# Patient Record
Sex: FEMALE | Race: WHITE | NOT HISPANIC OR LATINO | Employment: OTHER | URBAN - METROPOLITAN AREA
[De-identification: names, ages, dates, MRNs, and addresses within clinical notes are randomized per-mention and may not be internally consistent; named-entity substitution may affect disease eponyms.]

---

## 2017-01-18 ENCOUNTER — RADIANT APPOINTMENT (OUTPATIENT)
Dept: MAMMOGRAPHY | Facility: CLINIC | Age: 77
End: 2017-01-18

## 2017-01-18 DIAGNOSIS — Z12.31 VISIT FOR SCREENING MAMMOGRAM: ICD-10-CM

## 2017-01-25 ENCOUNTER — OFFICE VISIT (OUTPATIENT)
Dept: INTERNAL MEDICINE | Facility: CLINIC | Age: 77
End: 2017-01-25

## 2017-01-25 VITALS
WEIGHT: 143 LBS | HEART RATE: 80 BPM | RESPIRATION RATE: 16 BRPM | DIASTOLIC BLOOD PRESSURE: 73 MMHG | SYSTOLIC BLOOD PRESSURE: 116 MMHG | BODY MASS INDEX: 23.4 KG/M2

## 2017-01-25 DIAGNOSIS — Z13.220 LIPID SCREENING: ICD-10-CM

## 2017-01-25 DIAGNOSIS — C91.10 CLL (CHRONIC LYMPHOCYTIC LEUKEMIA) (H): Primary | ICD-10-CM

## 2017-01-25 DIAGNOSIS — M54.40 ACUTE MIDLINE LOW BACK PAIN WITH SCIATICA, SCIATICA LATERALITY UNSPECIFIED: ICD-10-CM

## 2017-01-25 DIAGNOSIS — C91.10 CLL (CHRONIC LYMPHOCYTIC LEUKEMIA) (H): ICD-10-CM

## 2017-01-25 LAB
ALBUMIN SERPL-MCNC: 3.7 G/DL (ref 3.4–5)
ALP SERPL-CCNC: 65 U/L (ref 40–150)
ALT SERPL W P-5'-P-CCNC: 33 U/L (ref 0–50)
ANION GAP SERPL CALCULATED.3IONS-SCNC: 6 MMOL/L (ref 3–14)
AST SERPL W P-5'-P-CCNC: 22 U/L (ref 0–45)
BASOPHILS # BLD AUTO: 0 10E9/L (ref 0–0.2)
BASOPHILS NFR BLD AUTO: 0.2 %
BILIRUB SERPL-MCNC: 0.4 MG/DL (ref 0.2–1.3)
BUN SERPL-MCNC: 13 MG/DL (ref 7–30)
CALCIUM SERPL-MCNC: 9.2 MG/DL (ref 8.5–10.1)
CHLORIDE SERPL-SCNC: 105 MMOL/L (ref 94–109)
CHOLEST SERPL-MCNC: 169 MG/DL
CO2 SERPL-SCNC: 30 MMOL/L (ref 20–32)
CREAT SERPL-MCNC: 0.87 MG/DL (ref 0.52–1.04)
DIFFERENTIAL METHOD BLD: ABNORMAL
EOSINOPHIL # BLD AUTO: 0.2 10E9/L (ref 0–0.7)
EOSINOPHIL NFR BLD AUTO: 1 %
ERYTHROCYTE [DISTWIDTH] IN BLOOD BY AUTOMATED COUNT: 13.2 % (ref 10–15)
GFR SERPL CREATININE-BSD FRML MDRD: 63 ML/MIN/1.7M2
GLUCOSE SERPL-MCNC: 78 MG/DL (ref 70–99)
HCT VFR BLD AUTO: 44 % (ref 35–47)
HDLC SERPL-MCNC: 78 MG/DL
HGB BLD-MCNC: 14.3 G/DL (ref 11.7–15.7)
IMM GRANULOCYTES # BLD: 0 10E9/L (ref 0–0.4)
IMM GRANULOCYTES NFR BLD: 0.2 %
LDLC SERPL CALC-MCNC: 68 MG/DL
LYMPHOCYTES # BLD AUTO: 11.6 10E9/L (ref 0.8–5.3)
LYMPHOCYTES NFR BLD AUTO: 65.5 %
MCH RBC QN AUTO: 30.2 PG (ref 26.5–33)
MCHC RBC AUTO-ENTMCNC: 32.5 G/DL (ref 31.5–36.5)
MCV RBC AUTO: 93 FL (ref 78–100)
MONOCYTES # BLD AUTO: 0.8 10E9/L (ref 0–1.3)
MONOCYTES NFR BLD AUTO: 4.5 %
NEUTROPHILS # BLD AUTO: 5.1 10E9/L (ref 1.6–8.3)
NEUTROPHILS NFR BLD AUTO: 28.6 %
NONHDLC SERPL-MCNC: 91 MG/DL
NRBC # BLD AUTO: 0 10*3/UL
NRBC BLD AUTO-RTO: 0 /100
PLATELET # BLD AUTO: 201 10E9/L (ref 150–450)
POTASSIUM SERPL-SCNC: 4.5 MMOL/L (ref 3.4–5.3)
PROT SERPL-MCNC: 6.6 G/DL (ref 6.8–8.8)
RBC # BLD AUTO: 4.73 10E12/L (ref 3.8–5.2)
SODIUM SERPL-SCNC: 141 MMOL/L (ref 133–144)
TRIGL SERPL-MCNC: 114 MG/DL
WBC # BLD AUTO: 17.7 10E9/L (ref 4–11)

## 2017-01-25 ASSESSMENT — PAIN SCALES - GENERAL: PAINLEVEL: NO PAIN (0)

## 2017-01-25 NOTE — MR AVS SNAPSHOT
After Visit Summary   1/25/2017    Kerri Shook    MRN: [id number]           Patient Information     Date Of Birth          1940        Visit Information        Provider Department      1/25/2017 10:05 AM Juan Westbrook MD [facility]        Today's Diagnoses     CLL (chronic lymphocytic leukemia) (H)    -  1     Lipid screening         Acute midline low back pain with sciatica, sciatica laterality unspecified           Care Instructions    Primary Care Center Medication Refill Request Information:  * Please contact your pharmacy regarding ANY request for medication refills.  ** [facility] Prescription Fax = [phone]  * Please allow 3 business days for routine medication refills.  * Please allow 5 business days for controlled substance medication refills.     Primary Care Center Test Result notification information:  *You will be notified with in 7-10 days of your appointment day regarding the results of your test.  If you are on MyChart you will be notified as soon as the provider has reviewed the results and signed off on them.    [facility] [place] [phone]   DEXA Screening Tool    Dexa Scan  Is the patient taking any calcium supplements? , if yes patient must stop calcium supplements 48 hours prior to appointment.        Follow-ups after your visit        Your next 10 appointments already scheduled     Jan 31, 2017  4:30 PM   DX HIP/PELVIS/SPINE with [id number]   [facility] Dexa ([facility])    [address] [place] [address] [phone]           Please do not take any of the following 48 hours prior to your exam: vitamins, calcium tablets, antacids.            Jan 31, 2017  5:30 PM   (Arrive by 5:15 PM)   MR LUMBAR SPINE W/O & W CONTRAST with [id number]   [facility] MRI ([facility])    [address] [address] [phone]            Take your medicines as usual, unless your doctor tells you not to. Bring a list of your current medicines to your exam (including vitamins, minerals and over-the-counter drugs).  You will be given intravenous contrast for this exam. To prepare:   The day before your exam, drink extra fluids at least six 8-ounce glasses (unless your doctor tells you to restrict your fluids).   Have a blood test (creatinine test) within 30 days of your exam. Go to your clinic or Diagnostic Imaging Department for this test.  The MRI machine uses a strong magnet. Please wear clothes without metal (snaps, zippers). A sweatsuit works well, or we may give you a hospital gown.  Please remove any body piercings and hair extensions before you arrive. You will also remove watches, jewelry, hairpins, wallets, dentures, partial dental plates and hearing aids. You may wear contact lenses, and you may be able to wear your rings. We have a safe place to keep your personal items, but it is safer to leave them at home.   **IMPORTANT** THE INSTRUCTIONS BELOW ARE ONLY FOR THOSE PATIENTS WHO HAVE BEEN TOLD THEY WILL RECEIVE SEDATION OR GENERAL ANESTHESIA DURING THEIR MRI PROCEDURE:  IF YOU WILL RECEIVE SEDATION (take medicine to help you relax during your exam):   You must get the medicine from your doctor before you arrive. Bring the medicine to the exam. Do not take it at home.   Arrive one hour early. Bring someone who can take you home after the test. Your medicine will make you sleepy. After the exam, you may not drive, take a bus or take a taxi by yourself.   No eating 8 hours before your exam. You may have clear liquids up until 4 hours before your exam. (Clear liquids include water, clear tea, black coffee and fruit juice without pulp.)  IF YOU WILL RECEIVE ANESTHESIA (be asleep for your exam):   Arrive 1 1/2 hours early. Bring someone who can take you home after the test. You may not drive, take a bus or take a taxi by yourself.   No eating 8  hours before your exam. You may have clear liquids up until 4 hours before your exam. (Clear liquids include water, clear tea, black coffee and fruit juice without pulp.)  Please call the Imaging Department at your exam site with any questions.            Feb 22, 2017  2:30 PM   (Arrive by 2:15 PM)   Return Visit with Valerie Silverio PA-C   [facility] ([facility])    [address]  2nd Floor  [facility] [id number] [phone]            Mar 27, 2017 10:05 AM   (Arrive by 9:50 AM)   Return Visit with Juan Westbrook MD   [facility] ([facility])    [address]  4th Floor  [facility] [id number] [phone]              Future tests that were ordered for you today     Open Future Orders        Priority Expected Expires Ordered    MRI Lumbar spine w & w/o contrast Routine  1/25/2018 1/25/2017    Dexa hip/pelvis/spine* Routine  1/25/2018 1/25/2017    Lipid panel reflex to direct LDL Routine  1/25/2018 1/25/2017    CBC with platelets differential STAT 1/25/2017 1/25/2020 1/25/2017    Comprehensive metabolic panel Routine 1/25/2017 1/25/2020 1/25/2017            Who to contact     Please call your clinic at [phone] to:    Ask questions about your health    Make or cancel appointments    Discuss your medicines    Learn about your test results    Speak to your doctor   If you have compliments or concerns about an experience at your clinic, or if you wish to file a complaint, please contact [facility] Patient Relations at [phone] or email us at [email or web address]         Additional Information About Your Visit        MyChart Information     [employer] gives you secure access to your electronic health record. If you see a primary care provider, you can also send messages to your care team and make appointments. If you have questions, please call your primary  [facility] clinic.  If you do not have a primary care provider, please call [phone] and they will assist you.      AFTER-MOUSE is an electronic gateway that provides easy, online access to your medical records. With AFTER-MOUSE, you can request a clinic appointment, read your test results, renew a prescription or communicate with your care team.     To access your existing account, please contact your [facility] or call [phone] for assistance.        Care EveryWhere ID     This is your Care EveryWhere ID. This could be used by other organizations to access your [place] medical records  [id number]        Your Vitals Were     Pulse Respirations Breastfeeding?             80 16 No          Blood Pressure from Last 3 Encounters:   01/25/17 116/73   08/01/16 112/68   06/13/16 134/76    Weight from Last 3 Encounters:   01/25/17 64.864 kg (143 lb)   08/01/16 63.685 kg (140 lb 6.4 oz)   06/13/16 64.048 kg (141 lb 3.2 oz)               Primary Care Provider Office Phone # Fax #    Juan Westbrook [phone]       [address] [address]        Thank you!     Thank you for choosing [facility]  for your care. Our goal is always to provide you with excellent care. Hearing back from our patients is one way we can continue to improve our services. Please take a few minutes to complete the written survey that you may receive in the mail after your visit with us. Thank you!             Your Updated Medication List - Protect others around you: Learn how to safely use, store and throw away your medicines at www.disposemymeds.org.          This list is accurate as of: 1/25/17 10:47 AM.  Always use your most recent med list.                   Brand Name Dispense Instructions for use    aspirin 81 MG tablet      Take 81 mg by mouth daily       carvedilol 3.125 MG tablet    COREG    180 tablet    Take 1 tablet (3.125 mg) by mouth 2  times daily (with meals)       multivitamin per tablet      Take 1 tablet by mouth daily.       simvastatin 20 MG tablet    ZOCOR    90 tablet    Take 1 tablet (20 mg) by mouth At Bedtime

## 2017-01-25 NOTE — PROGRESS NOTES
HPI:    Pt. Comes in for follow up today.   She o/w is doing well and denies additional HEENT, cardiopulmonary, abdominal, , GYN, neurological complaints. She has h/o breast CA and was seen by Dr. Sheldon, 11/13.   She was also  followed by Dr. Puente ONC for CLL and was seen 10/13.  She some chronic Low back pain that is getting less with PT.  She had some skin back lesions.   She was seen by Ms. Silverio ONC 1/11/16. Colonoscopy 6/14; repeat in 3 years.  She had  B eye lid surgery. She is active with exercise and denies any rest/exertional CP/SOB/palptations.      Past Medical History   Diagnosis Date     CLL (chronic lymphoblastic leukemia) 1989     hyperlipidemia      Primary localized osteoarthrosis, pelvic region and thigh      Cardiomyopathy (H)      History of blood transfusion      Colon adenomas      6/26/14 colonoscopy, repeat in 3 years     Breast disorder 1990     Breast Cancer     Chronic osteoarthritis 2012     resulted in hip replacement         PE:    Vitals noted; HEENT, ears normal oropharynx clear no exudate, neck supple nl rom,  No adenopahty, LCTA, RRR, S1, S2, no MRG,   Normal neurological exam. She has some back skin lesions.  Minimal  tenderness to palpation of low back area. Abdomen; positive BS's no masses no tenderness. No B CVA tenderness to palpation. Grossly normal neurological exam.         A/P:    1. Ordered DEXA scan future; last scan 11/2012  2. CLL; will check labs and continue to follow.  3. Breast Cancer,  mammogram stable 1/18/17.  4. HTN stable; check labs today 1/25/17.  5. Elevated cholesterol; will check fasting lipids and labs.  6. She was seen in ONC clinic by Ms. Silverio 1/11/16.   7. She was seen in  Dermatology for skin check and seen 6/7/16; she denies any current skin issues.   8. MRI lumbar spine for back pain done 5/9/16 and seen by Dr. Delong, adrian 6/13/16. I also reviewed these findings with Dr. Puente ONC (see Telephone note from 6/3/16). Will repeat  MRI Lumbar spine with contrast  9. Refer to [facility] for annual exam and she was seen 5/6/16.   10. Immunizations up to date. Flu shot when available.     I will see her back in 4-6 weeks to review all labs, DEXA scan, and MRI lumbar spine.            Total time spent 25 minutes.  More than 50% of the time spent with Ms. Shook on counseling / coordinating her care

## 2017-01-25 NOTE — NURSING NOTE
Chief Complaint   Patient presents with     RECHECK     Here for routine recheck     Mass     Here for lump on right hand of palm     Phu Fox CMA at 9:56 AM on 1/25/2017

## 2017-01-26 DIAGNOSIS — I51.81 STRESS-INDUCED CARDIOMYOPATHY: Primary | ICD-10-CM

## 2017-01-26 RX ORDER — CARVEDILOL 3.12 MG/1
3.12 TABLET ORAL 2 TIMES DAILY WITH MEALS
Qty: 180 TABLET | Refills: 3 | Status: SHIPPED
Start: 2017-01-26 | End: 2018-02-21

## 2017-01-26 NOTE — TELEPHONE ENCOUNTER
CARVEDILOL 3.125MG       Last Written Prescription Date:  3/21/16  Last Fill Quantity: 180,   # refills: 2  Last Office Visit : 1/25/17  Future Office visit:  3/27/17  BP Readings from Last 3 Encounters:   01/25/17 116/73   08/01/16 112/68   06/13/16 134/76

## 2017-02-22 ENCOUNTER — ONCOLOGY VISIT (OUTPATIENT)
Dept: ONCOLOGY | Facility: CLINIC | Age: 77
End: 2017-02-22
Attending: PHYSICIAN ASSISTANT
Payer: COMMERCIAL

## 2017-02-22 VITALS
WEIGHT: 147.2 LBS | BODY MASS INDEX: 24.09 KG/M2 | TEMPERATURE: 97 F | HEART RATE: 72 BPM | RESPIRATION RATE: 16 BRPM | DIASTOLIC BLOOD PRESSURE: 67 MMHG | SYSTOLIC BLOOD PRESSURE: 122 MMHG | OXYGEN SATURATION: 97 %

## 2017-02-22 DIAGNOSIS — C50.919 MALIGNANT NEOPLASM OF FEMALE BREAST, UNSPECIFIED LATERALITY, UNSPECIFIED SITE OF BREAST: Primary | Chronic | ICD-10-CM

## 2017-02-22 DIAGNOSIS — Z12.31 ENCOUNTER FOR SCREENING MAMMOGRAM FOR MALIGNANT NEOPLASM OF BREAST: ICD-10-CM

## 2017-02-22 DIAGNOSIS — C91.10 CLL (CHRONIC LYMPHOCYTIC LEUKEMIA) (H): ICD-10-CM

## 2017-02-22 PROCEDURE — 99212 OFFICE O/P EST SF 10 MIN: CPT

## 2017-02-22 PROCEDURE — 99214 OFFICE O/P EST MOD 30 MIN: CPT | Mod: ZP | Performed by: PHYSICIAN ASSISTANT

## 2017-02-22 ASSESSMENT — PAIN SCALES - GENERAL: PAINLEVEL: NO PAIN (0)

## 2017-02-22 NOTE — NURSING NOTE
"Kerri Shook is a 76 year old female who presents for:  Chief Complaint   Patient presents with     Oncology Clinic Visit     Physical        Initial Vitals:  /67  Pulse 72  Temp 97  F (36.1  C) (Oral)  Resp 16  Wt 66.8 kg (147 lb 3.2 oz)  SpO2 97%  BMI 24.09 kg/m2 Estimated body mass index is 24.09 kg/(m^2) as calculated from the following:    Height as of 6/13/16: 1.665 m (5' 5.55\").    Weight as of this encounter: 66.8 kg (147 lb 3.2 oz).. Body surface area is 1.76 meters squared. BP completed using cuff size: regular  No Pain (0) No LMP recorded. Patient is postmenopausal. Allergies and medications reviewed.     Medications: Medication refills not needed today.  Pharmacy name entered into [employer]: [facility] [address] [place] [address]  AT [facility] [address]    Comments: Patient is not having any pain today.     6 minutes for nursing intake (face to face time)   Gela Jacinto CMA       "

## 2017-02-22 NOTE — MR AVS SNAPSHOT
After Visit Summary   2/22/2017    Kerri Shook    MRN: [id number]           Patient Information     Date Of Birth          1940        Visit Information        Provider Department      2/22/2017 2:30 PM Valerie Silverio PA-C [facility]        Today's Diagnoses     Malignant neoplasm of female breast, unspecified laterality, unspecified site of breast (H)    -  1    CLL (chronic lymphocytic leukemia) (H)        Encounter for screening mammogram for malignant neoplasm of breast            Follow-ups after your visit        Your next 10 appointments already scheduled     Mar 27, 2017 10:05 AM CDT   (Arrive by 9:50 AM)   Return Visit with Juan Westbrook MD   [facility] ([facility])    [address]  [address] [facility] [address] [phone]              Future tests that were ordered for you today     Open Future Orders        Priority Expected Expires Ordered    MA Screening Digital Bilateral Routine 1/22/2018 1/22/2019 2/23/2017            Who to contact     If you have questions or need follow up information about today's clinic visit or your schedule please contact [facility] directly at [phone].  Normal or non-critical lab and imaging results will be communicated to you by MyChart, letter or phone within 4 business days after the clinic has received the results. If you do not hear from us within 7 days, please contact the clinic through MyChart or phone. If you have a critical or abnormal lab result, we will notify you by phone as soon as possible.  Submit refill requests through Room or call your pharmacy and they will forward the refill request to us. Please allow 3 business days for your refill to be completed.          Additional Information About Your Visit        MyChart Information     Room gives you secure access to your electronic health record. If you see a primary care  provider, you can also send messages to your care team and make appointments. If you have questions, please call your primary care clinic.  If you do not have a primary care provider, please call [phone] and they will assist you.        Care EveryWhere ID     This is your Care EveryWhere ID. This could be used by other organizations to access your [place] medical records  [id number]        Your Vitals Were     Pulse Temperature Respirations Pulse Oximetry BMI (Body Mass Index)       72 97  F (36.1  C) (Oral) 16 97% 24.09 kg/m2        Blood Pressure from Last 3 Encounters:   02/22/17 122/67   01/25/17 116/73   08/01/16 112/68    Weight from Last 3 Encounters:   02/22/17 66.8 kg (147 lb 3.2 oz)   01/25/17 64.9 kg (143 lb)   08/01/16 63.7 kg (140 lb 6.4 oz)               Primary Care Provider Office Phone # Fax #    Juan Westbrook [phone]       [address] [address]        Thank you!     Thank you for choosing [facility]  for your care. Our goal is always to provide you with excellent care. Hearing back from our patients is one way we can continue to improve our services. Please take a few minutes to complete the written survey that you may receive in the mail after your visit with us. Thank you!             Your Updated Medication List - Protect others around you: Learn how to safely use, store and throw away your medicines at www.disposemymeds.org.          This list is accurate as of: 2/22/17 11:59 PM.  Always use your most recent med list.                   Brand Name Dispense Instructions for use    aspirin 81 MG tablet      Take 81 mg by mouth daily       carvedilol 3.125 MG tablet    COREG    180 tablet    Take 1 tablet (3.125 mg) by mouth 2 times daily (with meals)       multivitamin per tablet      Take 1 tablet by mouth daily.       simvastatin 20 MG tablet    ZOCOR    90 tablet    Take 1 tablet (20 mg) by mouth At  Bedtime

## 2017-02-22 NOTE — PROGRESS NOTES
DIAGNOSIS: CANCER SURVIVORSHIP PROGRAM  1. CLL in 1986. Ms. Shook went in for routine blood testing, was found to have an elevated white blood count. This was consistent with CLL. She was followed here at the [place] by Dr. Puente. She has received no treatment for her CLL.   2. 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 The patient had a screening mammogram in 1990, which showed an abnormality in the left breast. She underwent a lumpectomy on 03/02/1990, which was consistent with a 1.5 cm primary, mucinous adenocarcinoma with 0 of 8 lymph nodes positive. She was staged as a stage I (T1 N0 M0). The tumor was ER positive. She did have radiation to the left breast with a boost having completed 6600 cGy in 33 fractions from 03/28/1990 until 05/15/1990. She went on to take tamoxifen for 6 years from 03/1990 until 03/1996.  CANCER TREATMENT:  CLL   *No treatment   BREAST CANCER:  *Lumpectomy on 03/02/1990.   *Radiation to the left breast with a boost having completed 6600 cGy in 33 fractions from 03/28/1990 until 05/15/1990.   *Tamoxifen from 03/1990 until 03/1996.     INTERVAL HISTORY:  Ms. Shook comes into the clinic today for followup of her history of breast cancer.  Review of systems is negative (please see below).  She is exercising 30-minute sessions on the treadmill, usually doing this between 3-4 days a week.     ROS:  Answers for HPI/ROS submitted by the patient on 2/21/2017   General Symptoms: No  Skin Symptoms: No  HENT Symptoms: No  EYE SYMPTOMS: No  HEART SYMPTOMS: No  LUNG SYMPTOMS: No  INTESTINAL SYMPTOMS: No  URINARY SYMPTOMS: No  GYNECOLOGIC SYMPTOMS: No  BREAST SYMPTOMS: No  SKELETAL SYMPTOMS: No  BLOOD SYMPTOMS: No  NERVOUS SYSTEM SYMPTOMS: No  MENTAL HEALTH SYMPTOMS: No    MEDICATIONS:   Current Outpatient Prescriptions   Medication Sig Dispense Refill     carvedilol (COREG) 3.125 MG tablet Take 1 tablet (3.125 mg) by mouth 2 times daily (with meals) 180 tablet 3     simvastatin  (ZOCOR) 20 MG tablet Take 1 tablet (20 mg) by mouth At Bedtime 90 tablet 3     aspirin 81 MG tablet Take 81 mg by mouth daily       Multiple Vitamin (MULTIVITAMIN) per tablet Take 1 tablet by mouth daily.           ALLERGIES:    Allergies   Allergen Reactions     Nkda [No Known Drug Allergies]        PHYSICAL EXAMINATION:  Vitals: /67  Pulse 72  Temp 97  F (36.1  C) (Oral)  Resp 16  Wt 66.8 kg (147 lb 3.2 oz)  SpO2 97%  BMI 24.09 kg/m2  GENERAL:  A pleasant person in no acute distress.   HEENT:  Sclerae are nonicteric.     NECK:  Supple.   LYMPH NODES:  No peripheral lymphadenopathy noted in the axillary, supraclavicular, or cervical regions.   LUNGS:  Clear to auscultation bilaterally.   HEART:  Regular rate and rhythm, with no murmur appreciated.   ABDOMEN:  Bowel sounds are active.  Soft and nontender.  No hepatosplenomegaly or other masses appreciated.  LOWER EXTREMITIES:  Without pitting edema to the knees bilaterally.   NEUROLOGICAL:  Alert/orientated/able to answer all questions.  CN grossly intact.  BREAST: Right breast normal to inspection, no masses. Left breast, well healed surgical incision from the 11-1 o'clock position. No masses.  PSYCH: Normal affect.  SKIN: No suspicious lesions on the areas of exposed skin.     LAB:      1/25/2017 11:08   WBC 17.7 (H)   Hemoglobin 14.3   Hematocrit 44.0   Platelet Count 201   RBC Count 4.73   MCV 93   MCH 30.2   MCHC 32.5   RDW 13.2   Diff Method Automated Method   % Neutrophils 28.6   % Lymphocytes 65.5   % Monocytes 4.5   % Eosinophils 1.0   % Basophils 0.2   % Immature Granulocytes 0.2   Nucleated RBCs 0   Absolute Neutrophil 5.1   Absolute Lymphocytes 11.6 (H)   Absolute Monocytes 0.8   Absolute Eosinophils 0.2   Absolute Basophils 0.0   Abs Immature Granulocytes 0.0   Absolute Nucleated RBC 0.0       RADIOLOGY:  SCREENING MAMMOGRAM, BILATERAL, DIGITAL, with CAD 01/18/2017  IMPRESSION: BI-RADS CATEGORY: 2 - Benign Finding(s).  RECOMMENDED FOLLOW-UP:  Annual Mammography    IMPRESSION/PLAN:   1. Stage I (T1 N0 M0) mucinous adenocarcinoma of the left breast. ER positive. This was treated as above with lumpectomy, radiation and tamoxifen. Ms. Shook continues to do well. She is not having any signs or symptoms that would suggest recurrence of her breast carcinoma. Her yearly mammogram from 01/18/2017 looks fine. She does wish to follow here in the Cancer Survivorship Program, and I will see her in 1 year with her bilateral mammogram.   2. CLL. She was originally diagnosed in 1986, and has never received any treatment. She has been released from Dr. Giulia Puente since she has never been treated, and likely will not need treatment for CLL in the future. Dr. Puente did recommend a CBC yearly. A CBC was obtained in 01/2017, which shows a stable white blood count/lymphocyte count. Hemoglobin and platelet count are normal. Between Dr. Westbrook and myself, we will plan to order the CBC at least once a year.   3. Cardiac complications. Given her left-sided breast radiation, she is at risk for premature coronary artery disease, hypertension, scarring of the cardiac tissue, decrease in heart function, heart failure and myocardial infarction. She was experiencing some left arm pain and eventually chest pain. She had an echocardiogram in 03/2013, which showed no concerns. She has also had a cardiac catheterization. She has already established her care with a cardiologist here at the [place], and will continue to follow with them. She needs to follow up with her PCP or cardiology for screening/treatment of blood pressure, cholesterol and diabetes.   4. Radiation side effects. The bones in the area of the radiation are at risk for fractures. Should she develop any pain in this area, she should contact the clinic. The lungs were also in the treatment field. Since she is asymptomatic, no routine screening is recommended. Should she develop a new cough, dyspnea or hemoptysis, I  will consider evaluation at that time. She already receive the annual influenza vaccine. She has already received the pneumococcal vaccine. In regards to the skin in the area of the radiation, should she note any lesions, she should see her family care provider or dermatologist.   5. Surgery complications. She remains at risk for lymphedema, and contact the clinic if she notices any swelling in her left arm.   6. Bone health. She had a DEXA scan in 01/2017. Her most valid and negative T-score was consistent with normal bone density. She does do weightbearing exercises 2-3 days a week. She does consume calcium with vitamin D.   7. Cancer screening after cancer. She should continue undergoing general cancer screening for women in her age group. At the age of 76, she no longer needs cervical cancer screening. She should continue to get pelvic exams, as directed by her PCP or GYN. If she has any vaginal bleeding, she should notify her health care provider. She has already begun colorectal cancer screening, and last colonoscopy was June 2014. A polyp was removed, with recommendations for follow up colonoscopy in 3 years. She will have her PCP order the colonoscopy for this year.  She should limit her sun exposure, and when out in the sun use of sunscreens.   8. Healthy lifestyle. She should maintain a healthy weight with a BMI between 20 and 25. She should exercise 150 minutes of cardiovascular exercise per week.  She should eat lots of fruits and vegetables. She should continue the yearly influenza vaccines. She has already received the pneumococcal vaccine. She should see her general care provider for screening/treatment of cholesterol, blood pressure and glucose. She continues to see the eye doctor every 1-2 years. She should see her dentist at least once a year.      If there are no interval concerns, patient will follow up in 1 year with her mammogram.    Valerie Silverio PA-C

## 2017-02-22 NOTE — LETTER
2/22/2017      RE: Kerri Shook  [address] [facility] [address]       DIAGNOSIS: CANCER SURVIVORSHIP PROGRAM  1. CLL in 1986. Ms. Shook went in for routine blood testing, was found to have an elevated white blood count. This was consistent with CLL. She was followed here at the [facility] by Dr. Puente. She has received no treatment for her CLL.   2. 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 The patient had a screening mammogram in 1990, which showed an abnormality in the left breast. She underwent a lumpectomy on 03/02/1990, which was consistent with a 1.5 cm primary, mucinous adenocarcinoma with 0 of 8 lymph nodes positive. She was staged as a stage I (T1 N0 M0). The tumor was ER positive. She did have radiation to the left breast with a boost having completed 6600 cGy in 33 fractions from 03/28/1990 until 05/15/1990. She went on to take tamoxifen for 6 years from 03/1990 until 03/1996.  CANCER TREATMENT:  CLL   *No treatment   BREAST CANCER:  *Lumpectomy on 03/02/1990.   *Radiation to the left breast with a boost having completed 6600 cGy in 33 fractions from 03/28/1990 until 05/15/1990.   *Tamoxifen from 03/1990 until 03/1996.     INTERVAL HISTORY:  Ms. Shook comes into the clinic today for followup of her history of breast cancer.  Review of systems is negative (please see below).  She is exercising 30-minute sessions on the treadmill, usually doing this between 3-4 days a week.     ROS:  Answers for HPI/ROS submitted by the patient on 2/21/2017   General Symptoms: No  Skin Symptoms: No  HENT Symptoms: No  EYE SYMPTOMS: No  HEART SYMPTOMS: No  LUNG SYMPTOMS: No  INTESTINAL SYMPTOMS: No  URINARY SYMPTOMS: No  GYNECOLOGIC SYMPTOMS: No  BREAST SYMPTOMS: No  SKELETAL SYMPTOMS: No  BLOOD SYMPTOMS: No  NERVOUS SYSTEM SYMPTOMS: No  MENTAL HEALTH SYMPTOMS: No    MEDICATIONS:   Current Outpatient Prescriptions   Medication Sig Dispense Refill     carvedilol (COREG) 3.125 MG tablet Take 1  tablet (3.125 mg) by mouth 2 times daily (with meals) 180 tablet 3     simvastatin (ZOCOR) 20 MG tablet Take 1 tablet (20 mg) by mouth At Bedtime 90 tablet 3     aspirin 81 MG tablet Take 81 mg by mouth daily       Multiple Vitamin (MULTIVITAMIN) per tablet Take 1 tablet by mouth daily.           ALLERGIES:    Allergies   Allergen Reactions     Nkda [No Known Drug Allergies]        PHYSICAL EXAMINATION:  Vitals: /67  Pulse 72  Temp 97  F (36.1  C) (Oral)  Resp 16  Wt 66.8 kg (147 lb 3.2 oz)  SpO2 97%  BMI 24.09 kg/m2  GENERAL:  A pleasant person in no acute distress.   HEENT:  Sclerae are nonicteric.     NECK:  Supple.   LYMPH NODES:  No peripheral lymphadenopathy noted in the axillary, supraclavicular, or cervical regions.   LUNGS:  Clear to auscultation bilaterally.   HEART:  Regular rate and rhythm, with no murmur appreciated.   ABDOMEN:  Bowel sounds are active.  Soft and nontender.  No hepatosplenomegaly or other masses appreciated.  LOWER EXTREMITIES:  Without pitting edema to the knees bilaterally.   NEUROLOGICAL:  Alert/orientated/able to answer all questions.  CN grossly intact.  BREAST: Right breast normal to inspection, no masses. Left breast, well healed surgical incision from the 11-1 o'clock position. No masses.  PSYCH: Normal affect.  SKIN: No suspicious lesions on the areas of exposed skin.     LAB:      1/25/2017 11:08   WBC 17.7 (H)   Hemoglobin 14.3   Hematocrit 44.0   Platelet Count 201   RBC Count 4.73   MCV 93   MCH 30.2   MCHC 32.5   RDW 13.2   Diff Method Automated Method   % Neutrophils 28.6   % Lymphocytes 65.5   % Monocytes 4.5   % Eosinophils 1.0   % Basophils 0.2   % Immature Granulocytes 0.2   Nucleated RBCs 0   Absolute Neutrophil 5.1   Absolute Lymphocytes 11.6 (H)   Absolute Monocytes 0.8   Absolute Eosinophils 0.2   Absolute Basophils 0.0   Abs Immature Granulocytes 0.0   Absolute Nucleated RBC 0.0       RADIOLOGY:  SCREENING MAMMOGRAM, BILATERAL, DIGITAL, with CAD  01/18/2017  IMPRESSION: BI-RADS CATEGORY: 2 - Benign Finding(s).  RECOMMENDED FOLLOW-UP: Annual Mammography    IMPRESSION/PLAN:   1. Stage I (T1 N0 M0) mucinous adenocarcinoma of the left breast. ER positive. This was treated as above with lumpectomy, radiation and tamoxifen. Ms. Shook continues to do well. She is not having any signs or symptoms that would suggest recurrence of her breast carcinoma. Her yearly mammogram from 01/18/2017 looks fine. She does wish to follow here in the Cancer Survivorship Program, and I will see her in 1 year with her bilateral mammogram.   2. CLL. She was originally diagnosed in 1986, and has never received any treatment. She has been released from Dr. Giulia Puente since she has never been treated, and likely will not need treatment for CLL in the future. Dr. Puente did recommend a CBC yearly. A CBC was obtained in 01/2017, which shows a stable white blood count/lymphocyte count. Hemoglobin and platelet count are normal. Between Dr. Westbrook and myself, we will plan to order the CBC at least once a year.   3. Cardiac complications. Given her left-sided breast radiation, she is at risk for premature coronary artery disease, hypertension, scarring of the cardiac tissue, decrease in heart function, heart failure and myocardial infarction. She was experiencing some left arm pain and eventually chest pain. She had an echocardiogram in 03/2013, which showed no concerns. She has also had a cardiac catheterization. She has already established her care with a cardiologist here at the [facility], and will continue to follow with them. She needs to follow up with her PCP or cardiology for screening/treatment of blood pressure, cholesterol and diabetes.   4. Radiation side effects. The bones in the area of the radiation are at risk for fractures. Should she develop any pain in this area, she should contact the clinic. The lungs were also in the treatment field. Since she is asymptomatic, no  routine screening is recommended. Should she develop a new cough, dyspnea or hemoptysis, I will consider evaluation at that time. She already receive the annual influenza vaccine. She has already received the pneumococcal vaccine. In regards to the skin in the area of the radiation, should she note any lesions, she should see her family care provider or dermatologist.   5. Surgery complications. She remains at risk for lymphedema, and contact the clinic if she notices any swelling in her left arm.   6. Bone health. She had a DEXA scan in 01/2017. Her most valid and negative T-score was consistent with normal bone density. She does do weightbearing exercises 2-3 days a week. She does consume calcium with vitamin D.   7. Cancer screening after cancer. She should continue undergoing general cancer screening for women in her age group. At the age of 76, she no longer needs cervical cancer screening. She should continue to get pelvic exams, as directed by her PCP or GYN. If she has any vaginal bleeding, she should notify her health care provider. She has already begun colorectal cancer screening, and last colonoscopy was June 2014. A polyp was removed, with recommendations for follow up colonoscopy in 3 years. She will have her PCP order the colonoscopy for this year.  She should limit her sun exposure, and when out in the sun use of sunscreens.   8. Healthy lifestyle. She should maintain a healthy weight with a BMI between 20 and 25. She should exercise 150 minutes of cardiovascular exercise per week.  She should eat lots of fruits and vegetables. She should continue the yearly influenza vaccines. She has already received the pneumococcal vaccine. She should see her general care provider for screening/treatment of cholesterol, blood pressure and glucose. She continues to see the eye doctor every 1-2 years. She should see her dentist at least once a year.      If there are no interval concerns, patient will follow up in  1 year with her mammogram.    Valerie Silverio PA-C

## 2017-03-27 ENCOUNTER — OFFICE VISIT (OUTPATIENT)
Dept: INTERNAL MEDICINE | Facility: CLINIC | Age: 77
End: 2017-03-27

## 2017-03-27 VITALS
DIASTOLIC BLOOD PRESSURE: 76 MMHG | HEART RATE: 79 BPM | BODY MASS INDEX: 23.58 KG/M2 | SYSTOLIC BLOOD PRESSURE: 125 MMHG | WEIGHT: 144.1 LBS

## 2017-03-27 DIAGNOSIS — M79.605 PAIN OF LEFT LOWER EXTREMITY: ICD-10-CM

## 2017-03-27 DIAGNOSIS — M25.562 ACUTE PAIN OF LEFT KNEE: ICD-10-CM

## 2017-03-27 DIAGNOSIS — Z12.11 SPECIAL SCREENING FOR MALIGNANT NEOPLASMS, COLON: Primary | ICD-10-CM

## 2017-03-27 RX ORDER — GABAPENTIN 100 MG/1
100 CAPSULE ORAL
Qty: 30 CAPSULE | Refills: 0 | Status: SHIPPED | OUTPATIENT
Start: 2017-03-27 | End: 2018-03-29

## 2017-03-27 ASSESSMENT — PAIN SCALES - GENERAL: PAINLEVEL: MILD PAIN (2)

## 2017-03-27 NOTE — MR AVS SNAPSHOT
After Visit Summary   3/27/2017    Kerri Shook    MRN: [id number]           Patient Information     Date Of Birth          1940        Visit Information        Provider Department      3/27/2017 10:05 AM Juan Westbrook MD [facility]        Today's Diagnoses     Special screening for malignant neoplasms, colon    -  1    Acute pain of left knee          Care Instructions    Orthopaedics & Xray [phone]  Gastroenterology [phone]    Endoscopy Screening Tool    Your Provider is requesting that you have a Colonoscopy procedure.    If you answer yes to any of the following 9 questions, your procedure will be done at the [facility] - [phone].    No 1) PATIENT IS UNDER 16 YEARS OF AGE?  No 2) PATIENT HAS A BLEEDING DISORDER?  No 3) PATIENT IS TAKING MEDICATIONS THAT THIN THE BLOOD TO IMPAIR CLOTTING?  No 4) PATIENT HAS HAD A HEART OR LUNG TRANSPLANT?  No 5) PATIENT HAS A PACE MAKER WITH A DEFIBRILLATOR?  No 6) PATIENT HAS CHEST PAIN OR FREQUENT USE OF NITROGLYCERIN?  No 7) PATIENT HAS CARDIOVASCULAR PROBLEMS OR CONGESTIVE HEART FAILURE?  No 8) PATIENT HAS ESOPHAGEAL VARICES OR COPD?  No 9) PATIENT USES HOME OXYGEN CONTINUOUSLY?    If none of those 9 questions apply to you, your procedure will be done at the [facility] ([facility])  [address] [place] [address], [address]  [place] [id number] [phone].    Please answer the following questions to provide schedulers information to note for the providers performing the procedure:  No 10) PATIENT HAS KIDNEY DISEASE OR KIDNEY FAILURE?  No 11) IS THE PATIENT DIABETIC?  No 12) FOR FEMALE PATIENTS, IS THE PATIENT PREGNANT?    Additional Information that is needed:  Pharmacy [facility] [id number] [place] [address] - [address] [place] [address]  AT [facility] OF [address] (of Pharmacy): [place]  Phone Number (of Pharmacy):     What is best time of day; Anytime,  and best phone number: [phone] to reach  patient.            Follow-ups after your visit        Additional Services     GI Procedure Referral       Last Lab Result: Creatinine (mg/dL)       Date                     Value                 01/25/2017               0.87             ----------  Body mass index is 23.58 kg/(m^2).     Needed:  No  Language:  English    Patient will be contacted to schedule procedure.     Please be aware that coverage of these services is subject to the terms and limitations of your health insurance plan.  Call member services at your health plan with any benefit or coverage questions.  Any procedures must be performed at a [facility] facility OR coordinated by your clinic's referral office.    Please bring the following with you to your appointment:    (1) Any X-Rays, CTs or MRIs which have been performed.  Contact the facility where they were done to arrange for  prior to your scheduled appointment.    (2) List of current medications   (3) This referral request   (4) Any documents/labs given to you for this referral            ORTHOPEDICS ADULT REFERRAL       Knee pain                  Your next 10 appointments already scheduled     Mar 27, 2017 11:20 AM CDT   XR KNEE LEFT 3 VIEWS with [id number]   [facility] ([facility])    [address] [place] [address]  1st [facility] [id number] [phone]           Please bring a list of your current medicines to your exam. (Include vitamins, minerals and over-thecounter medicines.) Leave your valuables at home.  Tell your doctor if there is a chance you may be pregnant.  You do not need to do anything special for this exam.            Mar 29, 2017 10:30 AM CDT   (Arrive by 10:15 AM)   Return Visit with Jonathan Delong MD   [facility] ([facility])    [address] [place] [address]  5th Floor  [facility] [id number] [phone]            May 01, 2017  9:35 AM CDT   (Arrive by 9:20 AM)    Return Visit with Juan Westbrook MD   [facility] ([facility] [place])    [address]  4th Floor  [facility] [address] [phone]            Feb 21, 2018  2:30 PM CST   (Arrive by 2:15 PM)   MA SCREENING DIGITAL BILATERAL with [id number]   [facility] ([facility])    [address]  [address] [facility] [address] [phone]           Do not use any powder, lotion or deodorant under your arms or on your breast. If you do, we will ask you to remove it before your exam.  Wear comfortable, two-piece clothing.  If you have any allergies, tell your care team.  Bring any previous mammograms from other facilities or have them mailed to the breast center. This mammogram location, [facility], now offers 3D mammography. It doesn't replace a screening mammogram and can be done with a regular screening mammogram. It is optional and not all insurances will pay for it. 3D mammography is a special kind of mammogram that produces a three-dimensional image of the breast by using low dose-xrays. 3D allows the radiologist to see the breast tissue differently from 2D, which reduces the chance of repeat testing due to overlapping breast tissue. If you are interested in have a 3D mammogram, please check with your insurance before you arrive for your exam. 3D mammography is offered to all patients. On the day of your exam you will be asked to sign a form stating yes, you wish to have 3D imaging or, no, you decline.            Feb 21, 2018  3:15 PM CST   (Arrive by 3:00 PM)   LONG TERM RETURN with Valerie Silverio PA-C   [facility] ([facility] [place])    [address]  [address] [facility] [address] [phone]              Who to contact     Please call your clinic at [phone] to:    Ask questions about your health    Make or cancel  appointments    Discuss your medicines    Learn about your test results    Speak to your doctor   If you have compliments or concerns about an experience at your clinic, or if you wish to file a complaint, please contact [facility] Patient Relations at [phone] or email us at [email or web address]         Additional Information About Your Visit        MyChart Information     [employer] gives you secure access to your electronic health record. If you see a primary care provider, you can also send messages to your care team and make appointments. If you have questions, please call your primary care clinic.  If you do not have a primary care provider, please call [phone] and they will assist you.      [employer] is an electronic gateway that provides easy, online access to your medical records. With [employer] you can request a clinic appointment, read your test results, renew a prescription or communicate with your care team.     To access your existing account, please contact your [facility] or call [phone] for assistance.        Care EveryWhere ID     This is your Care EveryWhere ID. This could be used by other organizations to access your [facility] medical records  [id number]        Your Vitals Were     Pulse Breastfeeding? BMI (Body Mass Index)             79 No 23.58 kg/m2          Blood Pressure from Last 3 Encounters:   03/27/17 125/76   02/22/17 122/67   01/25/17 116/73    Weight from Last 3 Encounters:   03/27/17 65.4 kg (144 lb 1.6 oz)   02/22/17 66.8 kg (147 lb 3.2 oz)   01/25/17 64.9 kg (143 lb)              We Performed the Following     GI Procedure Referral     ORTHOPEDICS ADULT REFERRAL     XR Knee Left 3 Views        Primary Care Provider Office Phone # Fax #    Juan Westbrook [phone]       [address] [address]        Thank you!     Thank you for choosing   [facility]  for your care. Our goal is always to provide you with excellent care. Hearing back from our patients is one way we can continue to improve our services. Please take a few minutes to complete the written survey that you may receive in the mail after your visit with us. Thank you!             Your Updated Medication List - Protect others around you: Learn how to safely use, store and throw away your medicines at www.disposemymeds.org.          This list is accurate as of: 3/27/17 10:58 AM.  Always use your most recent med list.                   Brand Name Dispense Instructions for use    aspirin 81 MG tablet      Take 81 mg by mouth daily       carvedilol 3.125 MG tablet    COREG    180 tablet    Take 1 tablet (3.125 mg) by mouth 2 times daily (with meals)       multivitamin per tablet      Take 1 tablet by mouth daily.       simvastatin 20 MG tablet    ZOCOR    90 tablet    Take 1 tablet (20 mg) by mouth At Bedtime

## 2017-03-27 NOTE — PATIENT INSTRUCTIONS
Orthopaedics & Xray [phone]  Gastroenterology [phone]    Endoscopy Screening Tool    Your Provider is requesting that you have a Colonoscopy procedure.    If you answer yes to any of the following 9 questions, your procedure will be done at the [facility] - [phone].    No 1) PATIENT IS UNDER 16 YEARS OF AGE?  No 2) PATIENT HAS A BLEEDING DISORDER?  No 3) PATIENT IS TAKING MEDICATIONS THAT THIN THE BLOOD TO IMPAIR CLOTTING?  No 4) PATIENT HAS HAD A HEART OR LUNG TRANSPLANT?  No 5) PATIENT HAS A PACE MAKER WITH A DEFIBRILLATOR?  No 6) PATIENT HAS CHEST PAIN OR FREQUENT USE OF NITROGLYCERIN?  No 7) PATIENT HAS CARDIOVASCULAR PROBLEMS OR CONGESTIVE HEART FAILURE?  No 8) PATIENT HAS ESOPHAGEAL VARICES OR COPD?  No 9) PATIENT USES HOME OXYGEN CONTINUOUSLY?    If none of those 9 questions apply to you, your procedure will be done at the [facility] ([facility])  [address] [place] [address], [address]  [place] [address]  [phone].    Please answer the following questions to provide schedulers information to note for the providers performing the procedure:  No 10) PATIENT HAS KIDNEY DISEASE OR KIDNEY FAILURE?  No 11) IS THE PATIENT DIABETIC?  No 12) FOR FEMALE PATIENTS, IS THE PATIENT PREGNANT?    Additional Information that is needed:  Pharmacy [facility] [id number] [place] [address]  AT [address] (of Pharmacy): [place]  Phone Number (of Pharmacy):     What is best time of day; Anytime,  and best phone number: [phone] to reach patient.

## 2017-03-27 NOTE — NURSING NOTE
Chief Complaint   Patient presents with     Results     Patient here to go over test results.      Jennifer Cedillo LPN at 10:03 AM on 3/27/2017.

## 2017-03-27 NOTE — PROGRESS NOTES
HPI:    Pt. Comes in for follow up today.   She o/w is doing well and denies additional HEENT, cardiopulmonary, abdominal, , GYN, neurological complaints. She has h/o breast CA and was seen by Dr. Sheldon, 11/13.   She was also  followed by Dr. Puente ONC for CLL and was seen 10/13.  She some chronic Low back pain that is getting less with PT.  She had some skin back lesions.   She was seen by Ms. Silverio ONC 1/11/16. Colonoscopy 6/14; repeat in 3 years.  She had  B eye lid surgery. She is active with exercise and denies any rest/exertional CP/SOB/palptations today. Last week, she was in urgent care for L sided neck pain and some pain going down her L arm. This has resolved. She had negative labs (troponin), EKG, and CXR.     Past Medical History:   Diagnosis Date     Breast disorder 1990    Breast Cancer     Cardiomyopathy (H)      Chronic osteoarthritis 2012    resulted in hip replacement     CLL (chronic lymphoblastic leukemia) 1989     Colon adenomas     6/26/14 colonoscopy, repeat in 3 years     History of blood transfusion      hyperlipidemia      Primary localized osteoarthrosis, pelvic region and thigh          PE:    Vitals noted; HEENT, ears normal oropharynx clear no exudate, neck supple nl rom,  No adenopahty, LCTA, RRR, S1, S2, no MRG,   Normal neurological exam. She has some back skin lesions.  Minimal  tenderness to palpation of low back area. Abdomen; positive BS's no masses no tenderness. No B CVA tenderness to palpation. Grossly normal neurological exam. She has some some minor tenderness to L neck area.         A/P:    1. DEXA scan stable  2. CLL; will check labs and continue to follow.  3. Breast Cancer,  mammogram stable 1/18/17.  4. HTN stable; checked labs 1/25/17.  5. Elevated cholesterol; will check fasting lipids and labs.  6. She was seen in ONC clinic by Ms. Silverio 1/11/16.   7. She was seen in  Dermatology for skin check and seen 6/7/16; she denies any current skin issues.   8.  MRI lumbar spine for back pain done 5/9/16 and seen by Dr. Delong, ortho 6/13/16. I also reviewed these findings with Dr. Puente ONC (see Telephone note from 6/3/16). Will repeat MRI Lumbar spine with contrast done 1/31/17 and no acute findings.  9. Refer to [facility] for annual exam and she was seen 5/6/16.   10. Neck and L shoulder pain; if persists, X-ray shoulder and MRI cervical spine.   11. X-ray L knee and ortho referral  12. Low dose Gabapentin for restless legs.     I will see her back in about one month.             Total time spent 25 minutes.  More than 50% of the time spent with Ms. Shook on counseling / coordinating her care

## 2017-03-28 RX ORDER — GABAPENTIN 100 MG/1
CAPSULE ORAL
Qty: 90 CAPSULE | Refills: 0 | OUTPATIENT
Start: 2017-03-28

## 2017-03-29 ENCOUNTER — OFFICE VISIT (OUTPATIENT)
Dept: ORTHOPEDICS | Facility: CLINIC | Age: 77
End: 2017-03-29

## 2017-03-29 VITALS — HEIGHT: 66 IN | BODY MASS INDEX: 23.14 KG/M2 | WEIGHT: 144 LBS

## 2017-03-29 DIAGNOSIS — M25.561 ARTHRALGIA OF RIGHT LOWER LEG: Primary | ICD-10-CM

## 2017-03-29 NOTE — LETTER
3/29/2017      RE: Kerri Shook  [address] [facility] [address]        Subjective:   Kerri Shook is a 76 year old female who is here complaining of left medial knee pain. She denies any HAY. Has had more pain over the past year, but actually doing quite well. Has continued her treadmill exercises with walking 3-5 x a week without limitation. Walking actually improves symptoms.  Some pain with sitting longer periods of time, though not worse with standing.       Background:   Date of injury: NA   Duration of symptoms: 1 year  Mechanism of Injury: Chronic; Unknown   Aggravating factors: Sitting for long periods of time, being immobile.  Relieving Factors: Knee AROM  Prior Evaluation: Prior Physician Evalutation: 3/27/17    PAST MEDICAL, SOCIAL, SURGICAL AND FAMILY HISTORY: She  has a past medical history of Breast disorder (1990); Cardiomyopathy (H); Chronic osteoarthritis (2012); CLL (chronic lymphoblastic leukemia) (1989); Colon adenomas; History of blood transfusion; hyperlipidemia; and Primary localized osteoarthrosis, pelvic region and thigh. She also has no past medical history of Anemia; Aortic valve disorders; Arrhythmia; Asymptomatic human immunodeficiency virus (HIV) infection status (H); Back injury; Bleeding disorder (H); Bone tumor; Cerebral infarction (H); Chest pain; Chronic kidney disease; Congestive heart failure, unspecified; Coronary artery disease; Deep vein thrombosis (DVT) (H); Depressive disorder; Diabetes (H); Hearing problem; Hepatitis; Hyperlipidaemia; Hypertension; Hypothyroidism; Immune disorder (H); Learning disability; Liver disease; Lung disease; Mental disorder; Neck injuries; RA (rheumatoid arthritis) (H); Reduced vision; Scoliosis; Seizures (H); Shortness of breath; Stomach ulcer; Surgical complication; Thyroid disease; Tuberculosis; Ulcer, gastric, acute; or Uncomplicated asthma.  She  has a past surgical history that includes Lumpectomy breast (1990); R hip  "arthroscopy (7/19/11); d & c (1962); orthopedic surgery (age?); orthopedic surgery (~ 2013); Colonoscopy (6/26/2014); biopsy (within the last 5 years); ENT surgery (1950); left hip replacemen (Left); PELVIS/HIP JOINT SURGERY UNLISTED (April 2012); and SHOULDER SURG PROC UNLISTED (?).  Her family history includes Alcoholism in her son; CANCER in her maternal grandfather; Coronary Artery Disease (age of onset: 76) in her father; DIABETES (age of onset: 85) in her maternal grandmother. There is no history of C.A.D., Cancer - colorectal, Psychotic Disorder, Skin Cancer, Melanoma, Hypertension, Breast Cancer, Prostate Cancer, or CEREBROVASCULAR DISEASE.  She reports that she has never smoked. She has never used smokeless tobacco. She reports that she drinks alcohol. She reports that she does not use illicit drugs.    ALLERGIES: She is allergic to nkda [no known drug allergies].    CURRENT MEDICATIONS: She has a current medication list which includes the following prescription(s): gabapentin, carvedilol, simvastatin, aspirin, and multivitamin.     REVIEW OF SYSTEMS: 3 point review of systems is negative except as noted above.     Exam:   Ht 5' 5.5\" (1.664 m)  Wt 144 lb (65.3 kg)  BMI 23.6 kg/m2     CONSTITUTIONIAL: healthy, alert and no distress  HEAD: Normocephalic. No masses, lesions, tenderness or abnormalities  SKIN: no suspicious lesions or rashes  GAIT: normal  NEUROLOGIC: Non-focal  PSYCHIATRIC: affect normal/bright and mentation appears normal.    MUSCULOSKELETAL:     Right Left Exam  No joint effusion, No redness;mild medial joint line tenderness mild tendernessa the pes anserine bursa compared to the R ;ROM unremarkable; Strength 5/5 ext, 5/5 flexion;No pain or opening with varus or valgus stress test; Neg Lachmans; Neg McMurrays; Neg Squat test; Neg patellar apprehension test, Neg pseudocompression test, hip IR/ER did not cause pain    XR L knee shows mild medial joint space narrowing.      Assessment/Plan: "   MARTÍNEZ. medial knee pain  ---we discussed her pain appears consistent with some mild medial compartment OA, or perhaps some mild tendonosis of the pes anserine bursa could not be excluded.      Plan  --we discussed options including PT, acetaminophen, a few sessions of PT, recheck if swelling, mechanical symptoms or decreasing function. We could consider joint injection as a next step for pain.

## 2017-03-29 NOTE — PROGRESS NOTES
Subjective:   Kerri Shook is a 76 year old female who is here complaining of left medial knee pain. She denies any HAY. Has had more pain over the past year, but actually doing quite well. Has continued her treadmill exercises with walking 3-5 x a week without limitation. Walking actually improves symptoms.  Some pain with sitting longer periods of time, though not worse with standing.       Background:   Date of injury: NA   Duration of symptoms: 1 year  Mechanism of Injury: Chronic; Unknown   Aggravating factors: Sitting for long periods of time, being immobile.  Relieving Factors: Knee AROM  Prior Evaluation: Prior Physician Evalutation: 3/27/17    PAST MEDICAL, SOCIAL, SURGICAL AND FAMILY HISTORY: She  has a past medical history of Breast disorder (1990); Cardiomyopathy (H); Chronic osteoarthritis (2012); CLL (chronic lymphoblastic leukemia) (1989); Colon adenomas; History of blood transfusion; hyperlipidemia; and Primary localized osteoarthrosis, pelvic region and thigh. She also has no past medical history of Anemia; Aortic valve disorders; Arrhythmia; Asymptomatic human immunodeficiency virus (HIV) infection status (H); Back injury; Bleeding disorder (H); Bone tumor; Cerebral infarction (H); Chest pain; Chronic kidney disease; Congestive heart failure, unspecified; Coronary artery disease; Deep vein thrombosis (DVT) (H); Depressive disorder; Diabetes (H); Hearing problem; Hepatitis; Hyperlipidaemia; Hypertension; Hypothyroidism; Immune disorder (H); Learning disability; Liver disease; Lung disease; Mental disorder; Neck injuries; RA (rheumatoid arthritis) (H); Reduced vision; Scoliosis; Seizures (H); Shortness of breath; Stomach ulcer; Surgical complication; Thyroid disease; Tuberculosis; Ulcer, gastric, acute; or Uncomplicated asthma.  She  has a past surgical history that includes Lumpectomy breast (1990); R hip arthroscopy (7/19/11); d & c (1962); orthopedic surgery (age?); orthopedic surgery (~ 2013);  "Colonoscopy (6/26/2014); biopsy (within the last 5 years); ENT surgery (1950); left hip replacemen (Left); PELVIS/HIP JOINT SURGERY UNLISTED (April 2012); and SHOULDER SURG PROC UNLISTED (?).  Her family history includes Alcoholism in her son; CANCER in her maternal grandfather; Coronary Artery Disease (age of onset: 76) in her father; DIABETES (age of onset: 85) in her maternal grandmother. There is no history of C.A.D., Cancer - colorectal, Psychotic Disorder, Skin Cancer, Melanoma, Hypertension, Breast Cancer, Prostate Cancer, or CEREBROVASCULAR DISEASE.  She reports that she has never smoked. She has never used smokeless tobacco. She reports that she drinks alcohol. She reports that she does not use illicit drugs.    ALLERGIES: She is allergic to nkda [no known drug allergies].    CURRENT MEDICATIONS: She has a current medication list which includes the following prescription(s): gabapentin, carvedilol, simvastatin, aspirin, and multivitamin.     REVIEW OF SYSTEMS: 3 point review of systems is negative except as noted above.     Exam:   Ht 5' 5.5\" (1.664 m)  Wt 144 lb (65.3 kg)  BMI 23.6 kg/m2     CONSTITUTIONIAL: healthy, alert and no distress  HEAD: Normocephalic. No masses, lesions, tenderness or abnormalities  SKIN: no suspicious lesions or rashes  GAIT: normal  NEUROLOGIC: Non-focal  PSYCHIATRIC: affect normal/bright and mentation appears normal.    MUSCULOSKELETAL:     Right Left Exam  No joint effusion, No redness;mild medial joint line tenderness mild tendernessa the pes anserine bursa compared to the R ;ROM unremarkable; Strength 5/5 ext, 5/5 flexion;No pain or opening with varus or valgus stress test; Neg Lachmans; Neg McMurrays; Neg Squat test; Neg patellar apprehension test, Neg pseudocompression test, hip IR/ER did not cause pain    XR L knee shows mild medial joint space narrowing.      Assessment/Plan:   L. medial knee pain  ---we discussed her pain appears consistent with some mild medial " compartment OA, or perhaps some mild tendonosis of the pes anserine bursa could not be excluded.      Plan  --we discussed options including PT, acetaminophen, a few sessions of PT, recheck if swelling, mechanical symptoms or decreasing function. We could consider joint injection as a next step for pain.      Jonathan Delong MD CAQ

## 2017-03-29 NOTE — MR AVS SNAPSHOT
After Visit Summary   3/29/2017    Kerri Shook    MRN: [id number]           Patient Information     Date Of Birth          1940        Visit Information        Provider Department      3/29/2017 10:30 AM Jonathan Delong MD [facility]        Care Instructions    L knee pain  --mild osteoarthritis middle compartment  --some calcium deposition as well    Plan  -- treatment is some ibuprofen as needed  -- a few PT session if needed-- call [phone] for appointment if needed.  -- if more pain and swelling f/u as needed could consider undergraduate      Jonathan Delong MD CAQ          Follow-ups after your visit        Your next 10 appointments already scheduled     May 01, 2017  9:35 AM CDT   (Arrive by 9:20 AM)   Return Visit with Juan Westbrook MD   [facility] ([facility])    [address] [place] [address]  4th [facility] [id number] [phone]            Oct 09, 2017  9:00 AM CDT   Colonoscopy with Pola Arceo MD   [facility] ([facility])    [address]  [address]  [facility] [id number] [phone]            Feb 21, 2018  2:30 PM CST   (Arrive by 2:15 PM)   MA SCREENING DIGITAL BILATERAL with [id number]   [facility] ([facility])    [address] [place] [address]  2nd [facility] [id number] [phone]           Do not use any powder, lotion or deodorant under your arms or on your breast. If you do, we will ask you to remove it before your exam.  Wear comfortable, two-piece clothing.  If you have any allergies, tell your care team.  Bring any previous mammograms from other facilities or have them mailed to the breast center. This mammogram location, [facility], now offers 3D mammography. It doesn't replace a screening mammogram and can be done with a regular screening mammogram. It is optional and  not all insurances will pay for it. 3D mammography is a special kind of mammogram that produces a three-dimensional image of the breast by using low dose-xrays. 3D allows the radiologist to see the breast tissue differently from 2D, which reduces the chance of repeat testing due to overlapping breast tissue. If you are interested in have a 3D mammogram, please check with your insurance before you arrive for your exam. 3D mammography is offered to all patients. On the day of your exam you will be asked to sign a form stating yes, you wish to have 3D imaging or, no, you decline.            Feb 21, 2018  3:15 PM CST   (Arrive by 3:00 PM)   LONG TERM RETURN with Valerie Silverio PA-C   [facility] ([facility])    [address] [place] [address] [phone]              Who to contact     Please call your clinic at [phone] to:    Ask questions about your health    Make or cancel appointments    Discuss your medicines    Learn about your test results    Speak to your doctor   If you have compliments or concerns about an experience at your clinic, or if you wish to file a complaint, please contact [facility] Patient Relations at [phone] or email us at [email or web address]         Additional Information About Your Visit        Medxnotehart Information     Beamrt gives you secure access to your electronic health record. If you see a primary care provider, you can also send messages to your care team and make appointments. If you have questions, please call your primary care clinic.  If you do not have a primary care provider, please call [phone] and they will assist you.      uSamp is an electronic gateway that provides easy, online access to your medical records. With uSamp, you can request a clinic appointment, read your test results, renew a prescription or communicate with your care team.     To  "access your existing account, please contact your [facility] or call [phone] for assistance.        Care EveryWhere ID     This is your Care EveryWhere ID. This could be used by other organizations to access your [facility] medical records  [id number]        Your Vitals Were     Height BMI (Body Mass Index)                5' 5.5\" (1.664 m) 23.6 kg/m2           Blood Pressure from Last 3 Encounters:   03/27/17 125/76   02/22/17 122/67   01/25/17 116/73    Weight from Last 3 Encounters:   03/29/17 144 lb (65.3 kg)   03/27/17 144 lb 1.6 oz (65.4 kg)   02/22/17 147 lb 3.2 oz (66.8 kg)              Today, you had the following     No orders found for display       Primary Care Provider Office Phone # Fax #    Juan Westbrook [phone]       [address] [address]        Thank you!     Thank you for choosing [facility]  for your care. Our goal is always to provide you with excellent care. Hearing back from our patients is one way we can continue to improve our services. Please take a few minutes to complete the written survey that you may receive in the mail after your visit with us. Thank you!             Your Updated Medication List - Protect others around you: Learn how to safely use, store and throw away your medicines at www.disposemymeds.org.          This list is accurate as of: 3/29/17 10:50 AM.  Always use your most recent med list.                   Brand Name Dispense Instructions for use    aspirin 81 MG tablet      Take 81 mg by mouth daily       carvedilol 3.125 MG tablet    COREG    180 tablet    Take 1 tablet (3.125 mg) by mouth 2 times daily (with meals)       gabapentin 100 MG capsule    NEURONTIN    30 capsule    Take 1 capsule (100 mg) by mouth nightly as needed       multivitamin per tablet      Take 1 tablet by mouth daily.       simvastatin 20 MG tablet    ZOCOR    90 tablet    Take 1 " tablet (20 mg) by mouth At Bedtime

## 2017-03-29 NOTE — PATIENT INSTRUCTIONS
L knee pain  --mild osteoarthritis middle compartment  --some calcium deposition as well    Plan  -- treatment is some ibuprofen as needed  -- a few PT session if needed-- call [phone] for appointment if needed.  -- if more pain and swelling f/u as needed could consider undergraduate      Jonathan Delong MD CAQ

## 2017-05-01 ENCOUNTER — OFFICE VISIT (OUTPATIENT)
Dept: INTERNAL MEDICINE | Facility: CLINIC | Age: 77
End: 2017-05-01

## 2017-05-01 VITALS
BODY MASS INDEX: 23.14 KG/M2 | DIASTOLIC BLOOD PRESSURE: 72 MMHG | SYSTOLIC BLOOD PRESSURE: 134 MMHG | HEART RATE: 83 BPM | WEIGHT: 141.2 LBS

## 2017-05-01 DIAGNOSIS — Z01.419 ENCOUNTER FOR GYNECOLOGICAL EXAMINATION WITHOUT ABNORMAL FINDING: Primary | ICD-10-CM

## 2017-05-01 DIAGNOSIS — L98.9 SKIN LESION: ICD-10-CM

## 2017-05-01 ASSESSMENT — PAIN SCALES - GENERAL: PAINLEVEL: NO PAIN (0)

## 2017-05-01 NOTE — PATIENT INSTRUCTIONS
[facility] ([facility]) [phone] ([address], [address])   Dermatology [phone]   [facility] D[facility] [phone]

## 2017-05-01 NOTE — MR AVS SNAPSHOT
After Visit Summary   5/1/2017    Kerri Shook    MRN: [id number]           Patient Information     Date Of Birth          1940        Visit Information        Provider Department      5/1/2017 9:35 AM Juan Westbrook MD [facility]        Today's Diagnoses     Encounter for gynecological examination without abnormal finding    -  1    Skin lesion          Care Instructions    [facility] ([facility]) [phone] ([address])   Dermatology [phone]   [facility] [phone]           Follow-ups after your visit        Additional Services     DERMATOLOGY REFERRAL       Skin lesion            OB/GYN REFERRAL       Annual exam                  Your next 10 appointments already scheduled     May 11, 2017 10:30 AM CDT   Annual Visit with JAMSHID Ceja   [facility] ([facility])    [facility] [address]  [address]  [address]  [facility] [address]   [phone]            Jun 08, 2017  1:15 PM CDT   (Arrive by 1:00 PM)   Return Visit with Oskar Shipley MD   [facility] ([facility] [place])    [address] [place] [address]  3rd Floor  [facility] [address]   [phone]            Oct 09, 2017  9:00 AM CDT   Colonoscopy with Pola Arceo MD   [facility] ([facility])    [address] [address] [address]  [address]  [facility] [address]   [phone]            Oct 16, 2017  9:35 AM CDT   (Arrive by 9:20 AM)   Return Visit with Juan Westbrook MD   [facility] ([facility] [place])    [address] [place] [address]  4th Floor  [facility] [address]   [phone]            Feb 21, 2018  2:30 PM CST   (Arrive by 2:15 PM)   MA SCREENING DIGITAL BILATERAL with [email or web address]   [facility] ([facility])    [address] [place] [address]  2nd Floor  [facility]  [address] [phone]           Do not use any powder, lotion or deodorant under your arms or on your breast. If you do, we will ask you to remove it before your exam.  Wear comfortable, two-piece clothing.  If you have any allergies, tell your care team.  Bring any previous mammograms from other facilities or have them mailed to the breast center. This mammogram location, [facility], now offers 3D mammography. It doesn't replace a screening mammogram and can be done with a regular screening mammogram. It is optional and not all insurances will pay for it. 3D mammography is a special kind of mammogram that produces a three-dimensional image of the breast by using low dose-xrays. 3D allows the radiologist to see the breast tissue differently from 2D, which reduces the chance of repeat testing due to overlapping breast tissue. If you are interested in have a 3D mammogram, please check with your insurance before you arrive for your exam. 3D mammography is offered to all patients. On the day of your exam you will be asked to sign a form stating yes, you wish to have 3D imaging or, no, you decline.            Feb 21, 2018  3:15 PM CST   (Arrive by 3:00 PM)   LONG TERM RETURN with Valerie Silverio PA-C   [facility] ([facility])    [address] [place] [address] [phone]              Who to contact     Please call your clinic at [phone] to:    Ask questions about your health    Make or cancel appointments    Discuss your medicines    Learn about your test results    Speak to your doctor   If you have compliments or concerns about an experience at your clinic, or if you wish to file a complaint, please contact [facility] Patient Relations at [phone] or email us at [email or web address]         Additional Information About Your Visit        MyChart Information      Calpian gives you secure access to your electronic health record. If you see a primary care provider, you can also send messages to your care team and make appointments. If you have questions, please call your primary care clinic.  If you do not have a primary care provider, please call [phone] and they will assist you.      Calpian is an electronic gateway that provides easy, online access to your medical records. With Calpian, you can request a clinic appointment, read your test results, renew a prescription or communicate with your care team.     To access your existing account, please contact your [facility] or call [phone] for assistance.        Care EveryWhere ID     This is your Care EveryWhere ID. This could be used by other organizations to access your [place] medical records  [id number]        Your Vitals Were     Pulse Breastfeeding? BMI (Body Mass Index)             83 No 23.14 kg/m2          Blood Pressure from Last 3 Encounters:   05/01/17 134/72   03/27/17 125/76   02/22/17 122/67    Weight from Last 3 Encounters:   05/01/17 64 kg (141 lb 3.2 oz)   03/29/17 65.3 kg (144 lb)   03/27/17 65.4 kg (144 lb 1.6 oz)              We Performed the Following     DERMATOLOGY REFERRAL     OB/GYN REFERRAL        Primary Care Provider Office Phone # Fax #    Juan Westbrook [phone]       [address] [address]        Thank you!     Thank you for choosing [facility]  for your care. Our goal is always to provide you with excellent care. Hearing back from our patients is one way we can continue to improve our services. Please take a few minutes to complete the written survey that you may receive in the mail after your visit with us. Thank you!             Your Updated Medication List - Protect others around you: Learn how to safely use, store and throw away your medicines at  www.disposemymeds.org.          This list is accurate as of: 5/1/17 10:37 AM.  Always use your most recent med list.                   Brand Name Dispense Instructions for use    aspirin 81 MG tablet      Take 81 mg by mouth daily       carvedilol 3.125 MG tablet    COREG    180 tablet    Take 1 tablet (3.125 mg) by mouth 2 times daily (with meals)       gabapentin 100 MG capsule    NEURONTIN    30 capsule    Take 1 capsule (100 mg) by mouth nightly as needed       multivitamin per tablet      Take 1 tablet by mouth daily.       simvastatin 20 MG tablet    ZOCOR    90 tablet    Take 1 tablet (20 mg) by mouth At Bedtime

## 2017-05-01 NOTE — NURSING NOTE
Chief Complaint   Patient presents with     Results     Patient here to go over results.      Jennifer Cedillo LPN at 9:45 AM on 5/1/2017.

## 2017-05-01 NOTE — PROGRESS NOTES
HPI:    Pt. Comes in for follow up today.   She o/w is doing well and denies additional HEENT, cardiopulmonary, abdominal, , GYN, neurological complaints. She has h/o breast CA and was seen by Dr. Sheldon, 11/13.   She was also  followed by Dr. Puente ONC for CLL and was seen 10/13.  She some chronic Low back pain that is getting less with PT.  She had some skin back lesions.   She was seen by Ms. Silverio ONC 1/11/16. Colonoscopy 6/14; repeat in 3 years.  She had  B eye lid surgery. She is active with exercise and denies any rest/exertional CP/SOB/palptations today. Last week, she was in urgent care for L sided neck pain and some pain going down her L arm. This has resolved. She had negative labs (troponin), EKG, and CXR.     Past Medical History:   Diagnosis Date     Breast disorder 1990    Breast Cancer     Cardiomyopathy (H)      Chronic osteoarthritis 2012    resulted in hip replacement     CLL (chronic lymphoblastic leukemia) 1989     Colon adenomas     6/26/14 colonoscopy, repeat in 3 years     History of blood transfusion      hyperlipidemia      Primary localized osteoarthrosis, pelvic region and thigh          PE:    Vitals noted; HEENT, ears normal oropharynx clear no exudate, neck supple nl rom,  Small darker tip of nose skin lesion. No adenopahty, LCTA, RRR, S1, S2, no MRG,   Normal neurological exam. She has some back skin lesions.  Minimal  tenderness to palpation of low back area. Abdomen; positive BS's no masses no tenderness. No B CVA tenderness to palpation. Grossly normal neurological exam.       A/P:    1. DEXA scan stable  2. CLL; will check labs and continue to follow.  3. Breast Cancer,  mammogram stable 1/18/17.  4. HTN stable; checked labs 1/25/17.  5. Elevated cholesterol; will check fasting lipids and labs.  6. She was seen in ONC clinic by Ms. Silverio 1/11/16.   7. She was seen in  Dermatology for skin check and seen 6/7/16. She has nasal skin lesion and placed referral today   8.  MRI lumbar spine for back pain done 5/9/16 and seen by Dr. Delong, ortho 6/13/16. I also reviewed these findings with Dr. Cindi WALLER (see Telephone note from 6/3/16). Will repeat MRI Lumbar spine with contrast done 1/31/17 and no acute findings.  9. Refer to [facility] for annual exam and she was seen 5/6/16. Placed referral today 5/1/17.  10. Neck and L shoulder pain; if persists, X-ray shoulder and MRI cervical spine.  Currently not complaints.  11. X-ray L knee was done 3/27/17 and she was seen by Dr. Delong, ortho 3/29/17. Currently no complaints.   12. Low dose Gabapentin for restless legs; she not taken this because of less sxs.     I will see her back 10/17 after colonoscopy             Total time spent 25 minutes.  More than 50% of the time spent with Ms. Shook on counseling / coordinating her care

## 2017-06-08 ENCOUNTER — OFFICE VISIT (OUTPATIENT)
Dept: DERMATOLOGY | Facility: CLINIC | Age: 77
End: 2017-06-08

## 2017-06-08 DIAGNOSIS — L82.1 SEBORRHEIC KERATOSIS: ICD-10-CM

## 2017-06-08 DIAGNOSIS — D18.01 CHERRY ANGIOMA: ICD-10-CM

## 2017-06-08 DIAGNOSIS — I78.1 TELANGIECTASIA: Primary | ICD-10-CM

## 2017-06-08 ASSESSMENT — PAIN SCALES - GENERAL: PAINLEVEL: NO PAIN (0)

## 2017-06-08 NOTE — NURSING NOTE
Dermatology Rooming Note    Kerri Shook's goals for this visit include:   Chief Complaint   Patient presents with     Skin Check     Annual. Red spot on nose of concern.     Jovana Holden, CMA

## 2017-06-08 NOTE — PROGRESS NOTES
[facility] Dermatology Note      Dermatology Problem List:  1. Cherry angiomas  2. SKs     Encounter Date: Jun 8, 2017    CC:   Chief Complaint   Patient presents with     Skin Check     Annual. Red spot on nose of concern.         History of Present Illness:  Ms. Kerri Shook is a 76 year old female who presents for evaluation of red spot on nose which is asymptomatic but has become more obvious in the last year, not itchy or painful, not bleeding. She is not too concerned about it, but she wanted it to have it specifically evaluated. She also is here for f/u skin check and was last seen June 2016. No hx of skin cancer and no family hx of skin cancer. She does not sunbath or indoor tan, and she has no lesions that are growing, bleeding, crusty, or otherwise symptomatic. She feels well today and states that her health has been stable.      Past Medical History:   Patient Active Problem List   Diagnosis     Breast cancer -- Left     S/P coronary angiogram     Stress-induced cardiomyopathy     Foot injury     Advance care planning     Rosacea     KP (keratosis pilaris)     CLL (chronic lymphocytic leukemia) (H)     Hx of cardiomyopathy     Encounter for routine gynecological examination     Menopause--age 50     Colon adenomas     SK (seborrheic keratosis)     Past Medical History:   Diagnosis Date     Breast disorder 1990    Breast Cancer     Cardiomyopathy (H)      Chronic osteoarthritis 2012    resulted in hip replacement     CLL (chronic lymphoblastic leukemia) 1989     Colon adenomas     6/26/14 colonoscopy, repeat in 3 years     History of blood transfusion      hyperlipidemia      Primary localized osteoarthrosis, pelvic region and thigh      Past Surgical History:   Procedure Laterality Date     BIOPSY  within the last 5 years    polyps during colonoscopy     C PELVIS/HIP JOINT SURGERY UNLISTED  April 2012    left hip replacement     C SHOULDER SURG PROC UNLISTED  ?     COLONOSCOPY   6/26/2014    Procedure: COMBINED COLONOSCOPY, SINGLE BIOPSY/POLYPECTOMY BY BIOPSY;  Surgeon: Pola Arceo MD;  Location:  GI     D & C  1962    late PP hemorrhage --> retained placenta     ENT SURGERY  1950    tonselectomy     left hip replacemen Left     hip replacement in 2013     LUMPECTOMY BREAST  1990    Left     ORTHOPEDIC SURGERY  age?    right shoulder      ORTHOPEDIC SURGERY  ~ 2013    left hip replacement     R hip arthroscopy  7/19/11       Social History:  The patient is retired. The patient denies use of tanning beds.    Family History:  There is no family history of skin cancer.    Medications:  Current Outpatient Prescriptions   Medication Sig Dispense Refill     gabapentin (NEURONTIN) 100 MG capsule Take 1 capsule (100 mg) by mouth nightly as needed 30 capsule 0     carvedilol (COREG) 3.125 MG tablet Take 1 tablet (3.125 mg) by mouth 2 times daily (with meals) 180 tablet 3     simvastatin (ZOCOR) 20 MG tablet Take 1 tablet (20 mg) by mouth At Bedtime 90 tablet 3     aspirin 81 MG tablet Take 81 mg by mouth daily       Multiple Vitamin (MULTIVITAMIN) per tablet Take 1 tablet by mouth daily.          Allergies   Allergen Reactions     Nkda [No Known Drug Allergies]          Review of Systems:  -Skin Establ Pt: The patient denies any new rash, pruritus, or lesions that are symptomatic, changing or bleeding, except as per HPI.  -Constitutional: The patient denies fatigue, fevers, chills, unintended weight loss, and night sweats.  -HEENT: Patient denies nonhealing oral sores.  -Skin: As above in HPI. No additional skin concerns.    Physical exam:  Vitals: There were no vitals taken for this visit.  GEN: This is a well developed, well-nourished female in no acute distress, in a pleasant mood.    SKIN: Full skin, which includes the head/face, both arms, chest, back, abdomen,both legs, genitalia and/or groin buttocks, digits and/or nails, was examined.  - 2 mm telangiectatic macule on tip of nose  -  atropic macule on lower back at midline with surrounding hemorrhage   -There are waxy stuck on tan to brown papules on the trunk and extremities .  -There are numerous bright red some shaped papules scattered on the trunk and extremities.   -No other lesions of concern on areas examined.     Impression/Plan:  1. Telangiectasia on nose/spider angioam    Benign, reassured     2. Seborrheic keratosis, non irritated    Benign, reassured     3. Cherry angioma(s)    Benign, reassured       4. Degos-like lesion on low back, benign.  Suspect scar only. Reassured    Dr. Shipley staffed the patient.    Staff Involved:  Resident(Arleen Hernandez MD)/Staff(as above)    I have seen and examined this patient and agree with the assessment and plan as documented in the resident's note, and was present for all pertinent procedures.    Oskar Shipley MD  Dermatology Attending

## 2017-06-08 NOTE — MR AVS SNAPSHOT
After Visit Summary   6/8/2017    Kerri Shook    MRN: [id number]           Patient Information     Date Of Birth          1940        Visit Information        Provider Department      6/8/2017 1:15 PM Oskar Shipley MD [facility] Dermatology        Today's Diagnoses     Telangiectasia    -  1    Cherry angioma        Seborrheic keratosis           Follow-ups after your visit        Follow-up notes from your care team     Return if symptoms worsen or fail to improve.      Your next 10 appointments already scheduled     Oct 09, 2017  9:00 AM CDT   Colonoscopy with Pola Acreo MD   [facility] ([facility])    [address]  [place] [address]   [phone]            Oct 16, 2017  9:35 AM CDT   (Arrive by 9:20 AM)   Return Visit with Juan Westbrook MD   [facility] ([facility])    [address]  4th Floor  [place] [id number] [phone]            Feb 21, 2018  2:30 PM CST   (Arrive by 2:15 PM)   MA SCREENING DIGITAL BILATERAL with [email or web address]   [facility] ([facility])    [address]  2nd Floor  [place] [id number] [phone]           Do not use any powder, lotion or deodorant under your arms or on your breast. If you do, we will ask you to remove it before your exam.  Wear comfortable, two-piece clothing.  If you have any allergies, tell your care team.  Bring any previous mammograms from other facilities or have them mailed to the breast center. This mammogram location, [facility], now offers 3D mammography. It doesn't replace a screening mammogram and can be done with a regular screening mammogram. It is optional and not all insurances will pay for it. 3D mammography is a special kind of mammogram that produces a three-dimensional image of the breast by using low dose-xrays. 3D allows  the radiologist to see the breast tissue differently from 2D, which reduces the chance of repeat testing due to overlapping breast tissue. If you are interested in have a 3D mammogram, please check with your insurance before you arrive for your exam. 3D mammography is offered to all patients. On the day of your exam you will be asked to sign a form stating yes, you wish to have 3D imaging or, no, you decline.            Feb 21, 2018  3:15 PM CST   (Arrive by 3:00 PM)   LONG TERM RETURN with Valerie Silverio PA-C   [facility] ([facility] [place])    [address]  2nd [address]  [place] [address] [phone]              Who to contact     Please call your clinic at [phone] to:    Ask questions about your health    Make or cancel appointments    Discuss your medicines    Learn about your test results    Speak to your doctor   If you have compliments or concerns about an experience at your clinic, or if you wish to file a complaint, please contact [facility] Patient Relations at [phone] or email us at [email or web address]         Additional Information About Your Visit        [employer] Information     [employer] gives you secure access to your electronic health record. If you see a primary care provider, you can also send messages to your care team and make appointments. If you have questions, please call your primary care clinic.  If you do not have a primary care provider, please call [phone] and they will assist you.      [employer] is an electronic gateway that provides easy, online access to your medical records. With [employer], you can request a clinic appointment, read your test results, renew a prescription or communicate with your care team.     To access your existing account, please contact your [facility] or call [phone] for assistance.        Care EveryWhere ID     This is  your Care EveryWhere ID. This could be used by other organizations to access your [facility] medical records  [id number]         Blood Pressure from Last 3 Encounters:   05/01/17 134/72   03/27/17 125/76   02/22/17 122/67    Weight from Last 3 Encounters:   05/01/17 64 kg (141 lb 3.2 oz)   03/29/17 65.3 kg (144 lb)   03/27/17 65.4 kg (144 lb 1.6 oz)              Today, you had the following     No orders found for display       Primary Care Provider Office Phone # Fax #    Juan Westbrook [phone]       [address] [address]        Thank you!     Thank you for choosing [facility]ERMAT[facility]  for your care. Our goal is always to provide you with excellent care. Hearing back from our patients is one way we can continue to improve our services. Please take a few minutes to complete the written survey that you may receive in the mail after your visit with us. Thank you!             Your Updated Medication List - Protect others around you: Learn how to safely use, store and throw away your medicines at www.disposemymeds.org.          This list is accurate as of: 6/8/17 11:59 PM.  Always use your most recent med list.                   Brand Name Dispense Instructions for use    aspirin 81 MG tablet      Take 81 mg by mouth daily       carvedilol 3.125 MG tablet    COREG    180 tablet    Take 1 tablet (3.125 mg) by mouth 2 times daily (with meals)       gabapentin 100 MG capsule    NEURONTIN    30 capsule    Take 1 capsule (100 mg) by mouth nightly as needed       multivitamin per tablet      Take 1 tablet by mouth daily.       simvastatin 20 MG tablet    ZOCOR    90 tablet    Take 1 tablet (20 mg) by mouth At Bedtime

## 2017-06-08 NOTE — LETTER
6/8/2017       RE: Kerri Shook  [address] [address]     Dear Colleague,    Thank you for referring your patient, Kerri Shook, to the [facility]ER[facility] at [facility]. Please see a copy of my visit note below.    [facility] Dermatology Note      Dermatology Problem List:  1. Cherry angiomas  2. SKs     Encounter Date: Jun 8, 2017    CC:   Chief Complaint   Patient presents with     Skin Check     Annual. Red spot on nose of concern.         History of Present Illness:  Ms. Kerri Shook is a 76 year old female who presents for evaluation of red spot on nose which is asymptomatic but has become more obvious in the last year, not itchy or painful, not bleeding. She is not too concerned about it, but she wanted it to have it specifically evaluated. She also is here for f/u skin check and was last seen June 2016. No hx of skin cancer and no family hx of skin cancer. She does not sunbath or indoor tan, and she has no lesions that are growing, bleeding, crusty, or otherwise symptomatic. She feels well today and states that her health has been stable.      Past Medical History:   Patient Active Problem List   Diagnosis     Breast cancer -- Left     S/P coronary angiogram     Stress-induced cardiomyopathy     Foot injury     Advance care planning     Rosacea     KP (keratosis pilaris)     CLL (chronic lymphocytic leukemia) (H)     Hx of cardiomyopathy     Encounter for routine gynecological examination     Menopause--age 50     Colon adenomas     SK (seborrheic keratosis)     Past Medical History:   Diagnosis Date     Breast disorder 1990    Breast Cancer     Cardiomyopathy (H)      Chronic osteoarthritis 2012    resulted in hip replacement     CLL (chronic lymphoblastic leukemia) 1989     Colon adenomas     6/26/14 colonoscopy, repeat in 3 years     History of blood transfusion      hyperlipidemia      Primary localized osteoarthrosis,  pelvic region and thigh      Past Surgical History:   Procedure Laterality Date     BIOPSY  within the last 5 years    polyps during colonoscopy     C PELVIS/HIP JOINT SURGERY UNLISTED  April 2012    left hip replacement     C SHOULDER SURG PROC UNLISTED  ?     COLONOSCOPY  6/26/2014    Procedure: COMBINED COLONOSCOPY, SINGLE BIOPSY/POLYPECTOMY BY BIOPSY;  Surgeon: Pola Arceo MD;  Location:  GI     D & C  1962    late PP hemorrhage --> retained placenta     ENT SURGERY  1950    tonselectomy     left hip replacemen Left     hip replacement in 2013     LUMPECTOMY BREAST  1990    Left     ORTHOPEDIC SURGERY  age?    right shoulder      ORTHOPEDIC SURGERY  ~ 2013    left hip replacement     R hip arthroscopy  7/19/11       Social History:  The patient is retired. The patient denies use of tanning beds.    Family History:  There is no family history of skin cancer.    Medications:  Current Outpatient Prescriptions   Medication Sig Dispense Refill     gabapentin (NEURONTIN) 100 MG capsule Take 1 capsule (100 mg) by mouth nightly as needed 30 capsule 0     carvedilol (COREG) 3.125 MG tablet Take 1 tablet (3.125 mg) by mouth 2 times daily (with meals) 180 tablet 3     simvastatin (ZOCOR) 20 MG tablet Take 1 tablet (20 mg) by mouth At Bedtime 90 tablet 3     aspirin 81 MG tablet Take 81 mg by mouth daily       Multiple Vitamin (MULTIVITAMIN) per tablet Take 1 tablet by mouth daily.          Allergies   Allergen Reactions     Nkda [No Known Drug Allergies]          Review of Systems:  -Skin Establ Pt: The patient denies any new rash, pruritus, or lesions that are symptomatic, changing or bleeding, except as per HPI.  -Constitutional: The patient denies fatigue, fevers, chills, unintended weight loss, and night sweats.  -HEENT: Patient denies nonhealing oral sores.  -Skin: As above in HPI. No additional skin concerns.    Physical exam:  Vitals: There were no vitals taken for this visit.  GEN: This is a well developed,  well-nourished female in no acute distress, in a pleasant mood.    SKIN: Full skin, which includes the head/face, both arms, chest, back, abdomen,both legs, genitalia and/or groin buttocks, digits and/or nails, was examined.  - 2 mm telangiectatic macule on tip of nose  - atropic macule on lower back at midline with surrounding hemorrhage   -There are waxy stuck on tan to brown papules on the trunk and extremities .  -There are numerous bright red some shaped papules scattered on the trunk and extremities.   -No other lesions of concern on areas examined.     Impression/Plan:  1. Telangiectasia on nose/spider angioam    Benign, reassured     2. Seborrheic keratosis, non irritated    Benign, reassured     3. Cherry angioma(s)    Benign, reassured       4. Degos-like lesion on low back, benign.  Suspect scar only. Reassured    Dr. Shipley staffed the patient.    Staff Involved:  Resident(Arleen Hernandez MD)/Staff(as above)    I have seen and examined this patient and agree with the assessment and plan as documented in the resident's note, and was present for all pertinent procedures.    Oskar Shipley MD  Dermatology Attending

## 2017-06-16 DIAGNOSIS — E78.5 HYPERLIPIDEMIA LDL GOAL <70: ICD-10-CM

## 2017-06-17 PROBLEM — L82.1 SEBORRHEIC KERATOSIS: Status: ACTIVE | Noted: 2017-06-17

## 2017-06-19 RX ORDER — SIMVASTATIN 20 MG
20 TABLET ORAL AT BEDTIME
Qty: 90 TABLET | Refills: 3 | Status: SHIPPED | OUTPATIENT
Start: 2017-06-19 | End: 2018-07-01

## 2017-06-19 NOTE — TELEPHONE ENCOUNTER
simvastatin      Last Written Prescription Date:  6/7/16  Last Fill Quantity: 90,   # refills: 3  Last Office Visit : 5/1/17  Future Office visit:  10/16/17

## 2017-10-01 ENCOUNTER — HEALTH MAINTENANCE LETTER (OUTPATIENT)
Age: 77
End: 2017-10-01

## 2017-10-03 ENCOUNTER — TELEPHONE (OUTPATIENT)
Dept: GASTROENTEROLOGY | Facility: OUTPATIENT CENTER | Age: 77
End: 2017-10-03

## 2017-10-09 ENCOUNTER — DOCUMENTATION ONLY (OUTPATIENT)
Dept: GASTROENTEROLOGY | Facility: OUTPATIENT CENTER | Age: 77
End: 2017-10-09

## 2017-10-09 ENCOUNTER — TRANSFERRED RECORDS (OUTPATIENT)
Dept: HEALTH INFORMATION MANAGEMENT | Facility: CLINIC | Age: 77
End: 2017-10-09

## 2017-10-12 LAB — COPATH REPORT: NORMAL

## 2017-10-16 ENCOUNTER — OFFICE VISIT (OUTPATIENT)
Dept: INTERNAL MEDICINE | Facility: CLINIC | Age: 77
End: 2017-10-16

## 2017-10-16 VITALS
BODY MASS INDEX: 22.98 KG/M2 | DIASTOLIC BLOOD PRESSURE: 75 MMHG | HEART RATE: 77 BPM | SYSTOLIC BLOOD PRESSURE: 128 MMHG | WEIGHT: 140.2 LBS

## 2017-10-16 DIAGNOSIS — E78.00 HIGH CHOLESTEROL: ICD-10-CM

## 2017-10-16 DIAGNOSIS — H93.13 TINNITUS OF BOTH EARS: ICD-10-CM

## 2017-10-16 DIAGNOSIS — E78.00 HIGH CHOLESTEROL: Primary | ICD-10-CM

## 2017-10-16 LAB
ALBUMIN SERPL-MCNC: 3.6 G/DL (ref 3.4–5)
ALP SERPL-CCNC: 91 U/L (ref 40–150)
ALT SERPL W P-5'-P-CCNC: 40 U/L (ref 0–50)
ANION GAP SERPL CALCULATED.3IONS-SCNC: 5 MMOL/L (ref 3–14)
AST SERPL W P-5'-P-CCNC: 24 U/L (ref 0–45)
BASOPHILS # BLD AUTO: 0.1 10E9/L (ref 0–0.2)
BASOPHILS NFR BLD AUTO: 0.4 %
BILIRUB SERPL-MCNC: 0.5 MG/DL (ref 0.2–1.3)
BUN SERPL-MCNC: 15 MG/DL (ref 7–30)
CALCIUM SERPL-MCNC: 8.8 MG/DL (ref 8.5–10.1)
CHLORIDE SERPL-SCNC: 105 MMOL/L (ref 94–109)
CHOLEST SERPL-MCNC: 171 MG/DL
CO2 SERPL-SCNC: 29 MMOL/L (ref 20–32)
CREAT SERPL-MCNC: 0.81 MG/DL (ref 0.52–1.04)
DIFFERENTIAL METHOD BLD: ABNORMAL
EOSINOPHIL # BLD AUTO: 0.1 10E9/L (ref 0–0.7)
EOSINOPHIL NFR BLD AUTO: 0.6 %
ERYTHROCYTE [DISTWIDTH] IN BLOOD BY AUTOMATED COUNT: 13.3 % (ref 10–15)
GFR SERPL CREATININE-BSD FRML MDRD: 69 ML/MIN/1.7M2
GLUCOSE SERPL-MCNC: 71 MG/DL (ref 70–99)
HCT VFR BLD AUTO: 43.9 % (ref 35–47)
HDLC SERPL-MCNC: 79 MG/DL
HGB BLD-MCNC: 14.1 G/DL (ref 11.7–15.7)
IMM GRANULOCYTES # BLD: 0 10E9/L (ref 0–0.4)
IMM GRANULOCYTES NFR BLD: 0.2 %
LDLC SERPL CALC-MCNC: 75 MG/DL
LYMPHOCYTES # BLD AUTO: 13.8 10E9/L (ref 0.8–5.3)
LYMPHOCYTES NFR BLD AUTO: 69.9 %
MCH RBC QN AUTO: 30.3 PG (ref 26.5–33)
MCHC RBC AUTO-ENTMCNC: 32.1 G/DL (ref 31.5–36.5)
MCV RBC AUTO: 94 FL (ref 78–100)
MONOCYTES # BLD AUTO: 0.7 10E9/L (ref 0–1.3)
MONOCYTES NFR BLD AUTO: 3.6 %
NEUTROPHILS # BLD AUTO: 5 10E9/L (ref 1.6–8.3)
NEUTROPHILS NFR BLD AUTO: 25.3 %
NONHDLC SERPL-MCNC: 92 MG/DL
NRBC # BLD AUTO: 0 10*3/UL
NRBC BLD AUTO-RTO: 0 /100
PLATELET # BLD AUTO: 195 10E9/L (ref 150–450)
POTASSIUM SERPL-SCNC: 4.6 MMOL/L (ref 3.4–5.3)
PROT SERPL-MCNC: 6.7 G/DL (ref 6.8–8.8)
RBC # BLD AUTO: 4.65 10E12/L (ref 3.8–5.2)
SODIUM SERPL-SCNC: 139 MMOL/L (ref 133–144)
TRIGL SERPL-MCNC: 87 MG/DL
WBC # BLD AUTO: 19.8 10E9/L (ref 4–11)

## 2017-10-16 ASSESSMENT — PAIN SCALES - GENERAL: PAINLEVEL: MILD PAIN (2)

## 2017-10-16 NOTE — NURSING NOTE
Chief Complaint   Patient presents with     Results     Patient here to go over results.     Jennifer Cedillo LPN at 9:26 AM on 10/16/2017.

## 2017-10-16 NOTE — PATIENT INSTRUCTIONS
[facility]: [phone]     [facility] Medication Refill Request Information:  * Please contact your pharmacy regarding ANY request for medication refills.  ** [facility] Prescription Fax = [phone]  * Please allow 3 business days for routine medication refills.  * Please allow 5 business days for controlled substance medication refills.     [facility] Test Result notification information:  *You will be notified with in 7-10 days of your appointment day regarding the results of your test.  If you are on MyChart you will be notified as soon as the provider has reviewed the results and signed off on them.  [phone]

## 2017-10-16 NOTE — PROGRESS NOTES
HPI:    Pt. Comes in for follow up today.   She o/w is doing well and denies additional HEENT, cardiopulmonary, abdominal, , GYN, neurological complaints. She has h/o breast CA and was seen by Dr. Sheldon, 11/13.   She was also  followed by Dr. Puente ONC for CLL and was seen 10/13.  She some chronic Low back pain that is getting less with PT.  She had some skin back lesions.   She was seen by Ms. Silverio ONC 1/11/16. Colonoscopy 6/14; repeat in 3 years.  She had  B eye lid surgery. She is active with exercise and denies any rest/exertional CP/SOB/palptations today. B hear ringing for several years.       Past Medical History:   Diagnosis Date     Breast disorder 1990    Breast Cancer     Cardiomyopathy (H)      Chronic osteoarthritis 2012    resulted in hip replacement     CLL (chronic lymphoblastic leukemia) 1989     Colon adenomas     6/26/14 colonoscopy, repeat in 3 years     History of blood transfusion      hyperlipidemia      Primary localized osteoarthrosis, pelvic region and thigh          PE:    Vitals noted; HEENT, ears normal oropharynx clear no exudate, neck supple nl rom,  No adenopahty, LCTA, RRR, S1, S2, no MRG,   Normal neurological exam. She has some back skin lesions.  Minimal  tenderness to palpation of low back area. Abdomen; positive BS's no masses no tenderness. No B CVA tenderness to palpation. Grossly normal neurological exam.       A/P:    1. DEXA scan stable  2. CLL; will check labs and continue to follow.  3. Breast Cancer,  mammogram stable 1/18/17. Next scheduled 2/2018  4. HTN stable; checked labs 1/25/17.  5. Elevated cholesterol; will check fasting lipids and labs.  6. She was seen in ONC clinic by Ms. Silverio in the past.  She has follow up 2/2018  7. She was seen in  Dermatology for skin check and seen 6/17  8. MRI lumbar spine for back pain done 5/9/16 and seen by Dr. Delong, ortho 6/13/16. I also reviewed these findings with Dr. Puente ONC (see Telephone note from  6/3/16). Repeat MRI Lumbar spine with contrast done 1/31/17 and no acute findings.  9. Refer to [facility] for annual exam and she was seen 5/6/16. Placed referral  5/1/17.  10. Neck and L shoulder pain; if persists, X-ray shoulder and MRI cervical spine.  Currently not complaints.  11. X-ray L knee was done 3/27/17 and she was seen by Dr. Delong, ortho 3/29/17. Currently no complaints.   12. Low dose Gabapentin for restless legs; she not taken this because of less sxs.   13. Colonoscopy 10/9/17 repeat in 3 years with 10 mm and 15 mm polyp.   14. ENT referral for ear ringing  15. She had flu shot this year 2017    I will see her back 2/2018.             Total time spent 25 minutes.  More than 50% of the time spent with Ms. Shook on counseling / coordinating her care

## 2017-10-16 NOTE — MR AVS SNAPSHOT
After Visit Summary   10/16/2017    Kerri Shook    MRN: [id number]           Patient Information     Date Of Birth          1940        Visit Information        Provider Department      10/16/2017 9:35 AM Juan Westbrook MD [facility]        Today's Diagnoses     High cholesterol    -  1    Tinnitus of both ears          Care Instructions    Primary Care Center: [phone]     Primary Care Center Medication Refill Request Information:  * Please contact your pharmacy regarding ANY request for medication refills.  ** [facility] Prescription Fax = [phone]  * Please allow 3 business days for routine medication refills.  * Please allow 5 business days for controlled substance medication refills.     Primary Care Center Test Result notification information:  *You will be notified with in 7-10 days of your appointment day regarding the results of your test.  If you are on MyChart you will be notified as soon as the provider has reviewed the results and signed off on them.  [phone]            Follow-ups after your visit        Additional Services     OTOLARYNGOLOGY REFERRAL       Ear ringing                  Your next 10 appointments already scheduled     Oct 16, 2017 10:15 AM CDT   LAB with  LAB   [facility] ([facility] [place])    [address] [place] [address] [phone]           Patient must bring picture ID. Patient should be prepared to give a urine specimen  Please do not eat 10-12 hours before your appointment if you are coming in fasting for labs on lipids, cholesterol, or glucose (sugar). Pregnant women should follow their Care Team instructions. Water with medications is okay. Do not drink coffee or other fluids. If you have concerns about taking  your medications, please ask at office or if scheduling via Stem, send a message by clicking on Secure Messaging, Message Your Care Team.            Nov 17, 2017   "1:00 PM CST   Walk In From ENT with Jani Johansen   [facility] ([facility])    [address]  4th Floor  [facility] [address]   [phone]            Nov 17, 2017  2:00 PM CST   (Arrive by 1:45 PM)   New Patient Visit with Bolivar Sanhcez MD   [facility] Nose [facility] Throat ([facility])    [address]  4th [facility] [address]   [phone]            Feb 21, 2018  2:30 PM CST   (Arrive by 2:15 PM)   MA SCREENING DIGITAL BILATERAL with UCBCMA1   [facility] ([facility])    [address]  2nd Floor  [facility] [address] [phone]           Do not use any powder, lotion or deodorant under your arms or on your breast. If you do, we will ask you to remove it before your exam.  Wear comfortable, two-piece clothing.  If you have any allergies, tell your care team.  Bring any previous mammograms from other facilities or have them mailed to the breast center. Three-dimensional (3D) mammograms are available at [place] locations in [facility], [facility], [facility], [place], [place], and [place]. [facility] locations include [place] and Clinic & Surgery Center in [place]. Benefits of 3D mammograms include: - Improved rate of cancer detection - Decreases your chance of having to go back for more tests, which means fewer: - \"False-positive\" results (This means that there is an abnormal area but it isn't cancer.) - Invasive testing procedures, such as a biopsy or surgery - Can provide clearer images of the breast if you have dense breast tissue. 3D mammography is an optional exam that anyone can have with a 2D mammogram. It doesn't replace or take the place of a 2D mammogram. 2D mammograms remain an effective screening test for all women.  Not all insurance companies cover the cost of a 3D mammogram. Check with your insurance.         "    Feb 21, 2018  3:15 PM CST   (Arrive by 3:00 PM)   Survivorship Visit with Valerie Silverio PA-C   [facility] ([facility])    [address]  2nd Floor  [facility] [id number] [phone]            Feb 26, 2018  9:35 AM CST   (Arrive by 9:20 AM)   Return Visit with Juan Westbrook MD   [facility] ([facility])    [address]  4th Floor  [facility] [address] [phone]              Future tests that were ordered for you today     Open Future Orders        Priority Expected Expires Ordered    CBC with platelets differential Routine 10/16/2017 10/30/2017 10/16/2017    Comprehensive metabolic panel Routine 10/16/2017 10/16/2018 10/16/2017    Lipid panel reflex to direct LDL Routine  10/16/2018 10/16/2017            Who to contact     Please call your clinic at [phone] to:    Ask questions about your health    Make or cancel appointments    Discuss your medicines    Learn about your test results    Speak to your doctor   If you have compliments or concerns about an experience at your clinic, or if you wish to file a complaint, please contact [facility] Patient Relations at [phone] or email us at [email or web address]         Additional Information About Your Visit        MyChart Information     [employer] gives you secure access to your electronic health record. If you see a primary care provider, you can also send messages to your care team and make appointments. If you have questions, please call your primary care clinic.  If you do not have a primary care provider, please call [phone] and they will assist you.      Carritus is an electronic gateway that provides easy, online access to your medical records. With Carritus, you can request a clinic appointment, read your test results, renew a prescription or communicate with your care team.     To access  your existing account, please contact your [facility] or call [phone] for assistance.        Care EveryWhere ID     This is your Care EveryWhere ID. This could be used by other organizations to access your [place] medical records  [id number]        Your Vitals Were     Pulse Breastfeeding? BMI (Body Mass Index)             77 No 22.98 kg/m2          Blood Pressure from Last 3 Encounters:   10/16/17 128/75   05/01/17 134/72   03/27/17 125/76    Weight from Last 3 Encounters:   10/16/17 63.6 kg (140 lb 3.2 oz)   05/01/17 64 kg (141 lb 3.2 oz)   03/29/17 65.3 kg (144 lb)              We Performed the Following     OTOLARYNGOLOGY REFERRAL        Primary Care Provider Office Phone # Fax #    Juan Westbrook [phone] [address] [address]        Equal Access to Services     KENDALL VILLARREAL : Hadii aad ku hadasho Soomaali, waaxda luqadaha, qaybta kaalmada adeegyada, waxay idiin haybeen pb bashir . So [facility] [phone].    ATENCIÓN: Si habla español, tiene a lopez disposición servicios gratuitos de asistencia lingüística. Llame al [phone].    We comply with applicable federal civil rights laws and [place] laws. We do not discriminate on the basis of race, color, national origin, age, disability, sex, sexual orientation, or gender identity.            Thank you!     Thank you for choosing [facility]  for your care. Our goal is always to provide you with excellent care. Hearing back from our patients is one way we can continue to improve our services. Please take a few minutes to complete the written survey that you may receive in the mail after your visit with us. Thank you!             Your Updated Medication List - Protect others around you: Learn how to safely use, store and throw away your medicines at www.disposemymeds.org.          This list is accurate as of: 10/16/17 10:08 AM.  Always use your most  recent med list.                   Brand Name Dispense Instructions for use Diagnosis    aspirin 81 MG tablet      Take 81 mg by mouth daily        carvedilol 3.125 MG tablet    COREG    180 tablet    Take 1 tablet (3.125 mg) by mouth 2 times daily (with meals)    Stress-induced cardiomyopathy       gabapentin 100 MG capsule    NEURONTIN    30 capsule    Take 1 capsule (100 mg) by mouth nightly as needed    Pain of left lower extremity       multivitamin per tablet      Take 1 tablet by mouth daily.        simvastatin 20 MG tablet    ZOCOR    90 tablet    Take 1 tablet (20 mg) by mouth At Bedtime    Hyperlipidemia LDL goal <70

## 2017-11-07 NOTE — TELEPHONE ENCOUNTER
APPT INFO    Date /Time: 11/17/17 at 2 pm    Reason for Appt: Tinnitus   Ref Provider/Clinic: Dr. Westbrook   Are there internal records? If yes, list: Yes;  [facility] ENT (Dr. Ford in 2015)  [facility] Primary Care   Patient Contact (Y/N) & Call Details: no   Action: Chart reviewed     OUTSIDE RECORDS CHECKLIST     CLINIC NAME COMMENTS REC (x) IMG (x)   None

## 2017-11-16 DIAGNOSIS — H93.19 TINNITUS: Primary | ICD-10-CM

## 2017-11-17 ENCOUNTER — OFFICE VISIT (OUTPATIENT)
Dept: OTOLARYNGOLOGY | Facility: CLINIC | Age: 77
End: 2017-11-17

## 2017-11-17 ENCOUNTER — PRE VISIT (OUTPATIENT)
Dept: OTOLARYNGOLOGY | Facility: CLINIC | Age: 77
End: 2017-11-17

## 2017-11-17 ENCOUNTER — OFFICE VISIT (OUTPATIENT)
Dept: AUDIOLOGY | Facility: CLINIC | Age: 77
End: 2017-11-17

## 2017-11-17 VITALS — WEIGHT: 141 LBS | HEIGHT: 66 IN | BODY MASS INDEX: 22.66 KG/M2

## 2017-11-17 DIAGNOSIS — H93.13 TINNITUS OF BOTH EARS: Primary | ICD-10-CM

## 2017-11-17 DIAGNOSIS — H90.42 SENSORINEURAL HEARING LOSS (SNHL) OF LEFT EAR WITH UNRESTRICTED HEARING OF RIGHT EAR: ICD-10-CM

## 2017-11-17 DIAGNOSIS — H93.13 TINNITUS, BILATERAL: Primary | ICD-10-CM

## 2017-11-17 ASSESSMENT — PAIN SCALES - GENERAL: PAINLEVEL: NO PAIN (0)

## 2017-11-17 NOTE — PROGRESS NOTES
AUDIOLOGY REPORT    SUMMARY: Audiology visit completed. See audiogram for results.      RECOMMENDATIONS: Follow-up with ENT.      Jani Doyle  Audiologist  [place] License  #[id number]

## 2017-11-17 NOTE — MR AVS SNAPSHOT
"              After Visit Summary   11/17/2017    Kerri Shook    MRN: [id number]           Patient Information     Date Of Birth          1940        Visit Information        Provider Department      11/17/2017 1:00 PM Avis Rhodes AuD [facility]        Today's Diagnoses     Tinnitus of both ears    -  1    Sensorineural hearing loss (SNHL) of left ear with unrestricted hearing of right ear           Follow-ups after your visit        Your next 10 appointments already scheduled     Feb 21, 2018  2:30 PM CST   (Arrive by 2:15 PM)   MA SCREENING DIGITAL BILATERAL with UCBCMA1   [facility] ([facility] [place])    [address]  2nd Floor  [facility] [address] [phone]           Do not use any powder, lotion or deodorant under your arms or on your breast. If you do, we will ask you to remove it before your exam.  Wear comfortable, two-piece clothing.  If you have any allergies, tell your care team.  Bring any previous mammograms from other facilities or have them mailed to the breast center. Three-dimensional (3D) mammograms are available at [place] locations in [facility], [facility], [facility], [place], [place], and [place]. [facility] locations include [place] and Clinic & Surgery Center in [place]. Benefits of 3D mammograms include: - Improved rate of cancer detection - Decreases your chance of having to go back for more tests, which means fewer: - \"False-positive\" results (This means that there is an abnormal area but it isn't cancer.) - Invasive testing procedures, such as a biopsy or surgery - Can provide clearer images of the breast if you have dense breast tissue. 3D mammography is an optional exam that anyone can have with a 2D mammogram. It doesn't replace or take the place of a 2D mammogram. 2D mammograms remain an effective screening test for all women.  Not all insurance companies cover the cost of " a 3D mammogram. Check with your insurance.            Feb 21, 2018  3:15 PM CST   (Arrive by 3:00 PM)   Survivorship Visit with Valerie Silverio PA-C   [facility] ([facility])    [address]  2nd Floor  [facility] [place] [address] [phone]            Feb 26, 2018  9:35 AM CST   (Arrive by 9:20 AM)   Return Visit with Juan Westbrook MD   [facility] ([facility])    [address]  4th Floor  [facility] [place] [address] [phone]              Who to contact     Please call your clinic at [phone] to:    Ask questions about your health    Make or cancel appointments    Discuss your medicines    Learn about your test results    Speak to your doctor   If you have compliments or concerns about an experience at your clinic, or if you wish to file a complaint, please contact [facility] Patient Relations at [phone] or email us at [email or web address]         Additional Information About Your Visit        Digital Sportshart Information     Intimate Bridge 2 [employer] gives you secure access to your electronic health record. If you see a primary care provider, you can also send messages to your care team and make appointments. If you have questions, please call your primary care clinic.  If you do not have a primary care provider, please call [phone] and they will assist you.      GO-SIM is an electronic gateway that provides easy, online access to your medical records. With GO-SIM, you can request a clinic appointment, read your test results, renew a prescription or communicate with your care team.     To access your existing account, please contact your [facility] or call [phone] for assistance.        Care EveryWhere ID     This is your Care EveryWhere ID. This could be used by other organizations to access your [place] medical records  [id number]          Blood Pressure from Last 3 Encounters:   10/16/17 128/75   05/01/17 134/72   03/27/17 125/76    Weight from Last 3 Encounters:   11/17/17 64 kg (141 lb)   10/16/17 63.6 kg (140 lb 3.2 oz)   05/01/17 64 kg (141 lb 3.2 oz)              We Performed the Following     AUDIOGRAM/TYMPANOGRAM - INTERFACE     [facility] Recog ([address])     Tymps / Reflex   ([id number])        Primary Care Provider Office Phone # Fax #    Juan Westbrook -[phone] [address] [address]        Equal Access to Services     KENDALL VILLARREAL : Hadii abdullahi ku hadasho Soomaali, waaxda luqadaha, qaybta kaalmada adeegyada, waxay idiin haybeen pb tuttle. So [facility] [phone].    ATENCIÓN: Si habla español, tiene a lopez disposición servicios gratuitos de asistencia lingüística. [facility] [phone].    We comply with applicable federal civil rights laws and [place] laws. We do not discriminate on the basis of race, color, national origin, age, disability, sex, sexual orientation, or gender identity.            Thank you!     Thank you for choosing [facility] AUDIOLOGY  for your care. Our goal is always to provide you with excellent care. Hearing back from our patients is one way we can continue to improve our services. Please take a few minutes to complete the written survey that you may receive in the mail after your visit with us. Thank you!             Your Updated Medication List - Protect others around you: Learn how to safely use, store and throw away your medicines at www.disposemymeds.org.          This list is accurate as of: 11/17/17  3:19 PM.  Always use your most recent med list.                   Brand Name Dispense Instructions for use Diagnosis    aspirin 81 MG tablet      Take 81 mg by mouth daily        carvedilol 3.125 MG tablet    COREG    180 tablet    Take 1 tablet (3.125 mg) by mouth 2 times daily (with meals)    Stress-induced cardiomyopathy        gabapentin 100 MG capsule    NEURONTIN    30 capsule    Take 1 capsule (100 mg) by mouth nightly as needed    Pain of left lower extremity       multivitamin per tablet      Take 1 tablet by mouth daily.        simvastatin 20 MG tablet    ZOCOR    90 tablet    Take 1 tablet (20 mg) by mouth At Bedtime    Hyperlipidemia LDL goal <70

## 2017-11-17 NOTE — LETTER
11/17/2017       RE: Kerri Shook  [address] [facility] [address]     Dear Colleague,    Thank you for referring your patient, Kerri Shook, to the [facility] at [facility]. Please see a copy of my visit note below.    The patient presents with a history of tinnitus in both ears.  The patient reports minimal hearing loss in both ears.  The patient denies ear infections, otalgia, otorrhea, or dizziness. The patient denies sinusitis, rhinitis, facial pain, nasal obstruction or purulent nasal discharge. The patient denies chronic or recurrent tonsillitis, chronic or recurrent pharyngitis. The patient's Audiogram and Tympanogram are reviewed with her and they demonstrate bilateral very mild sensorineural hearing loss worst at the very high frequency area with 100% word recognition scores and normal tympanograms.     This patient is seen in consultation at the request of Dr. Juan Westbrook.    All other systems were reviewed and they are either negative or they are not directly pertinent to this Otolaryngology examination.      Past Medical History:    Past Medical History:   Diagnosis Date     Breast disorder 1990    Breast Cancer     Cardiomyopathy (H)      Chronic osteoarthritis 2012    resulted in hip replacement     CLL (chronic lymphoblastic leukemia) 1989     Colon adenomas     6/26/14 colonoscopy, repeat in 3 years     History of blood transfusion      hyperlipidemia      Primary localized osteoarthrosis, pelvic region and thigh      Tinnitus        Past Surgical History:    Past Surgical History:   Procedure Laterality Date     BIOPSY  within the last 5 years    polyps during colonoscopy     C PELVIS/HIP JOINT SURGERY UNLISTED  April 2012    left hip replacement     C SHOULDER SURG PROC UNLISTED  ?     COLONOSCOPY  6/26/2014    Procedure: COMBINED COLONOSCOPY, SINGLE BIOPSY/POLYPECTOMY BY BIOPSY;  Surgeon: Pola Arceo MD;  Location:  GI      D & C  1962    late PP hemorrhage --> retained placenta     ENT SURGERY  1950    tonselectomy     left hip replacemen Left     hip replacement in 2013     LUMPECTOMY BREAST  1990    Left     ORTHOPEDIC SURGERY  age?    right shoulder      ORTHOPEDIC SURGERY  ~ 2013    left hip replacement     R hip arthroscopy  7/19/11     TONSILLECTOMY  1950       Medications:      Current Outpatient Prescriptions:      simvastatin (ZOCOR) 20 MG tablet, Take 1 tablet (20 mg) by mouth At Bedtime, Disp: 90 tablet, Rfl: 3     gabapentin (NEURONTIN) 100 MG capsule, Take 1 capsule (100 mg) by mouth nightly as needed, Disp: 30 capsule, Rfl: 0     carvedilol (COREG) 3.125 MG tablet, Take 1 tablet (3.125 mg) by mouth 2 times daily (with meals), Disp: 180 tablet, Rfl: 3     aspirin 81 MG tablet, Take 81 mg by mouth daily, Disp: , Rfl:      Multiple Vitamin (MULTIVITAMIN) per tablet, Take 1 tablet by mouth daily., Disp: , Rfl:     Allergies:    Nkda [no known drug allergies]    Physical Examination:    The patient is a well developed, well nourished female in no apparent distress.  She is normocephalic, atraumatic with pupils equally round and reactive to light.    Oral Cavity Examination:  Normal mucosa with no masses or lesions  Nasal Examination: Normal mucosa with no masses or lesions  Ear Examination: Ear canals clear, tympanic membranes and middle ear spaces normal  Neurological Examination: Facial nerve function intact and symmetric  Integumentary Examination: No lesions on the skin of the head and neck  Neck Examination: No masses or lesions, no lymphadenopathy  Endocrine Examination: Normal thyroid examination    Assessment and Plan:    The patient presents with a history of tinnitus.  The patient's physical examination shows no abnormalities of the ears.  We have discussed efforts to minimize tinnitus including minimizing caffeine consumption. She will be seen again in two years to review a repea Audiogram and Tympanogram.      CC: Dr. Juan Westbrook    Again, thank you for allowing me to participate in the care of your patient.      Sincerely,    Bolivar Sanchez MD

## 2017-11-17 NOTE — PATIENT INSTRUCTIONS
The patient presents with a history of tinnitus.  The patient's physical examination shows no abnormalities of the ears.  We have discussed efforts to minimize tinnitus including minimizing caffeine consumption. She will be seen again in two years to review a repea Audiogram and Tympanogram.

## 2017-11-17 NOTE — MR AVS SNAPSHOT
After Visit Summary   11/17/2017    Kerri Shook    MRN: [id number]           Patient Information     Date Of Birth          1940        Visit Information        Provider Department      11/17/2017 2:00 PM Bolivar Sanchez MD [facility]        Today's Diagnoses     Tinnitus, bilateral    -  1      Care Instructions      The patient presents with a history of tinnitus.  The patient's physical examination shows no abnormalities of the ears.  We have discussed efforts to minimize tinnitus including minimizing caffeine consumption. She will be seen again in two years to review a repea Audiogram and Tympanogram.           Follow-ups after your visit        Your next 10 appointments already scheduled     Nov 17, 2017  2:00 PM CST   (Arrive by 1:45 PM)   New Patient Visit with Bolivar Sanchez MD   [facility] ([facility])    [address] [place] [address]  4th [facility] [id number] [phone]            Feb 21, 2018  2:30 PM CST   (Arrive by 2:15 PM)   MA SCREENING DIGITAL BILATERAL with [id number]   [facility] ([facility])    [address] [place] [id number] [phone]           Do not use any powder, lotion or deodorant under your arms or on your breast. If you do, we will ask you to remove it before your exam.  Wear comfortable, two-piece clothing.  If you have any allergies, tell your care team.  Bring any previous mammograms from other facilities or have them mailed to the breast center. Three-dimensional (3D) mammograms are available at [place] locations in [facility], [facility], [facility], and [place]. [facility] locations include [place] and [facility] Center in [place]. Benefits of 3D mammograms include: - Improved rate of cancer detection - Decreases your chance of having to go back for  "more tests, which means fewer: - \"False-positive\" results (This means that there is an abnormal area but it isn't cancer.) - Invasive testing procedures, such as a biopsy or surgery - Can provide clearer images of the breast if you have dense breast tissue. 3D mammography is an optional exam that anyone can have with a 2D mammogram. It doesn't replace or take the place of a 2D mammogram. 2D mammograms remain an effective screening test for all women.  Not all insurance companies cover the cost of a 3D mammogram. Check with your insurance.            Feb 21, 2018  3:15 PM CST   (Arrive by 3:00 PM)   Survivorship Visit with Valerie Silverio PA-C   [facility] ([facility])    [address] [place] [id number] [phone]            Feb 26, 2018  9:35 AM CST   (Arrive by 9:20 AM)   Return Visit with Juan Westbrook MD   [facility] ([facility])    [address] [place] [address] [phone]              Who to contact     Please call your clinic at [phone] to:    Ask questions about your health    Make or cancel appointments    Discuss your medicines    Learn about your test results    Speak to your doctor   If you have compliments or concerns about an experience at your clinic, or if you wish to file a complaint, please contact [facility] Patient Relations at [phone] or email us at [email or web address]         Additional Information About Your Visit        MyChart Information     [employer] gives you secure access to your electronic health record. If you see a primary care provider, you can also send messages to your care team and make appointments. If you have questions, please call your primary care clinic.  If you do not have a primary care provider, please call [phone] and they will assist you.      MyChart is an " "electronic gateway that provides easy, online access to your medical records. With [employer], you can request a clinic appointment, read your test results, renew a prescription or communicate with your care team.     To access your existing account, please contact your [facility] or call [phone] for assistance.        Care EveryWhere ID     This is your Care EveryWhere ID. This could be used by other organizations to access your [place] medical records  [id number]        Your Vitals Were     Height BMI (Body Mass Index)                1.67 m (5' 5.75\") 22.93 kg/m2           Blood Pressure from Last 3 Encounters:   10/16/17 128/75   05/01/17 134/72   03/27/17 125/76    Weight from Last 3 Encounters:   11/17/17 64 kg (141 lb)   10/16/17 63.6 kg (140 lb 3.2 oz)   05/01/17 64 kg (141 lb 3.2 oz)              Today, you had the following     No orders found for display       Primary Care Provider Office Phone # Fax #    Juan Westbrook [phone] [address] [address]        Equal Access to Services     [facility]: Hadii aad ku hadasho Soomaali, waaxda luqadaha, qaybta kaalmada adeegyada, lulu webster haybeen pb tuttle. So [facility] [phone].    ATENCIÓN: Si habla español, tiene a lopez disposición servicios gratuitos de asistencia lingüística. Llame al [phone].    We comply with applicable federal civil rights laws and [place] laws. We do not discriminate on the basis of race, color, national origin, age, disability, sex, sexual orientation, or gender identity.            Thank you!     Thank you for choosing [facility]  for your care. Our goal is always to provide you with excellent care. Hearing back from our patients is one way we can continue to improve our services. Please take a few minutes to complete the written survey that you may receive in the mail after your visit with us. Thank you!      "        Your Updated Medication List - Protect others around you: Learn how to safely use, store and throw away your medicines at www.disposemymeds.org.          This list is accurate as of: 11/17/17  1:56 PM.  Always use your most recent med list.                   Brand Name Dispense Instructions for use Diagnosis    aspirin 81 MG tablet      Take 81 mg by mouth daily        carvedilol 3.125 MG tablet    COREG    180 tablet    Take 1 tablet (3.125 mg) by mouth 2 times daily (with meals)    Stress-induced cardiomyopathy       gabapentin 100 MG capsule    NEURONTIN    30 capsule    Take 1 capsule (100 mg) by mouth nightly as needed    Pain of left lower extremity       multivitamin per tablet      Take 1 tablet by mouth daily.        simvastatin 20 MG tablet    ZOCOR    90 tablet    Take 1 tablet (20 mg) by mouth At Bedtime    Hyperlipidemia LDL goal <70

## 2017-11-17 NOTE — PROGRESS NOTES
The patient presents with a history of tinnitus in both ears.  The patient reports minimal hearing loss in both ears.  The patient denies ear infections, otalgia, otorrhea, or dizziness. The patient denies sinusitis, rhinitis, facial pain, nasal obstruction or purulent nasal discharge. The patient denies chronic or recurrent tonsillitis, chronic or recurrent pharyngitis. The patient's Audiogram and Tympanogram are reviewed with her and they demonstrate bilateral very mild sensorineural hearing loss worst at the very high frequency area with 100% word recognition scores and normal tympanograms.     This patient is seen in consultation at the request of Dr. Juan Westbrook.    All other systems were reviewed and they are either negative or they are not directly pertinent to this Otolaryngology examination.      Past Medical History:    Past Medical History:   Diagnosis Date     Breast disorder 1990    Breast Cancer     Cardiomyopathy (H)      Chronic osteoarthritis 2012    resulted in hip replacement     CLL (chronic lymphoblastic leukemia) 1989     Colon adenomas     6/26/14 colonoscopy, repeat in 3 years     History of blood transfusion      hyperlipidemia      Primary localized osteoarthrosis, pelvic region and thigh      Tinnitus        Past Surgical History:    Past Surgical History:   Procedure Laterality Date     BIOPSY  within the last 5 years    polyps during colonoscopy     C PELVIS/HIP JOINT SURGERY UNLISTED  April 2012    left hip replacement     C SHOULDER SURG PROC UNLISTED  ?     COLONOSCOPY  6/26/2014    Procedure: COMBINED COLONOSCOPY, SINGLE BIOPSY/POLYPECTOMY BY BIOPSY;  Surgeon: Pola Arceo MD;  Location:  GI     D & C  1962    late PP hemorrhage --> retained placenta     ENT SURGERY  1950    tonselectomy     left hip replacemen Left     hip replacement in 2013     LUMPECTOMY BREAST  1990    Left     ORTHOPEDIC SURGERY  age?    right shoulder      ORTHOPEDIC SURGERY  ~ 2013    left hip  replacement     R hip arthroscopy  7/19/11     TONSILLECTOMY  1950       Medications:      Current Outpatient Prescriptions:      simvastatin (ZOCOR) 20 MG tablet, Take 1 tablet (20 mg) by mouth At Bedtime, Disp: 90 tablet, Rfl: 3     gabapentin (NEURONTIN) 100 MG capsule, Take 1 capsule (100 mg) by mouth nightly as needed, Disp: 30 capsule, Rfl: 0     carvedilol (COREG) 3.125 MG tablet, Take 1 tablet (3.125 mg) by mouth 2 times daily (with meals), Disp: 180 tablet, Rfl: 3     aspirin 81 MG tablet, Take 81 mg by mouth daily, Disp: , Rfl:      Multiple Vitamin (MULTIVITAMIN) per tablet, Take 1 tablet by mouth daily., Disp: , Rfl:     Allergies:    Nkda [no known drug allergies]    Physical Examination:    The patient is a well developed, well nourished female in no apparent distress.  She is normocephalic, atraumatic with pupils equally round and reactive to light.    Oral Cavity Examination:  Normal mucosa with no masses or lesions  Nasal Examination: Normal mucosa with no masses or lesions  Ear Examination: Ear canals clear, tympanic membranes and middle ear spaces normal  Neurological Examination: Facial nerve function intact and symmetric  Integumentary Examination: No lesions on the skin of the head and neck  Neck Examination: No masses or lesions, no lymphadenopathy  Endocrine Examination: Normal thyroid examination    Assessment and Plan:    The patient presents with a history of tinnitus.  The patient's physical examination shows no abnormalities of the ears.  We have discussed efforts to minimize tinnitus including minimizing caffeine consumption. She will be seen again in two years to review a repea Audiogram and Tympanogram.     CC: Dr. Juan Westbrook

## 2018-01-03 ENCOUNTER — OFFICE VISIT (OUTPATIENT)
Dept: INTERNAL MEDICINE | Facility: CLINIC | Age: 78
End: 2018-01-03
Payer: COMMERCIAL

## 2018-01-03 VITALS
DIASTOLIC BLOOD PRESSURE: 72 MMHG | WEIGHT: 142.8 LBS | HEART RATE: 62 BPM | BODY MASS INDEX: 23.23 KG/M2 | SYSTOLIC BLOOD PRESSURE: 115 MMHG

## 2018-01-03 DIAGNOSIS — S33.5XXA LUMBAR SPRAIN, INITIAL ENCOUNTER: Primary | ICD-10-CM

## 2018-01-03 RX ORDER — METHYLPREDNISOLONE 4 MG
TABLET, DOSE PACK ORAL
Qty: 21 TABLET | Refills: 0 | Status: SHIPPED | OUTPATIENT
Start: 2018-01-03 | End: 2018-02-23

## 2018-01-03 RX ORDER — CYCLOBENZAPRINE HCL 5 MG
5 TABLET ORAL 3 TIMES DAILY PRN
Qty: 42 TABLET | Refills: 3 | Status: SHIPPED | OUTPATIENT
Start: 2018-01-03 | End: 2018-02-19

## 2018-01-03 ASSESSMENT — PAIN SCALES - GENERAL: PAINLEVEL: EXTREME PAIN (8)

## 2018-01-03 NOTE — NURSING NOTE
Chief Complaint   Patient presents with     Back Pain     Patient here for back pain x2 weeks.      Aamir Shepherd CMA at 1:30 PM on 1/3/2018.

## 2018-01-03 NOTE — PATIENT INSTRUCTIONS
[facility]: [phone]     [facility] Medication Refill Request Information:  * Please contact your pharmacy regarding ANY request for medication refills.  ** [facility] Prescription Fax = [phone]  * Please allow 3 business days for routine medication refills.  * Please allow 5 business days for controlled substance medication refills.     [facility] Test Result notification information:  *You will be notified with in 7-10 days of your appointment day regarding the results of your test.  If you are on MyChart you will be notified as soon as the provider has reviewed the results and signed off on them.

## 2018-01-03 NOTE — MR AVS SNAPSHOT
After Visit Summary   1/3/2018    Kerri Shook    MRN: [id number]           Patient Information     Date Of Birth          1940        Visit Information        Provider Department      1/3/2018 1:30 PM Carina Dueñas MD [facility]        Today's Diagnoses     Lumbar sprain, initial encounter    -  1      Care Instructions    Primary Care Center: [phone]     [facility] Medication Refill Request Information:  * Please contact your pharmacy regarding ANY request for medication refills.  ** [facility] Prescription Fax = [phone]  * Please allow 3 business days for routine medication refills.  * Please allow 5 business days for controlled substance medication refills.     Primary Care Center Test Result notification information:  *You will be notified with in 7-10 days of your appointment day regarding the results of your test.  If you are on MyChart you will be notified as soon as the provider has reviewed the results and signed off on them.            Follow-ups after your visit        Your next 10 appointments already scheduled     Feb 21, 2018  2:30 PM CST   (Arrive by 2:15 PM)   MA SCREENING DIGITAL BILATERAL with [id number]   [facility] ([facility])    [address] [place] [address] [phone]           Do not use any powder, lotion or deodorant under your arms or on your breast. If you do, we will ask you to remove it before your exam.  Wear comfortable, two-piece clothing.  If you have any allergies, tell your care team.  Bring any previous mammograms from other facilities or have them mailed to the breast center. Three-dimensional (3D) mammograms are available at [place] locations in [facility], [facility], [facility], [facility], and [place]. [facility] locations include [place] and [facility] Center in [place]. Benefits of 3D mammograms  "include: - Improved rate of cancer detection - Decreases your chance of having to go back for more tests, which means fewer: - \"False-positive\" results (This means that there is an abnormal area but it isn't cancer.) - Invasive testing procedures, such as a biopsy or surgery - Can provide clearer images of the breast if you have dense breast tissue. 3D mammography is an optional exam that anyone can have with a 2D mammogram. It doesn't replace or take the place of a 2D mammogram. 2D mammograms remain an effective screening test for all women.  Not all insurance companies cover the cost of a 3D mammogram. Check with your insurance.            Feb 21, 2018  3:15 PM CST   (Arrive by 3:00 PM)   Survivorship Visit with Valerie Silverio PA-C   [facility] ([facility])    [address] [place] [address]  [address]  [facility] [id number] [phone]            Feb 26, 2018  9:35 AM CST   (Arrive by 9:20 AM)   Return Visit with Juan Westbrook MD   [facility] ([facility])    [address] [place] [address]  4th Floor  [facility] [id number] [phone]              Who to contact     Please call your clinic at [phone] to:    Ask questions about your health    Make or cancel appointments    Discuss your medicines    Learn about your test results    Speak to your doctor   If you have compliments or concerns about an experience at your clinic, or if you wish to file a complaint, please contact [facility] Patient Relations at [phone] or email us at [email or web address]         Additional Information About Your Visit        MyChart Information     [employer] gives you secure access to your electronic health record. If you see a primary care provider, you can also send messages to your care team and make appointments. If you have questions, please call your primary care clinic.  If you do not have a primary " care provider, please call [phone] and they will assist you.      [employer] is an electronic gateway that provides easy, online access to your medical records. With [employer], you can request a clinic appointment, read your test results, renew a prescription or communicate with your care team.     To access your existing account, please contact your [facility] or call [phone] for assistance.        Care EveryWhere ID     This is your Care EveryWhere ID. This could be used by other organizations to access your [place] medical records  [id number]        Your Vitals Were     Pulse Breastfeeding? BMI (Body Mass Index)             62 No 23.23 kg/m2          Blood Pressure from Last 3 Encounters:   01/03/18 115/72   10/16/17 128/75   05/01/17 134/72    Weight from Last 3 Encounters:   01/03/18 64.8 kg (142 lb 12.8 oz)   11/17/17 64 kg (141 lb)   10/16/17 63.6 kg (140 lb 3.2 oz)              Today, you had the following     No orders found for display         Today's Medication Changes          These changes are accurate as of: 1/3/18  2:06 PM.  If you have any questions, ask your nurse or doctor.               Start taking these medicines.        Dose/Directions    cyclobenzaprine 5 MG tablet   Commonly known as:  FLEXERIL   Used for:  Lumbar sprain, initial encounter   Started by:  Carina Dueñas MD        Dose:  5 mg   Take 1 tablet (5 mg) by mouth 3 times daily as needed for muscle spasms   Quantity:  42 tablet   Refills:  3       methylPREDNISolone 4 MG tablet   Commonly known as:  MEDROL DOSEPAK   Used for:  Lumbar sprain, initial encounter   Started by:  Carina Dueñas MD        Follow package instructions   Quantity:  21 tablet   Refills:  0            Where to get your medicines      These medications were sent to [id number] - [place], [place] - [address] [place] [address]  AT [facility] [address]   [address] [facility]  [address]     Phone:  [phone]     cyclobenzaprine 5 MG tablet    methylPREDNISolone 4 MG tablet                Primary Care Provider Office Phone # Fax #    Juan Westbrook [phone] [address] [address]        Equal Access to Services     TOMYKENZIE CHERELLE : Hadii aad ku hadasho Soomaali, waaxda luqadaha, qaybta kaalmada adeegyada, waxmilad rohitin hayaan cathieclaudia marino mckay tuttle. So [facility] [phone].    ATENCIÓN: Si habla español, tiene a lopez disposición servicios gratuitos de asistencia lingüística. [facility] [phone].    We comply with applicable federal civil rights laws and [place] laws. We do not discriminate on the basis of race, color, national origin, age, disability, sex, sexual orientation, or gender identity.            Thank you!     Thank you for choosing [facility]  for your care. Our goal is always to provide you with excellent care. Hearing back from our patients is one way we can continue to improve our services. Please take a few minutes to complete the written survey that you may receive in the mail after your visit with us. Thank you!             Your Updated Medication List - Protect others around you: Learn how to safely use, store and throw away your medicines at www.disposemymeds.org.          This list is accurate as of: 1/3/18  2:06 PM.  Always use your most recent med list.                   Brand Name Dispense Instructions for use Diagnosis    aspirin 81 MG tablet      Take 81 mg by mouth daily        carvedilol 3.125 MG tablet    COREG    180 tablet    Take 1 tablet (3.125 mg) by mouth 2 times daily (with meals)    Stress-induced cardiomyopathy       cyclobenzaprine 5 MG tablet    FLEXERIL    42 tablet    Take 1 tablet (5 mg) by mouth 3 times daily as needed for muscle spasms    Lumbar sprain, initial encounter       gabapentin 100 MG capsule    NEURONTIN    30 capsule    Take 1 capsule (100 mg) by mouth nightly as  needed    Pain of left lower extremity       methylPREDNISolone 4 MG tablet    MEDROL DOSEPAK    21 tablet    Follow package instructions    Lumbar sprain, initial encounter       multivitamin per tablet      Take 1 tablet by mouth daily.        simvastatin 20 MG tablet    ZOCOR    90 tablet    Take 1 tablet (20 mg) by mouth At Bedtime    Hyperlipidemia LDL goal <70

## 2018-01-10 NOTE — PROGRESS NOTES
Kerri was seen today for low back pain.  She is a 77 year old female with a past medical history of Breast disorder (1990); Cardiomyopathy (H); Chronic osteoarthritis (2012); CLL (chronic lymphoblastic leukemia) (1989); Colon adenomas; History of blood transfusion; hyperlipidemia; Primary localized osteoarthrosis, pelvic region and thigh; and Tinnitus.   This low back pain has been present about three weeks.  Has tried stretching exercises and NSAIDs at home without any improvement.  No known trauma or injury.  No neurologic symptoms.    On exam  B/P: 115/72, P: 62  MS:  Tenderness to palpation over the lower lumbar paraspinous muscles bilaterally, right greater than left; no SI joint or sciatic foramen tendernes.    Neuro:  +5/5 motor strength BLE    A/P    Lumbar sprain, initial encounter; seems to have a component of muscle spasm which may be helped by a short course of steroids plus muscle relaxant  -     methylPREDNISolone (MEDROL DOSEPAK) 4 MG tablet; Follow package instructions  -     cyclobenzaprine (FLEXERIL) 5 MG tablet; Take 1 tablet (5 mg) by mouth 3 times daily as needed for muscle spasms  --RTC if no improvement in two weeks and consider imaging at that time    -- Carina Charles MD

## 2018-02-19 ENCOUNTER — OFFICE VISIT (OUTPATIENT)
Dept: FAMILY MEDICINE | Facility: CLINIC | Age: 78
End: 2018-02-19
Payer: COMMERCIAL

## 2018-02-19 VITALS
DIASTOLIC BLOOD PRESSURE: 76 MMHG | WEIGHT: 142 LBS | SYSTOLIC BLOOD PRESSURE: 121 MMHG | HEART RATE: 93 BPM | HEIGHT: 65 IN | BODY MASS INDEX: 23.66 KG/M2 | TEMPERATURE: 98 F | OXYGEN SATURATION: 99 %

## 2018-02-19 DIAGNOSIS — J01.90 ACUTE SINUSITIS WITH SYMPTOMS > 10 DAYS: Primary | ICD-10-CM

## 2018-02-19 PROCEDURE — 99213 OFFICE O/P EST LOW 20 MIN: CPT | Performed by: PHYSICIAN ASSISTANT

## 2018-02-19 RX ORDER — GUAIFENESIN AND DEXTROMETHORPHAN HYDROBROMIDE 1200; 60 MG/1; MG/1
1 TABLET, EXTENDED RELEASE ORAL 2 TIMES DAILY
Qty: 28 TABLET | Refills: 0 | Status: SHIPPED | OUTPATIENT
Start: 2018-02-19 | End: 2018-04-13

## 2018-02-19 ASSESSMENT — PAIN SCALES - GENERAL: PAINLEVEL: NO PAIN (0)

## 2018-02-19 NOTE — PROGRESS NOTES
SUBJECTIVE:   Kerri Shook is a 77 year old female who presents to clinic today for the following health issues:      RESPIRATORY SYMPTOMS      Duration: x 2 weeks    Description  nasal congestion, rhinorrhea, facial pain/pressure, headache, fatigue/malaise, hoarse voice and nausea    Severity: moderate    Accompanying signs and symptoms: None    History (predisposing factors):  none    Precipitating or alleviating factors: None    Therapies tried and outcome:  oral decongestant          Problem list and histories reviewed & adjusted, as indicated.  Additional history: as documented    Patient Active Problem List   Diagnosis     Breast cancer -- Left     S/P coronary angiogram     Stress-induced cardiomyopathy     Foot injury     Advance care planning     Rosacea     KP (keratosis pilaris)     CLL (chronic lymphocytic leukemia) (H)     Hx of cardiomyopathy     Encounter for routine gynecological examination     Menopause--age 50     Colon adenomas     SK (seborrheic keratosis)     Seborrheic keratosis     Past Surgical History:   Procedure Laterality Date     BIOPSY  within the last 5 years    polyps during colonoscopy     C PELVIS/HIP JOINT SURGERY UNLISTED  April 2012    left hip replacement     C SHOULDER SURG PROC UNLISTED  ?     COLONOSCOPY  6/26/2014    Procedure: COMBINED COLONOSCOPY, SINGLE BIOPSY/POLYPECTOMY BY BIOPSY;  Surgeon: Pola Arceo MD;  Location:  GI     D & C  1962    late PP hemorrhage --> retained placenta     ENT SURGERY  1950    tonselectomy     left hip replacemen Left     hip replacement in 2013     LUMPECTOMY BREAST  1990    Left     ORTHOPEDIC SURGERY  age?    right shoulder      ORTHOPEDIC SURGERY  ~ 2013    left hip replacement     R hip arthroscopy  7/19/11     TONSILLECTOMY  1950       Social History   Substance Use Topics     Smoking status: Never Smoker     Smokeless tobacco: Never Used     Alcohol use 0.0 oz/week      Comment: 1s 2x/wk     Family History   Problem Relation  "Age of Onset     DIABETES Maternal Grandmother 85     Coronary Artery Disease Father 76      of MI     HEART DISEASE Father      CANCER Maternal Grandfather      stomach     Alcoholism Son      Active alcoholic     Dementia Mother      C.A.D. No family hx of      Cancer - colorectal No family hx of      Psychotic Disorder No family hx of      Skin Cancer No family hx of      Melanoma No family hx of      Hypertension No family hx of      Breast Cancer No family hx of      Prostate Cancer No family hx of      CEREBROVASCULAR DISEASE No family hx of      ,, ,         Current Outpatient Prescriptions   Medication Sig Dispense Refill     amoxicillin-clavulanate (AUGMENTIN) 875-125 MG per tablet Take 1 tablet by mouth 2 times daily for 10 days 20 tablet 0     Dextromethorphan-Guaifenesin  MG TB12 Take 1 tablet by mouth 2 times daily 28 tablet 0     methylPREDNISolone (MEDROL DOSEPAK) 4 MG tablet Follow package instructions 21 tablet 0     simvastatin (ZOCOR) 20 MG tablet Take 1 tablet (20 mg) by mouth At Bedtime 90 tablet 3     gabapentin (NEURONTIN) 100 MG capsule Take 1 capsule (100 mg) by mouth nightly as needed 30 capsule 0     carvedilol (COREG) 3.125 MG tablet Take 1 tablet (3.125 mg) by mouth 2 times daily (with meals) 180 tablet 3     aspirin 81 MG tablet Take 81 mg by mouth daily       Multiple Vitamin (MULTIVITAMIN) per tablet Take 1 tablet by mouth daily.       Allergies   Allergen Reactions     Nkda [No Known Drug Allergies]             ROS:  Constitutional, HEENT, cardiovascular, pulmonary, GI, , musculoskeletal, neuro, skin, endocrine and psych systems are negative, except as otherwise noted.    OBJECTIVE:     /76  Pulse 93  Temp 98  F (36.7  C) (Oral)  Ht 5' 5\" (1.651 m)  Wt 142 lb (64.4 kg)  SpO2 99%  Breastfeeding? No  BMI 23.63 kg/m2  Body mass index is 23.63 kg/(m^2).  GENERAL: healthy, alert and no distress  EYES: Eyes grossly normal to inspection, PERRL and conjunctivae and " sclerae normal  HENT: normal cephalic/atraumatic, ear canals and TM's normal, nose and mouth without ulcers or lesions, nasal mucosa edematous , rhinorrhea yellow, oral mucous membranes moist and sinuses: maxillary tenderness on bilaterally  NECK: no adenopathy, no asymmetry, masses, or scars and thyroid normal to palpation  RESP: lungs clear to auscultation - no rales, rhonchi or wheezes  CV: regular rate and rhythm, normal S1 S2, no S3 or S4, no murmur, click or rub, no peripheral edema and peripheral pulses strong  ABDOMEN: soft, nontender, no hepatosplenomegaly, no masses and bowel sounds normal  MS: no gross musculoskeletal defects noted, no edema    Diagnostic Test Results:  none     ASSESSMENT/PLAN:       ICD-10-CM    1. Acute sinusitis with symptoms > 10 days J01.90 amoxicillin-clavulanate (AUGMENTIN) 875-125 MG per tablet     Dextromethorphan-Guaifenesin  MG TB12     Augmentin 875 mg, 1 tablet twice a day for 10 days  Increase fluids  Nasal wash  Mucinex DM 1 tablet twice a day for 10-14 days   Follow up if not better after the antibiotic course.       Amy Pool PA-C  [facility]

## 2018-02-19 NOTE — PATIENT INSTRUCTIONS
Augmentin 875 mg, 1 tablet twice a day for 10 days  Increase fluids  Nasal wash  Mucinex DM 1 tablet twice a day for 10-14 days   Follow up if not better after the antibiotic course.     Sinusitis (Antibiotic Treatment)    The sinuses are air-filled spaces within the bones of the face. They connect to the inside of the nose. Sinusitis is an inflammation of the tissue lining the sinus cavity. Sinus inflammation can occur during a cold. It can also be due to allergies to pollens and other particles in the air. Sinusitis can cause symptoms of sinus congestion and fullness. A sinus infection causes fever, headache and facial pain. There is often green or yellow drainage from the nose or into the back of the throat (post-nasal drip). You have been given antibiotics to treat this condition.  Home care:    Take the full course of antibiotics as instructed. Do not stop taking them, even if you feel better.    Drink plenty of water, hot tea, and other liquids. This may help thin mucus. It also may promote sinus drainage.    Heat may help soothe painful areas of the face. Use a towel soaked in hot water. Or,  the shower and direct the hot spray onto your face. Using a vaporizer along with a menthol rub at night may also help.     An expectorant containing guaifenesin may help thin the mucus and promote drainage from the sinuses.    Over-the-counter decongestants may be used unless a similar medicine was prescribed. Nasal sprays work the fastest. Use one that contains phenylephrine or oxymetazoline. First blow the nose gently. Then use the spray. Do not use these medicines more often than directed on the label or symptoms may get worse. You may also use tablets containing pseudoephedrine. Avoid products that combine ingredients, because side effects may be increased. Read labels. You can also ask the pharmacist for help. (NOTE: Persons with high blood pressure should not use decongestants. They can raise blood  pressure.)    Over-the-counter antihistamines may help if allergies contributed to your sinusitis.      Do not use nasal rinses or irrigation during an acute sinus infection, unless told to by your health care provider. Rinsing may spread the infection to other sinuses.    Use acetaminophen or ibuprofen to control pain, unless another pain medicine was prescribed. (If you have chronic liver or kidney disease or ever had a stomach ulcer, talk with your doctor before using these medicines. Aspirin should never be used in anyone under 18 years of age who is ill with a fever. It may cause severe liver damage.)    Don't smoke. This can worsen symptoms.  Follow-up care  Follow up with your healthcare provider or our staff if you are not improving within the next week.  When to seek medical advice  Call your healthcare provider if any of these occur:    Facial pain or headache becoming more severe    Stiff neck    Unusual drowsiness or confusion    Swelling of the forehead or eyelids    Vision problems, including blurred or double vision    Fever of 100.4 F (38 C) or higher, or as directed by your healthcare provider    Seizure    Breathing problems    Symptoms not resolving within 10 days  Date Last Reviewed: 4/13/2015 2000-2017 [employer]. [address] [place] [address], [place] [address]. All rights reserved. This information is not intended as a substitute for professional medical care. Always follow your healthcare professional's instructions.

## 2018-02-19 NOTE — MR AVS SNAPSHOT
After Visit Summary   2/19/2018    Kerri Shook    MRN: [id number]           Patient Information     Date Of Birth          1940        Visit Information        Provider Department      2/19/2018 11:00 AM Amy Pool PA-C [facility]        Today's Diagnoses     Acute sinusitis with symptoms > 10 days    -  1      Care Instructions    Augmentin 875 mg, 1 tablet twice a day for 10 days  Increase fluids  Nasal wash  Mucinex DM 1 tablet twice a day for 10-14 days   Follow up if not better after the antibiotic course.     Sinusitis (Antibiotic Treatment)    The sinuses are air-filled spaces within the bones of the face. They connect to the inside of the nose. Sinusitis is an inflammation of the tissue lining the sinus cavity. Sinus inflammation can occur during a cold. It can also be due to allergies to pollens and other particles in the air. Sinusitis can cause symptoms of sinus congestion and fullness. A sinus infection causes fever, headache and facial pain. There is often green or yellow drainage from the nose or into the back of the throat (post-nasal drip). You have been given antibiotics to treat this condition.  Home care:    Take the full course of antibiotics as instructed. Do not stop taking them, even if you feel better.    Drink plenty of water, hot tea, and other liquids. This may help thin mucus. It also may promote sinus drainage.    Heat may help soothe painful areas of the face. Use a towel soaked in hot water. Or,  the shower and direct the hot spray onto your face. Using a vaporizer along with a menthol rub at night may also help.     An expectorant containing guaifenesin may help thin the mucus and promote drainage from the sinuses.    Over-the-counter decongestants may be used unless a similar medicine was prescribed. Nasal sprays work the fastest. Use one that contains phenylephrine or oxymetazoline. First blow the nose gently. Then  use the spray. Do not use these medicines more often than directed on the label or symptoms may get worse. You may also use tablets containing pseudoephedrine. Avoid products that combine ingredients, because side effects may be increased. Read labels. You can also ask the pharmacist for help. (NOTE: Persons with high blood pressure should not use decongestants. They can raise blood pressure.)    Over-the-counter antihistamines may help if allergies contributed to your sinusitis.      Do not use nasal rinses or irrigation during an acute sinus infection, unless told to by your health care provider. Rinsing may spread the infection to other sinuses.    Use acetaminophen or ibuprofen to control pain, unless another pain medicine was prescribed. (If you have chronic liver or kidney disease or ever had a stomach ulcer, talk with your doctor before using these medicines. Aspirin should never be used in anyone under 18 years of age who is ill with a fever. It may cause severe liver damage.)    Don't smoke. This can worsen symptoms.  Follow-up care  Follow up with your healthcare provider or our staff if you are not improving within the next week.  When to seek medical advice  Call your healthcare provider if any of these occur:    Facial pain or headache becoming more severe    Stiff neck    Unusual drowsiness or confusion    Swelling of the forehead or eyelids    Vision problems, including blurred or double vision    Fever of 100.4 F (38 C) or higher, or as directed by your healthcare provider    Seizure    Breathing problems    Symptoms not resolving within 10 days  Date Last Reviewed: 4/13/2015 2000-2017 [employer]. [address] [place] [address], [place] [address]. All rights reserved. This information is not intended as a substitute for professional medical care. Always follow your healthcare professional's instructions.                Follow-ups after your visit        Your next 10 appointments already  "scheduled     Feb 21, 2018  2:30 PM CST   (Arrive by 2:15 PM)   MA SCREENING DIGITAL BILATERAL with [id number]   [facility] ([facility])    [address]  [address]  [facility] [id number] [phone]           Do not use any powder, lotion or deodorant under your arms or on your breast. If you do, we will ask you to remove it before your exam.  Wear comfortable, two-piece clothing.  If you have any allergies, tell your care team.  Bring any previous mammograms from other facilities or have them mailed to the breast center. Three-dimensional (3D) mammograms are available at [place] locations in [facility], [facility], and [place]. [facility] locations include [place] [facility] in [place]. Benefits of 3D mammograms include: - Improved rate of cancer detection - Decreases your chance of having to go back for more tests, which means fewer: - \"False-positive\" results (This means that there is an abnormal area but it isn't cancer.) - Invasive testing procedures, such as a biopsy or surgery - Can provide clearer images of the breast if you have dense breast tissue. 3D mammography is an optional exam that anyone can have with a 2D mammogram. It doesn't replace or take the place of a 2D mammogram. 2D mammograms remain an effective screening test for all women.  Not all insurance companies cover the cost of a 3D mammogram. Check with your insurance.            Feb 21, 2018  3:15 PM CST   (Arrive by 3:00 PM)   Survivorship Visit with Valerie Silverio PA-C   [facility] ([facility])    [address] [place] [address]  [address]  [facility] [address]   [phone]            Feb 26, 2018  9:35 AM CST   (Arrive by 9:20 AM)   Return Visit with Juan Westbrook MD   [facility] ([facility])    [address] [place] [address]  4th " "[facility] [address] [phone]              Who to contact     If you have questions or need follow up information about today's clinic visit or your schedule please contact [facility] directly at [phone].  Normal or non-critical lab and imaging results will be communicated to you by MyChart, letter or phone within 4 business days after the clinic has received the results. If you do not hear from us within 7 days, please contact the clinic through [employer] or phone. If you have a critical or abnormal lab result, we will notify you by phone as soon as possible.  Submit refill requests through [employer] or call your pharmacy and they will forward the refill request to us. Please allow 3 business days for your refill to be completed.          Additional Information About Your Visit        [employer] Information     [employer] gives you secure access to your electronic health record. If you see a primary care provider, you can also send messages to your care team and make appointments. If you have questions, please call your primary care clinic.  If you do not have a primary care provider, please call [phone] and they will assist you.        Care EveryWhere ID     This is your Care EveryWhere ID. This could be used by other organizations to access your [place] medical records  [id number]        Your Vitals Were     Pulse Temperature Height Pulse Oximetry Breastfeeding? BMI (Body Mass Index)    93 98  F (36.7  C) (Oral) 5' 5\" (1.651 m) 99% No 23.63 kg/m2       Blood Pressure from Last 3 Encounters:   02/19/18 121/76   01/03/18 115/72   10/16/17 128/75    Weight from Last 3 Encounters:   02/19/18 142 lb (64.4 kg)   01/03/18 142 lb 12.8 oz (64.8 kg)   11/17/17 141 lb (64 kg)              Today, you had the following     No orders found for display         Today's Medication Changes          These changes are accurate as of 2/19/18 11:36 AM.  If you have any questions, ask your nurse " or doctor.               Start taking these medicines.        Dose/Directions    amoxicillin-clavulanate 875-125 MG per tablet   Commonly known as:  AUGMENTIN   Used for:  Acute sinusitis with symptoms > 10 days   Started by:  Amy Pool PA-C        Dose:  1 tablet   Take 1 tablet by mouth 2 times daily for 10 days   Quantity:  20 tablet   Refills:  0       Dextromethorphan-Guaifenesin  MG Tb12   Used for:  Acute sinusitis with symptoms > 10 days   Started by:  Amy Pool PA-C        Dose:  1 tablet   Take 1 tablet by mouth 2 times daily   Quantity:  28 tablet   Refills:  0         Stop taking these medicines if you haven't already. Please contact your care team if you have questions.     cyclobenzaprine 5 MG tablet   Commonly known as:  FLEXERIL   Stopped by:  Amy Pool PA-C                Where to get your medicines      These medications were sent to [facility] [address] [place] [address]  AT [address] , [facility] [address]     Phone:  [phone]     amoxicillin-clavulanate 875-125 MG per tablet    Dextromethorphan-Guaifenesin  MG Tb12                Primary Care Provider Office Phone # Fax #    Juan Westbrook [phone] [address] [address]        Equal Access to Services     [facility] AH: Hadii abdullahi ku hadasho Sokojoali, waaxda luqadaha, qaybta kaalmada adeegyada, lulu tuttle. So [facility] [phone].    ATENCIÓN: Si habla español, tiene a lopez disposición servicios gratuitos de asistencia lingüística. Rm al [phone].    We comply with applicable federal civil rights laws and [place] laws. We do not discriminate on the basis of race, color, national origin, age, disability, sex, sexual orientation, or gender identity.            Thank you!     Thank you for choosing [facility]  [facility]  for your care. Our goal is always to provide you with excellent care. Hearing back from our patients is one way we can continue to improve our services. Please take a few minutes to complete the written survey that you may receive in the mail after your visit with us. Thank you!             Your Updated Medication List - Protect others around you: Learn how to safely use, store and throw away your medicines at www.disposemymeds.org.          This list is accurate as of 2/19/18 11:36 AM.  Always use your most recent med list.                   Brand Name Dispense Instructions for use Diagnosis    amoxicillin-clavulanate 875-125 MG per tablet    AUGMENTIN    20 tablet    Take 1 tablet by mouth 2 times daily for 10 days    Acute sinusitis with symptoms > 10 days       aspirin 81 MG tablet      Take 81 mg by mouth daily        carvedilol 3.125 MG tablet    COREG    180 tablet    Take 1 tablet (3.125 mg) by mouth 2 times daily (with meals)    Stress-induced cardiomyopathy       Dextromethorphan-Guaifenesin  MG Tb12     28 tablet    Take 1 tablet by mouth 2 times daily    Acute sinusitis with symptoms > 10 days       gabapentin 100 MG capsule    NEURONTIN    30 capsule    Take 1 capsule (100 mg) by mouth nightly as needed    Pain of left lower extremity       methylPREDNISolone 4 MG tablet    MEDROL DOSEPAK    21 tablet    Follow package instructions    Lumbar sprain, initial encounter       multivitamin per tablet      Take 1 tablet by mouth daily.        simvastatin 20 MG tablet    ZOCOR    90 tablet    Take 1 tablet (20 mg) by mouth At Bedtime    Hyperlipidemia LDL goal <70

## 2018-02-21 DIAGNOSIS — I51.81 STRESS-INDUCED CARDIOMYOPATHY: ICD-10-CM

## 2018-02-21 RX ORDER — CARVEDILOL 3.12 MG/1
3.12 TABLET ORAL 2 TIMES DAILY WITH MEALS
Qty: 180 TABLET | Refills: 2 | Status: SHIPPED | OUTPATIENT
Start: 2018-02-21 | End: 2018-12-10

## 2018-02-23 ENCOUNTER — RADIANT APPOINTMENT (OUTPATIENT)
Dept: MAMMOGRAPHY | Facility: CLINIC | Age: 78
End: 2018-02-23
Payer: COMMERCIAL

## 2018-02-23 ENCOUNTER — ONCOLOGY SURVIVORSHIP VISIT (OUTPATIENT)
Dept: ONCOLOGY | Facility: CLINIC | Age: 78
End: 2018-02-23
Attending: PHYSICIAN ASSISTANT
Payer: MEDICARE

## 2018-02-23 VITALS
OXYGEN SATURATION: 94 % | DIASTOLIC BLOOD PRESSURE: 75 MMHG | TEMPERATURE: 96.8 F | HEIGHT: 65 IN | HEART RATE: 75 BPM | BODY MASS INDEX: 23.35 KG/M2 | SYSTOLIC BLOOD PRESSURE: 134 MMHG | RESPIRATION RATE: 16 BRPM | WEIGHT: 140.13 LBS

## 2018-02-23 DIAGNOSIS — C91.10 CLL (CHRONIC LYMPHOCYTIC LEUKEMIA) (H): ICD-10-CM

## 2018-02-23 DIAGNOSIS — C50.919 MALIGNANT NEOPLASM OF FEMALE BREAST (H): Chronic | ICD-10-CM

## 2018-02-23 DIAGNOSIS — Z12.31 ENCOUNTER FOR SCREENING MAMMOGRAM FOR MALIGNANT NEOPLASM OF BREAST: ICD-10-CM

## 2018-02-23 DIAGNOSIS — Z17.0 MALIGNANT NEOPLASM OF LEFT BREAST IN FEMALE, ESTROGEN RECEPTOR POSITIVE, UNSPECIFIED SITE OF BREAST (H): Primary | Chronic | ICD-10-CM

## 2018-02-23 DIAGNOSIS — C50.912 MALIGNANT NEOPLASM OF LEFT BREAST IN FEMALE, ESTROGEN RECEPTOR POSITIVE, UNSPECIFIED SITE OF BREAST (H): Primary | Chronic | ICD-10-CM

## 2018-02-23 PROCEDURE — G0463 HOSPITAL OUTPT CLINIC VISIT: HCPCS | Mod: ZF

## 2018-02-23 PROCEDURE — 99213 OFFICE O/P EST LOW 20 MIN: CPT | Mod: ZP | Performed by: PHYSICIAN ASSISTANT

## 2018-02-23 ASSESSMENT — PAIN SCALES - GENERAL: PAINLEVEL: NO PAIN (0)

## 2018-02-23 NOTE — MR AVS SNAPSHOT
After Visit Summary   2/23/2018    Kerri Shook    MRN: [id number]           Patient Information     Date Of Birth          1940        Visit Information        Provider Department      2/23/2018 11:00 AM Valerie Silverio PA-C [facility]        Today's Diagnoses     Malignant neoplasm of left breast in female, estrogen receptor positive, unspecified site of breast (H)    -  1    CLL (chronic lymphocytic leukemia) (H)           Follow-ups after your visit        Future tests that were ordered for you today     Open Future Orders        Priority Expected Expires Ordered    CBC with platelets differential Routine 2/26/2018 3/12/2018 2/26/2018    Comprehensive metabolic panel Routine 2/26/2018 3/12/2018 2/26/2018            Who to contact     If you have questions or need follow up information about today's clinic visit or your schedule please contact [facility] directly at [phone].  Normal or non-critical lab and imaging results will be communicated to you by [employer]hart, letter or phone within 4 business days after the clinic has received the results. If you do not hear from us within 7 days, please contact the clinic through [employer]hart or phone. If you have a critical or abnormal lab result, we will notify you by phone as soon as possible.  Submit refill requests through [email or web address] or call your pharmacy and they will forward the refill request to us. Please allow 3 business days for your refill to be completed.          Additional Information About Your Visit        MyChart Information     [email or web address] gives you secure access to your electronic health record. If you see a primary care provider, you can also send messages to your care team and make appointments. If you have questions, please call your primary care clinic.  If you do not have a primary care provider, please call [phone] and they will assist you.        Care EveryWhere ID     This is your Care  "EveryWhere ID. This could be used by other organizations to access your [place] medical records  [id number]        Your Vitals Were     Pulse Temperature Respirations Height Pulse Oximetry BMI (Body Mass Index)    75 96.8  F (36  C) (Oral) 16 1.651 m (5' 5\") 94% 23.32 kg/m2       Blood Pressure from Last 3 Encounters:   02/26/18 123/73   02/23/18 134/75   02/19/18 121/76    Weight from Last 3 Encounters:   02/26/18 63.1 kg (139 lb 3.2 oz)   02/23/18 63.6 kg (140 lb 2 oz)   02/19/18 64.4 kg (142 lb)              Today, you had the following     No orders found for display         Today's Medication Changes          These changes are accurate as of 2/23/18 11:59 PM.  If you have any questions, ask your nurse or doctor.               Stop taking these medicines if you haven't already. Please contact your care team if you have questions.     methylPREDNISolone 4 MG tablet   Commonly known as:  MEDROL DOSEPAK   Stopped by:  Valerie Silverio PA-C                    Primary Care Provider Office Phone # Fax #    Juan Westbrook [phone] [address] [address]        Equal Access to Services     KENZIE VILLARREAL : Hadpiedad ramoso Sorachelle, waaxda luqadaha, qaybta kaalmada adeegyada, lulu bashir . So [facility] [phone].    ATENCIÓN: Si habla español, tiene a lopez disposición servicios gratuitos de asistencia lingüística. [facility] [phone].    We comply with applicable federal civil rights laws and [place] laws. We do not discriminate on the basis of race, color, national origin, age, disability, sex, sexual orientation, or gender identity.            Thank you!     Thank you for choosing [facility]  for your care. Our goal is always to provide you with excellent care. Hearing back from our patients is one way we can continue to improve our services. Please take a few minutes to complete the written survey that you may " receive in the mail after your visit with us. Thank you!             Your Updated Medication List - Protect others around you: Learn how to safely use, store and throw away your medicines at www.disposemymeds.org.          This list is accurate as of 2/23/18 11:59 PM.  Always use your most recent med list.                   Brand Name Dispense Instructions for use Diagnosis    amoxicillin-clavulanate 875-125 MG per tablet    AUGMENTIN    20 tablet    Take 1 tablet by mouth 2 times daily for 10 days    Acute sinusitis with symptoms > 10 days       aspirin 81 MG tablet      Take 81 mg by mouth daily        carvedilol 3.125 MG tablet    COREG    180 tablet    Take 1 tablet (3.125 mg) by mouth 2 times daily (with meals)    Stress-induced cardiomyopathy       Dextromethorphan-Guaifenesin  MG Tb12     28 tablet    Take 1 tablet by mouth 2 times daily    Acute sinusitis with symptoms > 10 days       gabapentin 100 MG capsule    NEURONTIN    30 capsule    Take 1 capsule (100 mg) by mouth nightly as needed    Pain of left lower extremity       multivitamin per tablet      Take 1 tablet by mouth daily.        simvastatin 20 MG tablet    ZOCOR    90 tablet    Take 1 tablet (20 mg) by mouth At Bedtime    Hyperlipidemia LDL goal <70

## 2018-02-23 NOTE — NURSING NOTE
"Oncology Rooming Note    February 23, 2018 11:21 AM   Kerri Shook is a 77 year old female who presents for:    Chief Complaint   Patient presents with     Oncology Clinic Visit     Return: Survivorship     Initial Vitals: /75  Pulse 75  Temp 96.8  F (36  C) (Oral)  Resp 16  Ht 1.651 m (5' 5\")  Wt 63.6 kg (140 lb 2 oz)  SpO2 94%  BMI 23.32 kg/m2 Estimated body mass index is 23.32 kg/(m^2) as calculated from the following:    Height as of this encounter: 1.651 m (5' 5\").    Weight as of this encounter: 63.6 kg (140 lb 2 oz). Body surface area is 1.71 meters squared.  No Pain (0) Comment: Data Unavailable   No LMP recorded. Patient is postmenopausal.  Allergies reviewed: Yes  Medications reviewed: Yes    Medications: Medication refills not needed today.  Pharmacy name entered into [employer]: [facility] [address] [place] [address]  AT [facility] [address]    Clinical concerns: no new concerns Valerie Silverio was NOT notified.    10 minutes for nursing intake (face to face time)     Donald Luna CMA              "

## 2018-02-23 NOTE — PROGRESS NOTES
DIAGNOSIS: CANCER SURVIVORSHIP PROGRAM  1. CLL in 1986. Ms. Shook went in for routine blood testing, was found to have an elevated white blood count. This was consistent with CLL. She was followed here at the [facility] by Dr. Puente. She has received no treatment for her CLL.   2. 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 The patient had a screening mammogram in 1990, which showed an abnormality in the left breast. She underwent a lumpectomy on 03/02/1990, which was consistent with a 1.5 cm primary, mucinous adenocarcinoma with 0 of 8 lymph nodes positive. She was staged as a stage I (T1 N0 M0). The tumor was ER positive. She did have radiation to the left breast with a boost having completed 6600 cGy in 33 fractions from 03/28/1990 until 05/15/1990. She went on to take tamoxifen for 6 years from 03/1990 until 03/1996.  CANCER TREATMENT:  CLL   *No treatment   BREAST CANCER:  *Left lumpectomy on 03/02/1990.   *Radiation to the left breast with a boost having completed 6600 cGy in 33 fractions from 03/28/1990 until 05/15/1990.   *Tamoxifen from 03/1990 until 03/1996.   INTERVAL HISTORY:  Ms. Shook returns to clinic today for follow-up for her breast carcinoma and CLL.  She has done well over the last year.  She had a mammogram done today.  She is not having breast concerns.  She denies any adenopathy.  She denies any chest pain, shortness of breath, cough, lower extremity edema.  She is having some mild nausea secondary to the antibiotic she is on for sinusitis.  No vomiting or abdominal pain.  She denies any bone pains or headaches.    MEDICATIONS:   Current Outpatient Prescriptions   Medication Sig Dispense Refill     carvedilol (COREG) 3.125 MG tablet Take 1 tablet (3.125 mg) by mouth 2 times daily (with meals) 180 tablet 2     amoxicillin-clavulanate (AUGMENTIN) 875-125 MG per tablet Take 1 tablet by mouth 2 times daily for 10 days 20 tablet 0     simvastatin (ZOCOR) 20 MG tablet Take 1 tablet  "(20 mg) by mouth At Bedtime 90 tablet 3     gabapentin (NEURONTIN) 100 MG capsule Take 1 capsule (100 mg) by mouth nightly as needed 30 capsule 0     aspirin 81 MG tablet Take 81 mg by mouth daily       Multiple Vitamin (MULTIVITAMIN) per tablet Take 1 tablet by mouth daily.       Dextromethorphan-Guaifenesin  MG TB12 Take 1 tablet by mouth 2 times daily (Patient not taking: Reported on 2/23/2018) 28 tablet 0         ALLERGIES:    Allergies   Allergen Reactions     Nkda [No Known Drug Allergies]        PHYSICAL EXAMINATION:  Vitals: /75  Pulse 75  Temp 96.8  F (36  C) (Oral)  Resp 16  Ht 1.651 m (5' 5\")  Wt 63.6 kg (140 lb 2 oz)  SpO2 94%  BMI 23.32 kg/m2  GENERAL:  A pleasant person in no acute distress.   HEENT:  Sclerae are nonicteric.    NECK:  Supple.   LYMPH NODES:  No peripheral lymphadenopathy noted in the axillary, supraclavicular, or cervical regions.   LUNGS:  Clear to auscultation bilaterally.   HEART:  Regular rate and rhythm, with no murmur appreciated.   ABDOMEN:  Bowel sounds are active.  Soft and nontender.  No hepatosplenomegaly or other masses appreciated.  LOWER EXTREMITIES:  Without pitting edema to the knees bilaterally.   NEUROLOGICAL:  Alert/orientated/able to answer all questions.  CN grossly intact.  PSYCH:  Normal affect.  SKIN:  No suspicious lesions on areas of exposed skin.  BREAST: Right breast normal to inspection, no masses. Left breast, well healed surgical incision from the 11-1 o'clock position. No masses.     LAB:      10/16/2017 10:39   WBC 19.8 (H)   Hemoglobin 14.1   Hematocrit 43.9   Platelet Count 195   RBC Count 4.65   MCV 94   MCH 30.3   MCHC 32.1   RDW 13.3   Diff Method Automated Method   % Neutrophils 25.3   % Lymphocytes 69.9   % Monocytes 3.6   % Eosinophils 0.6   % Basophils 0.4   % Immature Granulocytes 0.2   Nucleated RBCs 0   Absolute Neutrophil 5.0   Absolute Lymphocytes 13.8 (H)   Absolute Monocytes 0.7   Absolute Eosinophils 0.1   Absolute " Basophils 0.1   Abs Immature Granulocytes 0.0   Absolute Nucleated RBC 0.0     RADIOLOGY:  Screening mammogram today:  Report still pending.    IMPRESSION/PLAN:   1. Stage I (T1 N0 M0) mucinous adenocarcinoma of the left breast. ER positive. This was treated as above with lumpectomy, radiation and tamoxifen. Ms. Shook continues to do well. She is not having any signs or symptoms that would suggest recurrence of her breast carcinoma. She had her yearly mammogram today, results still pending.  She does wish to follow here in the Cancer Survivorship Program, and I will see her in 1 year with her bilateral mammogram.   2. CLL. She was originally diagnosed in 1986, and has never received any treatment. She has been released from Dr. Giulia Puente since she has never been treated, and likely will not need treatment for CLL in the future. Dr. Puente did recommend a CBC yearly. A CBC was obtained in 10/2017, which shows a stable white blood count/lymphocyte count. Hemoglobin and platelet count are normal. Between Dr. Westbrook and myself, we will plan to order the CBC at least once a year.   3. Cardiac complications. Given her left-sided breast radiation, she is at risk for cardiac complications in the future.  She had an echocardiogram in 03/2013, which showed no concerns. She has also had a cardiac catheterization.  She continues on the Coreg and will discuss this medication with Dr. Westbrook.   She needs to follow up with her PCP for screening/treatment of blood pressure, cholesterol and diabetes.  I do not recommend further echocardiograms unless she develops cardiac symptoms.  4. Radiation side effects. The bones in the area of the radiation are at risk for fractures.  The lungs were also in the treatment field. Since she is asymptomatic, no routine screening is recommended. Should she develop a new cough, dyspnea or hemoptysis, I will consider chest imaging at that time.  In regards to the skin in the area of the  radiation, should she note any lesions, she should see her family care provider or dermatologist.   5. Surgery complications. She remains at risk for lymphedema, and contact the clinic if she notices any swelling in her left arm.  She denies lymphedema.  6. Bone health. She had a DEXA scan in 01/2017. Her most valid and negative T-score was consistent with normal bone density. She does do weightbearing exercises 2-3 days a week. She does consume calcium with vitamin D.  She will discuss the need further DEXA scans with her PCP.  7. Cancer screening. She should continue undergoing general cancer screening for women in her age group. At the age of 76, she no longer needs cervical cancer screening. She should continue to get pelvic exams, as directed by her PCP or GYN. If she has any vaginal bleeding, she should notify her health care provider. She has already begun colorectal cancer screening, and last colonoscopy was October 2017 with recommendations for follow up colonoscopy in 3 years.  She should limit her sun exposure, and when out in the sun use of sunscreens.   8. Healthy lifestyle. Her BMI is around 23.   She should exercise 150 minutes of cardiovascular exercise per week.  She should eat lots of fruits and vegetables. She should continue the yearly influenza vaccines. She has already received the pneumococcal vaccine. She should see her general care provider for screening/treatment of cholesterol, blood pressure and glucose. She should see her eye doctor and dentist yearly.      If there are no interval concerns, patient will follow up in 1 year with her mammogram.

## 2018-02-23 NOTE — LETTER
2/23/2018      RE: Kerri Shook  [address] [facility] [facility] [id number]       DIAGNOSIS: CANCER SURVIVORSHIP PROGRAM  1. CLL in 1986. Ms. Shook went in for routine blood testing, was found to have an elevated white blood count. This was consistent with CLL. She was followed here at the [facility] by Dr. Puente. She has received no treatment for her CLL.   2. 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 The patient had a screening mammogram in 1990, which showed an abnormality in the left breast. She underwent a lumpectomy on 03/02/1990, which was consistent with a 1.5 cm primary, mucinous adenocarcinoma with 0 of 8 lymph nodes positive. She was staged as a stage I (T1 N0 M0). The tumor was ER positive. She did have radiation to the left breast with a boost having completed 6600 cGy in 33 fractions from 03/28/1990 until 05/15/1990. She went on to take tamoxifen for 6 years from 03/1990 until 03/1996.  CANCER TREATMENT:  CLL   *No treatment   BREAST CANCER:  *Left lumpectomy on 03/02/1990.   *Radiation to the left breast with a boost having completed 6600 cGy in 33 fractions from 03/28/1990 until 05/15/1990.   *Tamoxifen from 03/1990 until 03/1996.   INTERVAL HISTORY:  Ms. Shook returns to clinic today for follow-up for her breast carcinoma and CLL.  She has done well over the last year.  She had a mammogram done today.  She is not having breast concerns.  She denies any adenopathy.  She denies any chest pain, shortness of breath, cough, lower extremity edema.  She is having some mild nausea secondary to the antibiotic she is on for sinusitis.  No vomiting or abdominal pain.  She denies any bone pains or headaches.    MEDICATIONS:   Current Outpatient Prescriptions   Medication Sig Dispense Refill     carvedilol (COREG) 3.125 MG tablet Take 1 tablet (3.125 mg) by mouth 2 times daily (with meals) 180 tablet 2     amoxicillin-clavulanate (AUGMENTIN) 875-125 MG per tablet Take 1 tablet by mouth 2  "times daily for 10 days 20 tablet 0     simvastatin (ZOCOR) 20 MG tablet Take 1 tablet (20 mg) by mouth At Bedtime 90 tablet 3     gabapentin (NEURONTIN) 100 MG capsule Take 1 capsule (100 mg) by mouth nightly as needed 30 capsule 0     aspirin 81 MG tablet Take 81 mg by mouth daily       Multiple Vitamin (MULTIVITAMIN) per tablet Take 1 tablet by mouth daily.       Dextromethorphan-Guaifenesin  MG TB12 Take 1 tablet by mouth 2 times daily (Patient not taking: Reported on 2/23/2018) 28 tablet 0         ALLERGIES:    Allergies   Allergen Reactions     Nkda [No Known Drug Allergies]        PHYSICAL EXAMINATION:  Vitals: /75  Pulse 75  Temp 96.8  F (36  C) (Oral)  Resp 16  Ht 1.651 m (5' 5\")  Wt 63.6 kg (140 lb 2 oz)  SpO2 94%  BMI 23.32 kg/m2  GENERAL:  A pleasant person in no acute distress.   HEENT:  Sclerae are nonicteric.    NECK:  Supple.   LYMPH NODES:  No peripheral lymphadenopathy noted in the axillary, supraclavicular, or cervical regions.   LUNGS:  Clear to auscultation bilaterally.   HEART:  Regular rate and rhythm, with no murmur appreciated.   ABDOMEN:  Bowel sounds are active.  Soft and nontender.  No hepatosplenomegaly or other masses appreciated.  LOWER EXTREMITIES:  Without pitting edema to the knees bilaterally.   NEUROLOGICAL:  Alert/orientated/able to answer all questions.  CN grossly intact.  PSYCH:  Normal affect.  SKIN:  No suspicious lesions on areas of exposed skin.  BREAST: Right breast normal to inspection, no masses. Left breast, well healed surgical incision from the 11-1 o'clock position. No masses.     LAB:      10/16/2017 10:39   WBC 19.8 (H)   Hemoglobin 14.1   Hematocrit 43.9   Platelet Count 195   RBC Count 4.65   MCV 94   MCH 30.3   MCHC 32.1   RDW 13.3   Diff Method Automated Method   % Neutrophils 25.3   % Lymphocytes 69.9   % Monocytes 3.6   % Eosinophils 0.6   % Basophils 0.4   % Immature Granulocytes 0.2   Nucleated RBCs 0   Absolute Neutrophil 5.0 "   Absolute Lymphocytes 13.8 (H)   Absolute Monocytes 0.7   Absolute Eosinophils 0.1   Absolute Basophils 0.1   Abs Immature Granulocytes 0.0   Absolute Nucleated RBC 0.0     RADIOLOGY:  Screening mammogram today:  Report still pending.    IMPRESSION/PLAN:   1. Stage I (T1 N0 M0) mucinous adenocarcinoma of the left breast. ER positive. This was treated as above with lumpectomy, radiation and tamoxifen. Ms. Shook continues to do well. She is not having any signs or symptoms that would suggest recurrence of her breast carcinoma. She had her yearly mammogram today, results still pending.  She does wish to follow here in the Cancer Survivorship Program, and I will see her in 1 year with her bilateral mammogram.   2. CLL. She was originally diagnosed in 1986, and has never received any treatment. She has been released from Dr. Giulia Puente since she has never been treated, and likely will not need treatment for CLL in the future. Dr. Puente did recommend a CBC yearly. A CBC was obtained in 10/2017, which shows a stable white blood count/lymphocyte count. Hemoglobin and platelet count are normal. Between Dr. Westbrook and myself, we will plan to order the CBC at least once a year.   3. Cardiac complications. Given her left-sided breast radiation, she is at risk for cardiac complications in the future.  She had an echocardiogram in 03/2013, which showed no concerns. She has also had a cardiac catheterization.  She continues on the Coreg and will discuss this medication with Dr. Westbrook.   She needs to follow up with her PCP for screening/treatment of blood pressure, cholesterol and diabetes.  I do not recommend further echocardiograms unless she develops cardiac symptoms.  4. Radiation side effects. The bones in the area of the radiation are at risk for fractures.  The lungs were also in the treatment field. Since she is asymptomatic, no routine screening is recommended. Should she develop a new cough, dyspnea or  hemoptysis, I will consider chest imaging at that time.  In regards to the skin in the area of the radiation, should she note any lesions, she should see her family care provider or dermatologist.   5. Surgery complications. She remains at risk for lymphedema, and contact the clinic if she notices any swelling in her left arm.  She denies lymphedema.  6. Bone health. She had a DEXA scan in 01/2017. Her most valid and negative T-score was consistent with normal bone density. She does do weightbearing exercises 2-3 days a week. She does consume calcium with vitamin D.  She will discuss the need further DEXA scans with her PCP.  7. Cancer screening. She should continue undergoing general cancer screening for women in her age group. At the age of 76, she no longer needs cervical cancer screening. She should continue to get pelvic exams, as directed by her PCP or GYN. If she has any vaginal bleeding, she should notify her health care provider. She has already begun colorectal cancer screening, and last colonoscopy was October 2017 with recommendations for follow up colonoscopy in 3 years.  She should limit her sun exposure, and when out in the sun use of sunscreens.   8. Healthy lifestyle. Her BMI is around 23.   She should exercise 150 minutes of cardiovascular exercise per week.  She should eat lots of fruits and vegetables. She should continue the yearly influenza vaccines. She has already received the pneumococcal vaccine. She should see her general care provider for screening/treatment of cholesterol, blood pressure and glucose. She should see her eye doctor and dentist yearly.      If there are no interval concerns, patient will follow up in 1 year with her mammogram.      Valerie Silverio PA-C

## 2018-02-26 ENCOUNTER — OFFICE VISIT (OUTPATIENT)
Dept: INTERNAL MEDICINE | Facility: CLINIC | Age: 78
End: 2018-02-26
Payer: COMMERCIAL

## 2018-02-26 VITALS
HEART RATE: 81 BPM | DIASTOLIC BLOOD PRESSURE: 73 MMHG | WEIGHT: 139.2 LBS | BODY MASS INDEX: 23.16 KG/M2 | SYSTOLIC BLOOD PRESSURE: 123 MMHG

## 2018-02-26 DIAGNOSIS — I10 BENIGN ESSENTIAL HYPERTENSION: ICD-10-CM

## 2018-02-26 DIAGNOSIS — I10 BENIGN ESSENTIAL HYPERTENSION: Primary | ICD-10-CM

## 2018-02-26 DIAGNOSIS — Z85.3 HX: BREAST CANCER: ICD-10-CM

## 2018-02-26 LAB
ALBUMIN SERPL-MCNC: 3.5 G/DL (ref 3.4–5)
ALP SERPL-CCNC: 79 U/L (ref 40–150)
ALT SERPL W P-5'-P-CCNC: 36 U/L (ref 0–50)
ANION GAP SERPL CALCULATED.3IONS-SCNC: 4 MMOL/L (ref 3–14)
AST SERPL W P-5'-P-CCNC: 21 U/L (ref 0–45)
BASOPHILS # BLD AUTO: 0.1 10E9/L (ref 0–0.2)
BASOPHILS NFR BLD AUTO: 0.3 %
BILIRUB SERPL-MCNC: 0.3 MG/DL (ref 0.2–1.3)
BUN SERPL-MCNC: 14 MG/DL (ref 7–30)
CALCIUM SERPL-MCNC: 8.7 MG/DL (ref 8.5–10.1)
CHLORIDE SERPL-SCNC: 105 MMOL/L (ref 94–109)
CO2 SERPL-SCNC: 29 MMOL/L (ref 20–32)
CREAT SERPL-MCNC: 0.85 MG/DL (ref 0.52–1.04)
CRP SERPL-MCNC: 4.4 MG/L (ref 0–8)
DIFFERENTIAL METHOD BLD: ABNORMAL
EOSINOPHIL # BLD AUTO: 0.2 10E9/L (ref 0–0.7)
EOSINOPHIL NFR BLD AUTO: 1.3 %
ERYTHROCYTE [DISTWIDTH] IN BLOOD BY AUTOMATED COUNT: 13.5 % (ref 10–15)
ERYTHROCYTE [SEDIMENTATION RATE] IN BLOOD BY WESTERGREN METHOD: 12 MM/H (ref 0–30)
GFR SERPL CREATININE-BSD FRML MDRD: 65 ML/MIN/1.7M2
GLUCOSE SERPL-MCNC: 76 MG/DL (ref 70–99)
HCT VFR BLD AUTO: 42.9 % (ref 35–47)
HGB BLD-MCNC: 14.2 G/DL (ref 11.7–15.7)
IMM GRANULOCYTES # BLD: 0 10E9/L (ref 0–0.4)
IMM GRANULOCYTES NFR BLD: 0.2 %
LYMPHOCYTES # BLD AUTO: 11.9 10E9/L (ref 0.8–5.3)
LYMPHOCYTES NFR BLD AUTO: 66.2 %
MCH RBC QN AUTO: 31.1 PG (ref 26.5–33)
MCHC RBC AUTO-ENTMCNC: 33.1 G/DL (ref 31.5–36.5)
MCV RBC AUTO: 94 FL (ref 78–100)
MONOCYTES # BLD AUTO: 1.3 10E9/L (ref 0–1.3)
MONOCYTES NFR BLD AUTO: 7.2 %
NEUTROPHILS # BLD AUTO: 4.5 10E9/L (ref 1.6–8.3)
NEUTROPHILS NFR BLD AUTO: 24.8 %
NRBC # BLD AUTO: 0 10*3/UL
NRBC BLD AUTO-RTO: 0 /100
PLATELET # BLD AUTO: 200 10E9/L (ref 150–450)
PLATELET # BLD EST: ABNORMAL 10*3/UL
POTASSIUM SERPL-SCNC: 4.4 MMOL/L (ref 3.4–5.3)
PROT SERPL-MCNC: 6.6 G/DL (ref 6.8–8.8)
RBC # BLD AUTO: 4.56 10E12/L (ref 3.8–5.2)
RBC MORPH BLD: NORMAL
SODIUM SERPL-SCNC: 137 MMOL/L (ref 133–144)
WBC # BLD AUTO: 18 10E9/L (ref 4–11)

## 2018-02-26 ASSESSMENT — PAIN SCALES - GENERAL: PAINLEVEL: NO PAIN (0)

## 2018-02-26 NOTE — NURSING NOTE
Chief Complaint   Patient presents with     Results     Patient here to go over results.     Jennifer Cedillo LPN at 9:34 AM on 2/26/2018.

## 2018-02-26 NOTE — MR AVS SNAPSHOT
After Visit Summary   2/26/2018    Kerri Shook    MRN: [id number]           Patient Information     Date Of Birth          1940        Visit Information        Provider Department      2/26/2018 9:35 AM Juan Westbrook MD [facility]        Today's Diagnoses     Benign essential hypertension    -  1    HX: breast cancer          Care Instructions    [facility]: [phone]     Primary Care Center Medication Refill Request Information:  * Please contact your pharmacy regarding ANY request for medication refills.  ** [facility] Prescription Fax = [phone]  * Please allow 3 business days for routine medication refills.  * Please allow 5 business days for controlled substance medication refills.     Primary Care Center Test Result notification information:  *You will be notified with in 7-10 days of your appointment day regarding the results of your test.  If you are on MyChart you will be notified as soon as the provider has reviewed the results and signed off on them.    [facility] ([facility]) [phone] ([address], [address])               Follow-ups after your visit        Additional Services     OB/GYN REFERRAL       Annual exam h/o breast cancer                  Follow-up notes from your care team     Return in about 4 months (around 6/26/2018).      Your next 10 appointments already scheduled     Mar 13, 2018  2:00 PM CDT   Annual Visit with Ladonna Leal MD   [facility] ([facility])    [facility] [address]  [address],[place] [address]  [address]  [facility] [place] [facility] [address] [phone]            Jun 26, 2018 10:05 AM CDT   (Arrive by 9:50 AM)   Return Visit with Juan Westbrook MD   [facility] ([facility])    [address]  4th Floor  [facility] [place] [facility] [address] [phone]              Who to contact     Please call your clinic at  [phone] to:    Ask questions about your health    Make or cancel appointments    Discuss your medicines    Learn about your test results    Speak to your doctor            Additional Information About Your Visit        incrediblueharNambii Information     [employer] gives you secure access to your electronic health record. If you see a primary care provider, you can also send messages to your care team and make appointments. If you have questions, please call your primary care clinic.  If you do not have a primary care provider, please call [phone] and they will assist you.      [employer] is an electronic gateway that provides easy, online access to your medical records. With [employer], you can request a clinic appointment, read your test results, renew a prescription or communicate with your care team.     To access your existing account, please contact your [facility] or call [phone] for assistance.        Care EveryWhere ID     This is your Care EveryWhere ID. This could be used by other organizations to access your [facility] medical records  [id number]        Your Vitals Were     Pulse Breastfeeding? BMI (Body Mass Index)             81 No 23.16 kg/m2          Blood Pressure from Last 3 Encounters:   02/26/18 123/73   02/23/18 134/75   02/19/18 121/76    Weight from Last 3 Encounters:   02/26/18 63.1 kg (139 lb 3.2 oz)   02/23/18 63.6 kg (140 lb 2 oz)   02/19/18 64.4 kg (142 lb)              We Performed the Following     OB/GYN REFERRAL        Primary Care Provider Office Phone # Fax #    Juan Westbrook [phone]       [address] [address]        Equal Access to Services     [facility]: Hadii aad ku hadasho Soomaali, waaxda luqadaha, qaybta kaalmada adeegyada, lulu tuttle. So [facility] [phone].    ATENCIÓN: Si habla español, tiene a lopez disposición servicios gratuitos de asistencia lingüística. Llame al  [phone].    We comply with applicable federal civil rights laws and [place] laws. We do not discriminate on the basis of race, color, national origin, age, disability, sex, sexual orientation, or gender identity.            Thank you!     Thank you for choosing [facility]  for your care. Our goal is always to provide you with excellent care. Hearing back from our patients is one way we can continue to improve our services. Please take a few minutes to complete the written survey that you may receive in the mail after your visit with us. Thank you!             Your Updated Medication List - Protect others around you: Learn how to safely use, store and throw away your medicines at www.disposemymeds.org.          This list is accurate as of 2/26/18  3:52 PM.  Always use your most recent med list.                   Brand Name Dispense Instructions for use Diagnosis    amoxicillin-clavulanate 875-125 MG per tablet    AUGMENTIN    20 tablet    Take 1 tablet by mouth 2 times daily for 10 days    Acute sinusitis with symptoms > 10 days       aspirin 81 MG tablet      Take 81 mg by mouth daily        carvedilol 3.125 MG tablet    COREG    180 tablet    Take 1 tablet (3.125 mg) by mouth 2 times daily (with meals)    Stress-induced cardiomyopathy       Dextromethorphan-Guaifenesin  MG Tb12     28 tablet    Take 1 tablet by mouth 2 times daily    Acute sinusitis with symptoms > 10 days       gabapentin 100 MG capsule    NEURONTIN    30 capsule    Take 1 capsule (100 mg) by mouth nightly as needed    Pain of left lower extremity       multivitamin per tablet      Take 1 tablet by mouth daily.        simvastatin 20 MG tablet    ZOCOR    90 tablet    Take 1 tablet (20 mg) by mouth At Bedtime    Hyperlipidemia LDL goal <70

## 2018-02-26 NOTE — PATIENT INSTRUCTIONS
[facility]: [phone]     [facility] Medication Refill Request Information:  * Please contact your pharmacy regarding ANY request for medication refills.  ** [facility] Prescription Fax = [phone]  * Please allow 3 business days for routine medication refills.  * Please allow 5 business days for controlled substance medication refills.     Primary Care Center Test Result notification information:  *You will be notified with in 7-10 days of your appointment day regarding the results of your test.  If you are on MyChart you will be notified as soon as the provider has reviewed the results and signed off on them.    [facility] ([place] [place] [facility]) [phone] ([address], [address])

## 2018-02-26 NOTE — PROGRESS NOTES
HPI:    Pt. Comes in for follow up today.   She o/w is doing well and denies additional HEENT, cardiopulmonary, abdominal, , GYN, neurological complaints. She has h/o breast CA and was seen by Dr. Sheldon, 11/13.   She was also  followed by Dr. Cindi WALLER for CLL and was seen 10/13.  She some chronic Low back pain that is getting less with PT.  She had some skin back lesions.   She was seen by Ms. Jean Claude WALLER 2/23/2018  She is active with exercise and denies any rest/exertional CP/SOB/palptations today.       Past Medical History:   Diagnosis Date     Breast disorder 1990    Breast Cancer     Cardiomyopathy (H)      Chronic osteoarthritis 2012    resulted in hip replacement     CLL (chronic lymphoblastic leukemia) 1989     Colon adenomas     6/26/14 colonoscopy, repeat in 3 years     History of blood transfusion      hyperlipidemia      Primary localized osteoarthrosis, pelvic region and thigh      Tinnitus          PE:    Vitals noted; HEENT, ears normal oropharynx clear no exudate, neck supple nl rom,  No adenopahty, LCTA, RRR, S1, S2, no MRG. No B temporal tenderness to palpation. No B carotid bruits, no B temporal bruits,   Normal neurological exam. She has some back skin lesions.  No tenderness to palpation of low back area. Abdomen; positive BS's no masses no tenderness. No B CVA tenderness to palpation. Grossly normal neurological exam.       A/P:    1. DEXA scan stable  2. CLL; will check labs and continue to follow.  3. Breast Cancer,  mammogram done 2/23/18  4. HTN stable; checked labs 10/17.  5. Elevated cholesterol; will check fasting lipids and labs.  6. She was seen in ONC clinic by Ms. Silverio 2/23/18  7. She was seen in  Dermatology for skin check and seen 6/17  8. MRI lumbar spine for back pain done 5/9/16 and seen by Dr. Delong, ortho 6/13/16. I also reviewed these findings with Dr. Cindi WALLER (see Telephone note from 6/3/16). Repeat MRI Lumbar spine with contrast done 1/31/17 and no acute  findings. She was seen by Dr. Charles, [facility] 1/2018 and give Medrol dose pack and less sxs. Currently.   9. Refer to [facility] for annual exam and she was seen 5/6/16. Placed referral  Today 2/26/18.  10. Neck and L shoulder pain; if persists, X-ray shoulder and MRI cervical spine.  Currently not complaints.  11. X-ray L knee was done 3/27/17 and she was seen by Dr. Delong, ortho 3/29/17. Currently no complaints.   12. Low dose Gabapentin for restless legs; she not taken this because of less sxs.   13. Colonoscopy 10/9/17 repeat in 3 years with 10 mm and 15 mm polyp.   14. ENT referral for ear ringing and seen Dr. Sanchez 11/17/17.  15. She had flu shot 10/2017. Consider new Zoster Vaccination and check with insurance.   16. She was seen in Urgent Care for R sided HA and possible sinus. Treated with antibiotics and feels overall better. Will check ESR, CRP today and if worse get MRI brain. She will contact me. She denies any eye complaints.     I will see her back in about 4  Months.             Total time spent 25 minutes.  More than 50% of the time spent with Ms. Shook on counseling / coordinating her care

## 2018-03-04 ENCOUNTER — OFFICE VISIT (OUTPATIENT)
Dept: URGENT CARE | Facility: URGENT CARE | Age: 78
End: 2018-03-04
Payer: COMMERCIAL

## 2018-03-04 VITALS
OXYGEN SATURATION: 94 % | WEIGHT: 138.56 LBS | TEMPERATURE: 98.1 F | HEART RATE: 75 BPM | SYSTOLIC BLOOD PRESSURE: 116 MMHG | BODY MASS INDEX: 23.06 KG/M2 | DIASTOLIC BLOOD PRESSURE: 68 MMHG

## 2018-03-04 DIAGNOSIS — J01.01 ACUTE RECURRENT MAXILLARY SINUSITIS: Primary | ICD-10-CM

## 2018-03-04 PROCEDURE — 99214 OFFICE O/P EST MOD 30 MIN: CPT | Performed by: PHYSICIAN ASSISTANT

## 2018-03-04 RX ORDER — CEFUROXIME AXETIL 500 MG/1
500 TABLET ORAL 2 TIMES DAILY
Qty: 20 TABLET | Refills: 0 | Status: SHIPPED | OUTPATIENT
Start: 2018-03-04 | End: 2018-04-13

## 2018-03-04 RX ORDER — FLUTICASONE PROPIONATE 50 MCG
1-2 SPRAY, SUSPENSION (ML) NASAL DAILY
Qty: 1 BOTTLE | Refills: 11 | Status: SHIPPED | OUTPATIENT
Start: 2018-03-04 | End: 2018-04-13

## 2018-03-04 NOTE — MR AVS SNAPSHOT
After Visit Summary   3/4/2018    Kerri Shook    MRN: [id number]           Patient Information     Date Of Birth          1940        Visit Information        Provider Department      3/4/2018 10:45 AM Bri Cevallos PA-C [facility]        Today's Diagnoses     Acute sinusitis with symptoms > 10 days    -  1       Follow-ups after your visit        Your next 10 appointments already scheduled     Mar 13, 2018  2:00 PM CDT   Annual Visit with Ladonna Leal MD   [facility] ([facility])    [facility] [address]  [address]  [address]  [facility] [address]   [phone]            Jun 26, 2018 10:05 AM CDT   (Arrive by 9:50 AM)   Return Visit with Juan Westbrook MD   [facility] ([facility])    [address]  4th Floor  [facility] [address] [phone]              Who to contact     If you have questions or need follow up information about today's clinic visit or your schedule please contact [facility] directly at [phone].  Normal or non-critical lab and imaging results will be communicated to you by MyChart, letter or phone within 4 business days after the clinic has received the results. If you do not hear from us within 7 days, please contact the clinic through MyChart or phone. If you have a critical or abnormal lab result, we will notify you by phone as soon as possible.  Submit refill requests through [facility] or call your pharmacy and they will forward the refill request to us. Please allow 3 business days for your refill to be completed.          Additional Information About Your Visit        MyChart Information     [facility] gives you secure access to your electronic health record. If you see a primary care provider, you can also send messages to your care team and make appointments. If you have questions, please call your  primary care clinic.  If you do not have a primary care provider, please call [phone] and they will assist you.        Care EveryWhere ID     This is your Care EveryWhere ID. This could be used by other organizations to access your [facility] medical records  [id number]        Your Vitals Were     Pulse Temperature Pulse Oximetry Breastfeeding? BMI (Body Mass Index)       75 98.1  F (36.7  C) (Oral) 94% No 23.06 kg/m2        Blood Pressure from Last 3 Encounters:   03/04/18 116/68   02/26/18 123/73   02/23/18 134/75    Weight from Last 3 Encounters:   03/04/18 138 lb 9 oz (62.9 kg)   02/26/18 139 lb 3.2 oz (63.1 kg)   02/23/18 140 lb 2 oz (63.6 kg)              Today, you had the following     No orders found for display         Today's Medication Changes          These changes are accurate as of 3/4/18 12:26 PM.  If you have any questions, ask your nurse or doctor.               Start taking these medicines.        Dose/Directions    cefuroxime 500 MG tablet   Commonly known as:  CEFTIN   Used for:  Acute sinusitis with symptoms > 10 days   Started by:  Bri Cevallos PA-C        Dose:  500 mg   Take 1 tablet (500 mg) by mouth 2 times daily   Quantity:  20 tablet   Refills:  0       fluticasone 50 MCG/ACT spray   Commonly known as:  FLONASE   Used for:  Acute sinusitis with symptoms > 10 days   Started by:  Bri Cevallos PA-C        Dose:  1-2 spray   Spray 1-2 sprays into both nostrils daily   Quantity:  1 Bottle   Refills:  11            Where to get your medicines      These medications were sent to [facility] [address]  [facility]   [address] [place] [address]     Phone:  [phone]     cefuroxime 500 MG tablet    fluticasone 50 MCG/ACT spray                Primary Care Provider Office Phone # Fax #    Juan Westbrook [phone]       [address] [address]        Equal  Access to Services     [facility]: Hadii aad ku hadbaronashleigh Allarachelle, wadianeda luqadaha, qaybta kaalmalulu oquendo. So [facility] [phone].    ATENCIÓN: Si jenniferla carol, tiene a lopez disposición servicios gratuitos de asistencia lingüística. Llame al [phone].    We comply with applicable federal civil rights laws and [place] laws. We do not discriminate on the basis of race, color, national origin, age, disability, sex, sexual orientation, or gender identity.            Thank you!     Thank you for choosing [facility]  for your care. Our goal is always to provide you with excellent care. Hearing back from our patients is one way we can continue to improve our services. Please take a few minutes to complete the written survey that you may receive in the mail after your visit with us. Thank you!             Your Updated Medication List - Protect others around you: Learn how to safely use, store and throw away your medicines at www.disposemymeds.org.          This list is accurate as of 3/4/18 12:26 PM.  Always use your most recent med list.                   Brand Name Dispense Instructions for use Diagnosis    aspirin 81 MG tablet      Take 81 mg by mouth daily        carvedilol 3.125 MG tablet    COREG    180 tablet    Take 1 tablet (3.125 mg) by mouth 2 times daily (with meals)    Stress-induced cardiomyopathy       cefuroxime 500 MG tablet    CEFTIN    20 tablet    Take 1 tablet (500 mg) by mouth 2 times daily    Acute sinusitis with symptoms > 10 days       Dextromethorphan-Guaifenesin  MG Tb12     28 tablet    Take 1 tablet by mouth 2 times daily    Acute sinusitis with symptoms > 10 days       fluticasone 50 MCG/ACT spray    FLONASE    1 Bottle    Spray 1-2 sprays into both nostrils daily    Acute sinusitis with symptoms > 10 days       gabapentin 100 MG capsule    NEURONTIN    30 capsule    Take 1 capsule (100 mg) by mouth nightly as needed     Pain of left lower extremity       multivitamin per tablet      Take 1 tablet by mouth daily.        simvastatin 20 MG tablet    ZOCOR    90 tablet    Take 1 tablet (20 mg) by mouth At Bedtime    Hyperlipidemia LDL goal <70

## 2018-03-04 NOTE — PROGRESS NOTES
SUBJECTIVE:   Kerri Shook is a 77 year old female who presents to clinic today for the following health issues:      MD/JOSE Followup:    Facility:  [facility]  Date of visit: February 19, 2018  Reason for visit: Sinus infection  Current Status: Has symptoms that are progressively getting worse again after she completed the 10 day course of antibiotics         2 days after finishing Augmentin sinus pressure and pain returned.     Cough and PND present. No ST      Allergies   Allergen Reactions     Nkda [No Known Drug Allergies]        Past Medical History:   Diagnosis Date     Breast disorder 1990    Breast Cancer     Cardiomyopathy (H)      Chronic osteoarthritis 2012    resulted in hip replacement     CLL (chronic lymphoblastic leukemia) 1989     Colon adenomas     6/26/14 colonoscopy, repeat in 3 years     History of blood transfusion      hyperlipidemia      Primary localized osteoarthrosis, pelvic region and thigh      Tinnitus          Current Outpatient Prescriptions on File Prior to Visit:  carvedilol (COREG) 3.125 MG tablet Take 1 tablet (3.125 mg) by mouth 2 times daily (with meals)   simvastatin (ZOCOR) 20 MG tablet Take 1 tablet (20 mg) by mouth At Bedtime   aspirin 81 MG tablet Take 81 mg by mouth daily   Multiple Vitamin (MULTIVITAMIN) per tablet Take 1 tablet by mouth daily.   Dextromethorphan-Guaifenesin  MG TB12 Take 1 tablet by mouth 2 times daily (Patient not taking: Reported on 3/4/2018)   gabapentin (NEURONTIN) 100 MG capsule Take 1 capsule (100 mg) by mouth nightly as needed (Patient not taking: Reported on 3/4/2018)     No current facility-administered medications on file prior to visit.     Social History   Substance Use Topics     Smoking status: Never Smoker     Smokeless tobacco: Never Used     Alcohol use 0.0 oz/week      Comment: 1s 2x/wk       ROS:  CONSTITUTIONAL: Negative for fatigue or fever.  EYES: Negative for eye problems.  ENT: As above.  RESP: As above.  CV: Negative  for chest pains.  GI: Negative for vomiting.  MUSCULOSKELETAL:  Negative for significant muscle or joint pains.  NEUROLOGIC: Positive for headaches.  SKIN: Negative for rash.    OBJECTIVE:  /68 (BP Location: Right arm, Patient Position: Chair, Cuff Size: Adult Regular)  Pulse 75  Temp 98.1  F (36.7  C) (Oral)  Wt 138 lb 9 oz (62.9 kg)  SpO2 94%  Breastfeeding? No  BMI 23.06 kg/m2  GENERAL APPEARANCE: Healthy, alert and no distress.  EYES:Conjunctiva/sclera clear.  EARS: No cerumen.   Ear canals w/o erythema.  TM's intact w/o erythema.    NOSE/MOUTH: Nasal turbinates red and inflamed.  SINUSES: Moderate r maxillary sinus tenderness.  THROAT: No erythema w/o tonsillar enlargement . No exudates.  NECK: Supple, nontender, no lymphadenopathy.  RESP: Lungs clear to auscultation - no rales, rhonchi or wheezes  CV: Regular rate and rhythm, normal S1 S2, no murmur noted.  NEURO: Awake, alert    SKIN: No rashes        ASSESSMENT:     ICD-10-CM    1. Acute recurrent maxillary sinusitis J01.01            PLAN:Will try second course of antibiotic. Wants to avoid Augmentin as it caused diarrhea  Lots of rest and fluids.  RTC if any worsening symptoms or if not improving.    Bri Cevallos PA-C

## 2018-03-04 NOTE — NURSING NOTE
"Chief Complaint   Patient presents with     Sinus Problem     patient dx w/ a sinus infection about 2 weeks ago, completed antibiotics, now infection is back and is worse than before       Initial /68 (BP Location: Right arm, Patient Position: Chair, Cuff Size: Adult Regular)  Pulse 75  Temp 98.1  F (36.7  C) (Oral)  Wt 138 lb 9 oz (62.9 kg)  SpO2 94%  Breastfeeding? No  BMI 23.06 kg/m2 Estimated body mass index is 23.06 kg/(m^2) as calculated from the following:    Height as of 2/23/18: 5' 5\" (1.651 m).    Weight as of this encounter: 138 lb 9 oz (62.9 kg).  Medication Reconciliation: complete   Corin Rod MA    "

## 2018-03-12 ASSESSMENT — ENCOUNTER SYMPTOMS: SINUS PAIN: 1

## 2018-03-12 ASSESSMENT — ANXIETY QUESTIONNAIRES
GAD7 TOTAL SCORE: 0
2. NOT BEING ABLE TO STOP OR CONTROL WORRYING: NOT AT ALL
4. TROUBLE RELAXING: NOT AT ALL
5. BEING SO RESTLESS THAT IT IS HARD TO SIT STILL: NOT AT ALL
6. BECOMING EASILY ANNOYED OR IRRITABLE: NOT AT ALL
GAD7 TOTAL SCORE: 0
1. FEELING NERVOUS, ANXIOUS, OR ON EDGE: NOT AT ALL
3. WORRYING TOO MUCH ABOUT DIFFERENT THINGS: NOT AT ALL
7. FEELING AFRAID AS IF SOMETHING AWFUL MIGHT HAPPEN: NOT AT ALL
7. FEELING AFRAID AS IF SOMETHING AWFUL MIGHT HAPPEN: NOT AT ALL

## 2018-03-13 ENCOUNTER — OFFICE VISIT (OUTPATIENT)
Dept: OBGYN | Facility: CLINIC | Age: 78
End: 2018-03-13
Attending: OBSTETRICS & GYNECOLOGY
Payer: MEDICARE

## 2018-03-13 VITALS
HEIGHT: 65 IN | HEART RATE: 69 BPM | WEIGHT: 142.3 LBS | SYSTOLIC BLOOD PRESSURE: 120 MMHG | DIASTOLIC BLOOD PRESSURE: 62 MMHG | BODY MASS INDEX: 23.71 KG/M2

## 2018-03-13 DIAGNOSIS — Z00.00 VISIT FOR PREVENTIVE HEALTH EXAMINATION: ICD-10-CM

## 2018-03-13 DIAGNOSIS — Z12.4 SCREENING FOR MALIGNANT NEOPLASM OF CERVIX: ICD-10-CM

## 2018-03-13 DIAGNOSIS — Z01.419 ENCOUNTER FOR GYNECOLOGICAL EXAMINATION WITHOUT ABNORMAL FINDING: Primary | ICD-10-CM

## 2018-03-13 PROCEDURE — G0476 HPV COMBO ASSAY CA SCREEN: HCPCS | Performed by: OBSTETRICS & GYNECOLOGY

## 2018-03-13 PROCEDURE — G0145 SCR C/V CYTO,THINLAYER,RESCR: HCPCS | Performed by: OBSTETRICS & GYNECOLOGY

## 2018-03-13 PROCEDURE — G0463 HOSPITAL OUTPT CLINIC VISIT: HCPCS | Mod: ZF

## 2018-03-13 ASSESSMENT — ANXIETY QUESTIONNAIRES: GAD7 TOTAL SCORE: 0

## 2018-03-13 NOTE — PROGRESS NOTES
Progress Note    SUBJECTIVE:  Kerri Shook is an 77 year old, , who requests an Annual Preventive Exam.     Concerns today include: none    SEXUAL HISTORY  Sexual Activity  not at present  Male (not sexually active since )  Sexually transmitted disease history:  none      MEDICAL HISTORY:    Current Outpatient Prescriptions on File Prior to Visit:  cefuroxime (CEFTIN) 500 MG tablet Take 1 tablet (500 mg) by mouth 2 times daily   fluticasone (FLONASE) 50 MCG/ACT spray Spray 1-2 sprays into both nostrils daily   carvedilol (COREG) 3.125 MG tablet Take 1 tablet (3.125 mg) by mouth 2 times daily (with meals)   Dextromethorphan-Guaifenesin  MG TB12 Take 1 tablet by mouth 2 times daily (Patient not taking: Reported on 3/4/2018)   simvastatin (ZOCOR) 20 MG tablet Take 1 tablet (20 mg) by mouth At Bedtime   gabapentin (NEURONTIN) 100 MG capsule Take 1 capsule (100 mg) by mouth nightly as needed (Patient not taking: Reported on 3/4/2018)   aspirin 81 MG tablet Take 81 mg by mouth daily   Multiple Vitamin (MULTIVITAMIN) per tablet Take 1 tablet by mouth daily.     No current facility-administered medications on file prior to visit.   Allergies   Allergen Reactions     Nkda [No Known Drug Allergies]        Obstetric History       T3      L3     SAB0   TAB0   Ectopic0   Multiple0   Live Births3      Past Medical History:   Diagnosis Date     Breast disorder     Breast Cancer     Cardiomyopathy (H)      Chronic osteoarthritis     resulted in hip replacement     CLL (chronic lymphoblastic leukemia)      Colon adenomas     14 colonoscopy, repeat in 3 years     History of blood transfusion      hyperlipidemia      Primary localized osteoarthrosis, pelvic region and thigh      Tinnitus      Past Surgical History:   Procedure Laterality Date     BIOPSY  within the last 5 years    polyps during colonoscopy     C PELVIS/HIP JOINT SURGERY UNLISTED  2012    left hip replacement      C SHOULDER SURG PROC UNLISTED  ?     COLONOSCOPY  2014    Procedure: COMBINED COLONOSCOPY, SINGLE BIOPSY/POLYPECTOMY BY BIOPSY;  Surgeon: Pola Arceo MD;  Location:  GI     D & C      late PP hemorrhage --> retained placenta     ENT SURGERY      tonselectomy     left hip replacemen Left     hip replacement in      LUMPECTOMY BREAST  1990    Left     ORTHOPEDIC SURGERY  age?    right shoulder      ORTHOPEDIC SURGERY  ~     left hip replacement     R hip arthroscopy  11     TONSILLECTOMY  1950     Family History   Problem Relation Age of Onset     DIABETES Maternal Grandmother 85     Coronary Artery Disease Father 76      of MI     HEART DISEASE Father      CANCER Maternal Grandfather      stomach     Alcoholism Son      Active alcoholic     Dementia Mother      C.A.D. No family hx of      Cancer - colorectal No family hx of      Psychotic Disorder No family hx of      Skin Cancer No family hx of      Melanoma No family hx of      Hypertension No family hx of      Breast Cancer No family hx of      Prostate Cancer No family hx of      CEREBROVASCULAR DISEASE No family hx of      ,, ,     Social History     Social History     Marital status:      Spouse name: N/A     Number of children: N/A     Years of education: N/A     Social History Main Topics     Smoking status: Never Smoker     Smokeless tobacco: Never Used     Alcohol use 0.0 oz/week      Comment: 1s 2x/wk     Drug use: No     Sexual activity: Not Currently     Birth control/ protection: None      ROS: 10 point ROS neg other than the symptoms noted above in the HPI.      SCREENING HISTORY:  Pap:  History of abnormal Pap smear: NO   Last    Lab Results   Component Value Date    PAP NIL 10/16/2012     Mammogram:  Mammogram current: yes and not applicable  Last Mammogram:   Ma Screening Digital Bilateral    Result Date: 2018  Narrative: Examination: Bilateral digital screening mammography with computer aided  detection. Comparison: 1/18/2017, 1/8/2016, 10/21/2014, 10/16/2012. History / Family History: No symptoms, routine screening. Personal history of left breast cancer diagnosed in 1990, status post lumpectomy and radiation. History of 3 breast biopsies. BREAST DENSITY: Scattered fibroglandular densities. COMMENTS: No significant change.      Colonoscopy:  Last Colonoscopy:  Results for orders placed or performed during the hospital encounter of 06/26/14   COLONOSCOPY   Result Value Ref Range    COLONOSCOPY       [address] [place] [address] [phone]     Endoscopy Department  _______________________________________________________________________________  Patient Name: Kerri Shook           Procedure Date: 6/26/2014 13:06:SS A6/P6    MRN: [id number]                       Account Number: [id number]                 YOB: 1940              Admit Type: Outpatient                      Age: 73                               Room:  #4                                 Gender: Female                        Note Status: Finalized                      Attending MD: Pola Reid MD       Pause for the Cause: Pause for the cause   completed  _______________________________________________________________________________     Procedure:                Colonoscopy  Indications:              Follow-up for history of adenomatous polyps in the                             colon  Providers:                Pola Arceo MD, Randolph Yun RN  Referring  MD:             Andrés Greene MD, Juan Westbrook MD  Medicines:                Midazolam 3 mg IV, Fentanyl 100 micrograms IV  Complications:            No immediate complications  _______________________________________________________________________________  Procedure:                Pre-Anesthesia Assessment:                            - Prior to the procedure, a History and Physical                             was  performed, and patient medications and                             allergies were reviewed. The patient is competent.                             The risks and benefits of the procedure and the                             sedation options and risks were discussed with the                             patient. All questions were answered and informed                             consent was obtained. Patient identification and                             proposed procedure were verified by the physician                             and the nurse in the procedure room . Mental Status                             Examination: alert and oriented. Airway                             Examination: normal oropharyngeal airway and neck                             mobility. Respiratory Examination: clear to                             auscultation. CV Examination: normal. Prophylactic                             Antibiotics: The patient does not require                             prophylactic antibiotics. Prior Anticoagulants: The                             patient has taken aspirin, last dose was 5 days                             prior to procedure. ASA Grade Assessment: II - A                             patient with mild systemic disease. After reviewing                             the risks and benefits, the patient was deemed in                             satisfactory condition to undergo the procedure.                             The anesthesia plan was to use moderate sedation /                             analgesia (conscious sedation). Immediatel y prior                             to administration of medications, the patient was                             re-assessed for adequacy to receive sedatives. The                             heart rate, respiratory rate, oxygen saturations,                             blood pressure, adequacy of pulmonary ventilation,                             and response to care were  monitored throughout the                             procedure. The physical status of the patient was                             re-assessed after the procedure.                            After obtaining informed consent, the colonoscope                             was passed under direct vision. Throughout the                             procedure, the patient's blood pressure, pulse, and                             oxygen saturations were monitored continuously. The                             Colonoscope was introduced through the anus and                             advanced to the cecum, identified by appendi ceal                             orifice & ileocecal valve. The colonoscopy was                             somewhat difficult due to a tortuous colon. The                             patient tolerated the procedure well. The quality                             of the bowel preparation was excellent and adequate                             to identify polyps 5 mm and larger in size.                                                                                   Findings:       The perianal and digital rectal examinations were normal. Pertinent        negatives include normal sphincter tone and no palpable rectal lesions.        A sessile polyp was found in the transverse colon. The polyp was 3 mm in        size. The polyp was removed with a cold biopsy forceps. Resection and        retrieval were complete. The retroflexed view of the anal verge was        normal and showed no anal or rectal abnormalities. The exam was        otherwise without abnormality.                                                                                    Impression:               - One 3 mm polyp in the transverse colon. Resected                             and retrieved.                            - The examination was otherwise normal.  Recommendation:           - Repeat colonoscopy in 3 years for surveillance.                                                                                      Electronically signed by Dr. Pola Arceo  ___________________  Pola Arceo MD  Signed Date: 6/26/2014 14:06:SKYLAR A6/P6  Number of Addenda: 0  I was physically present for the entire viewing portion of the exam.  __________________________  Signature of teaching physician  B4c/D4c  Note Initiated On: 6/26/2014 13:06:SKYLAR A6/P6  Scope Withdrawal Time: 0 hours 0 minutes 0 seconds   Scope Withdrawal Time: 0 hours 0 minutes 0 seconds   Total Procedure Duration: 0 hours 0 minutes 0 seconds   Total Procedure Duration: 0 hours 0 minutes 0 seconds          Bone Density:  Last DEXA:  Last order of DX HIP/PELVIS/SPINE was found on 1/31/2017 from [facility] Appointment on 1/31/2017   Normal (lowest T score -0.8)    PHYSICAL EXAM:  not currently breastfeeding. There is no height or weight on file to calculate BMI.  General - Well-noursished, pleasant female in no acute distress  Skin - no suspicious lesions or rashes  Neck - supple without lymphadenopathy  Lungs - clear to auscultation bilaterally  Heart - regular rate and rhythm without murmur  Abdomen - soft, nontender, nondistended, no masses or organomegaly noted  Neurological - grossly normal strength, normal mental status.    Breast Exam:  Breasts: Without visible skin changes. No dimpling or lesions seen.   Breasts supple, non-tender with palpation, no dominant mass, nodularity, or nipple discharge noted bilaterally. Axillary and supraclavicular nodes negative bilaterally.      Pelvic Exam:  EG/BUS: normal genital architecture without lesions, erythema or abnormal secretions. Bartholin's, Urethra, [place]'s normal   Bladder: no masses or tenderness  Vagina: atrophic, thin, dry  Cervix: difficult to visualize, flush with vaginal wall, atrophic. dry, stenotic  Uterus: anteverted, small, smooth, firm, mobile w/o pain  Adnexa: ovaries not palpable. No masses, nodularity, or tenderness  bilaterally  Rectum: anus appears normal       ASSESSMENT:  Encounter Diagnoses   Name Primary?     Encounter for gynecological examination without abnormal finding Yes     Visit for preventive health examination      Screening for malignant neoplasm of cervix         PLAN:   Orders Placed This Encounter   Procedures     Pelvic and Breast Exam Procedure []     Pap Smear Exam [] Do Not Remove     Pap imaged thin layer screen with HPV - recommended age 30 - 65 years (select HPV order below)     Additional teaching done at this visit regarding self breast awareness and recommendations for no pap if 65 and older if no history of abnormal paps and no new partners. Patient desires pap today since she is here, but will likely not opt for pap smears in the future. Also recommended continue breast/pelvic exams annually.    Ladonna Leal MD  3/13/2018  2:41 PM

## 2018-03-13 NOTE — MR AVS SNAPSHOT
After Visit Summary   3/13/2018    Kerri Shook    MRN: [id number]           Patient Information     Date Of Birth          1940        Visit Information        Provider Department      3/13/2018 2:00 PM Ladonna Leal MD [facility]        Today's Diagnoses     Encounter for gynecological examination without abnormal finding    -  1    Visit for preventive health examination        Screening for malignant neoplasm of cervix           Follow-ups after your visit        Your next 10 appointments already scheduled     Jun 26, 2018 10:05 AM CDT   (Arrive by 9:50 AM)   Return Visit with Juan Westbrook MD   [facility] ([facility])    [address]  [address] [facility] [address] [phone]              Who to contact     Please call your clinic at [phone] to:    Ask questions about your health    Make or cancel appointments    Discuss your medicines    Learn about your test results    Speak to your doctor            Additional Information About Your Visit        MyChart Information     [employer] gives you secure access to your electronic health record. If you see a primary care provider, you can also send messages to your care team and make appointments. If you have questions, please call your primary care clinic.  If you do not have a primary care provider, please call [phone] and they will assist you.      [employer] is an electronic gateway that provides easy, online access to your medical records. With [employer], you can request a clinic appointment, read your test results, renew a prescription or communicate with your care team.     To access your existing account, please contact your [facility] or call [phone] for assistance.        Care EveryWhere ID     This is your Care EveryWhere ID. This could be used by other organizations to access your [facility]  "records  [id number]        Your Vitals Were     Pulse Height Breastfeeding? BMI (Body Mass Index)          69 1.651 m (5' 5\") No 23.68 kg/m2         Blood Pressure from Last 3 Encounters:   03/13/18 120/62   03/04/18 116/68   02/26/18 123/73    Weight from Last 3 Encounters:   03/13/18 64.5 kg (142 lb 4.8 oz)   03/04/18 62.9 kg (138 lb 9 oz)   02/26/18 63.1 kg (139 lb 3.2 oz)              We Performed the Following     Pap imaged thin layer screen with HPV - recommended age 30 - 65 years (select HPV order below)     Pap Smear Exam [] Do Not Remove     Pelvic and Breast Exam Procedure []        Primary Care Provider Office Phone # Fax #    Juan Westbrook [phone] [address] [address]        Equal Access to Services     KENDALL VILLARREAL : Hadii aad ku hadasho Soomaali, waaxda luqadaha, qaybta kaalmada adeegyada, waxay darin bashir . So [facility] [phone].    ATENCIÓN: Si azeb medina, tiene a lopez disposición servicios gratuitos de asistencia lingüística. Llame al [phone].    We comply with applicable federal civil rights laws and [place] laws. We do not discriminate on the basis of race, color, national origin, age, disability, sex, sexual orientation, or gender identity.            Thank you!     Thank you for choosing [facility]  for your care. Our goal is always to provide you with excellent care. Hearing back from our patients is one way we can continue to improve our services. Please take a few minutes to complete the written survey that you may receive in the mail after your visit with us. Thank you!             Your Updated Medication List - Protect others around you: Learn how to safely use, store and throw away your medicines at www.disposemymeds.org.          This list is accurate as of 3/13/18 11:59 PM.  Always use your most recent med list.                   Brand Name Dispense Instructions for " use Diagnosis    aspirin 81 MG tablet      Take 81 mg by mouth daily        carvedilol 3.125 MG tablet    COREG    180 tablet    Take 1 tablet (3.125 mg) by mouth 2 times daily (with meals)    Stress-induced cardiomyopathy       cefuroxime 500 MG tablet    CEFTIN    20 tablet    Take 1 tablet (500 mg) by mouth 2 times daily        Dextromethorphan-Guaifenesin  MG Tb12     28 tablet    Take 1 tablet by mouth 2 times daily    Acute sinusitis with symptoms > 10 days       fluticasone 50 MCG/ACT spray    FLONASE    1 Bottle    Spray 1-2 sprays into both nostrils daily        gabapentin 100 MG capsule    NEURONTIN    30 capsule    Take 1 capsule (100 mg) by mouth nightly as needed    Pain of left lower extremity       multivitamin per tablet      Take 1 tablet by mouth daily.        simvastatin 20 MG tablet    ZOCOR    90 tablet    Take 1 tablet (20 mg) by mouth At Bedtime    Hyperlipidemia LDL goal <70

## 2018-03-13 NOTE — LETTER
3/13/2018       RE: Kerri Shook  [address] [facility] [address]     Dear Colleague,    Thank you for referring your patient, Kerri Shook, to the [facility] at [facility]. Please see a copy of my visit note below.    Progress Note    SUBJECTIVE:  Kerri Shook is an 77 year old, , who requests an Annual Preventive Exam.     Concerns today include: none    SEXUAL HISTORY  Sexual Activity  not at present  Male (not sexually active since )  Sexually transmitted disease history:  none    MEDICAL HISTORY:    Current Outpatient Prescriptions on File Prior to Visit:  cefuroxime (CEFTIN) 500 MG tablet Take 1 tablet (500 mg) by mouth 2 times daily   fluticasone (FLONASE) 50 MCG/ACT spray Spray 1-2 sprays into both nostrils daily   carvedilol (COREG) 3.125 MG tablet Take 1 tablet (3.125 mg) by mouth 2 times daily (with meals)   Dextromethorphan-Guaifenesin  MG TB12 Take 1 tablet by mouth 2 times daily (Patient not taking: Reported on 3/4/2018)   simvastatin (ZOCOR) 20 MG tablet Take 1 tablet (20 mg) by mouth At Bedtime   gabapentin (NEURONTIN) 100 MG capsule Take 1 capsule (100 mg) by mouth nightly as needed (Patient not taking: Reported on 3/4/2018)   aspirin 81 MG tablet Take 81 mg by mouth daily   Multiple Vitamin (MULTIVITAMIN) per tablet Take 1 tablet by mouth daily.     No current facility-administered medications on file prior to visit.   Allergies   Allergen Reactions     Nkda [No Known Drug Allergies]        Obstetric History       T3      L3     SAB0   TAB0   Ectopic0   Multiple0   Live Births3      Past Medical History:   Diagnosis Date     Breast disorder     Breast Cancer     Cardiomyopathy (H)      Chronic osteoarthritis     resulted in hip replacement     CLL (chronic lymphoblastic leukemia)      Colon adenomas     14 colonoscopy, repeat in 3 years     History of blood transfusion       hyperlipidemia      Primary localized osteoarthrosis, pelvic region and thigh      Tinnitus      Past Surgical History:   Procedure Laterality Date     BIOPSY  within the last 5 years    polyps during colonoscopy     C PELVIS/HIP JOINT SURGERY UNLISTED  2012    left hip replacement     C SHOULDER SURG PROC UNLISTED  ?     COLONOSCOPY  2014    Procedure: COMBINED COLONOSCOPY, SINGLE BIOPSY/POLYPECTOMY BY BIOPSY;  Surgeon: Pola Arceo MD;  Location:  GI     D & C      late PP hemorrhage --> retained placenta     ENT SURGERY  1950    tonselectomy     left hip replacemen Left     hip replacement in      LUMPECTOMY BREAST      Left     ORTHOPEDIC SURGERY  age?    right shoulder      ORTHOPEDIC SURGERY  ~     left hip replacement     R hip arthroscopy  11     TONSILLECTOMY  1950     Family History   Problem Relation Age of Onset     DIABETES Maternal Grandmother 85     Coronary Artery Disease Father 76      of MI     HEART DISEASE Father      CANCER Maternal Grandfather      stomach     Alcoholism Son      Active alcoholic     Dementia Mother      C.A.D. No family hx of      Cancer - colorectal No family hx of      Psychotic Disorder No family hx of      Skin Cancer No family hx of      Melanoma No family hx of      Hypertension No family hx of      Breast Cancer No family hx of      Prostate Cancer No family hx of      CEREBROVASCULAR DISEASE No family hx of      ,, ,     Social History     Social History     Marital status:      Spouse name: N/A     Number of children: N/A     Years of education: N/A     Social History Main Topics     Smoking status: Never Smoker     Smokeless tobacco: Never Used     Alcohol use 0.0 oz/week      Comment: 1s 2x/wk     Drug use: No     Sexual activity: Not Currently     Birth control/ protection: None      ROS: 10 point ROS neg other than the symptoms noted above in the HPI.    SCREENING HISTORY:  Pap:  History of abnormal Pap smear: NO    Last    Lab Results   Component Value Date    PAP NIL 10/16/2012     Mammogram:  Mammogram current: yes and not applicable  Last Mammogram:   Ma Screening Digital Bilateral    Result Date: 2/26/2018  Narrative: Examination: Bilateral digital screening mammography with computer aided detection. Comparison: 1/18/2017, 1/8/2016, 10/21/2014, 10/16/2012. History / Family History: No symptoms, routine screening. Personal history of left breast cancer diagnosed in 1990, status post lumpectomy and radiation. History of 3 breast biopsies. BREAST DENSITY: Scattered fibroglandular densities. COMMENTS: No significant change.      Colonoscopy:  Last Colonoscopy:  Results for orders placed or performed during the hospital encounter of 06/26/14   COLONOSCOPY   Result Value Ref Range    COLONOSCOPY       [facility], [address] [place] [address] [phone]     Endoscopy Department  _______________________________________________________________________________  Patient Name: Kerri Shook           Procedure Date: 6/26/2014 13:06:SS A6/P6    MRN: [id number]                       Account Number: [id number]                 YOB: 1940              Admit Type: Outpatient                      Age: 73                               Room: [facility]                                 Gender: Female                        Note Status: Finalized                      Attending MD: Pola Reid MD       Pause for the Cause: Pause for the cause   completed  _______________________________________________________________________________     Procedure:                Colonoscopy  Indications:              Follow-up for history of adenomatous polyps in the                             colon  Providers:                Pola Arceo MD, Randolph Yun RN  Referring  MD:             Andrés Greene MD, Juan Westbrook MD  Medicines:                Midazolam 3 mg IV, Fentanyl 100 micrograms  IV  Complications:            No immediate complications  _______________________________________________________________________________  Procedure:                Pre-Anesthesia Assessment:                            - Prior to the procedure, a History and Physical                             was performed, and patient medications and                             allergies were reviewed. The patient is competent.                             The risks and benefits of the procedure and the                             sedation options and risks were discussed with the                             patient. All questions were answered and informed                             consent was obtained. Patient identification and                             proposed procedure were verified by the physician                             and the nurse in the procedure room . Mental Status                             Examination: alert and oriented. Airway                             Examination: normal oropharyngeal airway and neck                             mobility. Respiratory Examination: clear to                             auscultation. CV Examination: normal. Prophylactic                             Antibiotics: The patient does not require                             prophylactic antibiotics. Prior Anticoagulants: The                             patient has taken aspirin, last dose was 5 days                             prior to procedure. ASA Grade Assessment: II - A                             patient with mild systemic disease. After reviewing                             the risks and benefits, the patient was deemed in                             satisfactory condition to undergo the procedure.                             The anesthesia plan was to use moderate sedation /                             analgesia (conscious sedation). Immediatel y prior                             to administration of medications, the  patient was                             re-assessed for adequacy to receive sedatives. The                             heart rate, respiratory rate, oxygen saturations,                             blood pressure, adequacy of pulmonary ventilation,                             and response to care were monitored throughout the                             procedure. The physical status of the patient was                             re-assessed after the procedure.                            After obtaining informed consent, the colonoscope                             was passed under direct vision. Throughout the                             procedure, the patient's blood pressure, pulse, and                             oxygen saturations were monitored continuously. The                             Colonoscope was introduced through the anus and                             advanced to the cecum, identified by appendi ceal                             orifice & ileocecal valve. The colonoscopy was                             somewhat difficult due to a tortuous colon. The                             patient tolerated the procedure well. The quality                             of the bowel preparation was excellent and adequate                             to identify polyps 5 mm and larger in size.                                                                                   Findings:       The perianal and digital rectal examinations were normal. Pertinent        negatives include normal sphincter tone and no palpable rectal lesions.        A sessile polyp was found in the transverse colon. The polyp was 3 mm in        size. The polyp was removed with a cold biopsy forceps. Resection and        retrieval were complete. The retroflexed view of the anal verge was        normal and showed no anal or rectal abnormalities. The exam was        otherwise without abnormality.                                                                                     Impression:               - One 3 mm polyp in the transverse colon. Resected                             and retrieved.                            - The examination was otherwise normal.  Recommendation:           - Repeat colonoscopy in 3 years for surveillance.                                                                                     Electronically signed by Dr. Pola Arceo  ___________________  Pola Arceo MD  Signed Date: 6/26/2014 14:06:SS A6/P6  Number of Addenda: 0  I was physically present for the entire viewing portion of the exam.  __________________________  Signature of teaching physician  B4c/D4c  Note Initiated On: 6/26/2014 13:06:SS A6/P6  Scope Withdrawal Time: 0 hours 0 minutes 0 seconds   Scope Withdrawal Time: 0 hours 0 minutes 0 seconds   Total Procedure Duration: 0 hours 0 minutes 0 seconds   Total Procedure Duration: 0 hours 0 minutes 0 seconds        Bone Density:  Last DEXA:  Last order of DX HIP/PELVIS/SPINE was found on 1/31/2017 from [facility] Appointment on 1/31/2017   Normal (lowest T score -0.8)    PHYSICAL EXAM:  not currently breastfeeding. There is no height or weight on file to calculate BMI.  General - Well-noursished, pleasant female in no acute distress  Skin - no suspicious lesions or rashes  Neck - supple without lymphadenopathy  Lungs - clear to auscultation bilaterally  Heart - regular rate and rhythm without murmur  Abdomen - soft, nontender, nondistended, no masses or organomegaly noted  Neurological - grossly normal strength, normal mental status.    Breast Exam:  Breasts: Without visible skin changes. No dimpling or lesions seen.   Breasts supple, non-tender with palpation, no dominant mass, nodularity, or nipple discharge noted bilaterally. Axillary and supraclavicular nodes negative bilaterally.      Pelvic Exam:  EG/BUS: normal genital architecture without lesions, erythema or abnormal secretions. Bartholin's, Urethra,  Concord's normal   Bladder: no masses or tenderness  Vagina: atrophic, thin, dry  Cervix: difficult to visualize, flush with vaginal wall, atrophic. dry, stenotic  Uterus: anteverted, small, smooth, firm, mobile w/o pain  Adnexa: ovaries not palpable. No masses, nodularity, or tenderness bilaterally  Rectum: anus appears normal     ASSESSMENT:  Encounter Diagnoses   Name Primary?     Encounter for gynecological examination without abnormal finding Yes     Visit for preventive health examination      Screening for malignant neoplasm of cervix       PLAN:   Orders Placed This Encounter   Procedures     Pelvic and Breast Exam Procedure []     Pap Smear Exam [] Do Not Remove     Pap imaged thin layer screen with HPV - recommended age 30 - 65 years (select HPV order below)     Additional teaching done at this visit regarding self breast awareness and recommendations for no pap if 65 and older if no history of abnormal paps and no new partners. Patient desires pap today since she is here, but will likely not opt for pap smears in the future. Also recommended continue breast/pelvic exams annually.    Ladonna Leal MD  3/13/2018  2:41 PM

## 2018-03-16 ENCOUNTER — RADIANT APPOINTMENT (OUTPATIENT)
Dept: MRI IMAGING | Facility: CLINIC | Age: 78
End: 2018-03-16
Attending: INTERNAL MEDICINE
Payer: COMMERCIAL

## 2018-03-16 DIAGNOSIS — G44.89 OTHER HEADACHE SYNDROME: ICD-10-CM

## 2018-03-16 LAB
COPATH REPORT: NORMAL
PAP: NORMAL

## 2018-03-16 RX ORDER — GADOBUTROL 604.72 MG/ML
7.5 INJECTION INTRAVENOUS ONCE
Status: COMPLETED | OUTPATIENT
Start: 2018-03-16 | End: 2018-03-16

## 2018-03-16 RX ADMIN — GADOBUTROL 7.5 ML: 604.72 INJECTION INTRAVENOUS at 10:34

## 2018-03-16 NOTE — DISCHARGE INSTRUCTIONS
MRI Contrast Discharge Instructions    The IV contrast you received today will pass out of your body in your  urine. This will happen in the next 24 hours. You will not feel this process.  Your urine will not change color.    Drink at least 4 extra glasses of water or juice today (unless your doctor  has restricted your fluids). This reduces the stress on your kidneys.  You may take your regular medicines.    If you are on dialysis: It is best to have dialysis today.    If you have a reaction: Most reactions happen right away. If you have  any new symptoms after leaving the hospital (such as hives or swelling),  call your hospital at the correct number below. Or call your family doctor.  If you have breathing distress or wheezing, call [phone].    Special instructions: ***    I have read and understand the above information.    Signature:______________________________________ Date:___________    Staff:__________________________________________ Date:___________     Time:__________    [facility] Departments:    ___[place]: [phone]  ___[facility]: [phone]  ___[place]: [phone] ___[facility]: [phone]  ___[facility]: [phone]  ___[facility]: [phone]  ___[phone]  ___[facility]: [phone]  ___[phone]

## 2018-03-18 ENCOUNTER — TELEPHONE (OUTPATIENT)
Dept: INTERNAL MEDICINE | Facility: CLINIC | Age: 78
End: 2018-03-18

## 2018-03-18 DIAGNOSIS — J32.8 OTHER CHRONIC SINUSITIS: Primary | ICD-10-CM

## 2018-03-18 NOTE — TELEPHONE ENCOUNTER
Dear Susan;    Please see results note below I sent to Kerri and please help coordinate ENT appt. (referral placed this encounter)    Thanks     ANABELLA Westbrook    Pt called and will have clinic coordinators help schedule appt. Pt did not have any questions at this time.  Susan Ocampo RN 10:53 AM on 3/19/2018.

## 2018-03-19 NOTE — PROGRESS NOTES
Her pap was unsatisfactory, not enough cells to evaluate. This is likely due to menopause. Could have her use some vaginal estrogen for a few weeks and repeat, or, we had discussed in clinic no further paps since she is >66yo without any history of abnormal pap, so I would be okay not even repeating unless she prefers.  Please contact the patient and see which she would prefer.     Ladonna Leal MD  3/19/2018

## 2018-03-20 LAB
FINAL DIAGNOSIS: NORMAL
HPV HR 12 DNA CVX QL NAA+PROBE: NEGATIVE
HPV16 DNA SPEC QL NAA+PROBE: NEGATIVE
HPV18 DNA SPEC QL NAA+PROBE: NEGATIVE
SPECIMEN DESCRIPTION: NORMAL
SPECIMEN SOURCE CVX/VAG CYTO: NORMAL

## 2018-03-29 DIAGNOSIS — M79.605 PAIN OF LEFT LOWER EXTREMITY: ICD-10-CM

## 2018-04-02 RX ORDER — GABAPENTIN 100 MG/1
100 CAPSULE ORAL
Qty: 30 CAPSULE | Refills: 0 | Status: SHIPPED | OUTPATIENT
Start: 2018-04-02 | End: 2019-07-13

## 2018-04-02 NOTE — TELEPHONE ENCOUNTER
Gabapentin  100 MG    Last Written Prescription Date:  3/27/17  Last Fill Quantity: 30,   # refills: 0  Last Office Visit : 2/26/18  Future Office visit:  6/29/18    Routing refill request to provider for review/approval because:  Drug not on the refill protocol or controlled substance

## 2018-04-13 ENCOUNTER — OFFICE VISIT (OUTPATIENT)
Dept: OTOLARYNGOLOGY | Facility: CLINIC | Age: 78
End: 2018-04-13
Payer: COMMERCIAL

## 2018-04-13 ENCOUNTER — RADIANT APPOINTMENT (OUTPATIENT)
Dept: CT IMAGING | Facility: CLINIC | Age: 78
End: 2018-04-13
Attending: OTOLARYNGOLOGY
Payer: COMMERCIAL

## 2018-04-13 VITALS — BODY MASS INDEX: 23.16 KG/M2 | WEIGHT: 139 LBS | HEIGHT: 65 IN

## 2018-04-13 DIAGNOSIS — J32.3 CHRONIC SPHENOIDAL SINUSITIS: Primary | ICD-10-CM

## 2018-04-13 DIAGNOSIS — J32.3 CHRONIC SPHENOIDAL SINUSITIS: ICD-10-CM

## 2018-04-13 LAB
CREAT BLD-MCNC: 0.9 MG/DL (ref 0.52–1.04)
GFR SERPL CREATININE-BSD FRML MDRD: 61 ML/MIN/1.7M2

## 2018-04-13 RX ORDER — IOPAMIDOL 755 MG/ML
75 INJECTION, SOLUTION INTRAVASCULAR ONCE
Status: COMPLETED | OUTPATIENT
Start: 2018-04-13 | End: 2018-04-13

## 2018-04-13 RX ADMIN — IOPAMIDOL 75 ML: 755 INJECTION, SOLUTION INTRAVASCULAR at 09:20

## 2018-04-13 ASSESSMENT — PAIN SCALES - GENERAL: PAINLEVEL: NO PAIN (0)

## 2018-04-13 NOTE — NURSING NOTE
"Chief Complaint   Patient presents with     RECHECK     Follow up sinusitis patient reports improvement     Height 1.651 m (5' 5\"), weight 63 kg (139 lb), not currently breastfeeding.    Sanford Velazquez    "

## 2018-04-13 NOTE — MR AVS SNAPSHOT
After Visit Summary   4/13/2018    Kerri Shook    MRN: [id number]           Patient Information     Date Of Birth          1940        Visit Information        Provider Department      4/13/2018 7:00 AM Bolivar Sanchez MD [facility]        Today's Diagnoses     Chronic sphenoidal sinusitis    -  1      Care Instructions    The patient presents with a history of chronic sinusitis. Based upon her recent MRI scan, the patient will be referred for a CT scan to further assess possible bone erosion and fungal disease and the patient will be referred to Dr. Kishore Webster for possible surgical intervention.           Follow-ups after your visit        Your next 10 appointments already scheduled     Apr 13, 2018 11:00 AM CDT   (Arrive by 10:45 AM)   CT MAXILLOFACIAL W CONTRAST with UCCT2   [facility] ([facility])    [address] [address] [phone]           Please bring any scans or X-rays taken at other hospitals, if similar tests were done. Also bring a list of your medicines, including vitamins, minerals and over-the-counter drugs. It is safest to leave personal items at home.  Be sure to tell your doctor:   If you have any allergies.   If there s any chance you are pregnant.   If you are breastfeeding.    If you have diabetes as your medication may need to be adjusted for this exam.  You will have contrast for this exam. To prepare:   Do not eat or drink for 2 hours before your exam. If you need to take medicine, you may take it with small sips of water. (We may ask you to take liquid medicine as well.)   The day before your exam, drink extra fluids at least six 8-ounce glasses (unless your doctor tells you to restrict your fluids).  Patients over 70 or patients with diabetes or kidney problems:   If you haven t had a blood test (creatinine test) within the last 30 days, the  Cardiologist/Radiologist may require you to get this test prior to your exam.  Please wear loose clothing, such as a sweat suit or jogging clothes. Avoid snaps, zippers and other metal. We may ask you to undress and put on a hospital gown.  If you have any questions, please call the Imaging Department where you will have your exam.            May 14, 2018  8:00 AM CDT   (Arrive by 7:45 AM)   New Patient Visit with Kishore Webster MD   [facility] ([facility])    [address] [place] [address] [phone]            Jun 29, 2018 10:35 AM CDT   (Arrive by 10:20 AM)   Return Visit with Juan Westbrook MD   [facility] ([facility])    [address] [place] [address] [phone]              Future tests that were ordered for you today     Open Future Orders        Priority Expected Expires Ordered    Creatinine Routine  4/13/2019 4/13/2018    Urea nitrogen Routine  4/13/2019 4/13/2018    CT Maxillofacial w contrast Routine  4/13/2019 4/13/2018            Who to contact     Please call your clinic at [phone] to:    Ask questions about your health    Make or cancel appointments    Discuss your medicines    Learn about your test results    Speak to your doctor            Additional Information About Your Visit        [employer] Information     [employer] gives you secure access to your electronic health record. If you see a primary care provider, you can also send messages to your care team and make appointments. If you have questions, please call your primary care clinic.  If you do not have a primary care provider, please call [phone] and they will assist you.      [employer] is an electronic gateway that provides easy, online access to your medical records. With [employer], you can request a clinic appointment, read your test results, renew a prescription or communicate with your  "care team.     To access your existing account, please contact your [facility] or call [phone] for assistance.        Care EveryWhere ID     This is your Care EveryWhere ID. This could be used by other organizations to access your [facility] medical records  [id number]        Your Vitals Were     Height BMI (Body Mass Index)                1.651 m (5' 5\") 23.13 kg/m2           Blood Pressure from Last 3 Encounters:   03/13/18 120/62   03/04/18 116/68   02/26/18 123/73    Weight from Last 3 Encounters:   04/13/18 63 kg (139 lb)   03/13/18 64.5 kg (142 lb 4.8 oz)   03/04/18 62.9 kg (138 lb 9 oz)               Primary Care Provider Office Phone # Fax #    Juan Westbrook [phone]       [address] [address]        Equal Access to Services     KENDALL VILLARREAL : Hadii aad ku hadasho Soomaali, waaxda luqadaha, qaybta kaalmada adeegyada, waxay idiin haybeen pb bashir . So [facility] [phone].    ATENCIÓN: Si habla español, tiene a lopez disposición servicios gratuitos de asistencia lingüística. Llame al [phone].    We comply with applicable federal civil rights laws and [place] laws. We do not discriminate on the basis of race, color, national origin, age, disability, sex, sexual orientation, or gender identity.            Thank you!     Thank you for choosing [facility]  for your care. Our goal is always to provide you with excellent care. Hearing back from our patients is one way we can continue to improve our services. Please take a few minutes to complete the written survey that you may receive in the mail after your visit with us. Thank you!             Your Updated Medication List - Protect others around you: Learn how to safely use, store and throw away your medicines at www.disposemymeds.org.          This list is accurate as of 4/13/18  7:17 AM.  Always use your most recent med list.                   " Brand Name Dispense Instructions for use Diagnosis    aspirin 81 MG tablet      Take 81 mg by mouth daily        carvedilol 3.125 MG tablet    COREG    180 tablet    Take 1 tablet (3.125 mg) by mouth 2 times daily (with meals)    Stress-induced cardiomyopathy       gabapentin 100 MG capsule    NEURONTIN    30 capsule    Take 1 capsule (100 mg) by mouth nightly as needed    Pain of left lower extremity       multivitamin per tablet      Take 1 tablet by mouth daily.        simvastatin 20 MG tablet    ZOCOR    90 tablet    Take 1 tablet (20 mg) by mouth At Bedtime    Hyperlipidemia LDL goal <70

## 2018-04-13 NOTE — DISCHARGE INSTRUCTIONS

## 2018-04-13 NOTE — PROGRESS NOTES
The patient presents with a history of chronic sinusitis.  The patient's recent MRI scan demonstrates material in the sphenoid sinus with possible fungal disease and possible erosion.  She reports that after her third course of antibiotics, the condition has improved. Her primary symptom has been headaches. She denies nasal congestion or rhinitis at this time. She has never experienced such difficulties.     This patient is seen in consultation at the request of Dr. Juan Westbrook.    All other systems were reviewed and they are either negative or they are not directly pertinent to this Otolaryngology examination.      Past Medical History:    Past Medical History:   Diagnosis Date     Breast disorder 1990    Breast Cancer     Cardiomyopathy (H)      Chronic osteoarthritis 2012    resulted in hip replacement     CLL (chronic lymphoblastic leukemia) 1989     Colon adenomas     6/26/14 colonoscopy, repeat in 3 years     History of blood transfusion      hyperlipidemia      Primary localized osteoarthrosis, pelvic region and thigh      Tinnitus        Past Surgical History:    Past Surgical History:   Procedure Laterality Date     BIOPSY  within the last 5 years    polyps during colonoscopy     C PELVIS/HIP JOINT SURGERY UNLISTED  April 2012    left hip replacement     C SHOULDER SURG PROC UNLISTED  ?     COLONOSCOPY  6/26/2014    Procedure: COMBINED COLONOSCOPY, SINGLE BIOPSY/POLYPECTOMY BY BIOPSY;  Surgeon: Pola Arceo MD;  Location:  GI     D & C  1962    late PP hemorrhage --> retained placenta     ENT SURGERY  1950    tonselectomy     EYE SURGERY  2015    cataract on left eye     left hip replacemen Left     hip replacement in 2013     LUMPECTOMY BREAST  1990    Left     ORTHOPEDIC SURGERY  age?    right shoulder      ORTHOPEDIC SURGERY  ~ 2013    left hip replacement     R hip arthroscopy  7/19/11     TONSILLECTOMY  1950       Medications:      Current Outpatient Prescriptions:      gabapentin (NEURONTIN)  100 MG capsule, Take 1 capsule (100 mg) by mouth nightly as needed, Disp: 30 capsule, Rfl: 0     cefuroxime (CEFTIN) 500 MG tablet, Take 1 tablet (500 mg) by mouth 2 times daily, Disp: 20 tablet, Rfl: 0     fluticasone (FLONASE) 50 MCG/ACT spray, Spray 1-2 sprays into both nostrils daily, Disp: 1 Bottle, Rfl: 11     carvedilol (COREG) 3.125 MG tablet, Take 1 tablet (3.125 mg) by mouth 2 times daily (with meals), Disp: 180 tablet, Rfl: 2     Dextromethorphan-Guaifenesin  MG TB12, Take 1 tablet by mouth 2 times daily (Patient not taking: Reported on 3/4/2018), Disp: 28 tablet, Rfl: 0     simvastatin (ZOCOR) 20 MG tablet, Take 1 tablet (20 mg) by mouth At Bedtime, Disp: 90 tablet, Rfl: 3     aspirin 81 MG tablet, Take 81 mg by mouth daily, Disp: , Rfl:      Multiple Vitamin (MULTIVITAMIN) per tablet, Take 1 tablet by mouth daily., Disp: , Rfl:     Allergies:    Nkda [no known drug allergies]    Physical Examination:    The patient is a well developed, well nourished female in no apparent distress.  She is normocephalic, atraumatic with pupils equally round and reactive to light.    Oral Cavity Examination:  Normal mucosa with no masses or lesions  Nasal Examination:  Engorged nasal turbinates and a straight septum.  There are no masses or lesions in either nostril and no discharge or infection in either nostril at this time.  Ear Examination: Ear canals clear, tympanic membranes and middle ear spaces normal  Neurological Examination: Facial nerve function intact and symmetric  Integumentary Examination: No lesions on the skin of the head and neck  Neck Examination: No masses or lesions, no lymphadenopathy  Endocrine Examination: Normal thyroid examination    Assessment and Plan:    The patient presents with a history of chronic sinusitis. Based upon her recent MRI scan, the patient will be referred for a CT scan to further assess possible bone erosion and fungal disease and the patient will be referred to   Kishore Webster for possible surgical intervention.     CC: Dr. Kishore Webster   CC: Dr. Juan Westbrook

## 2018-04-13 NOTE — PATIENT INSTRUCTIONS
The patient presents with a history of chronic sinusitis. Based upon her recent MRI scan, the patient will be referred for a CT scan to further assess possible bone erosion and fungal disease and the patient will be referred to Dr. Kishore Webster for possible surgical intervention.

## 2018-04-13 NOTE — LETTER
4/13/2018       RE: Kerri Shook  [address] [facility] [address]     Dear Colleague,    Thank you for referring your patient, Kerri Shook, to the [facility] EAR NOSE AND THROAT at [facility]. Please see a copy of my visit note below.    The patient presents with a history of chronic sinusitis.  The patient's recent MRI scan demonstrates material in the sphenoid sinus with possible fungal disease and possible erosion.  She reports that after her third course of antibiotics, the condition has improved. Her primary symptom has been headaches. She denies nasal congestion or rhinitis at this time. She has never experienced such difficulties.     This patient is seen in consultation at the request of Dr. Juan Westbrook.    All other systems were reviewed and they are either negative or they are not directly pertinent to this Otolaryngology examination.      Past Medical History:    Past Medical History:   Diagnosis Date     Breast disorder 1990    Breast Cancer     Cardiomyopathy (H)      Chronic osteoarthritis 2012    resulted in hip replacement     CLL (chronic lymphoblastic leukemia) 1989     Colon adenomas     6/26/14 colonoscopy, repeat in 3 years     History of blood transfusion      hyperlipidemia      Primary localized osteoarthrosis, pelvic region and thigh      Tinnitus        Past Surgical History:    Past Surgical History:   Procedure Laterality Date     BIOPSY  within the last 5 years    polyps during colonoscopy     C PELVIS/HIP JOINT SURGERY UNLISTED  April 2012    left hip replacement     C SHOULDER SURG PROC UNLISTED  ?     COLONOSCOPY  6/26/2014    Procedure: COMBINED COLONOSCOPY, SINGLE BIOPSY/POLYPECTOMY BY BIOPSY;  Surgeon: Pola Arceo MD;  Location:  GI     D & C  1962    late PP hemorrhage --> retained placenta     ENT SURGERY  1950    tonselectomy     EYE SURGERY  2015    cataract on left eye     left hip replacemen Left     hip replacement  in 2013     LUMPECTOMY BREAST  1990    Left     ORTHOPEDIC SURGERY  age?    right shoulder      ORTHOPEDIC SURGERY  ~ 2013    left hip replacement     R hip arthroscopy  7/19/11     TONSILLECTOMY  1950       Medications:      Current Outpatient Prescriptions:      gabapentin (NEURONTIN) 100 MG capsule, Take 1 capsule (100 mg) by mouth nightly as needed, Disp: 30 capsule, Rfl: 0     cefuroxime (CEFTIN) 500 MG tablet, Take 1 tablet (500 mg) by mouth 2 times daily, Disp: 20 tablet, Rfl: 0     fluticasone (FLONASE) 50 MCG/ACT spray, Spray 1-2 sprays into both nostrils daily, Disp: 1 Bottle, Rfl: 11     carvedilol (COREG) 3.125 MG tablet, Take 1 tablet (3.125 mg) by mouth 2 times daily (with meals), Disp: 180 tablet, Rfl: 2     Dextromethorphan-Guaifenesin  MG TB12, Take 1 tablet by mouth 2 times daily (Patient not taking: Reported on 3/4/2018), Disp: 28 tablet, Rfl: 0     simvastatin (ZOCOR) 20 MG tablet, Take 1 tablet (20 mg) by mouth At Bedtime, Disp: 90 tablet, Rfl: 3     aspirin 81 MG tablet, Take 81 mg by mouth daily, Disp: , Rfl:      Multiple Vitamin (MULTIVITAMIN) per tablet, Take 1 tablet by mouth daily., Disp: , Rfl:     Allergies:    Nkda [no known drug allergies]    Physical Examination:    The patient is a well developed, well nourished female in no apparent distress.  She is normocephalic, atraumatic with pupils equally round and reactive to light.    Oral Cavity Examination:  Normal mucosa with no masses or lesions  Nasal Examination:  Engorged nasal turbinates and a straight septum.  There are no masses or lesions in either nostril and no discharge or infection in either nostril at this time.  Ear Examination: Ear canals clear, tympanic membranes and middle ear spaces normal  Neurological Examination: Facial nerve function intact and symmetric  Integumentary Examination: No lesions on the skin of the head and neck  Neck Examination: No masses or lesions, no lymphadenopathy  Endocrine Examination:  Normal thyroid examination    Assessment and Plan:    The patient presents with a history of chronic sinusitis. Based upon her recent MRI scan, the patient will be referred for a CT scan to further assess possible bone erosion and fungal disease and the patient will be referred to Dr. Kishore Webster for possible surgical intervention.     CC: Dr. Kishore Webster   CC: Dr. Juan Westbrook    Again, thank you for allowing me to participate in the care of your patient.      Sincerely,    Bolivar Sanchez MD

## 2018-04-16 ENCOUNTER — MYC MEDICAL ADVICE (OUTPATIENT)
Dept: INTERNAL MEDICINE | Facility: CLINIC | Age: 78
End: 2018-04-16

## 2018-04-16 DIAGNOSIS — T75.3XXA MOTION SICKNESS, INITIAL ENCOUNTER: Primary | ICD-10-CM

## 2018-04-17 RX ORDER — SCOLOPAMINE TRANSDERMAL SYSTEM 1 MG/1
1 PATCH, EXTENDED RELEASE TRANSDERMAL
Qty: 5 PATCH | Refills: 0 | COMMUNITY
Start: 2018-04-17 | End: 2018-04-20

## 2018-04-17 NOTE — TELEPHONE ENCOUNTER
Dear Susan;    Placed historical order for scopolamine patch    Please advise side effects:    Blurred vision  Confusion  Dry mouth  Urinary retention    She CANNOT have glaucoma    Thanks, ANABELLA Westbrook    Pt called and reviewed side effects of Scopolamine patch.  Pt does not have glaucoma.  Clinic numbers given for question or concerns.  Susan Ocampo RN 10:02 AM on 4/18/2018.  .

## 2018-04-17 NOTE — TELEPHONE ENCOUNTER
I am going on a small ship cruise later this month and I have a tendency to motion sickness.  I have used the patches before and would like a prescription for them - can't remember the name.  Thank you!

## 2018-04-20 RX ORDER — SCOLOPAMINE TRANSDERMAL SYSTEM 1 MG/1
1 PATCH, EXTENDED RELEASE TRANSDERMAL
Qty: 5 PATCH | Refills: 0 | Status: SHIPPED | OUTPATIENT
Start: 2018-04-20 | End: 2018-05-14

## 2018-05-08 NOTE — TELEPHONE ENCOUNTER
FUTURE VISIT INFORMATION      FUTURE VISIT INFORMATION:    Date: 5/14/18    Time: 8AM    Location: [facility]  REFERRAL INFORMATION:    Referring provider:  Dr Bolivar Sanchez    Referring providers clinic:  [facility]    Reason for visit/diagnosis assess possible bone erosion and fungal disease & possible surgical intervention    RECORDS REQUESTED FROM:       Clinic name Comments Records Status Imaging Status   [facility] 4/13/18 visit notes with Dr Sanchez  4/13/18 [facility] PACS                                   RECORDS STATUS

## 2018-05-14 ENCOUNTER — OFFICE VISIT (OUTPATIENT)
Dept: OTOLARYNGOLOGY | Facility: CLINIC | Age: 78
End: 2018-05-14
Payer: COMMERCIAL

## 2018-05-14 ENCOUNTER — PRE VISIT (OUTPATIENT)
Dept: OTOLARYNGOLOGY | Facility: CLINIC | Age: 78
End: 2018-05-14

## 2018-05-14 VITALS — WEIGHT: 141 LBS | BODY MASS INDEX: 22.66 KG/M2 | HEIGHT: 66 IN

## 2018-05-14 DIAGNOSIS — J32.3 CHRONIC SPHENOIDAL SINUSITIS: Primary | ICD-10-CM

## 2018-05-14 LAB
GRAM STN SPEC: NORMAL
GRAM STN SPEC: NORMAL
SPECIMEN SOURCE: NORMAL

## 2018-05-14 ASSESSMENT — PAIN SCALES - GENERAL: PAINLEVEL: NO PAIN (0)

## 2018-05-14 NOTE — PROGRESS NOTES
HISTORY OF PRESENT ILLNESS:  Ms. Shook is seen here today.  She notes that she is feeling much better that she had previously.  She has been seen previously by Dr. Sanchez.  She had developed a right temporal headache that was severe a couple months ago and had an MRI scan which showed right sphenoid sinusitis.  She was treated a couple times with antibiotics and now has good improvement.  She still has some posterior discolored nasal drainage but no pain.  She states that she feels well.  She does not remember having discolored drainage until maybe 6-12 months ago.  She had never had headaches like she did two or three months ago.      PHYSICAL EXAMINATION:  She is in no distress, alert and appropriate.  Breathing without difficulty or stridor.  Eyes are anicteric.  Skin of the head and neck appears normal.  Examination of the nose shows normal anterior nasal exam.      PROCEDURE:  The right side of the nose was sprayed with lidocaine and Afrin after verbal consent, and nasal endoscopy is performed.  The patient has a reddish swelling/polyp in the sphenoethmoidal recess on the right side with surrounding purulent mucus which is cultured.      IMAGING:  CT scan shows sclerotic bone surrounding what appears to be chronic opacification of the sphenoid sinus on the right side.      ASSESSMENT AND PLAN:  Today, we discussed options with the patient.  We will go ahead and check for culture to see if we should treat her with a different antibiotic.  I did go over the pros and cons of doing a sphenoidotomy with removal of tissue.  She understands this.  The plan will be to contact her with the culture results and possibly put her on another antibiotic based on what those are.  She will think about having the procedure.  We will schedule that for her at her convenience should she decide to.  If not, we will see her back in three months.

## 2018-05-14 NOTE — PATIENT INSTRUCTIONS
The plan will be to see what the left sinus culture grows and treat that. If you are wanting sinus surgery before the 3 month follow up please lobo the RN care coordinator.  Venancio HollingsworthRN  [phone]

## 2018-05-14 NOTE — LETTER
5/14/2018       RE: Kerri Shook  [address] [facility] [address]     Dear Colleague,    Thank you for referring your patient, Kerri Shook, to the [facility] EAR NOSE AND THROAT at [facility]. Please see a copy of my visit note below.    HISTORY OF PRESENT ILLNESS:  Ms. Shook is seen here today.  She notes that she is feeling much better that she had previously.  She has been seen previously by Dr. Sanchez.  She had developed a right temporal headache that was severe a couple months ago and had an MRI scan which showed right sphenoid sinusitis.  She was treated a couple times with antibiotics and now has good improvement.  She still has some posterior discolored nasal drainage but no pain.  She states that she feels well.  She does not remember having discolored drainage until maybe 6-12 months ago.  She had never had headaches like she did two or three months ago.      PHYSICAL EXAMINATION:  She is in no distress, alert and appropriate.  Breathing without difficulty or stridor.  Eyes are anicteric.  Skin of the head and neck appears normal.  Examination of the nose shows normal anterior nasal exam.      PROCEDURE:  The right side of the nose was sprayed with lidocaine and Afrin after verbal consent, and nasal endoscopy is performed.  The patient has a reddish swelling/polyp in the sphenoethmoidal recess on the right side with surrounding purulent mucus which is cultured.      IMAGING:  CT scan shows sclerotic bone surrounding what appears to be chronic opacification of the sphenoid sinus on the right side.      ASSESSMENT AND PLAN:  Today, we discussed options with the patient.  We will go ahead and check for culture to see if we should treat her with a different antibiotic.  I did go over the pros and cons of doing a sphenoidotomy with removal of tissue.  She understands this.  The plan will be to contact her with the culture results and possibly put her on  another antibiotic based on what those are.  She will think about having the procedure.  We will schedule that for her at her convenience should she decide to.  If not, we will see her back in three months.         Again, thank you for allowing me to participate in the care of your patient.      Sincerely,    Kishore Webster MD

## 2018-05-14 NOTE — NURSING NOTE
"Chief Complaint   Patient presents with     Consult     review ct-fungal infection/bone erosion     Height 1.68 m (5' 6.14\"), weight 64 kg (141 lb), not currently breastfeeding.    Esteban Poole LPN    "

## 2018-05-14 NOTE — MR AVS SNAPSHOT
After Visit Summary   5/14/2018    Kerri Shook    MRN: [id number]           Patient Information     Date Of Birth          1940        Visit Information        Provider Department      5/14/2018 8:00 AM Kishore Webster MD [facility] Ear Nose and Throat        Today's Diagnoses     Chronic sphenoidal sinusitis    -  1      Care Instructions    The plan will be to see what the left sinus culture grows and treat that. If you are wanting sinus surgery before the 3 month follow up please lobo the RN care coordinator.  Venancio Hollingsworth ,[place]  [phone]              Follow-ups after your visit        Follow-up notes from your care team     Return in about 3 months (around 8/14/2018).      Your next 10 appointments already scheduled     Jun 29, 2018 10:35 AM CDT   (Arrive by 10:20 AM)   Return Visit with Juan Westbrook MD   [facility] ([facility])    [address] [place] [id number] [phone]              Who to contact     Please call your clinic at [phone] to:    Ask questions about your health    Make or cancel appointments    Discuss your medicines    Learn about your test results    Speak to your doctor            Additional Information About Your Visit        [email or web address] Information     [employer] gives you secure access to your electronic health record. If you see a primary care provider, you can also send messages to your care team and make appointments. If you have questions, please call your primary care clinic.  If you do not have a primary care provider, please call [phone] and they will assist you.      [employer] is an electronic gateway that provides easy, online access to your medical records. With [employer], you can request a clinic appointment, read your test results, renew a prescription or communicate with your care team.     To access your existing account, please contact your [facility] or call [phone] for assistance.        Care EveryWhere ID     This is your Care EveryWhere ID. This could be used by other organizations to access your [place] medical records  [id number]        Your Vitals Were     Height BMI (Body Mass Index)                1.68 m (5' 6.14\") 22.66 kg/m2           Blood Pressure from Last 3 Encounters:   05/17/18 132/66   03/13/18 120/62   03/04/18 116/68    Weight from Last 3 Encounters:   05/17/18 63.9 kg (140 lb 14.4 oz)   05/14/18 64 kg (141 lb)   04/13/18 63 kg (139 lb)              We Performed the Following     Gram stain     NASAL ENDOSCOPY, DIAGNOSTIC     Sinus Culture Aerobic Bacterial        Primary Care Provider Office Phone # Fax #    Juan Westbrook [phone] [address] [address]        Equal Access to Services     KENZIE VILLARREAL : Hadii aad ku hadasho Soomaali, waaxda luqadaha, qaybta kaalmada adeegyada, waxay rohitin haybeen pb bashir . So [facility] [phone].    ATENCIÓN: Si azeb medina, tiene a lopez disposición servicios gratuitos de asistencia lingüística. Llame al [phone].    We comply with applicable federal civil rights laws and [place] laws. We do not discriminate on the basis of race, color, national origin, age, disability, sex, sexual orientation, or gender identity.            Thank you!     Thank you for choosing [facility] EAR NOSE AND THROAT  for your care. Our goal is always to provide you with excellent care. Hearing back from our patients is one way we can continue to improve our services. Please take a few minutes to complete the written survey that you may receive in the mail after your visit with us. Thank you!             Your Updated Medication List - Protect others around you: Learn how to safely use, store and throw away your medicines at www.disposemymeds.org.          This list is accurate as of 5/14/18 11:59 PM.  Always use your most recent med list.    "                Brand Name Dispense Instructions for use Diagnosis    aspirin 81 MG tablet      Take 81 mg by mouth daily        carvedilol 3.125 MG tablet    COREG    180 tablet    Take 1 tablet (3.125 mg) by mouth 2 times daily (with meals)    Stress-induced cardiomyopathy       gabapentin 100 MG capsule    NEURONTIN    30 capsule    Take 1 capsule (100 mg) by mouth nightly as needed    Pain of left lower extremity       multivitamin per tablet      Take 1 tablet by mouth daily.        simvastatin 20 MG tablet    ZOCOR    90 tablet    Take 1 tablet (20 mg) by mouth At Bedtime    Hyperlipidemia LDL goal <70

## 2018-05-16 LAB
BACTERIA SPEC CULT: NO GROWTH
SPECIMEN SOURCE: NORMAL

## 2018-05-17 ENCOUNTER — OFFICE VISIT (OUTPATIENT)
Dept: INTERNAL MEDICINE | Facility: CLINIC | Age: 78
End: 2018-05-17
Payer: COMMERCIAL

## 2018-05-17 ENCOUNTER — TELEPHONE (OUTPATIENT)
Dept: OTOLARYNGOLOGY | Facility: CLINIC | Age: 78
End: 2018-05-17

## 2018-05-17 VITALS
SYSTOLIC BLOOD PRESSURE: 132 MMHG | OXYGEN SATURATION: 99 % | WEIGHT: 140.9 LBS | BODY MASS INDEX: 22.64 KG/M2 | HEART RATE: 77 BPM | RESPIRATION RATE: 12 BRPM | DIASTOLIC BLOOD PRESSURE: 66 MMHG

## 2018-05-17 DIAGNOSIS — R11.0 NAUSEA: Primary | ICD-10-CM

## 2018-05-17 DIAGNOSIS — R11.0 NAUSEA: ICD-10-CM

## 2018-05-17 LAB
ALBUMIN SERPL-MCNC: 3.8 G/DL (ref 3.4–5)
ALP SERPL-CCNC: 77 U/L (ref 40–150)
ALT SERPL W P-5'-P-CCNC: 27 U/L (ref 0–50)
AMYLASE SERPL-CCNC: 106 U/L (ref 30–110)
ANION GAP SERPL CALCULATED.3IONS-SCNC: 6 MMOL/L (ref 3–14)
AST SERPL W P-5'-P-CCNC: 20 U/L (ref 0–45)
BASOPHILS # BLD AUTO: 0.1 10E9/L (ref 0–0.2)
BASOPHILS NFR BLD AUTO: 0.3 %
BILIRUB SERPL-MCNC: 0.5 MG/DL (ref 0.2–1.3)
BUN SERPL-MCNC: 18 MG/DL (ref 7–30)
CALCIUM SERPL-MCNC: 9.1 MG/DL (ref 8.5–10.1)
CHLORIDE SERPL-SCNC: 106 MMOL/L (ref 94–109)
CO2 SERPL-SCNC: 29 MMOL/L (ref 20–32)
CREAT SERPL-MCNC: 0.79 MG/DL (ref 0.52–1.04)
DIFFERENTIAL METHOD BLD: ABNORMAL
EOSINOPHIL # BLD AUTO: 0.1 10E9/L (ref 0–0.7)
EOSINOPHIL NFR BLD AUTO: 0.5 %
ERYTHROCYTE [DISTWIDTH] IN BLOOD BY AUTOMATED COUNT: 13.2 % (ref 10–15)
GFR SERPL CREATININE-BSD FRML MDRD: 70 ML/MIN/1.7M2
GLUCOSE SERPL-MCNC: 89 MG/DL (ref 70–99)
HCT VFR BLD AUTO: 45.3 % (ref 35–47)
HGB BLD-MCNC: 14.9 G/DL (ref 11.7–15.7)
IMM GRANULOCYTES # BLD: 0.1 10E9/L (ref 0–0.4)
IMM GRANULOCYTES NFR BLD: 0.3 %
LIPASE SERPL-CCNC: 179 U/L (ref 73–393)
LYMPHOCYTES # BLD AUTO: 15.2 10E9/L (ref 0.8–5.3)
LYMPHOCYTES NFR BLD AUTO: 68.7 %
MCH RBC QN AUTO: 31.4 PG (ref 26.5–33)
MCHC RBC AUTO-ENTMCNC: 32.9 G/DL (ref 31.5–36.5)
MCV RBC AUTO: 95 FL (ref 78–100)
MONOCYTES # BLD AUTO: 0.9 10E9/L (ref 0–1.3)
MONOCYTES NFR BLD AUTO: 4.1 %
NEUTROPHILS # BLD AUTO: 5.7 10E9/L (ref 1.6–8.3)
NEUTROPHILS NFR BLD AUTO: 26.1 %
NRBC # BLD AUTO: 0 10*3/UL
NRBC BLD AUTO-RTO: 0 /100
PLATELET # BLD AUTO: 188 10E9/L (ref 150–450)
POTASSIUM SERPL-SCNC: 4.8 MMOL/L (ref 3.4–5.3)
PROT SERPL-MCNC: 6.9 G/DL (ref 6.8–8.8)
RBC # BLD AUTO: 4.75 10E12/L (ref 3.8–5.2)
SODIUM SERPL-SCNC: 141 MMOL/L (ref 133–144)
WBC # BLD AUTO: 22.1 10E9/L (ref 4–11)

## 2018-05-17 ASSESSMENT — PAIN SCALES - GENERAL: PAINLEVEL: NO PAIN (0)

## 2018-05-17 NOTE — PROGRESS NOTES
HPI:    Pt. Comes in for follow up today.   She just returned from a  trip and was on a ship. She did use the scopolamine  patch. She states for about one week after she got back some dizziness and GI upset. She is now back to her baseline and feels fine. She o/w is doing well and denies additional HEENT, cardiopulmonary, abdominal, , GYN, neurological complaints. She has h/o breast CA and was seen by Dr. Sheldon, 11/13.   She was also  followed by Dr. Cindi WALLER for CLL and was seen 10/13.  She some chronic Low back pain that is getting less with PT.  She had some skin back lesions.   She was seen by Ms. Silverio ONC 2/23/2018  She is active with exercise and denies any rest/exertional CP/SOB/palptations today.       Past Medical History:   Diagnosis Date     Breast disorder 1990    Breast Cancer     Cardiomyopathy (H)      Chronic osteoarthritis 2012    resulted in hip replacement     CLL (chronic lymphoblastic leukemia) 1989     Colon adenomas     6/26/14 colonoscopy, repeat in 3 years     History of blood transfusion      hyperlipidemia      Primary localized osteoarthrosis, pelvic region and thigh      Tinnitus          PE:    Vitals noted; HEENT, ears normal oropharynx clear no exudate, neck supple nl rom,  No adenopahty, LCTA, RRR, S1, S2, no MRG.  No B carotid bruits, no B temporal bruits,   Normal neurological exam. She has some back skin lesions.  No tenderness to palpation of low back area. Abdomen; positive BS's no masses no tenderness. No B CVA tenderness to palpation. Grossly normal neurological exam.       A/P:    1. Resolved dizziness and nausea. She is eating OK and has no additional sxs. Will, never the less check her labs today. Possibly related to scopolamine patch?  2. I can do her preop 6/29/2018 for ENT surgery. She was seen on 5/14/2018 by Dr. Webster  3. CLL will check her counts today                  Total time spent 25 minutes.  More than 50% of the time spent with Ms. Shook on  counseling / coordinating her care

## 2018-05-17 NOTE — MR AVS SNAPSHOT
After Visit Summary   5/17/2018    Kerri Shook    MRN: [id number]           Patient Information     Date Of Birth          1940        Visit Information        Provider Department      5/17/2018 9:00 AM Juan Westbrook MD [facility]        Today's Diagnoses     Nausea    -  1      Care Instructions    [facility]: [phone]     [facility] Medication Refill Request Information:  * Please contact your pharmacy regarding ANY request for medication refills.  ** [facility] Prescription Fax = [phone]  * Please allow 3 business days for routine medication refills.  * Please allow 5 business days for controlled substance medication refills.     Primary Care Center Test Result notification information:  *You will be notified with in 7-10 days of your appointment day regarding the results of your test.  If you are on MyChart you will be notified as soon as the provider has reviewed the results and signed off on them.            Follow-ups after your visit        Your next 10 appointments already scheduled     May 17, 2018 10:45 AM CDT   LAB with  LAB   [facility])    [address] [place] [id number] [phone]           Please do not eat 10-12 hours before your appointment if you are coming in fasting for labs on lipids, cholesterol, or glucose (sugar). This does not apply to pregnant women. Water, hot tea and black coffee (with nothing added) are okay. Do not drink other fluids, diet soda or chew gum.            Jun 29, 2018 10:35 AM CDT   (Arrive by 10:20 AM)   Return Visit with Juan Westbrook MD   [facility] ([place] campus)    [address] [place] [id number] [phone]              Future tests that were ordered for you today     Open Future Orders        Priority Expected Expires Ordered    Amylase Routine 5/17/2018  5/17/2019 5/17/2018    Lipase Routine 5/17/2018 5/31/2018 5/17/2018    CBC with platelets differential Routine 5/17/2018 5/31/2018 5/17/2018    Comprehensive metabolic panel Routine 5/17/2018 5/31/2018 5/17/2018            Who to contact     Please call your clinic at [phone] to:    Ask questions about your health    Make or cancel appointments    Discuss your medicines    Learn about your test results    Speak to your doctor            Additional Information About Your Visit        [employer] Information     [employer] gives you secure access to your electronic health record. If you see a primary care provider, you can also send messages to your care team and make appointments. If you have questions, please call your primary care clinic.  If you do not have a primary care provider, please call [phone] and they will assist you.      [employer] is an electronic gateway that provides easy, online access to your medical records. With [employer] you can request a clinic appointment, read your test results, renew a prescription or communicate with your care team.     To access your existing account, please contact your [facility] or call [phone] for assistance.        Care EveryWhere ID     This is your Care EveryWhere ID. This could be used by other organizations to access your [place] medical records  [id number]        Your Vitals Were     Pulse Respirations Pulse Oximetry BMI (Body Mass Index)          77 12 99% 22.64 kg/m2         Blood Pressure from Last 3 Encounters:   05/17/18 132/66   03/13/18 120/62   03/04/18 116/68    Weight from Last 3 Encounters:   05/17/18 63.9 kg (140 lb 14.4 oz)   05/14/18 64 kg (141 lb)   04/13/18 63 kg (139 lb)               Primary Care Provider Office Phone # Fax #    Juan Westbrook [phone]       [address] [address]        Equal Access to Services     KENDALL VILLARREAL : Malathi Gentile,  geoffrey ray, abdiel bebetona hernandez, lulu ambriz joleencori laMarbellaevelin ah. So [facility] [phone].    ATENCIÓN: Si azeb medina, tiene a lopez disposición servicios gratuitos de asistencia lingüística. Rm al [phone].    We comply with applicable federal civil rights laws and [place] laws. We do not discriminate on the basis of race, color, national origin, age, disability, sex, sexual orientation, or gender identity.            Thank you!     Thank you for choosing [facility]  for your care. Our goal is always to provide you with excellent care. Hearing back from our patients is one way we can continue to improve our services. Please take a few minutes to complete the written survey that you may receive in the mail after your visit with us. Thank you!             Your Updated Medication List - Protect others around you: Learn how to safely use, store and throw away your medicines at www.disposemymeds.org.          This list is accurate as of 5/17/18 10:17 AM.  Always use your most recent med list.                   Brand Name Dispense Instructions for use Diagnosis    aspirin 81 MG tablet      Take 81 mg by mouth daily        carvedilol 3.125 MG tablet    COREG    180 tablet    Take 1 tablet (3.125 mg) by mouth 2 times daily (with meals)    Stress-induced cardiomyopathy       gabapentin 100 MG capsule    NEURONTIN    30 capsule    Take 1 capsule (100 mg) by mouth nightly as needed    Pain of left lower extremity       multivitamin per tablet      Take 1 tablet by mouth daily.        simvastatin 20 MG tablet    ZOCOR    90 tablet    Take 1 tablet (20 mg) by mouth At Bedtime    Hyperlipidemia LDL goal <70

## 2018-05-17 NOTE — PATIENT INSTRUCTIONS
[facility]: [phone]     [facility] Medication Refill Request Information:  * Please contact your pharmacy regarding ANY request for medication refills.  ** [facility] Prescription Fax = [phone]  * Please allow 3 business days for routine medication refills.  * Please allow 5 business days for controlled substance medication refills.     [facility] Test Result notification information:  *You will be notified with in 7-10 days of your appointment day regarding the results of your test.  If you are on MyChart you will be notified as soon as the provider has reviewed the results and signed off on them.

## 2018-05-17 NOTE — TELEPHONE ENCOUNTER
The patient was called and message left to report that the results of her sinus culture did not show any growth of bacteria. She is thinking about a procedure with Dr Webster or if not follow up would be in 3 months.  Venancio Hollingsworth RN  [phone]

## 2018-05-22 DIAGNOSIS — J32.0 CHRONIC MAXILLARY SINUSITIS: Primary | ICD-10-CM

## 2018-05-23 ENCOUNTER — DOCUMENTATION ONLY (OUTPATIENT)
Dept: OTOLARYNGOLOGY | Facility: CLINIC | Age: 78
End: 2018-05-23

## 2018-05-23 NOTE — PROGRESS NOTES
5/23/18  Left message for patient to call to schedule surgery with Dr Webster      5/25/18  Left 2nd msg on home #    Melissa Keegan   ENT Estee-Op Coordinator  [phone]

## 2018-06-01 ENCOUNTER — DOCUMENTATION ONLY (OUTPATIENT)
Dept: OTOLARYNGOLOGY | Facility: CLINIC | Age: 78
End: 2018-06-01

## 2018-06-01 NOTE — PROGRESS NOTES
Pt returned call (msg left by patient - yesterday after clinic hours) and left message    11:39am -- Melissa surgery scheduled called home # [phone] left return call message.    Melissa Allison   ENT Estee-Op Coordinator  [phone]

## 2018-06-18 ASSESSMENT — ENCOUNTER SYMPTOMS
BACK PAIN: 1
SORE THROAT: 0
STIFFNESS: 0
NECK MASS: 0
SINUS PAIN: 0
SINUS CONGESTION: 0
MUSCLE WEAKNESS: 0
MUSCLE CRAMPS: 0
MYALGIAS: 0
HOARSE VOICE: 0
TASTE DISTURBANCE: 0
JOINT SWELLING: 0
ARTHRALGIAS: 0
SMELL DISTURBANCE: 0
TROUBLE SWALLOWING: 0
NECK PAIN: 0

## 2018-06-18 ASSESSMENT — ACTIVITIES OF DAILY LIVING (ADL)
IN_THE_PAST_7_DAYS,_DID_YOU_NEED_HELP_FROM_OTHERS_TO_PERFORM_EVERYDAY_ACTIVITIES_SUCH_AS_EATING,_GETTING_DRESSED,_GROOMING,_BATHING,_WALKING,_OR_USING_THE_TOILET: N
IN_THE_PAST_7_DAYS,_DID_YOU_NEED_HELP_FROM_OTHERS_TO_TAKE_CARE_OF_THINGS_SUCH_AS_LAUNDRY_AND_HOUSEKEEPING,_BANKING,_SHOPPING,_USING_THE_TELEPHONE,_FOOD_PREPARATION,_TRANSPORTATION,_OR_TAKING_YOUR_OWN_MEDICATIONS?: N

## 2018-06-27 ENCOUNTER — TELEPHONE (OUTPATIENT)
Dept: OTOLARYNGOLOGY | Facility: CLINIC | Age: 78
End: 2018-06-27

## 2018-06-27 NOTE — TELEPHONE ENCOUNTER
Relevant Diagnosis: right maxillary sinusitis   Teaching Topic: Endoscopic sphenoidotomy with removal of tissue  Person(s) involved in teaching: Patient     Teaching Concerns Addressed:  Pre op teaching included the need for an H&P, NPO status pre op, hospital routines, expected recovery, activity  restrictions, antimicrobial scrub, s/s of infection, pain control methods and the importance of follow up appointments.  The patient voiced an understanding of all instructions and will call with questions.     Motivation Level:  Asks Questions:   Yes  Eager to Learn:   Yes  Cooperative:   Yes  Receptive (willing/able to accept information):   Yes     Patient  demonstrates understanding of the following:  Reason for the appointment, diagnosis and treatment plan:   Yes  Knowledge of proper use of medications and conditions for which they are ordered (with special attention to potential side effects or drug interactions):   Yes  Which situations necessitate calling provider and whom to contact:   Yes        Proper use and care of  (medical equip, care aids, etc.):   NA  Nutritional needs and diet plan:   Yes  Pain management techniques:   Yes  Patient instructed on hand hygiene:  Yes  How and/when to access community resources:   NA     Infection Prevention:  Patient   demonstrates understanding of the following:  Surgical procedure site care taught   Signs and symptoms of infection taught Yes  Wound care taught Yes     Instructional Materials Used/Given: Pre op booklet.

## 2018-06-29 ENCOUNTER — OFFICE VISIT (OUTPATIENT)
Dept: INTERNAL MEDICINE | Facility: CLINIC | Age: 78
End: 2018-06-29
Payer: COMMERCIAL

## 2018-06-29 VITALS
SYSTOLIC BLOOD PRESSURE: 123 MMHG | BODY MASS INDEX: 22.69 KG/M2 | DIASTOLIC BLOOD PRESSURE: 73 MMHG | HEART RATE: 74 BPM | WEIGHT: 141.2 LBS

## 2018-06-29 DIAGNOSIS — I10 BENIGN ESSENTIAL HYPERTENSION: ICD-10-CM

## 2018-06-29 DIAGNOSIS — I10 BENIGN ESSENTIAL HYPERTENSION: Primary | ICD-10-CM

## 2018-06-29 DIAGNOSIS — Z12.83 SKIN CANCER SCREENING: ICD-10-CM

## 2018-06-29 LAB
ALBUMIN SERPL-MCNC: 3.7 G/DL (ref 3.4–5)
ALP SERPL-CCNC: 71 U/L (ref 40–150)
ALT SERPL W P-5'-P-CCNC: 28 U/L (ref 0–50)
ANION GAP SERPL CALCULATED.3IONS-SCNC: 6 MMOL/L (ref 3–14)
AST SERPL W P-5'-P-CCNC: 19 U/L (ref 0–45)
BASOPHILS # BLD AUTO: 0.1 10E9/L (ref 0–0.2)
BASOPHILS NFR BLD AUTO: 0.3 %
BILIRUB SERPL-MCNC: 0.4 MG/DL (ref 0.2–1.3)
BUN SERPL-MCNC: 14 MG/DL (ref 7–30)
CALCIUM SERPL-MCNC: 8.7 MG/DL (ref 8.5–10.1)
CHLORIDE SERPL-SCNC: 108 MMOL/L (ref 94–109)
CO2 SERPL-SCNC: 28 MMOL/L (ref 20–32)
CREAT SERPL-MCNC: 0.88 MG/DL (ref 0.52–1.04)
DIFFERENTIAL METHOD BLD: ABNORMAL
EOSINOPHIL # BLD AUTO: 0.2 10E9/L (ref 0–0.7)
EOSINOPHIL NFR BLD AUTO: 0.8 %
ERYTHROCYTE [DISTWIDTH] IN BLOOD BY AUTOMATED COUNT: 13.1 % (ref 10–15)
GFR SERPL CREATININE-BSD FRML MDRD: 62 ML/MIN/1.7M2
GLUCOSE SERPL-MCNC: 88 MG/DL (ref 70–99)
HCT VFR BLD AUTO: 44.6 % (ref 35–47)
HGB BLD-MCNC: 14.2 G/DL (ref 11.7–15.7)
IMM GRANULOCYTES # BLD: 0 10E9/L (ref 0–0.4)
IMM GRANULOCYTES NFR BLD: 0.1 %
INTERPRETATION ECG - MUSE: NORMAL
LYMPHOCYTES # BLD AUTO: 14.9 10E9/L (ref 0.8–5.3)
LYMPHOCYTES NFR BLD AUTO: 69 %
MCH RBC QN AUTO: 30.1 PG (ref 26.5–33)
MCHC RBC AUTO-ENTMCNC: 31.8 G/DL (ref 31.5–36.5)
MCV RBC AUTO: 95 FL (ref 78–100)
MONOCYTES # BLD AUTO: 1.7 10E9/L (ref 0–1.3)
MONOCYTES NFR BLD AUTO: 7.7 %
NEUTROPHILS # BLD AUTO: 4.8 10E9/L (ref 1.6–8.3)
NEUTROPHILS NFR BLD AUTO: 22.1 %
NRBC # BLD AUTO: 0 10*3/UL
NRBC BLD AUTO-RTO: 0 /100
PLATELET # BLD AUTO: 187 10E9/L (ref 150–450)
POTASSIUM SERPL-SCNC: 4.5 MMOL/L (ref 3.4–5.3)
PROT SERPL-MCNC: 6.8 G/DL (ref 6.8–8.8)
RBC # BLD AUTO: 4.72 10E12/L (ref 3.8–5.2)
SODIUM SERPL-SCNC: 142 MMOL/L (ref 133–144)
WBC # BLD AUTO: 21.6 10E9/L (ref 4–11)

## 2018-06-29 ASSESSMENT — PAIN SCALES - GENERAL: PAINLEVEL: NO PAIN (0)

## 2018-06-29 NOTE — NURSING NOTE
Chief Complaint   Patient presents with     Pre-Op Exam     Patient here for pre op exam.       Aamir Shepherd CMA at 10:23 AM on 6/29/2018.

## 2018-06-29 NOTE — PROGRESS NOTES
Rooming Note      Pre-Operative Information  [facility] PREOP PROCEDURE RS 6/18/2018   Date? 7/16/18   Time? AM   [facility]   Surgeon? Dr. Webster   Surgical Procedure? Sphenoidotomy     Aamir Nicolejoyces, DADA at 10:20 AM on 6/29/2018    HPI:    Pt. Comes in for preop today for sinus surgery with Dr. Webster. See note from Dr. Webster 5/14/2018 regarding sinus complaints. Today she is stable and denies any new HEENT, cardiopulmonary, abdominal, , GYN, neurological complaints. Chronic L nasal labial skin nodule where her glasses rest. She does not smoke nor abuse EtOH. No bleeding or anesthesia complaints.     Past Medical History:   Diagnosis Date     Breast disorder 1990    Breast Cancer     Cardiomyopathy (H)      Chronic osteoarthritis 2012    resulted in hip replacement     CLL (chronic lymphoblastic leukemia) 1989     Colon adenomas     6/26/14 colonoscopy, repeat in 3 years     History of blood transfusion      hyperlipidemia      Primary localized osteoarthrosis, pelvic region and thigh      Tinnitus      Past Surgical History:   Procedure Laterality Date     BIOPSY  within the last 5 years    polyps during colonoscopy     C PELVIS/HIP JOINT SURGERY UNLISTED  April 2012    left hip replacement     C SHOULDER SURG PROC UNLISTED  ?     COLONOSCOPY  6/26/2014    Procedure: COMBINED COLONOSCOPY, SINGLE BIOPSY/POLYPECTOMY BY BIOPSY;  Surgeon: Pola Arceo MD;  Location:  GI     D & C  1962    late PP hemorrhage --> retained placenta     ENT SURGERY  1950    tonselectomy     EYE SURGERY  2015    cataract on left eye     left hip replacemen Left     hip replacement in 2013     LUMPECTOMY BREAST  1990    Left     ORTHOPEDIC SURGERY  age?    right shoulder      ORTHOPEDIC SURGERY  ~ 2013    left hip replacement     R hip arthroscopy  7/19/11     TONSILLECTOMY  1950     PE:    Vitals noted gen nad cooperative alert, she has non-tender very small firm R sided nasal labial skin nodule, oropharynx clear,  neck supple nl rom, LCAT, B, good air movement, RRR, S1, S2, no MRG, abdomen, nt, nd, no masses, grossly normal neurological exam    EKG; SR at 66; no acute findings and no change from 5/24/2016    A/P:    (1) Low risk for planned procedure. She has good cardiopulmonary functional status. Avoid ASA/NSAIDS before surgery. She can remain on her previous mediations. Labs today are stable  (2) Dermatology referral for skin lesions  (3) She should check with insurance regarding new Shingles vaccine  (4) Chronic elevated and stable lymphocyte count from CLL      Total time spent 25 minutes.  More than 50% of the time spent with Ms. Shook on counseling / coordinating her care

## 2018-06-29 NOTE — PATIENT INSTRUCTIONS
[facility]: [phone]     [facility] Medication Refill Request Information:  * Please contact your pharmacy regarding ANY request for medication refills.  ** [facility] Prescription Fax = [phone]  * Please allow 3 business days for routine medication refills.  * Please allow 5 business days for controlled substance medication refills.     [facility] Test Result notification information:  *You will be notified with in 7-10 days of your appointment day regarding the results of your test.  If you are on MyChart you will be notified as soon as the provider has reviewed the results and signed off on them.

## 2018-06-29 NOTE — MR AVS SNAPSHOT
After Visit Summary   6/29/2018    Kerri Shook    MRN: [id number]           Patient Information     Date Of Birth          1940        Visit Information        Provider Department      6/29/2018 10:35 AM Juan Westbrook MD [facility]        Today's Diagnoses     Benign essential hypertension    -  1    Skin cancer screening          Care Instructions    [facility]: [phone]     Primary Care Center Medication Refill Request Information:  * Please contact your pharmacy regarding ANY request for medication refills.  ** [facility] Prescription Fax = [phone]  * Please allow 3 business days for routine medication refills.  * Please allow 5 business days for controlled substance medication refills.     [facility] Center Test Result notification information:  *You will be notified with in 7-10 days of your appointment day regarding the results of your test.  If you are on MyChart you will be notified as soon as the provider has reviewed the results and signed off on them.            Follow-ups after your visit        Additional Services     DERMATOLOGY REFERRAL       Skin check                  Your next 10 appointments already scheduled     Jul 16, 2018   Procedure with Kishore Webster MD   [facility] ([facility])    [address] [place] [address]   [phone]           Located in the Clinics and Surgery Center at [address] [place] [address].   parking is very convenient and highly recommended.  is a $6 flat rate fee.  Both  and self parkers should enter the main arrival plaza from [facility]; parking attendants will direct you based on your parking preference.            Jul 24, 2018  1:45 PM CDT   (Arrive by 1:30 PM)   Return Visit with Rica Moran PA-C   [facility] ([facility])    [address] [place] [address]  [facility] [id number] [phone]            Jul 30, 2018 10:00 AM CDT   (Arrive by 9:45 AM)   Return Visit with Kishore Webster MD   [facility] ([facility])    [address] [facility]  [address] [facility] [id number] [phone]              Future tests that were ordered for you today     Open Future Orders        Priority Expected Expires Ordered    CBC with platelets differential Routine 6/29/2018 7/13/2018 6/29/2018    Comprehensive metabolic panel Routine 6/29/2018 7/13/2018 6/29/2018            Who to contact     Please call your clinic at [phone] to:    Ask questions about your health    Make or cancel appointments    Discuss your medicines    Learn about your test results    Speak to your doctor            Additional Information About Your Visit        [employer] Information     [employer] gives you secure access to your electronic health record. If you see a primary care provider, you can also send messages to your care team and make appointments. If you have questions, please call your primary care clinic.  If you do not have a primary care provider, please call [phone] and they will assist you.      [employer] is an electronic gateway that provides easy, online access to your medical records. With [employer], you can request a clinic appointment, read your test results, renew a prescription or communicate with your care team.     To access your existing account, please contact your [facility] or call [phone] for assistance.        Care EveryWhere ID     This is your Care EveryWhere ID. This could be used by other organizations to access your [place] medical records  [id number]        Your Vitals Were     Pulse BMI (Body Mass Index)                74 22.69 kg/m2           Blood Pressure from Last 3 Encounters:   06/29/18 123/73   05/17/18 132/66   03/13/18 120/62    Weight from Last 3 Encounters:   06/29/18 64  kg (141 lb 3.2 oz)   05/17/18 63.9 kg (140 lb 14.4 oz)   05/14/18 64 kg (141 lb)              We Performed the Following     DERMATOLOGY REFERRAL     EKG Performed in Clinic w/ Provider Reading Fee        Primary Care Provider Office Phone # Fax #    Juan Westbrook [phone] [address] [address]        Equal Access to Services     KENDALL VILLARREAL : Hadii aad ku hadasho Soomaali, waaxda luqadaha, qaybta kaalmada adeegyada, waxay idiin hayaan adeeg kharash lajude . So [facility] [phone].    ATENCIÓN: Si azeb medina, tiene a lopez disposición servicios gratuitos de asistencia lingüística. Llame al [phone].    We comply with applicable federal civil rights laws and [place] laws. We do not discriminate on the basis of race, color, national origin, age, disability, sex, sexual orientation, or gender identity.            Thank you!     Thank you for choosing [facility]  for your care. Our goal is always to provide you with excellent care. Hearing back from our patients is one way we can continue to improve our services. Please take a few minutes to complete the written survey that you may receive in the mail after your visit with us. Thank you!             Your Updated Medication List - Protect others around you: Learn how to safely use, store and throw away your medicines at www.disposemymeds.org.          This list is accurate as of 6/29/18 11:42 AM.  Always use your most recent med list.                   Brand Name Dispense Instructions for use Diagnosis    aspirin 81 MG tablet      Take 81 mg by mouth daily        carvedilol 3.125 MG tablet    COREG    180 tablet    Take 1 tablet (3.125 mg) by mouth 2 times daily (with meals)    Stress-induced cardiomyopathy       gabapentin 100 MG capsule    NEURONTIN    30 capsule    Take 1 capsule (100 mg) by mouth nightly as needed    Pain of left lower extremity       multivitamin per tablet      Take 1  tablet by mouth daily.        simvastatin 20 MG tablet    ZOCOR    90 tablet    Take 1 tablet (20 mg) by mouth At Bedtime    Hyperlipidemia LDL goal <70

## 2018-07-01 DIAGNOSIS — E78.5 HYPERLIPIDEMIA LDL GOAL <70: ICD-10-CM

## 2018-07-03 RX ORDER — SIMVASTATIN 20 MG
20 TABLET ORAL AT BEDTIME
Qty: 90 TABLET | Refills: 3 | Status: SHIPPED | OUTPATIENT
Start: 2018-07-03 | End: 2019-07-03

## 2018-07-13 ENCOUNTER — ANESTHESIA EVENT (OUTPATIENT)
Dept: SURGERY | Facility: AMBULATORY SURGERY CENTER | Age: 78
End: 2018-07-13

## 2018-07-16 ENCOUNTER — SURGERY (OUTPATIENT)
Age: 78
End: 2018-07-16
Payer: COMMERCIAL

## 2018-07-16 ENCOUNTER — HOSPITAL ENCOUNTER (OUTPATIENT)
Facility: AMBULATORY SURGERY CENTER | Age: 78
End: 2018-07-16
Attending: OTOLARYNGOLOGY
Payer: COMMERCIAL

## 2018-07-16 ENCOUNTER — ANESTHESIA (OUTPATIENT)
Dept: SURGERY | Facility: AMBULATORY SURGERY CENTER | Age: 78
End: 2018-07-16

## 2018-07-16 VITALS
DIASTOLIC BLOOD PRESSURE: 73 MMHG | HEIGHT: 66 IN | TEMPERATURE: 97.6 F | RESPIRATION RATE: 17 BRPM | SYSTOLIC BLOOD PRESSURE: 131 MMHG | HEART RATE: 56 BPM | WEIGHT: 141 LBS | BODY MASS INDEX: 22.66 KG/M2 | OXYGEN SATURATION: 99 %

## 2018-07-16 DIAGNOSIS — G89.18 POSTOPERATIVE PAIN: Primary | ICD-10-CM

## 2018-07-16 DIAGNOSIS — Z98.890 S/P FESS (FUNCTIONAL ENDOSCOPIC SINUS SURGERY): ICD-10-CM

## 2018-07-16 RX ORDER — OXYMETAZOLINE HYDROCHLORIDE 0.05 G/100ML
SPRAY NASAL PRN
Status: DISCONTINUED | OUTPATIENT
Start: 2018-07-16 | End: 2018-07-16 | Stop reason: HOSPADM

## 2018-07-16 RX ORDER — ONDANSETRON 2 MG/ML
4 INJECTION INTRAMUSCULAR; INTRAVENOUS EVERY 30 MIN PRN
Status: DISCONTINUED | OUTPATIENT
Start: 2018-07-16 | End: 2018-07-17 | Stop reason: HOSPADM

## 2018-07-16 RX ORDER — HYDROCODONE BITARTRATE AND ACETAMINOPHEN 5; 325 MG/1; MG/1
1 TABLET ORAL EVERY 6 HOURS PRN
Qty: 10 TABLET | Refills: 0 | Status: SHIPPED | OUTPATIENT
Start: 2018-07-16 | End: 2018-12-28

## 2018-07-16 RX ORDER — MEPERIDINE HYDROCHLORIDE 25 MG/ML
12.5 INJECTION INTRAMUSCULAR; INTRAVENOUS; SUBCUTANEOUS
Status: DISCONTINUED | OUTPATIENT
Start: 2018-07-16 | End: 2018-07-17 | Stop reason: HOSPADM

## 2018-07-16 RX ORDER — LIDOCAINE HYDROCHLORIDE 20 MG/ML
INJECTION, SOLUTION INFILTRATION; PERINEURAL PRN
Status: DISCONTINUED | OUTPATIENT
Start: 2018-07-16 | End: 2018-07-16

## 2018-07-16 RX ORDER — LIDOCAINE 40 MG/G
CREAM TOPICAL
Status: DISCONTINUED | OUTPATIENT
Start: 2018-07-16 | End: 2018-07-16 | Stop reason: HOSPADM

## 2018-07-16 RX ORDER — PROPOFOL 10 MG/ML
INJECTION, EMULSION INTRAVENOUS CONTINUOUS PRN
Status: DISCONTINUED | OUTPATIENT
Start: 2018-07-16 | End: 2018-07-16

## 2018-07-16 RX ORDER — ONDANSETRON 4 MG/1
4 TABLET, ORALLY DISINTEGRATING ORAL EVERY 30 MIN PRN
Status: DISCONTINUED | OUTPATIENT
Start: 2018-07-16 | End: 2018-07-17 | Stop reason: HOSPADM

## 2018-07-16 RX ORDER — FENTANYL CITRATE 50 UG/ML
25-50 INJECTION, SOLUTION INTRAMUSCULAR; INTRAVENOUS
Status: DISCONTINUED | OUTPATIENT
Start: 2018-07-16 | End: 2018-07-16 | Stop reason: HOSPADM

## 2018-07-16 RX ORDER — NALOXONE HYDROCHLORIDE 0.4 MG/ML
.1-.4 INJECTION, SOLUTION INTRAMUSCULAR; INTRAVENOUS; SUBCUTANEOUS
Status: DISCONTINUED | OUTPATIENT
Start: 2018-07-16 | End: 2018-07-17 | Stop reason: HOSPADM

## 2018-07-16 RX ORDER — PROPOFOL 10 MG/ML
INJECTION, EMULSION INTRAVENOUS PRN
Status: DISCONTINUED | OUTPATIENT
Start: 2018-07-16 | End: 2018-07-16

## 2018-07-16 RX ORDER — EPHEDRINE SULFATE 50 MG/ML
INJECTION, SOLUTION INTRAMUSCULAR; INTRAVENOUS; SUBCUTANEOUS PRN
Status: DISCONTINUED | OUTPATIENT
Start: 2018-07-16 | End: 2018-07-16

## 2018-07-16 RX ORDER — ECHINACEA PURPUREA EXTRACT 125 MG
2 TABLET ORAL 3 TIMES DAILY
Qty: 104 ML | Refills: 1 | Status: SHIPPED | OUTPATIENT
Start: 2018-07-16 | End: 2018-12-28

## 2018-07-16 RX ORDER — DEXAMETHASONE SODIUM PHOSPHATE 10 MG/ML
INJECTION, SOLUTION INTRAMUSCULAR; INTRAVENOUS PRN
Status: DISCONTINUED | OUTPATIENT
Start: 2018-07-16 | End: 2018-07-16

## 2018-07-16 RX ORDER — FENTANYL CITRATE 50 UG/ML
INJECTION, SOLUTION INTRAMUSCULAR; INTRAVENOUS PRN
Status: DISCONTINUED | OUTPATIENT
Start: 2018-07-16 | End: 2018-07-16

## 2018-07-16 RX ORDER — LIDOCAINE HYDROCHLORIDE AND EPINEPHRINE 10; 10 MG/ML; UG/ML
INJECTION, SOLUTION INFILTRATION; PERINEURAL PRN
Status: DISCONTINUED | OUTPATIENT
Start: 2018-07-16 | End: 2018-07-16 | Stop reason: HOSPADM

## 2018-07-16 RX ORDER — ONDANSETRON 2 MG/ML
INJECTION INTRAMUSCULAR; INTRAVENOUS PRN
Status: DISCONTINUED | OUTPATIENT
Start: 2018-07-16 | End: 2018-07-16

## 2018-07-16 RX ORDER — SODIUM CHLORIDE, SODIUM LACTATE, POTASSIUM CHLORIDE, CALCIUM CHLORIDE 600; 310; 30; 20 MG/100ML; MG/100ML; MG/100ML; MG/100ML
INJECTION, SOLUTION INTRAVENOUS CONTINUOUS
Status: DISCONTINUED | OUTPATIENT
Start: 2018-07-16 | End: 2018-07-17 | Stop reason: HOSPADM

## 2018-07-16 RX ORDER — SODIUM CHLORIDE, SODIUM LACTATE, POTASSIUM CHLORIDE, CALCIUM CHLORIDE 600; 310; 30; 20 MG/100ML; MG/100ML; MG/100ML; MG/100ML
INJECTION, SOLUTION INTRAVENOUS CONTINUOUS
Status: DISCONTINUED | OUTPATIENT
Start: 2018-07-16 | End: 2018-07-16 | Stop reason: HOSPADM

## 2018-07-16 RX ORDER — ACETAMINOPHEN 325 MG/1
975 TABLET ORAL ONCE
Status: COMPLETED | OUTPATIENT
Start: 2018-07-16 | End: 2018-07-16

## 2018-07-16 RX ADMIN — ONDANSETRON 4 MG: 2 INJECTION INTRAMUSCULAR; INTRAVENOUS at 14:57

## 2018-07-16 RX ADMIN — ACETAMINOPHEN 975 MG: 325 TABLET ORAL at 13:43

## 2018-07-16 RX ADMIN — PROPOFOL 140 MG: 10 INJECTION, EMULSION INTRAVENOUS at 14:28

## 2018-07-16 RX ADMIN — LIDOCAINE HYDROCHLORIDE 80 MG: 20 INJECTION, SOLUTION INFILTRATION; PERINEURAL at 14:28

## 2018-07-16 RX ADMIN — DEXAMETHASONE SODIUM PHOSPHATE 4 MG: 10 INJECTION, SOLUTION INTRAMUSCULAR; INTRAVENOUS at 14:28

## 2018-07-16 RX ADMIN — EPHEDRINE SULFATE 10 MG: 50 INJECTION, SOLUTION INTRAMUSCULAR; INTRAVENOUS; SUBCUTANEOUS at 14:37

## 2018-07-16 RX ADMIN — LIDOCAINE HYDROCHLORIDE AND EPINEPHRINE 1 ML: 10; 10 INJECTION, SOLUTION INFILTRATION; PERINEURAL at 15:01

## 2018-07-16 RX ADMIN — SODIUM CHLORIDE, SODIUM LACTATE, POTASSIUM CHLORIDE, CALCIUM CHLORIDE: 600; 310; 30; 20 INJECTION, SOLUTION INTRAVENOUS at 13:44

## 2018-07-16 RX ADMIN — PROPOFOL 150 MCG/KG/MIN: 10 INJECTION, EMULSION INTRAVENOUS at 14:28

## 2018-07-16 RX ADMIN — FENTANYL CITRATE 50 MCG: 50 INJECTION, SOLUTION INTRAMUSCULAR; INTRAVENOUS at 14:28

## 2018-07-16 RX ADMIN — OXYMETAZOLINE HYDROCHLORIDE 5 ML: 0.05 SPRAY NASAL at 14:55

## 2018-07-16 RX ADMIN — Medication 30 MG: at 14:28

## 2018-07-16 RX ADMIN — ONDANSETRON 4 MG: 4 TABLET, ORALLY DISINTEGRATING ORAL at 15:45

## 2018-07-16 NOTE — ANESTHESIA CARE TRANSFER NOTE
"Patient: Kerri Shook    Procedure(s):  Endoscopic Sphenoidotomy with Removal of Tissue - Wound Class: I-Clean    Diagnosis: Chronic Opacificiation Right Sinus  Diagnosis Additional Information: No value filed.    Anesthesia Type:   General, ETT     Note:  Airway :Room Air  Patient transferred to:PACU  Comments: VSS and WNL, comfortable except for \"sore throat\", no PONV, report to Jojo RNHandoff Report: Identifed the Patient, Identified the Reponsible Provider, Reviewed the pertinent medical history, Discussed the surgical course, Reviewed Intra-OP anesthesia mangement and issues during anesthesia, Set expectations for post-procedure period and Allowed opportunity for questions and acknowledgement of understanding      Vitals: (Last set prior to Anesthesia Care Transfer)    CRNA VITALS  7/16/2018 1440 - 7/16/2018 1516      7/16/2018             Pulse: 70    SpO2: 100 %    Resp Rate (observed): (!)  7                Electronically Signed By: JAMSHID Varela CRNA  July 16, 2018  3:16 PM  "

## 2018-07-16 NOTE — BRIEF OP NOTE
[facility]    Brief Operative Note    Pre-operative diagnosis: Chronic Opacificiation Right Sinus  Post-operative diagnosis Same  Procedure: Procedure(s):  Endoscopic Sphenoidotomy with Removal of Tissue - Wound Class: I-Clean  Surgeon: Surgeon(s) and Role:     * Kishore Webster MD - Primary  Anesthesia: General   Estimated blood loss: Minimal  Drains: None  Specimens:   ID Type Source Tests Collected by Time Destination   1 : Right sphenoid contents  Tissue Nose FUNGUS CULTURE, TISSUE CULTURE AEROBIC BACTERIAL Kishore Webster MD 7/16/2018  2:51 PM    A : Right spheniod sinus  Tissue Nose SURGICAL PATHOLOGY EXAM Kishore Webster MD 7/16/2018  2:57 PM      Findings:   Findings consistent with fungal ball in right sphenoid sinus.  Complications: None.  Implants: None.

## 2018-07-16 NOTE — ANESTHESIA PREPROCEDURE EVALUATION
Anesthesia Evaluation     .             ROS/MED HX    ENT/Pulmonary:  - neg pulmonary ROS     Neurologic:  - neg neurologic ROS     Cardiovascular: Comment: Hx of stress induced cardiomyopathy.  Since fully resolved. - neg cardiovascular ROS       METS/Exercise Tolerance:     Hematologic:  - neg hematologic  ROS       Musculoskeletal:  - neg musculoskeletal ROS       GI/Hepatic:  - neg GI/hepatic ROS       Renal/Genitourinary:  - ROS Renal section negative       Endo: Comment: hyperlipidemia - neg endo ROS       Psychiatric:  - neg psychiatric ROS       Infectious Disease:  - neg infectious disease ROS       Malignancy:   (+) Malignancy History of Lymphoma/Leukemia and Breast  Breast CA Remission status post.      - no malignancy   Other:    - neg other ROS                 Physical Exam  Normal systems: cardiovascular, pulmonary and dental    Airway   Mallampati: II  TM distance: >3 FB  Neck ROM: full    Dental     Cardiovascular       Pulmonary                     Anesthesia Plan      History & Physical Review  History and physical reviewed and following examination; no interval change.    ASA Status:  2 .    NPO Status:  > 8 hours    Plan for General and ETT with Intravenous induction. Maintenance will be Balanced.    PONV prophylaxis:  Ondansetron (or other 5HT-3) and Dexamethasone or Solumedrol       Postoperative Care  Postoperative pain management:  IV analgesics, Oral pain medications and Multi-modal analgesia.      Consents  Anesthetic plan, risks, benefits and alternatives discussed with:  Patient..                          .

## 2018-07-16 NOTE — OP NOTE
Procedure Date: 07/16/2018      SURGEON:  Kishore Webster MD      RESIDENT SURGEON:  Ramos Us MD      PROCEDURE:  Image-guided right sphenoidotomy with tissue removal.      PREOPERATIVE DIAGNOSIS:  Right chronic sphenoid sinusitis.      POSTOPERATIVE DIAGNOSIS:  Right sphenoid fungal ball.      INDICATIONS FOR PROCEDURE:  Kerri Shook is a 77-year-old female with a history of headaches.  An MRI demonstrated opacification of her right sphenoid sinus.  Despite a course of antibiotics, the opacification has persisted as have her headaches.  Given the ongoing symptoms, it was recommended that she undergo the above-stated procedure, and she provided consent after explanation of risks, benefits and alternatives.      FINDINGS:    1. There was debris at the right sphenoethmoid recess and within the right sphenoid sinus consistent with mycetoma.  2. 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        ANESTHESIA:  General endotracheal.      ESTIMATED BLOOD LOSS:  Less than 5 mL.      COMPLICATIONS:  None apparent.      CONDITION:  Stable to the PACU.        DESCRIPTION OF PROCEDURE:  The patient was met in the preoperative area, where informed consent was obtained.  She was brought back to the operating room and transferred to the operating table and laid in the supine position.  General anesthesia was induced, and she was intubated with an endotracheal tube.  The bed was rotated 90 degrees, and the patient was prepped and draped in the usual fashion.  The Stealth navigation system was applied, registered and found to be accurate.  An institutional time-out was performed to verify the correct patient, procedure and site, and all present were in agreement.  One-percent lidocaine with epinephrine was injected into the right nasal cavity for hemostasis.  The middle turbinate was gently lateralized, and immediately there was noted to be debris in the sphenoethmoid  recess with friable mucosa.  This was suctioned, and a portion was sent for culture.  The sphenoid ostium was identified with the navigation system and entered bluntly.  Debris was suctioned from the sphenoid sinus.  Utilizing a shaver and downbiting rongeur, the sphenoid os was widened inferior and laterally.  At this point, the nasopharynx was suctioned, and care of the patient was returned back to Anesthesia, who awoke and extubated her uneventfully.  She was transferred to the PACU in stable condition.      Dr. Randolph Jackman was present for all portions of the procedure.      Dictated by Nilay Us MD    Resident         RANDOLPH JACKMAN MD       As dictated by NILAY US MD            D: 2018   T: 2018   MT: carlos      Name:     MOLLY RICHEY   MRN:      [id number]        Account:        [id number]   :      1940           Procedure Date: 2018      Document: [id number]

## 2018-07-16 NOTE — ANESTHESIA POSTPROCEDURE EVALUATION
Patient: Kerri Shook    Procedure(s):  Endoscopic Sphenoidotomy with Removal of Tissue - Wound Class: I-Clean    Diagnosis:Chronic Opacificiation Right Sinus  Diagnosis Additional Information: No value filed.    Anesthesia Type:  General, ETT    Note:  Anesthesia Post Evaluation    Patient location during evaluation: PACU  Patient participation: Able to fully participate in evaluation  Level of consciousness: awake  Pain management: adequate  Airway patency: patent  Cardiovascular status: acceptable  Respiratory status: acceptable  Hydration status: balanced  PONV: none     Anesthetic complications: None          Last vitals:  Vitals:    07/16/18 1515 07/16/18 1531 07/16/18 1541   BP: 111/50 119/67 132/66   Pulse:   56   Resp: 10 17 16   Temp:  36.8  C (98.3  F) 36.5  C (97.7  F)   SpO2: 98% 99% 99%         Electronically Signed By: Otto Villalpando MD  July 16, 2018  4:00 PM

## 2018-07-16 NOTE — DISCHARGE INSTRUCTIONS
[facility]  Home Care Following Anesthesia  For 24 hours after surgery:  1. Get plenty of rest.  A responsible adult must stay with you for at least 24 hours after you leave the surgery center.  2. Do not drive or use heavy equipment.  If you have weakness or tingling, don't drive or use heavy equipment until this feeling goes away.   3. Do not drink alcohol.   4. Avoid strenuous or risky activities.  Ask for help when climbing stairs.  5. You may feel lightheaded.  IF so, sit for a few minutes before standing.  Have someone help you get up.   6. If you have nausea (feel sick to your stomach): Drink only clear liquids such as apple juice, ginger ale, broth or 7-Up.  Rest may also help.  Be sure to drink enough fluids.  Move to a regular diet as you feel able.   7. You may have a slight fever.  Call the doctor if your fever is over 100 F (37.7 C) (taken under the tongue) or lasts longer than 24 hours.  8. You may have a dry mouth, a sore throat, muscle aches or trouble sleeping. These should go away after 24 hours.  9. Do not make important or legal decisions.               Tips for taking pain medications  To get the best pain relief possible, remember these points:    Take pain medications as directed, before pain becomes severe.    Pain medication can upset your stomach: taking it with food may help.    Constipation is a common side effect of pain medication. Drink plenty of  fluids.    Eat foods high in fiber. Take a stool softener if recommended by your doctor or pharmacist.    Do not drink alcohol, drive or operate machinery while taking pain medications.    Ask about other ways to control pain, such as with heat, ice or relaxation.    Tylenol/Acetaminophen Consumption  To help encourage the safe use of acetaminophen, the makers of TYLENOL  have lowered the maximum daily dose for single-ingredient Extra Strength TYLENOL  (acetaminophen) products sold in the [place] from 8  pills per day (4,000 mg) to 6 pills per day (3,000 mg). The dosing interval has also changed from 2 pills every 4-6 hours to 2 pills every 6 hours.    If you feel your pain relief is insufficient, you may take Tylenol/Acetaminophen in addition to your narcotic pain medication.     Be careful not to exceed 3,000 mg of Tylenol/Acetaminophen in a 24 hour period from all sources.    If you are taking extra strength Tylenol/acetaminophen (500 mg), the maximum dose is 6 tablets in 24 hours.    If you are taking regular strength acetaminophen (325 mg), the maximum dose is 9 tablets in 24 hours.    Call a doctor for any of the followin. Signs of infection (fever, growing tenderness at the surgery site, a large amount of drainage or bleeding, severe pain, foul-smelling drainage, redness, swelling).  2. It has been over 8 to 10 hours since surgery and you are still not able to urinate (pass water).  3. Headache for over 24 hours.  4. Numbness, tingling or weakness the day after surgery (if you had spinal anesthesia).  Your doctor is:   Dr. Kishore Webster  [phone]                    Or dial [phone] and ask for the resident on call for:  ENT Otolaryngology  For emergency care, call the:  [facility]:  [phone] (TTY for hearing impaired: [phone])

## 2018-07-16 NOTE — IP AVS SNAPSHOT
"                  MRN:[id number]                      After Visit Summary   7/16/2018    Kerri Shook    MRN: [id number]           Thank you!     Thank you for choosing [facility] for your care. Our goal is always to provide you with excellent care. Hearing back from our patients is one way we can continue to improve our services. Please take a few minutes to complete the written survey that you may receive in the mail after you visit with us. Thank you!        Patient Information     Date Of Birth          1940        About your hospital stay     You were admitted on:  July 16, 2018 [facility]    You were discharged on:  July 16, 2018       Who to Call     For medical emergencies, please call 911.  For non-urgent questions about your medical care, please call your primary care provider or clinic, [phone]  For questions related to your surgery, please call your surgery clinic        Attending Provider     Provider Kishore Morley MD Otolaryngology       Primary Care Provider Office Phone # Fax #    Juan JOHNSON MD Pietro [phone]      After Care Instructions     Diet Instructions       Resume pre procedure diet            Discharge Instructions       - Follow up with Dr. Webster as previously scheduled  - Take pain medications as prescribed  - Follow sinus precautions x 2 weeks. Open your mouth when you sneeze. Try not to left forward and allow blood to build up in your head. Do not blow your nose. No lifting greater than 20 lbs.  - Use nasal saline spray starting tomorrow  - If you develop fevers, chills, or purulent malorodorous discharge from the nose which may indicate infection. If you feel it is acute, please return to the emergency department. If you have questions or concerns during the day please call ENT clinic and [phone]. If at night you can call [facility] at [phone] and ask for the \"ENT " "resident on call\".            No blowing nose                 Your next 10 appointments already scheduled     Jul 24, 2018  1:45 PM CDT   (Arrive by 1:30 PM)   Return Visit with Rica Moran PA-C   [facility] ([facility])    [address] [place] [address]  3[address] [facility] [id number] [phone]            Jul 30, 2018 10:00 AM CDT   (Arrive by 9:45 AM)   Return Visit with Kishore Webster MD   [facility] ([facility])    [address] [place] [address]  [address] [facility] [id number] [phone]              Further instructions from your care team       [facility]  Home Care Following Anesthesia  For 24 hours after surgery:  1. Get plenty of rest.  A responsible adult must stay with you for at least 24 hours after you leave the surgery center.  2. Do not drive or use heavy equipment.  If you have weakness or tingling, don't drive or use heavy equipment until this feeling goes away.   3. Do not drink alcohol.   4. Avoid strenuous or risky activities.  Ask for help when climbing stairs.  5. You may feel lightheaded.  IF so, sit for a few minutes before standing.  Have someone help you get up.   6. If you have nausea (feel sick to your stomach): Drink only clear liquids such as apple juice, ginger ale, broth or 7-Up.  Rest may also help.  Be sure to drink enough fluids.  Move to a regular diet as you feel able.   7. You may have a slight fever.  Call the doctor if your fever is over 100 F (37.7 C) (taken under the tongue) or lasts longer than 24 hours.  8. You may have a dry mouth, a sore throat, muscle aches or trouble sleeping. These should go away after 24 hours.  9. Do not make important or legal decisions.               Tips for taking pain medications  To get the best pain relief possible, remember these points:    Take pain medications as directed, before pain becomes severe.    Pain medication " can upset your stomach: taking it with food may help.    Constipation is a common side effect of pain medication. Drink plenty of  fluids.    Eat foods high in fiber. Take a stool softener if recommended by your doctor or pharmacist.    Do not drink alcohol, drive or operate machinery while taking pain medications.    Ask about other ways to control pain, such as with heat, ice or relaxation.    Tylenol/Acetaminophen Consumption  To help encourage the safe use of acetaminophen, the makers of TYLENOL  have lowered the maximum daily dose for single-ingredient Extra Strength TYLENOL  (acetaminophen) products sold in the [place] from 8 pills per day (4,000 mg) to 6 pills per day (3,000 mg). The dosing interval has also changed from 2 pills every 4-6 hours to 2 pills every 6 hours.    If you feel your pain relief is insufficient, you may take Tylenol/Acetaminophen in addition to your narcotic pain medication.     Be careful not to exceed 3,000 mg of Tylenol/Acetaminophen in a 24 hour period from all sources.    If you are taking extra strength Tylenol/acetaminophen (500 mg), the maximum dose is 6 tablets in 24 hours.    If you are taking regular strength acetaminophen (325 mg), the maximum dose is 9 tablets in 24 hours.    Call a doctor for any of the followin. Signs of infection (fever, growing tenderness at the surgery site, a large amount of drainage or bleeding, severe pain, foul-smelling drainage, redness, swelling).  2. It has been over 8 to 10 hours since surgery and you are still not able to urinate (pass water).  3. Headache for over 24 hours.  4. Numbness, tingling or weakness the day after surgery (if you had spinal anesthesia).  Your doctor is:   Dr. Kishore Webster  [phone]                    Or dial [phone] and ask for the resident on call for:  ENT Otolaryngology  For emergency care, call the:  [facility]:  [phone] (TTY for hearing impaired:  "[phone])                Pending Results     No orders found from 7/14/2018 to 7/17/2018.            Admission Information     Date & Time Provider Department Dept. Phone    7/16/2018 Kishore Webster MD [facility] [phone]      Your Vitals Were     Blood Pressure Temperature Respirations Height Weight Pulse Oximetry    111/50 97.8  F (36.6  C) (Temporal) 10 1.676 m (5' 6\") 64 kg (141 lb) 98%    BMI (Body Mass Index)                   22.76 kg/m2           MyChart Information     [employer] gives you secure access to your electronic health record. If you see a primary care provider, you can also send messages to your care team and make appointments. If you have questions, please call your primary care clinic.  If you do not have a primary care provider, please call [phone] and they will assist you.      [employer] is an electronic gateway that provides easy, online access to your medical records. With [employer], you can request a clinic appointment, read your test results, renew a prescription or communicate with your care team.     To access your existing account, please contact your [facility] or call [phone] for assistance.        Care EveryWhere ID     This is your Care EveryWhere ID. This could be used by other organizations to access your [facility] medical records  [id number]        Equal Access to Services     KENDALL VILLARREAL : Hadii abdullahi luke hadasho Sokojoali, waaxda luqadaha, qaybta kaalmada david, lulu tuttle. So [facility] [phone].    ATENCIÓN: Si habla español, tiene a lopez disposición servicios gratuitos de asistencia lingüística. Llame al [phone].    We comply with applicable federal civil rights laws and [place] laws. We do not discriminate on the basis of race, color, national origin, age, disability, sex, sexual orientation, or gender identity.               Review of your medicines      START taking  "       Dose / Directions    HYDROcodone-acetaminophen 5-325 MG per tablet   Commonly known as:  NORCO   Used for:  Postoperative pain        Dose:  1 tablet   Take 1 tablet by mouth every 6 hours as needed for pain   Quantity:  10 tablet   Refills:  0       sodium chloride 0.65 % nasal spray   Commonly known as:  OCEAN NASAL SPRAY   Used for:  S/P FESS (functional endoscopic sinus surgery)        Dose:  2 spray   Spray 2 sprays into both nostrils 3 times daily   Quantity:  104 mL   Refills:  1         CONTINUE these medicines which have NOT CHANGED        Dose / Directions    aspirin 81 MG tablet        Dose:  81 mg   Take 81 mg by mouth daily   Refills:  0       carvedilol 3.125 MG tablet   Commonly known as:  COREG   Used for:  Stress-induced cardiomyopathy        Dose:  3.125 mg   Take 1 tablet (3.125 mg) by mouth 2 times daily (with meals)   Quantity:  180 tablet   Refills:  2       gabapentin 100 MG capsule   Commonly known as:  NEURONTIN   Used for:  Pain of left lower extremity        Dose:  100 mg   Take 1 capsule (100 mg) by mouth nightly as needed   Quantity:  30 capsule   Refills:  0       multivitamin per tablet        Dose:  1 tablet   Take 1 tablet by mouth daily.   Refills:  0       simvastatin 20 MG tablet   Commonly known as:  ZOCOR   Used for:  Hyperlipidemia LDL goal <70        Dose:  20 mg   Take 1 tablet (20 mg) by mouth At Bedtime   Quantity:  90 tablet   Refills:  3            Where to get your medicines      These medications were sent to [place], [place] - [address] [place] [address]    Hours:  TRANSPLANT PHONE NUMBER [phone] Phone:  [phone]     sodium chloride 0.65 % nasal spray         Some of these will need a paper prescription and others can be bought over the counter. Ask your nurse if you have questions.     Bring a paper prescription for each of these medications      HYDROcodone-acetaminophen 5-325 MG per tablet                Protect others around you: Learn how to safely use, store and throw away your medicines at www.disposemymeds.org.        Information about OPIOIDS     PRESCRIPTION OPIOIDS: WHAT YOU NEED TO KNOW   We gave you an opioid (narcotic) pain medicine. It is important to manage your pain, but opioids are not always the best choice. You should first try all the other options your care team gave you. Take this medicine for as short a time (and as few doses) as possible.     These medicines have risks:    DO NOT drive when on new or higher doses of pain medicine. These medicines can affect your alertness and reaction times, and you could be arrested for driving under the influence (DUI). If you need to use opioids long-term, talk to your care team about driving.    DO NOT operate heave machinery    DO NOT do any other dangerous activities while taking these medicines.     DO NOT drink any alcohol while taking these medicines.      If the opioid prescribed includes acetaminophen, DO NOT take with any other medicines that contain acetaminophen. Read all labels carefully. Look for the word  acetaminophen  or  Tylenol.  Ask your pharmacist if you have questions or are unsure.    You can get addicted to pain medicines, especially if you have a history of addiction (chemical, alcohol or substance dependence). Talk to your care team about ways to reduce this risk.    Store your pills in a secure place, locked if possible. We will not replace any lost or stolen medicine. If you don t finish your medicine, please throw away (dispose) as directed by your pharmacist. The [place] Pollution Control Agency has more information about safe disposal: https://www.pca.[facility].[place].us/living-green/managing-unwanted-medications.     All opioids tend to cause constipation. Drink plenty of water and eat foods that have a lot of fiber, such as fruits, vegetables, prune juice, apple juice and  high-fiber cereal. Take a laxative (Miralax, milk of magnesia, Colace, Senna) if you don t move your bowels at least every other day.              Medication List: This is a list of all your medications and when to take them. Check marks below indicate your daily home schedule. Keep this list as a reference.      Medications           Morning Afternoon Evening Bedtime As Needed    aspirin 81 MG tablet   Take 81 mg by mouth daily                                carvedilol 3.125 MG tablet   Commonly known as:  COREG   Take 1 tablet (3.125 mg) by mouth 2 times daily (with meals)                                gabapentin 100 MG capsule   Commonly known as:  NEURONTIN   Take 1 capsule (100 mg) by mouth nightly as needed                                HYDROcodone-acetaminophen 5-325 MG per tablet   Commonly known as:  NORCO   Take 1 tablet by mouth every 6 hours as needed for pain                                multivitamin per tablet   Take 1 tablet by mouth daily.                                simvastatin 20 MG tablet   Commonly known as:  ZOCOR   Take 1 tablet (20 mg) by mouth At Bedtime                                sodium chloride 0.65 % nasal spray   Commonly known as:  OCEAN NASAL SPRAY   Spray 2 sprays into both nostrils 3 times daily

## 2018-07-16 NOTE — IP AVS SNAPSHOT
[facility]    [address] [place] [id number]    Phone:  [phone]    Fax:  [phone]                                       After Visit Summary   7/16/2018    Kerri Shook    MRN: [id number]           After Visit Summary Signature Page     I have received my discharge instructions, and my questions have been answered. I have discussed any challenges I see with this plan with the nurse or doctor.    ..........................................................................................................................................  Patient/Patient Representative Signature      ..........................................................................................................................................  Patient Representative Print Name and Relationship to Patient    ..................................................               ................................................  Date                                            Time    ..........................................................................................................................................  Reviewed by Signature/Title    ...................................................              ..............................................  Date                                                            Time

## 2018-07-18 LAB
BACTERIA SPEC CULT: ABNORMAL
SPECIMEN SOURCE: ABNORMAL

## 2018-07-19 LAB
COPATH REPORT: NORMAL
FUNGUS SPEC CULT: ABNORMAL
SPECIMEN SOURCE: ABNORMAL

## 2018-07-24 ENCOUNTER — OFFICE VISIT (OUTPATIENT)
Dept: DERMATOLOGY | Facility: CLINIC | Age: 78
End: 2018-07-24
Payer: COMMERCIAL

## 2018-07-24 DIAGNOSIS — D18.01 CHERRY ANGIOMA: ICD-10-CM

## 2018-07-24 DIAGNOSIS — Z12.83 SKIN CANCER SCREENING: Primary | ICD-10-CM

## 2018-07-24 DIAGNOSIS — L82.0 SEBORRHEIC KERATOSES, INFLAMED: ICD-10-CM

## 2018-07-24 DIAGNOSIS — M77.9 BONE SPUR: ICD-10-CM

## 2018-07-24 ASSESSMENT — PAIN SCALES - GENERAL: PAINLEVEL: NO PAIN (0)

## 2018-07-24 NOTE — PROGRESS NOTES
"[facility] Dermatology Note      Dermatology Problem List:  1. Cherry angiomas  2. SKs     CC:   Chief Complaint   Patient presents with     Skin Check     Kerri is here today for a skin check- some areas of concern.          Encounter Date: Jul 24, 2018    History of Present Illness:  Ms. Kerri Shook is a 77 year old female who returns as a referral from Dr. Westbrook regarding a bump on the right side of the nose. She tells me the bump has been there \"forever\", and it is not painful except when she wears her glasses. She thinks it is bone. She has no hx of skin cancer. She was last seen 6/8/17 by Dr. Shipley. She has no hx of skin cancer. The patient denies painful, itching, tingling or bleeding lesions unless otherwise noted. She is otherwise feeing well today.    Past Medical History:   Patient Active Problem List   Diagnosis     Breast cancer -- Left     S/P coronary angiogram     Stress-induced cardiomyopathy     Foot injury     Advance care planning     Rosacea     KP (keratosis pilaris)     CLL (chronic lymphocytic leukemia) (H)     Hx of cardiomyopathy     Encounter for routine gynecological examination     Menopause--age 50     Colon adenomas     SK (seborrheic keratosis)     Seborrheic keratosis     Plantar fasciitis     Past Medical History:   Diagnosis Date     Breast disorder 1990    Breast Cancer     Cardiomyopathy (H)      Chronic osteoarthritis 2012    resulted in hip replacement     CLL (chronic lymphoblastic leukemia) 1989     Colon adenomas     6/26/14 colonoscopy, repeat in 3 years     History of blood transfusion      hyperlipidemia      Primary localized osteoarthrosis, pelvic region and thigh      Tinnitus      Past Surgical History:   Procedure Laterality Date     BIOPSY  within the last 5 years    polyps during colonoscopy     C PELVIS/HIP JOINT SURGERY UNLISTED  April 2012    left hip replacement     C SHOULDER SURG PROC UNLISTED  ?     COLONOSCOPY  6/26/2014    " Procedure: COMBINED COLONOSCOPY, SINGLE BIOPSY/POLYPECTOMY BY BIOPSY;  Surgeon: Pola Arceo MD;  Location:  GI     D & C  1962    late PP hemorrhage --> retained placenta     ENDOSCOPIC SINUS SURGERY Right 7/16/2018    Procedure: ENDOSCOPIC SINUS SURGERY;  Endoscopic Sphenoidotomy with Removal of Tissue;  Surgeon: Kishore Webster MD;  Location:  OR     ENT SURGERY  1950    tonselectomy     EYE SURGERY  2015    cataract on left eye     left hip replacemen Left     hip replacement in 2013     LUMPECTOMY BREAST  1990    Left     ORTHOPEDIC SURGERY  age?    right shoulder      ORTHOPEDIC SURGERY  ~ 2013    left hip replacement     R hip arthroscopy  7/19/11     TONSILLECTOMY  1950       Social History:  The patient is retired. The patient denies use of tanning beds.     Family History:  There is no family history of skin cancer.    Medications:  Current Outpatient Prescriptions   Medication Sig Dispense Refill     aspirin 81 MG tablet Take 81 mg by mouth daily       carvedilol (COREG) 3.125 MG tablet Take 1 tablet (3.125 mg) by mouth 2 times daily (with meals) 180 tablet 2     gabapentin (NEURONTIN) 100 MG capsule Take 1 capsule (100 mg) by mouth nightly as needed 30 capsule 0     HYDROcodone-acetaminophen (NORCO) 5-325 MG per tablet Take 1 tablet by mouth every 6 hours as needed for pain 10 tablet 0     Multiple Vitamin (MULTIVITAMIN) per tablet Take 1 tablet by mouth daily.       simvastatin (ZOCOR) 20 MG tablet Take 1 tablet (20 mg) by mouth At Bedtime 90 tablet 3     sodium chloride (OCEAN NASAL SPRAY) 0.65 % nasal spray Spray 2 sprays into both nostrils 3 times daily 104 mL 1     Allergies   Allergen Reactions     Nkda [No Known Drug Allergies]          Review of Systems:  -Constitutional: The patient denies fatigue, fevers, chills, unintended weight loss, and night sweats.  -Skin: As above in HPI. No additional skin concerns.    Physical exam:  Vitals: There were no vitals taken for this visit.  GEN: This  is a well developed, well-nourished female in no acute distress, in a pleasant mood.    SKIN: Total skin excluding the undergarment areas was performed. The exam included the head/face, neck, both arms, chest, back, abdomen, both legs, digits and/or nails.   -There are dome shaped bright red papules on the chest and trunk.   -There are waxy stuck on tan to brown papules on the trunk and extremities.  -Huynh's skin type I-II, fewer than 100 nevi  -there is a 2mm round non-mobile very hard papule which feels bony on the right lateral nose  -No other lesions of concern on areas examined.     Impression/Plan:  1. Skin Cancer Screening    ABCD's of melanoma were reviewed with patient    Sunscreen: Apply 20 minutes prior to going outdoors and reapply every two hours, when wet or sweating. We recommend using an SPF 30 or higher, and to use one that is water resistant.       2. Seborrheic keratosis, irritated - mid lower back  Cryotherapy procedure note: After verbal consent and discussion of risks and benefits including but no limited to dyspigmentation/scar, blister, and pain, 1 was(were) treated with 1-2mm freeze border for 2 cycles with liquid nitrogen. Post cryotherapy instructions were provided.     3. [place] Angiomas    Reassured benign    4. Bone Spur - right lateral nasal bridge - suspect this is due to repetitive trauma from glasses possibly    Suggested follow-up with ENT or plastics regarding removal    CC Dr. Bustamante on close of this encounter.  Follow-up in 1 year, earlier for new or changing lesions.       Staff Involved:  Staff Only  All risks, benefits and alternatives were discussed with patient.  Patient is in agreement and understands the assessment and plan.  All questions were answered.    Rica Moran PA-C  [facility]: Phone: [phone], Fax: [phone]

## 2018-07-24 NOTE — NURSING NOTE
Dermatology Rooming Note    Kerri Shook's goals for this visit include:   Chief Complaint   Patient presents with     Skin Check     Kerri is here today for a skin check- some areas of concern.      Onelia Ornelas MA

## 2018-07-24 NOTE — LETTER
"7/24/2018       RE: Kerri Shook  [address] [address]     Dear Colleague,    Thank you for referring your patient, Kerri Shook, to the [facility] DERMATOLOGY at [facility]. Please see a copy of my visit note below.    [facility] Dermatology Note      Dermatology Problem List:  1. Cherry angiomas  2. SKs     CC:   Chief Complaint   Patient presents with     Skin Check     Kerir is here today for a skin check- some areas of concern.          Encounter Date: Jul 24, 2018    History of Present Illness:  Ms. Kerri Shook is a 77 year old female who returns as a referral from Dr. Westbrook regarding a bump on the right side of the nose. She tells me the bump has been there \"forever\", and it is not painful except when she wears her glasses. She thinks it is bone. She has no hx of skin cancer. She was last seen 6/8/17 by Dr. Shipley. She has no hx of skin cancer. The patient denies painful, itching, tingling or bleeding lesions unless otherwise noted. She is otherwise feeing well today.    Past Medical History:   Patient Active Problem List   Diagnosis     Breast cancer -- Left     S/P coronary angiogram     Stress-induced cardiomyopathy     Foot injury     Advance care planning     Rosacea     KP (keratosis pilaris)     CLL (chronic lymphocytic leukemia) (H)     Hx of cardiomyopathy     Encounter for routine gynecological examination     Menopause--age 50     Colon adenomas     SK (seborrheic keratosis)     Seborrheic keratosis     Plantar fasciitis     Past Medical History:   Diagnosis Date     Breast disorder 1990    Breast Cancer     Cardiomyopathy (H)      Chronic osteoarthritis 2012    resulted in hip replacement     CLL (chronic lymphoblastic leukemia) 1989     Colon adenomas     6/26/14 colonoscopy, repeat in 3 years     History of blood transfusion      hyperlipidemia      Primary localized osteoarthrosis, pelvic region and thigh  "     Tinnitus      Past Surgical History:   Procedure Laterality Date     BIOPSY  within the last 5 years    polyps during colonoscopy     C PELVIS/HIP JOINT SURGERY UNLISTED  April 2012    left hip replacement     C SHOULDER SURG PROC UNLISTED  ?     COLONOSCOPY  6/26/2014    Procedure: COMBINED COLONOSCOPY, SINGLE BIOPSY/POLYPECTOMY BY BIOPSY;  Surgeon: Pola Arceo MD;  Location:  GI     D & C  1962    late PP hemorrhage --> retained placenta     ENDOSCOPIC SINUS SURGERY Right 7/16/2018    Procedure: ENDOSCOPIC SINUS SURGERY;  Endoscopic Sphenoidotomy with Removal of Tissue;  Surgeon: Kishore Webster MD;  Location:  OR     ENT SURGERY  1950    tonselectomy     EYE SURGERY  2015    cataract on left eye     left hip replacemen Left     hip replacement in 2013     LUMPECTOMY BREAST  1990    Left     ORTHOPEDIC SURGERY  age?    right shoulder      ORTHOPEDIC SURGERY  ~ 2013    left hip replacement     R hip arthroscopy  7/19/11     TONSILLECTOMY  1950       Social History:  The patient is retired. The patient denies use of tanning beds.     Family History:  There is no family history of skin cancer.    Medications:  Current Outpatient Prescriptions   Medication Sig Dispense Refill     aspirin 81 MG tablet Take 81 mg by mouth daily       carvedilol (COREG) 3.125 MG tablet Take 1 tablet (3.125 mg) by mouth 2 times daily (with meals) 180 tablet 2     gabapentin (NEURONTIN) 100 MG capsule Take 1 capsule (100 mg) by mouth nightly as needed 30 capsule 0     HYDROcodone-acetaminophen (NORCO) 5-325 MG per tablet Take 1 tablet by mouth every 6 hours as needed for pain 10 tablet 0     Multiple Vitamin (MULTIVITAMIN) per tablet Take 1 tablet by mouth daily.       simvastatin (ZOCOR) 20 MG tablet Take 1 tablet (20 mg) by mouth At Bedtime 90 tablet 3     sodium chloride (OCEAN NASAL SPRAY) 0.65 % nasal spray Spray 2 sprays into both nostrils 3 times daily 104 mL 1     Allergies   Allergen Reactions     Nkda [No Known Drug  Allergies]          Review of Systems:  -Constitutional: The patient denies fatigue, fevers, chills, unintended weight loss, and night sweats.  -Skin: As above in HPI. No additional skin concerns.    Physical exam:  Vitals: There were no vitals taken for this visit.  GEN: This is a well developed, well-nourished female in no acute distress, in a pleasant mood.    SKIN: Total skin excluding the undergarment areas was performed. The exam included the head/face, neck, both arms, chest, back, abdomen, both legs, digits and/or nails.   -There are dome shaped bright red papules on the chest and trunk.   -There are waxy stuck on tan to brown papules on the trunk and extremities.  -Huynh's skin type I-II, fewer than 100 nevi  -there is a 2mm round non-mobile very hard papule which feels bony on the right lateral nose  -No other lesions of concern on areas examined.     Impression/Plan:  1. Skin Cancer Screening    ABCD's of melanoma were reviewed with patient    Sunscreen: Apply 20 minutes prior to going outdoors and reapply every two hours, when wet or sweating. We recommend using an SPF 30 or higher, and to use one that is water resistant.       2. Seborrheic keratosis, irritated - mid lower back  Cryotherapy procedure note: After verbal consent and discussion of risks and benefits including but no limited to dyspigmentation/scar, blister, and pain, 1 was(were) treated with 1-2mm freeze border for 2 cycles with liquid nitrogen. Post cryotherapy instructions were provided.     3. [place]a Angiomas    Reassured benign    4. Bone Spur - right lateral nasal bridge - suspect this is due to repetitive trauma from glasses possibly    Suggested follow-up with ENT or plastics regarding removal    CC Dr. Bustamante on close of this encounter.  Follow-up in 1 year, earlier for new or changing lesions.       Staff Involved:  Staff Only  All risks, benefits and alternatives were discussed with patient.  Patient is in agreement and  understands the assessment and plan.  All questions were answered.    Rica Moran PA-C  [facility]: Phone: [phone], Fax: [phone]

## 2018-07-24 NOTE — MR AVS SNAPSHOT
After Visit Summary   7/24/2018    Kerri Shook    MRN: [id number]           Patient Information     Date Of Birth          1940        Visit Information        Provider Department      7/24/2018 1:45 PM Rica Moran PA-C [facility] Dermatology        Care Instructions    Cryotherapy    What is it?    Use of a very cold liquid, such as liquid nitrogen, to freeze and destroy abnormal skin cells that need to be removed    What should I expect?    Tenderness and redness    A small blister that might grow and fill with dark purple blood. There may be crusting.    More than one treatment may be needed if the lesions do not go away.    How do I care for the treated area?    Gently wash the area with your hands when bathing.    Use a thin layer of Vaseline to help with healing. You may use a Band-Aid.     The area should heal within 7-10 days and may leave behind a pink or lighter color.     Do not use an antibiotic or Neosporin ointment.     You may take acetaminophen (Tylenol) for pain.     Call your Doctor if you have:    Severe pain    Signs of infection (warmth, redness, cloudy yellow drainage, and or a bad smell)    Questions or concerns    Who should I call with questions?       [facility]: [phone]       [facility]: [phone]       For urgent needs outside of business hours call the [facility] at [phone]        and ask for the dermatology resident on call            Follow-ups after your visit        Your next 10 appointments already scheduled     Jul 30, 2018 10:00 AM CDT   (Arrive by 9:45 AM)   Return Visit with Kishore Webster MD   [facility] ([facility])    [address] [place] [address] [phone]              Who to contact     Please call your clinic at [phone] to:    Ask questions about your health    Make or cancel  appointments    Discuss your medicines    Learn about your test results    Speak to your doctor            Additional Information About Your Visit        [facility] Information     [employer] gives you secure access to your electronic health record. If you see a primary care provider, you can also send messages to your care team and make appointments. If you have questions, please call your primary care clinic.  If you do not have a primary care provider, please call [phone] and they will assist you.      [employer] is an electronic gateway that provides easy, online access to your medical records. With [employer], you can request a clinic appointment, read your test results, renew a prescription or communicate with your care team.     To access your existing account, please contact your [facility] or call [phone] for assistance.        Care EveryWhere ID     This is your Care EveryWhere ID. This could be used by other organizations to access your [facility] medical records  [id number]         Blood Pressure from Last 3 Encounters:   07/16/18 131/73   06/29/18 123/73   05/17/18 132/66    Weight from Last 3 Encounters:   07/16/18 64 kg (141 lb)   06/29/18 64 kg (141 lb 3.2 oz)   05/17/18 63.9 kg (140 lb 14.4 oz)              Today, you had the following     No orders found for display       Primary Care Provider Office Phone # Fax #    Juan Westbrook [phone] [address] [address]        Equal Access to Services     KENDALL VILLARREAL : Hadii aad ku hadasho Soomaali, waaxda luqadaha, qaybta kaalmada adeegyada, lulu tuttle. So [facility] [phone].    ATENCIÓN: Si habla español, tiene a lopez disposición servicios gratuitos de asistencia lingüística. Llame al [phone].    We comply with applicable federal civil rights laws and [place] laws. We do not discriminate on the basis of race, color, national origin, age,  disability, sex, sexual orientation, or gender identity.            Thank you!     Thank you for choosing [facility] DERMATOLOGY  for your care. Our goal is always to provide you with excellent care. Hearing back from our patients is one way we can continue to improve our services. Please take a few minutes to complete the written survey that you may receive in the mail after your visit with us. Thank you!             Your Updated Medication List - Protect others around you: Learn how to safely use, store and throw away your medicines at www.disposemymeds.org.          This list is accurate as of 7/24/18  2:28 PM.  Always use your most recent med list.                   Brand Name Dispense Instructions for use Diagnosis    aspirin 81 MG tablet      Take 81 mg by mouth daily        carvedilol 3.125 MG tablet    COREG    180 tablet    Take 1 tablet (3.125 mg) by mouth 2 times daily (with meals)    Stress-induced cardiomyopathy       gabapentin 100 MG capsule    NEURONTIN    30 capsule    Take 1 capsule (100 mg) by mouth nightly as needed    Pain of left lower extremity       HYDROcodone-acetaminophen 5-325 MG per tablet    NORCO    10 tablet    Take 1 tablet by mouth every 6 hours as needed for pain    Postoperative pain       multivitamin per tablet      Take 1 tablet by mouth daily.        simvastatin 20 MG tablet    ZOCOR    90 tablet    Take 1 tablet (20 mg) by mouth At Bedtime    Hyperlipidemia LDL goal <70       sodium chloride 0.65 % nasal spray    OCEAN NASAL SPRAY    104 mL    Spray 2 sprays into both nostrils 3 times daily    S/P FESS (functional endoscopic sinus surgery)

## 2018-07-24 NOTE — PATIENT INSTRUCTIONS
Cryotherapy    What is it?    Use of a very cold liquid, such as liquid nitrogen, to freeze and destroy abnormal skin cells that need to be removed    What should I expect?    Tenderness and redness    A small blister that might grow and fill with dark purple blood. There may be crusting.    More than one treatment may be needed if the lesions do not go away.    How do I care for the treated area?    Gently wash the area with your hands when bathing.    Use a thin layer of Vaseline to help with healing. You may use a Band-Aid.     The area should heal within 7-10 days and may leave behind a pink or lighter color.     Do not use an antibiotic or Neosporin ointment.     You may take acetaminophen (Tylenol) for pain.     Call your Doctor if you have:    Severe pain    Signs of infection (warmth, redness, cloudy yellow drainage, and or a bad smell)    Questions or concerns    Who should I call with questions?       [facility]: [phone]       [facility]: [phone]       For urgent needs outside of business hours call the [facility] at [phone]        and ask for the dermatology resident on call

## 2018-07-30 ENCOUNTER — OFFICE VISIT (OUTPATIENT)
Dept: OTOLARYNGOLOGY | Facility: CLINIC | Age: 78
End: 2018-07-30
Payer: COMMERCIAL

## 2018-07-30 VITALS — HEIGHT: 66 IN | BODY MASS INDEX: 22.34 KG/M2 | WEIGHT: 139 LBS

## 2018-07-30 DIAGNOSIS — Z98.890 S/P FUNCTIONAL ENDOSCOPIC SINUS SURGERY: Primary | ICD-10-CM

## 2018-07-30 ASSESSMENT — PAIN SCALES - GENERAL: PAINLEVEL: NO PAIN (0)

## 2018-07-30 NOTE — NURSING NOTE
"Chief Complaint   Patient presents with     RECHECK     7/16/18 post op     Height 1.676 m (5' 6\"), weight 63 kg (139 lb), not currently breastfeeding.    Viraj Hoskins    "

## 2018-07-30 NOTE — PROGRESS NOTES
HISTORY OF PRESENT ILLNESS:  Ms. Shook is back to see us again today.  She is doing well after her surgery, no complaints or concerns, no pain or drainage.      PHYSICAL EXAMINATION:  She is in no distress, alert and appropriate.  Breathing without difficulty or stridor.  Examination of the nose is performed.        PROCEDURE NOTE:  Nose is sprayed with lidocaine and Afrin on the right side and after verbal consent, rigid endoscopy is performed.  The sphenoethmoidal recess appears clean without any evidence of polyps or purulence.      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  Plan for follow-up in one month.

## 2018-07-30 NOTE — MR AVS SNAPSHOT
After Visit Summary   2018    Kerri Shook    MRN: [id number]           Patient Information     Date Of Birth          1940        Visit Information        Provider Department      2018 10:00 AM Kishore Webster MD [facility] N[facility] Th[facility]        Today's Diagnoses     S/P functional endoscopic sinus surgery    -  1      Care Instructions    Plan of care:  Follow up with Dr Webster in one month  Clinic contact information:  1. To schedule an appointment or to speak to someone regarding your medical care, please call [phone], option #1  2. LizetteRN: [phone]  3. Surgery scheduling:      Melissa Keegan: [phone]      Daiana Lane: [phone]  4. Fax: [phone]  5. [phone]            Follow-ups after your visit        Your next 10 appointments already scheduled     Aug 27, 2018  1:00 PM CDT   (Arrive by 12:45 PM)   Return Visit with MD ALLEN Bowens [facility] ([facility] [place])    [address] [place] [id number] [phone]              Who to contact     Please call your clinic at [phone] to:    Ask questions about your health    Make or cancel appointments    Discuss your medicines    Learn about your test results    Speak to your doctor            Additional Information About Your Visit        MyChart Information     Zibby gives you secure access to your electronic health record. If you see a primary care provider, you can also send messages to your care team and make appointments. If you have questions, please call your primary care clinic.  If you do not have a primary care provider, please call [phone] and they will assist you.      Zibby is an electronic gateway that provides easy, online access to your medical records. With Zibby, you can request a clinic appointment, read your test results, renew a prescription or communicate with your care team.     To access your  "existing account, please contact your [facility] or call [phone] for assistance.        Care EveryWhere ID     This is your Care EveryWhere ID. This could be used by other organizations to access your [place] medical records  [id number]        Your Vitals Were     Height BMI (Body Mass Index)                1.676 m (5' 6\") 22.44 kg/m2           Blood Pressure from Last 3 Encounters:   07/16/18 131/73   06/29/18 123/73   05/17/18 132/66    Weight from Last 3 Encounters:   07/30/18 63 kg (139 lb)   07/16/18 64 kg (141 lb)   06/29/18 64 kg (141 lb 3.2 oz)              We Performed the Following     NASAL ENDOSCOPY, DIAGNOSTIC        Primary Care Provider Office Phone # Fax #    Juan Westbrook [phone] [address] [address]        Equal Access to Services     KENDALL VILLARREAL : Hadii abdullahi ku hadasho Soomaali, waaxda luqadaha, qaybta kaalmada adeegyada, waxay rohitin haybeen pb bashir . So [facility] [phone].    ATENCIÓN: Si habla español, tiene a lopez disposición servicios gratuitos de asistencia lingüística. Llame al [phone].    We comply with applicable federal civil rights laws and [place] laws. We do not discriminate on the basis of race, color, national origin, age, disability, sex, sexual orientation, or gender identity.            Thank you!     Thank you for choosing [facility] EAR NOSE AND THROAT  for your care. Our goal is always to provide you with excellent care. Hearing back from our patients is one way we can continue to improve our services. Please take a few minutes to complete the written survey that you may receive in the mail after your visit with us. Thank you!             Your Updated Medication List - Protect others around you: Learn how to safely use, store and throw away your medicines at www.disposemymeds.org.          This list is accurate as of 7/30/18 10:19 AM.  Always use your most recent med " list.                   Brand Name Dispense Instructions for use Diagnosis    aspirin 81 MG tablet      Take 81 mg by mouth daily        carvedilol 3.125 MG tablet    COREG    180 tablet    Take 1 tablet (3.125 mg) by mouth 2 times daily (with meals)    Stress-induced cardiomyopathy       gabapentin 100 MG capsule    NEURONTIN    30 capsule    Take 1 capsule (100 mg) by mouth nightly as needed    Pain of left lower extremity       HYDROcodone-acetaminophen 5-325 MG per tablet    NORCO    10 tablet    Take 1 tablet by mouth every 6 hours as needed for pain    Postoperative pain       multivitamin per tablet      Take 1 tablet by mouth daily.        simvastatin 20 MG tablet    ZOCOR    90 tablet    Take 1 tablet (20 mg) by mouth At Bedtime    Hyperlipidemia LDL goal <70       sodium chloride 0.65 % nasal spray    OCEAN NASAL SPRAY    104 mL    Spray 2 sprays into both nostrils 3 times daily    S/P FESS (functional endoscopic sinus surgery)

## 2018-07-30 NOTE — LETTER
7/30/2018       RE: Kerri Shook  [address] [address]     Dear Colleague,    Thank you for referring your patient, Kerri Shook, to the [facility] EAR NOSE AND THROAT at [facility]. Please see a copy of my visit note below.    HISTORY OF PRESENT ILLNESS:  Ms. Shook is back to see us again today.  She is doing well after her surgery, no complaints or concerns, no pain or drainage.      PHYSICAL EXAMINATION:  She is in no distress, alert and appropriate.  Breathing without difficulty or stridor.  Examination of the nose is performed.        PROCEDURE NOTE:  Nose is sprayed with lidocaine and Afrin on the right side and after verbal consent, rigid endoscopy is performed.  The sphenoethmoidal recess appears clean without any evidence of polyps or purulence.      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  Plan for follow-up in one month.     Again, thank you for allowing me to participate in the care of your patient.      Sincerely,    Kishore Webster MD

## 2018-07-30 NOTE — PATIENT INSTRUCTIONS
Plan of care:  Follow up with Dr Webster in one month  Clinic contact information:  1. To schedule an appointment or to speak to someone regarding your medical care, please call [phone], option #1  2. CHADWICK Bauer: [phone]  3. Surgery scheduling:      Melissa Allison: [phone]      Daiana Lane: [phone]  4. Fax: [phone]  5. [phone]

## 2018-12-10 DIAGNOSIS — I51.81 STRESS-INDUCED CARDIOMYOPATHY: ICD-10-CM

## 2018-12-12 RX ORDER — CARVEDILOL 3.12 MG/1
3.12 TABLET ORAL 2 TIMES DAILY
Qty: 180 TABLET | Refills: 1 | Status: SHIPPED | OUTPATIENT
Start: 2018-12-12 | End: 2019-07-03

## 2018-12-28 ENCOUNTER — OFFICE VISIT (OUTPATIENT)
Dept: ORTHOPEDICS | Facility: CLINIC | Age: 78
End: 2018-12-28
Payer: COMMERCIAL

## 2018-12-28 ENCOUNTER — ANCILLARY PROCEDURE (OUTPATIENT)
Dept: GENERAL RADIOLOGY | Facility: CLINIC | Age: 78
End: 2018-12-28
Attending: FAMILY MEDICINE
Payer: COMMERCIAL

## 2018-12-28 ENCOUNTER — OFFICE VISIT (OUTPATIENT)
Dept: INTERNAL MEDICINE | Facility: CLINIC | Age: 78
End: 2018-12-28
Payer: COMMERCIAL

## 2018-12-28 VITALS
WEIGHT: 138.2 LBS | RESPIRATION RATE: 20 BRPM | DIASTOLIC BLOOD PRESSURE: 77 MMHG | BODY MASS INDEX: 22.31 KG/M2 | SYSTOLIC BLOOD PRESSURE: 122 MMHG | HEART RATE: 78 BPM | OXYGEN SATURATION: 95 %

## 2018-12-28 VITALS
WEIGHT: 138 LBS | BODY MASS INDEX: 22.18 KG/M2 | HEIGHT: 66 IN | DIASTOLIC BLOOD PRESSURE: 77 MMHG | HEART RATE: 78 BPM | SYSTOLIC BLOOD PRESSURE: 122 MMHG

## 2018-12-28 DIAGNOSIS — M79.672 PAIN OF LEFT FOOT: ICD-10-CM

## 2018-12-28 DIAGNOSIS — R79.89 BLOOD CHOLESTEROL INCREASED COMPARED WITH PRIOR MEASUREMENT: ICD-10-CM

## 2018-12-28 DIAGNOSIS — M25.572 PAIN OF JOINT OF LEFT ANKLE AND FOOT: Primary | ICD-10-CM

## 2018-12-28 DIAGNOSIS — C91.10 CLL (CHRONIC LYMPHOCYTIC LEUKEMIA) (H): Primary | ICD-10-CM

## 2018-12-28 DIAGNOSIS — M79.605 PAIN OF LEFT LOWER EXTREMITY: ICD-10-CM

## 2018-12-28 ASSESSMENT — PAIN SCALES - GENERAL: PAINLEVEL: MILD PAIN (3)

## 2018-12-28 ASSESSMENT — MIFFLIN-ST. JEOR: SCORE: 1122.71

## 2018-12-28 NOTE — LETTER
"12/28/2018       RE: Kerri Shook  [address] [facility] [address]     Dear Colleague,    Thank you for referring your patient, Kerri Shook, to the [facility] SPORTS AND ORTHOPAEDIC WALK IN CLINIC at [facility]. Please see a copy of my visit note below.          SPORTS & ORTHOPEDIC WALK-IN VISIT 12/28/2018    Primary Care Physician: Dr. Pietro SOLIZ foot pain for about 2 months. Denies any injury/trauma. Navicular, base of [facility] [place]. States it feels like it is \"bruised\". Going to [place] in Jan. 2019 for ten days.    Reason for visit:     What part of your body is injured / painful?  left foot    What caused the injury /pain? No inciting event     How long ago did your injury occur or pain begin? several months ago    What are your most bothersome symptoms? Pain    How would you characterize your symptom?  aching and sharp/sore    What makes your symptoms better? unsure    What makes your symptoms worse? Walking    Have you been previously seen for this problem? Yes, PCP    Medical History:    Any recent changes to your medical history? No    Any new medication prescribed since last visit? No    Have you had surgery on this body part before? No    Social History:    Occupation: NA    Handedness: Right    Exercise: 2-3 days/week    Review of Systems:    Do you have fever, chills, weight loss? No    Do you have any vision problems? No    Do you have any chest pain or edema? No    Do you have any shortness of breath or wheezing?  No    Do you have stomach problems? No    Do you have any numbness or focal weakness? No    Do you have diabetes? No    Do you have problems with bleeding or clotting? No    Do you have an rashes or other skin lesions? No       SUBJECTIVE: Kerri Shook is a 78 year old female who presents with left foot pain x 2 months.  Not getting any better.  Going on a trip and that'll require hiking.  Pt reports medial pain over side of medial foot.  Pt " doesn't recall any injury or trauma.  Left Hip replacement several years ago- right leg functionally shorter- heel lift in place.    Chronic low back pain that improves with exercises- has OA.  Left breast cancer- 20 years ago- lumpectomy- radiation, CLL-untreated  Marginally worse with walking.  Occasionally taking ibuprofen, hasn't iced.  Activities have been similar, work-out- 3 times a week treadmill for 30 min.  Lifting arms and legs at the gym.  Can still do this routine.    PAST MEDICAL, SOCIAL, SURGICAL AND FAMILY HISTORY: She  has a past medical history of Breast disorder (1990), Cardiomyopathy (H), Chronic osteoarthritis (2012), CLL (chronic lymphoblastic leukemia) (1989), Colon adenomas, History of blood transfusion, hyperlipidemia, Primary localized osteoarthrosis, pelvic region and thigh, and Tinnitus. She also has no past medical history of Anemia, Aortic valve disorders, Arrhythmia, Asymptomatic human immunodeficiency virus (HIV) infection status (H), Back injury, Bleeding disorder (H), Bone tumor, Cerebral infarction (H), Chest pain, Chronic kidney disease, Congestive heart failure, unspecified, Coronary artery disease, Deep vein thrombosis (DVT) (H), Depressive disorder, Diabetes (H), Hearing problem, Hepatitis, Hyperlipidaemia, Hypertension, Hypothyroidism, Immune disorder (H), Learning disability, Liver disease, Lung disease, Mental disorder, Neck injuries, RA (rheumatoid arthritis) (H), Reduced vision, Scoliosis, Seizures (H), Shortness of breath, Stomach ulcer, Surgical complication, Thyroid disease, Tuberculosis, Ulcer, gastric, acute, or Uncomplicated asthma.  She  has a past surgical history that includes Lumpectomy breast (1990); R hip arthroscopy (7/19/11); d & c (1962); orthopedic surgery (age?); orthopedic surgery (~ 2013); Colonoscopy (6/26/2014); biopsy (within the last 5 years); ENT surgery (1950); left hip replacemen (Left); PELVIS/HIP JOINT SURGERY UNLISTED (April 2012); SHOULDER  "SURG PROC UNLISTED (?); Tonsillectomy (1950); Eye surgery (2015); and Endoscopic sinus surgery (Right, 7/16/2018).  Her family history includes Alcoholism in her son; Cancer in her maternal grandfather; Coronary Artery Disease (age of onset: 76) in her father; Dementia in her mother; Diabetes (age of onset: 85) in her maternal grandmother; Heart Disease in her father.  She reports that  has never smoked. she has never used smokeless tobacco. She reports that she drinks alcohol. She reports that she does not use drugs.    ALLERGIES: She is allergic to nkda [no known drug allergies].    CURRENT MEDICATIONS: She has a current medication list which includes the following prescription(s): aspirin, carvedilol, gabapentin, multivitamin, and simvastatin.     REVIEW OF SYSTEMS: 10 point review of systems is negative except as noted above.    EXAM:  /77   Pulse 78   Ht 1.676 m (5' 6\")   Wt 62.6 kg (138 lb)   BMI 22.27 kg/m     CONSTITUTIONIAL: healthy, alert, no distress and cooperative  HEAD: Normocephalic. No masses, lesions, tenderness or abnormalities  ENT: ENT exam normal, no neck nodes or sinus tenderness  SKIN: no suspicious lesions or rashes  GAIT: normal  Stance: normal  NEUROLOGIC: Normal muscle tone and strength, reflexes normal, sensation grossly normal.  PSYCHIATRIC: affect normal/bright and mentation appears normal.    MUSCULOSKELETAL: left medial foot pain    ANKLE  Inspection/Palpation:     Swelling: no swelling      Non-tender: ATFL, CFL, PTFL, medial malleolus, deltoid ligament, anterior tib-fib ligament, achilles  tendon, no achilles tendon defect   Range of Motion: dorsiflexion: full, plantarflexion: full, inversion: full, eversion: full  Strength:dorsiflexion: 5/5, plantarflexion: 5/5, inversion: 5/5, eversion: 5/5   Special tests: negative anterior drawer, negative varus stress, negative valgus stress, negative forced external rotation, negative Charles sign     FOOT  Inspection/Palpation:     "  Swelling: no swelling  Tender: navicular     Non-tender: promixal 5th metatarsal, 1st, 2nd, 3rd, 4th, 5th metatarsals, calcaneous, cuboid, cuneiform lateral, cuneiform middle, cuneiform medial, metatarsal heads, peroneal tendon: at lateral malleolus, at cuboid, at proximal 5th metatarsal, posterior tibial tendon at medial malleolus, posterior tibial tendon at navicular, plantar fascia  Range of Motion: flexion of toes: full, extension of toes: full        ASSESSMENT/PLAN:  Pt is a 79 yo white female with PMHx of Breast cancer on left s/p radiation, CLL presenting with medial left foot pain  1. Left medial foot pain- OA, osteophyte off navicular  PT and given donut for hiking  Hiking boots are relatively new  RTC prn  X-RAY INTERPRETATION:   X-Ray of the Left Foot: 3-view, ap, lateral, oblique   ordered and interpreted in the office today was positive for Impression:  1. Vertical lucency and a 3 mm bony fragment at the base of metatarsal  likely represents an enthesophyte or sequela from prior injury. Less  likely an acute nondisplaced avulsion fracture. Correlate clinically  for focal tenderness.  2. No substantial degenerative change.    Again, thank you for allowing me to participate in the care of your patient.      Sincerely,    Julieta Metz MD

## 2018-12-28 NOTE — PROGRESS NOTES
"      SPORTS & ORTHOPEDIC WALK-IN VISIT 12/28/2018    Primary Care Physician: Dr. Pietro SOLIZ foot pain for about 2 months. Denies any injury/trauma. Pete, base of [facility] [place]. States it feels like it is \"bruised\". Going to [place] in Jan. 2019 for ten days.    Reason for visit:     What part of your body is injured / painful?  left foot    What caused the injury /pain? No inciting event     How long ago did your injury occur or pain begin? several months ago    What are your most bothersome symptoms? Pain    How would you characterize your symptom?  aching and sharp/sore    What makes your symptoms better? unsure    What makes your symptoms worse? Walking    Have you been previously seen for this problem? Yes, PCP    Medical History:    Any recent changes to your medical history? No    Any new medication prescribed since last visit? No    Have you had surgery on this body part before? No    Social History:    Occupation: NA    Handedness: Right    Exercise: 2-3 days/week    Review of Systems:    Do you have fever, chills, weight loss? No    Do you have any vision problems? No    Do you have any chest pain or edema? No    Do you have any shortness of breath or wheezing?  No    Do you have stomach problems? No    Do you have any numbness or focal weakness? No    Do you have diabetes? No    Do you have problems with bleeding or clotting? No    Do you have an rashes or other skin lesions? No         "

## 2018-12-28 NOTE — PROGRESS NOTES
SUBJECTIVE: Kerri Shook is a 78 year old female who presents with left foot pain x 2 months.  Not getting any better.  Going on a trip and that'll require hiking.  Pt reports medial pain over side of medial foot.  Pt doesn't recall any injury or trauma.  Left Hip replacement several years ago- right leg functionally shorter- heel lift in place.    Chronic low back pain that improves with exercises- has OA.  Left breast cancer- 20 years ago- lumpectomy- radiation, CLL-untreated  Marginally worse with walking.  Occasionally taking ibuprofen, hasn't iced.  Activities have been similar, work-out- 3 times a week treadmill for 30 min.  Lifting arms and legs at the gym.  Can still do this routine.    PAST MEDICAL, SOCIAL, SURGICAL AND FAMILY HISTORY: She  has a past medical history of Breast disorder (1990), Cardiomyopathy (H), Chronic osteoarthritis (2012), CLL (chronic lymphoblastic leukemia) (1989), Colon adenomas, History of blood transfusion, hyperlipidemia, Primary localized osteoarthrosis, pelvic region and thigh, and Tinnitus. She also has no past medical history of Anemia, Aortic valve disorders, Arrhythmia, Asymptomatic human immunodeficiency virus (HIV) infection status (H), Back injury, Bleeding disorder (H), Bone tumor, Cerebral infarction (H), Chest pain, Chronic kidney disease, Congestive heart failure, unspecified, Coronary artery disease, Deep vein thrombosis (DVT) (H), Depressive disorder, Diabetes (H), Hearing problem, Hepatitis, Hyperlipidaemia, Hypertension, Hypothyroidism, Immune disorder (H), Learning disability, Liver disease, Lung disease, Mental disorder, Neck injuries, RA (rheumatoid arthritis) (H), Reduced vision, Scoliosis, Seizures (H), Shortness of breath, Stomach ulcer, Surgical complication, Thyroid disease, Tuberculosis, Ulcer, gastric, acute, or Uncomplicated asthma.  She  has a past surgical history that includes Lumpectomy breast (1990); R hip arthroscopy (7/19/11); d & c (1962);  "orthopedic surgery (age?); orthopedic surgery (~ 2013); Colonoscopy (6/26/2014); biopsy (within the last 5 years); ENT surgery (1950); left hip replacemen (Left); PELVIS/HIP JOINT SURGERY UNLISTED (April 2012); SHOULDER SURG PROC UNLISTED (?); Tonsillectomy (1950); Eye surgery (2015); and Endoscopic sinus surgery (Right, 7/16/2018).  Her family history includes Alcoholism in her son; Cancer in her maternal grandfather; Coronary Artery Disease (age of onset: 76) in her father; Dementia in her mother; Diabetes (age of onset: 85) in her maternal grandmother; Heart Disease in her father.  She reports that  has never smoked. she has never used smokeless tobacco. She reports that she drinks alcohol. She reports that she does not use drugs.      ALLERGIES: She is allergic to nkda [no known drug allergies].    CURRENT MEDICATIONS: She has a current medication list which includes the following prescription(s): aspirin, carvedilol, gabapentin, multivitamin, and simvastatin.     REVIEW OF SYSTEMS: 10 point review of systems is negative except as noted above.    EXAM:  /77   Pulse 78   Ht 1.676 m (5' 6\")   Wt 62.6 kg (138 lb)   BMI 22.27 kg/m    CONSTITUTIONIAL: healthy, alert, no distress and cooperative  HEAD: Normocephalic. No masses, lesions, tenderness or abnormalities  ENT: ENT exam normal, no neck nodes or sinus tenderness  SKIN: no suspicious lesions or rashes  GAIT: normal  Stance: normal  NEUROLOGIC: Normal muscle tone and strength, reflexes normal, sensation grossly normal.  PSYCHIATRIC: affect normal/bright and mentation appears normal.    MUSCULOSKELETAL: left medial foot pain    ANKLE  Inspection/Palpation:     Swelling: no swelling      Non-tender: ATFL, CFL, PTFL, medial malleolus, deltoid ligament, anterior tib-fib ligament, achilles  tendon, no achilles tendon defect   Range of Motion: dorsiflexion: full, plantarflexion: full, inversion: full, eversion: full  Strength:dorsiflexion: 5/5, " plantarflexion: 5/5, inversion: 5/5, eversion: 5/5   Special tests: negative anterior drawer, negative varus stress, negative valgus stress, negative forced external rotation, negative Charles sign     FOOT  Inspection/Palpation:      Swelling: no swelling  Tender: navicular     Non-tender: promixal 5th metatarsal, 1st, 2nd, 3rd, 4th, 5th metatarsals, calcaneous, cuboid, cuneiform lateral, cuneiform middle, cuneiform medial, metatarsal heads, peroneal tendon: at lateral malleolus, at cuboid, at proximal 5th metatarsal, posterior tibial tendon at medial malleolus, posterior tibial tendon at navicular, plantar fascia  Range of Motion: flexion of toes: full, extension of toes: full        ASSESSMENT/PLAN:  Pt is a 79 yo white female with PMHx of Breast cancer on left s/p radiation, CLL presenting with medial left foot pain  1. Left medial foot pain- OA, osteophyte off navicular  PT and given donut for hiking  Hiking boots are relatively new  RTC prn  X-RAY INTERPRETATION:   X-Ray of the Left Foot: 3-view, ap, lateral, oblique   ordered and interpreted in the office today was positive for Impression:  1. Vertical lucency and a 3 mm bony fragment at the base of metatarsal  likely represents an enthesophyte or sequela from prior injury. Less  likely an acute nondisplaced avulsion fracture. Correlate clinically  for focal tenderness.  2. No substantial degenerative change.

## 2018-12-28 NOTE — PROGRESS NOTES
HPI:    Pt. Comes in for follow up today.  She o/w is doing well and denies additional HEENT, cardiopulmonary, abdominal, , GYN, neurological complaints. She has h/o breast CA and was seen by Dr. Sheldon, 11/13.   She was also  followed by Dr. Cindi WALLER for CLL and was seen 10/13.  She some chronic Low back pain that is getting less with PT.  She had some skin back lesions.   She was seen by Ms. Silverio ONC 2/23/2018  She is active with exercise and denies any rest/exertional CP/SOB/palptations today. She has about 2 months of L foot pain. No injury and can be worse with walking.        Past Medical History:   Diagnosis Date     Breast disorder 1990    Breast Cancer     Cardiomyopathy (H)      Chronic osteoarthritis 2012    resulted in hip replacement     CLL (chronic lymphoblastic leukemia) 1989     Colon adenomas     6/26/14 colonoscopy, repeat in 3 years     History of blood transfusion      hyperlipidemia      Primary localized osteoarthrosis, pelvic region and thigh      Tinnitus          PE:    Vitals noted; HEENT, ears normal oropharynx clear no exudate, neck supple nl rom,  No adenopahty, LCTA, RRR, S1, S2, no MRG.     Normal neurological exam. She has some back skin lesions.  No tenderness to palpation of low back area. Abdomen; positive BS's no masses no tenderness. No B CVA tenderness to palpation. Grossly normal neurological exam. L foot with no gross abnormality but tenderness medially at arch      A/P:    1.  L foot pain. X-rays today and refer to ortho walk in clinic. If worse, consider MRI imaging.   2. She had ENT surgery with Dr. Webster 7/16/2018 and was seen back in his clinic 7/30/2018  3. CLL will ordered labs today 12/27/2018  4. Breast cancer, She had mammogram 2/2018  5. Colonoscopy done 10/9/2017 and repeat in 3 years  6. Immunizations; check with insurance regarding new Shingles vaccine. Influenza vaccination this year?  7. Dermatology; she will think about this for future  8. She was  seen in GYN clinic for annual exam 3/13/2018  9. Ordered fasting lipids and liver tests; she is on Atorvastatin               Total time spent 25 minutes.  More than 50% of the time spent with MsJax Bouchra on counseling / coordinating her care

## 2018-12-28 NOTE — NURSING NOTE
"Chief Complaint   Patient presents with     Pain     pain in left foot \" for about 2 months\"   Ellen Chavez LPN 7:53 AM on 12/28/2018  Will bring copy of Health Directive to have scanned into chart at next appointment..Ellen Chavez LPN 7:55 AM on 12/28/2018      "

## 2019-01-03 ENCOUNTER — THERAPY VISIT (OUTPATIENT)
Dept: PHYSICAL THERAPY | Facility: CLINIC | Age: 79
End: 2019-01-03
Payer: COMMERCIAL

## 2019-01-03 DIAGNOSIS — M79.672 PAIN OF LEFT FOOT: ICD-10-CM

## 2019-01-03 DIAGNOSIS — M79.672 LEFT FOOT PAIN: ICD-10-CM

## 2019-01-03 PROCEDURE — 97161 PT EVAL LOW COMPLEX 20 MIN: CPT | Mod: GP | Performed by: PHYSICAL THERAPIST

## 2019-01-03 PROCEDURE — G8978 MOBILITY CURRENT STATUS: HCPCS | Mod: GP | Performed by: PHYSICAL THERAPIST

## 2019-01-03 PROCEDURE — G8979 MOBILITY GOAL STATUS: HCPCS | Mod: GP | Performed by: PHYSICAL THERAPIST

## 2019-01-03 PROCEDURE — 97110 THERAPEUTIC EXERCISES: CPT | Mod: GP | Performed by: PHYSICAL THERAPIST

## 2019-01-03 NOTE — LETTER
DEPARTMENT OF HEALTH AND HUMAN SERVICES  [facility] FOR MED[facility]ARE & MEDICAID SERVICES    PLAN/UPDATED PLAN OF PROGRESS FOR OUTPATIENT REHABILITATION    PATIENTS NAME:  Kerri Shook     : 1940    PROVIDER NUMBER:    [id number]    HICN:  [id number]     PROVIDER NAME: [facility]    MEDICAL RECORD NUMBER: [id number]     START OF CARE DATE:  SOC Date: 19   TYPE:  PT    PRIMARY/TREATMENT DIAGNOSIS: (Pertinent Medical Diagnosis)     Pain of left foot  Left foot pain    VISITS FROM START OF CARE:  Rxs Used: 1     [facility] Initial Evaluation  Subjective:  The history is provided by the patient. Kerri Shook is a 78 year old female with a left foot condition.  Condition occurred with:  Repetition/overuse.  Condition occurred: during recreation/sport.  This is a chronic condition  Pt reports 2 month history of medial L foot pain at navicular tuberosity along the medial arch. No incident, just gradual onset. Describes pain as sharp at times but mostly achy. Worse with walking (6/10 pain after walking 30 min). Hasn't been doing anything to help symptoms. Will be going on a hiking trip soon that she wants to be prepared for.Saw MD on 18.  Patient reports pain:  Medial and longitudinal arch (Navicular bone).    Pain is described as aching and sharp and is constant and reported as 2/10.  Special tests:  MRI.      General health as reported by patient is good.  Pertinent medical history includes:  Cancer, implanted devices and menopausal.    Surgical history: Lumpectomy, L BRITTANY.  Current medications:  Cardiac medication.  Current occupation is Retired.               Objective:  System  Ankle/Foot Evaluation  ROM:    AROM:    Dorsiflexion:  Left:   2  Right:   10  Plantarflexion: Left:  60    Right:   Inversion: Left:  20     Right:   Eversion: 11     Right:   PROM:    Dorsiflexion:  Left:    5     Right:   13   Plantarflexion: Left:    60     Right:   Inversion: Left:       30     Right:    Eversion: Left:  20     Right:   Strength:    Dorsiflexion:  Left: 5/5      Pain:+     Plantarflexion: Left: 5/5    Pain:-     Inversion:Left: 5/5   Pain:+       Eversion:Left: 5/5   Pain:-    Flexion Great Toe:Left: 5/5   Pain:-    Extension Great Toe:Left: 5/5   Pain:-    Anterior Tibialis:Left: 5/5   Pain:+    PALPATION: Palpation of ankle: Tender to touch at medial navicular bone.  EDEMA: normal  Hip Evaluation  Hip Strength:  : 4+/5 strength in hip extensors and abductors. : 4+/5 strength in hip extensors and abductors.  Knee Evaluation:  ROM:  Strength:  Normal    Assessment/Plan:    Patient is a 78 year old female with L foot complaints.    Patient has the following significant findings with corresponding treatment plan.                Diagnosis 1:  L medial foot pain  Pain -  hot/cold therapy, US, electric stimulation, mechanical traction, manual therapy, splint/taping/bracing/orthotics, self management, education, directional preference exercise and home program  Decreased ROM/flexibility - manual therapy, therapeutic exercise and home program  Decreased joint mobility - manual therapy, therapeutic exercise and home program  Decreased strength - therapeutic exercise, therapeutic activities and home program  Impaired muscle performance - neuro re-education and home program  Decreased function - therapeutic activities and home program    Therapy Evaluation Codes:   1) History comprised of:   Personal factors that impact the plan of care:      None.    Comorbidity factors that impact the plan of care are:      Cancer, Implanted device and Menopausal.     Medications impacting care: Cardiac.  2) Examination of Body Systems comprised of:   Body structures and functions that impact the plan of care:      Foot.   Activity limitations that impact the plan of care are:      Stairs, Standing and Walking.  3) Clinical presentation characteristics  "are:   Stable/Uncomplicated.  4) Decision-Making    Low complexity using standardized patient assessment instrument and/or measureable assessment of functional outcome.  Cumulative Therapy Evaluation is: Low complexity.  Previous and current functional limitations:  (See Goal Flow Sheet for this information)    Short term and Long term goals: (See Goal Flow Sheet for this information)   Communication ability:  Patient appears to be able to clearly communicate and understand verbal and written communication and follow directions correctly.  Treatment Explanation - The following has been discussed with the patient:   RX ordered/plan of care  Anticipated outcomes  Possible risks and side effects  This patient would benefit from PT intervention to resume normal activities.   Rehab potential is good.  Frequency:  1 X week, once daily  Duration:  for 6 weeks  Discharge Plan:  Achieve all LTG.  Independent in home treatment program.  Reach maximal therapeutic benefit.        Caregiver Signature/Credentials _____________________________ Date ________          Harris La DPT   I have reviewed and certified the need for these services and plan of treatment while under my care.        PHYSICIAN'S SIGNATURE:   _____________________________________  Date___________              Julieta Metz MD    Certification period:  Beginning of Cert date period: 01/03/19 to  End of Cert period date: 04/02/19   Functional Level Progress Report: Please see attached \"Goal Flow sheet for Functional level.\"  ____X____ Continue Services or       ________ DC Services                Service dates: From  SOC Date: 01/03/19 date to present                         "

## 2019-01-03 NOTE — PROGRESS NOTES
[facility] Initial Evaluation  Subjective:  The history is provided by the patient.   Kerri Shook is a 78 year old female with a left foot condition.  Condition occurred with:  Repetition/overuse.  Condition occurred: during recreation/sport.  This is a chronic condition  Pt reports 2 month history of medial L foot pain at navicular tuberosity along the medial arch. No incident, just gradual onset. Describes pain as sharp at times but mostly achy. Worse with walking (6/10 pain after walking 30 min). Hasn't been doing anything to help symptoms. Will be going on a hiking trip soon that she wants to be prepared for..    Patient reports pain:  Medial and longitudinal arch (Navicular bone).    Pain is described as aching and sharp and is constant and reported as 2/10.          Special tests:  MRI.      General health as reported by patient is good.  Pertinent medical history includes:  Cancer, implanted devices and menopausal.    Surgical history: Lumpectomy, L BRITTANY.  Current medications:  Cardiac medication.  Current occupation is Retired.                                    Objective:  System    Ankle/Foot Evaluation  ROM:    AROM:    Dorsiflexion:  Left:   2  Right:   10  Plantarflexion: Left:  60    Right:   Inversion: Left:  20     Right:   Eversion: 11     Right:       PROM:    Dorsiflexion:  Left:    5     Right:   13   Plantarflexion: Left:    60     Right:   Inversion: Left:      30     Right:    Eversion: Left:  20     Right:           Strength:    Dorsiflexion:  Left: 5/5      Pain:+     Plantarflexion: Left: 5/5    Pain:-     Inversion:Left: 5/5   Pain:+       Eversion:Left: 5/5   Pain:-    Flexion Great Toe:Left: 5/5   Pain:-    Extension Great Toe:Left: 5/5   Pain:-    Anterior Tibialis:Left: 5/5   Pain:+                  PALPATION: Palpation of ankle: Tender to touch at medial navicular bone.    EDEMA: normal                                                   Hip Evaluation    Hip  Strength:  : 4+/5 strength in hip extensors and abductors. : 4+/5 strength in hip extensors and abductors.                                   Knee Evaluation:  ROM:  Strength:  Normal                            General     ROS    Assessment/Plan:    Patient is a 78 year old female with L foot complaints.    Patient has the following significant findings with corresponding treatment plan.                Diagnosis 1:  L medial foot pain  Pain -  hot/cold therapy, US, electric stimulation, mechanical traction, manual therapy, splint/taping/bracing/orthotics, self management, education, directional preference exercise and home program  Decreased ROM/flexibility - manual therapy, therapeutic exercise and home program  Decreased joint mobility - manual therapy, therapeutic exercise and home program  Decreased strength - therapeutic exercise, therapeutic activities and home program  Impaired muscle performance - neuro re-education and home program  Decreased function - therapeutic activities and home program    Therapy Evaluation Codes:   1) History comprised of:   Personal factors that impact the plan of care:      None.    Comorbidity factors that impact the plan of care are:      Cancer, Implanted device and Menopausal.     Medications impacting care: Cardiac.  2) Examination of Body Systems comprised of:   Body structures and functions that impact the plan of care:      Foot.   Activity limitations that impact the plan of care are:      Stairs, Standing and Walking.  3) Clinical presentation characteristics are:   Stable/Uncomplicated.  4) Decision-Making    Low complexity using standardized patient assessment instrument and/or measureable assessment of functional outcome.  Cumulative Therapy Evaluation is: Low complexity.    Previous and current functional limitations:  (See Goal Flow Sheet for this information)    Short term and Long term goals: (See Goal Flow Sheet for this information)     Communication ability:   Patient appears to be able to clearly communicate and understand verbal and written communication and follow directions correctly.  Treatment Explanation - The following has been discussed with the patient:   RX ordered/plan of care  Anticipated outcomes  Possible risks and side effects  This patient would benefit from PT intervention to resume normal activities.   Rehab potential is good.    Frequency:  1 X week, once daily  Duration:  for 6 weeks  Discharge Plan:  Achieve all LTG.  Independent in home treatment program.  Reach maximal therapeutic benefit.    Please refer to the daily flowsheet for treatment today, total treatment time and time spent performing 1:1 timed codes.

## 2019-01-28 ENCOUNTER — OFFICE VISIT (OUTPATIENT)
Dept: INTERNAL MEDICINE | Facility: CLINIC | Age: 79
End: 2019-01-28
Payer: COMMERCIAL

## 2019-01-28 VITALS
HEART RATE: 77 BPM | SYSTOLIC BLOOD PRESSURE: 121 MMHG | BODY MASS INDEX: 22.6 KG/M2 | WEIGHT: 140 LBS | RESPIRATION RATE: 16 BRPM | DIASTOLIC BLOOD PRESSURE: 75 MMHG

## 2019-01-28 DIAGNOSIS — R79.89 BLOOD CHOLESTEROL INCREASED COMPARED WITH PRIOR MEASUREMENT: ICD-10-CM

## 2019-01-28 DIAGNOSIS — H93.8X2 EAR FULLNESS, LEFT: Primary | ICD-10-CM

## 2019-01-28 DIAGNOSIS — C91.10 CLL (CHRONIC LYMPHOCYTIC LEUKEMIA) (H): ICD-10-CM

## 2019-01-28 DIAGNOSIS — J32.3 CHRONIC SPHENOIDAL SINUSITIS: ICD-10-CM

## 2019-01-28 LAB
ALBUMIN SERPL-MCNC: 3.4 G/DL (ref 3.4–5)
ALP SERPL-CCNC: 81 U/L (ref 40–150)
ALT SERPL W P-5'-P-CCNC: 36 U/L (ref 0–50)
ANION GAP SERPL CALCULATED.3IONS-SCNC: 5 MMOL/L (ref 3–14)
AST SERPL W P-5'-P-CCNC: 26 U/L (ref 0–45)
BASOPHILS # BLD AUTO: 0.1 10E9/L (ref 0–0.2)
BASOPHILS NFR BLD AUTO: 0.3 %
BILIRUB SERPL-MCNC: 0.4 MG/DL (ref 0.2–1.3)
BUN SERPL-MCNC: 14 MG/DL (ref 7–30)
CALCIUM SERPL-MCNC: 8.8 MG/DL (ref 8.5–10.1)
CHLORIDE SERPL-SCNC: 104 MMOL/L (ref 94–109)
CHOLEST SERPL-MCNC: 175 MG/DL
CO2 SERPL-SCNC: 29 MMOL/L (ref 20–32)
CREAT SERPL-MCNC: 0.89 MG/DL (ref 0.52–1.04)
DIFFERENTIAL METHOD BLD: ABNORMAL
EOSINOPHIL # BLD AUTO: 0.2 10E9/L (ref 0–0.7)
EOSINOPHIL NFR BLD AUTO: 0.6 %
ERYTHROCYTE [DISTWIDTH] IN BLOOD BY AUTOMATED COUNT: 13.7 % (ref 10–15)
GFR SERPL CREATININE-BSD FRML MDRD: 62 ML/MIN/{1.73_M2}
GLUCOSE SERPL-MCNC: 107 MG/DL (ref 70–99)
HCT VFR BLD AUTO: 45.1 % (ref 35–47)
HDLC SERPL-MCNC: 78 MG/DL
HGB BLD-MCNC: 14.7 G/DL (ref 11.7–15.7)
IMM GRANULOCYTES # BLD: 0.1 10E9/L (ref 0–0.4)
IMM GRANULOCYTES NFR BLD: 0.3 %
LDLC SERPL CALC-MCNC: 78 MG/DL
LYMPHOCYTES # BLD AUTO: 20.2 10E9/L (ref 0.8–5.3)
LYMPHOCYTES NFR BLD AUTO: 74.5 %
MCH RBC QN AUTO: 30.8 PG (ref 26.5–33)
MCHC RBC AUTO-ENTMCNC: 32.6 G/DL (ref 31.5–36.5)
MCV RBC AUTO: 95 FL (ref 78–100)
MONOCYTES # BLD AUTO: 1.2 10E9/L (ref 0–1.3)
MONOCYTES NFR BLD AUTO: 4.3 %
NEUTROPHILS # BLD AUTO: 5.5 10E9/L (ref 1.6–8.3)
NEUTROPHILS NFR BLD AUTO: 20 %
NONHDLC SERPL-MCNC: 97 MG/DL
NRBC # BLD AUTO: 0 10*3/UL
NRBC BLD AUTO-RTO: 0 /100
PLATELET # BLD AUTO: 254 10E9/L (ref 150–450)
PLATELET # BLD EST: ABNORMAL 10*3/UL
POTASSIUM SERPL-SCNC: 4.2 MMOL/L (ref 3.4–5.3)
PROT SERPL-MCNC: 6.8 G/DL (ref 6.8–8.8)
RBC # BLD AUTO: 4.77 10E12/L (ref 3.8–5.2)
SODIUM SERPL-SCNC: 138 MMOL/L (ref 133–144)
TRIGL SERPL-MCNC: 92 MG/DL
WBC # BLD AUTO: 27.2 10E9/L (ref 4–11)

## 2019-01-28 RX ORDER — AMOXICILLIN AND CLAVULANATE POTASSIUM 500; 125 MG/1; MG/1
1 TABLET, FILM COATED ORAL 2 TIMES DAILY
Qty: 14 TABLET | Refills: 0 | Status: SHIPPED | OUTPATIENT
Start: 2019-01-28 | End: 2019-03-14

## 2019-01-28 ASSESSMENT — PAIN SCALES - GENERAL: PAINLEVEL: NO PAIN (1)

## 2019-01-28 NOTE — NURSING NOTE
Chief Complaint   Patient presents with     Results     Patient is here to follow up with lab results     Mildred Lazar CMA 9:46 AM on 1/28/2019.

## 2019-01-28 NOTE — PROGRESS NOTES
HPI:    Pt. Comes in for follow up today.  She o/w is doing well and denies additional HEENT, cardiopulmonary, abdominal, , GYN, neurological complaints. She has h/o breast CA and was seen by Dr. Sheldon, 11/13.   She was also  followed by Dr. Cindi WALLER for CLL and was seen 10/13.  She some chronic Low back pain that is getting less with PT.  She had some skin back lesions.   She was seen by Ms. Silverio ONC 2/23/2018  She is active with exercise and denies any rest/exertional CP/SOB/palptations today. She had several months of L foot pain. No injury and can be worse with walking.  She just returned from [place] and did a lot of hiking w/o problems.       Past Medical History:   Diagnosis Date     Breast disorder 1990    Breast Cancer     Cardiomyopathy (H)      Chronic osteoarthritis 2012    resulted in hip replacement     CLL (chronic lymphoblastic leukemia) 1989     Colon adenomas     6/26/14 colonoscopy, repeat in 3 years     History of blood transfusion      hyperlipidemia      Primary localized osteoarthrosis, pelvic region and thigh      Tinnitus          PE:    Vitals noted; HEENT, L ear with redness, R ear with wax/normal oropharynx clear no exudate, neck supple nl rom,  No adenopahty, LCTA, RRR, S1, S2, no MRG.     Normal neurological exam. She has some back skin lesions.  No tenderness to palpation of low back area. Abdomen; positive BS's no masses no tenderness. No B CVA tenderness to palpation. Grossly normal neurological exam. L foot with no gross abnormality      A/P:    1.  L foot pain. X-rays today and seen  ortho by Dr. Metz 12/28/2018. She is getting PT (see note 1/3/2019). If worse, consider MRI imaging.   2. She had ENT surgery with Dr. Webster 7/16/2018 and was seen back in his clinic 7/30/2018  3. CLL labs today 1/28/2019  4. Breast cancer, She had mammogram 2/2018  5. Colonoscopy done 10/9/2017 and repeat in 3 years  6. Immunizations; check with insurance regarding new Shingles  vaccine. Influenza vaccination this year?  7. Dermatology; she will think about this for future  8. She was seen in GYN clinic for annual exam 3/13/2018  9. Ordered fasting lipids and liver tests; she is on Atorvastatin   10. L ear, if worse, gave her hard copy for Augmentin (no allergies) and ENT referral if worse.     I will see her back in about 6 months.           Total time spent 25 minutes.  More than 50% of the time spent with Ms. Shook on counseling / coordinating her care

## 2019-01-29 ENCOUNTER — TELEPHONE (OUTPATIENT)
Dept: INTERNAL MEDICINE | Facility: CLINIC | Age: 79
End: 2019-01-29

## 2019-01-29 NOTE — TELEPHONE ENCOUNTER
----- Message from Giulia Puente MD sent at 1/29/2019 10:00 AM CST -----  I just checked her labs. Hgb perfect, platelets great. Lymphocytes up slightly but with normal Hgb/platelets and if now spleen I am not worried. Could repeat the CBC with diff again in 6 months to be safe.    giulia  ----- Message -----  From: Juan Westbrook MD  Sent: 1/29/2019   6:52 AM  To: Giulia Puente MD    Dear Dr. Puente;    Please take a look at Ms. Shook's CBC. Absolute Lymphocyte count up to 20.2 (When you saw her 6/8/2011 it was 11.2). Clinically she is doing fine. I will continue to follow unless you want to see her    Thanks, Nas Westbrook

## 2019-02-26 ENCOUNTER — ANCILLARY PROCEDURE (OUTPATIENT)
Dept: MAMMOGRAPHY | Facility: CLINIC | Age: 79
End: 2019-02-26
Attending: PHYSICIAN ASSISTANT
Payer: COMMERCIAL

## 2019-02-26 DIAGNOSIS — Z12.31 SCREENING MAMMOGRAM, ENCOUNTER FOR: ICD-10-CM

## 2019-03-14 ENCOUNTER — OFFICE VISIT (OUTPATIENT)
Dept: OBGYN | Facility: CLINIC | Age: 79
End: 2019-03-14
Payer: COMMERCIAL

## 2019-03-14 VITALS
WEIGHT: 141.3 LBS | BODY MASS INDEX: 22.71 KG/M2 | HEIGHT: 66 IN | HEART RATE: 75 BPM | SYSTOLIC BLOOD PRESSURE: 117 MMHG | DIASTOLIC BLOOD PRESSURE: 69 MMHG

## 2019-03-14 DIAGNOSIS — Z00.00 VISIT FOR PREVENTIVE HEALTH EXAMINATION: Primary | ICD-10-CM

## 2019-03-14 DIAGNOSIS — Z01.419 ENCOUNTER FOR GYNECOLOGICAL EXAMINATION WITHOUT ABNORMAL FINDING: ICD-10-CM

## 2019-03-14 ASSESSMENT — MIFFLIN-ST. JEOR: SCORE: 1137.68

## 2019-03-14 NOTE — PATIENT INSTRUCTIONS
PREVENTIVE HEALTH RECOMMENDATIONS:   Most women need a yearly breast and pelvic exam. - Please return in one year, or sooner with any questions or concerns    A PAP screen, a test done DURING a pelvic exam, is NO longer recommended yearly.    March 2013, screening guidelines recommended by ACOG for PAP screen are:    1) First pap at age 21.    2) Pap every 3 years until age 30.    3) After age 30, pap every 3 years or Pap with HR HPV screen every 5 years until age 65.  4) Women do NOT need a vaginal Pap screen after a hysterectomy (surgical removal of the uterus) when they have not had cancer.    Exceptions:  1) Yearly pap if HIV+ or immunosuppressed secondary to organ transplant  2) OLGA II-III continue routine screening for 20 years.    I encourage you continue looking for opportunities to choose a healthy lifestyle:       * Choose to eat a heart healthy diet. Check out the FOOD PLATE guidelines at: http://www.choosemyplate.gov/ for helpful hints on weight and cholesterol management.  Balance your caloric intake with exercise to maintain a BMI in the 22 to 26 range. For bone health: Eat calcium-rich foods like yogurt, broccoli or take chewable calcium pills (500 to 600 mg) twice a day with food.       * Exercise for at least an average of 30 minutes a day, 5 days of the week. This will help you control your weight, release stress, and help prevent disease.      * Take a Vitamin D3 supplement daily fall through spring and during summer unless you [id number]' full body sun exposure to skin without sunscreen.      * DO wear sunscreen to prevent skin cancer after the first 15-30 minutes.      * Identify stressors in your life, find ways to release the stress, and, make time for yourself. PLEASE ask for help if mood changes last longer than two weeks.     * Limit alcohol to one drink per day.  No smoking.  Avoid second hand smoke. If you smoke, ask for help to stop.       *  If you are in a sexual relationship, talk  with your partner about possible infection risks and take action to protect yourself from exposure to a sexual infection.    Please request an infection screen for STIs (sexually transmitted infections) if you are less than age 26 OR believe that you may be at risk.     Get a flu shot each year. Get a tetanus shot every 10 years. EVERYONE needs a pertussis (Whooping cough) booster.    See your dentist twice a year for an exam and preventive care cleaning.     Consider the following screen tests:    1) cholesterol test every 5 years.     2) yearly mammogram after age 40 unless you have identified risks.    3) colonoscopy every 10 years after age 50 unless you have identified risks.    4) diabetes blood test screening if you are at risk for diabetes.      Additional information that you may also find helpful:  The Internet now gives us access to LOTS of information -- some of it helpful, research documented and also plenty of harmful, anecdotal information that may not pertain to your situtaion. Consider visiting the following websites for accurate health information:    www.vitamindcouncil.org/ : Info and ongoing research re Vitamin D    www.fairview.org : Up to date and easily searchable information on multiple topics.    www.medlineplus.gov : medication info, interactive tutorials, watch real surgeries online    www.cdc.gov : public health info, travel advisories, epidemics (H1N1)    www.wil/std.org: current research re diagnosis, treatment and prevention of sexually contacted infections.    www.health.[facility].[place] : [place] dept of heatl, public health issues in [place], N1N1    www.familydoctor.org : good info from the Academy of Family Physicians

## 2019-03-14 NOTE — PROGRESS NOTES
Progress Note    SUBJECTIVE:  Kerri Shook is an 78 year old, , who requests an Annual Preventive Exam. She has no concerns today, is followed closely by her primary care physician and is here for a breast and pelvic exam    Menstrual History:  Postmenopausal since 49 yo, no postmenopausal bleeding, no hx of abnormal pap, not sexually active and denies vaginal pain.    Last    Lab Results   Component Value Date    PAP UNSAT 2018     Last   Lab Results   Component Value Date    HPV16 Negative 2018     Last   Lab Results   Component Value Date    HPV18 Negative 2018     Last   Lab Results   Component Value Date    HRHPV Negative 2018       Colonoscopy 2014 - advanced adenomatous polyps, 10/2017 - 5 polyps negative  Mammogram 2019 BIRADS 1 neg  PAP 10/2012 NIL, 3/2018 unsatisfactory  DEXA 2017 -0.8    Result Date: 2019  Narrative: EXAMINATION: MA SCREENING DIGITAL BILATERAL, 2019 10:18 AM and CAD Comparison: 2018, 2017, 2016, 10/21/2014 History/Family history: No symptoms, routine screening. History of left breast cancer with conservation therapy in . Three prior breast biopsies. BREAST DENSITY: Scattered fibroglandular densities. COMMENTS: There has been no significant change.      Last Colonoscopy:  Results for orders placed or performed during the hospital encounter of 14   COLONOSCOPY   Result Value Ref Range    COLONOSCOPY       [address] [place] [address] [phone]     Endoscopy Department  _______________________________________________________________________________  Patient Name: Kerri Shook           Procedure Date: 2014 13:06:SS A6/P6    MRN: [id number]                       Account Number: [id number]                 YOB: 1940              Admit Type: Outpatient                      Age: 73                               Room: [facility]                                  Gender: Female                        Note Status: Finalized                      Attending MD: Pola Reid MD       Pause for the Cause: Pause for the cause   completed  _______________________________________________________________________________     Procedure:                Colonoscopy  Indications:              Follow-up for history of adenomatous polyps in the                             colon  Providers:                Pola Arceo MD, Randolph Yun RN  Referring  MD:             Andrés Greene MD, Juan Westbrook MD  Medicines:                Midazolam 3 mg IV, Fentanyl 100 micrograms IV  Complications:            No immediate complications  _______________________________________________________________________________  Procedure:                Pre-Anesthesia Assessment:                            - Prior to the procedure, a History and Physical                             was performed, and patient medications and                             allergies were reviewed. The patient is competent.                             The risks and benefits of the procedure and the                             sedation options and risks were discussed with the                             patient. All questions were answered and informed                             consent was obtained. Patient identification and                             proposed procedure were verified by the physician                             and the nurse in the procedure room . Mental Status                             Examination: alert and oriented. Airway                             Examination: normal oropharyngeal airway and neck                             mobility. Respiratory Examination: clear to                             auscultation. CV Examination: normal. Prophylactic                             Antibiotics: The patient does not require                             prophylactic antibiotics. Prior Anticoagulants:  The                             patient has taken aspirin, last dose was 5 days                             prior to procedure. ASA Grade Assessment: II - A                             patient with mild systemic disease. After reviewing                             the risks and benefits, the patient was deemed in                             satisfactory condition to undergo the procedure.                             The anesthesia plan was to use moderate sedation /                             analgesia (conscious sedation). Immediatel y prior                             to administration of medications, the patient was                             re-assessed for adequacy to receive sedatives. The                             heart rate, respiratory rate, oxygen saturations,                             blood pressure, adequacy of pulmonary ventilation,                             and response to care were monitored throughout the                             procedure. The physical status of the patient was                             re-assessed after the procedure.                            After obtaining informed consent, the colonoscope                             was passed under direct vision. Throughout the                             procedure, the patient's blood pressure, pulse, and                             oxygen saturations were monitored continuously. The                             Colonoscope was introduced through the anus and                             advanced to the cecum, identified by appendi ceal                             orifice & ileocecal valve. The colonoscopy was                             somewhat difficult due to a tortuous colon. The                             patient tolerated the procedure well. The quality                             of the bowel preparation was excellent and adequate                             to identify polyps 5 mm and larger in size.                                                                                    Findings:       The perianal and digital rectal examinations were normal. Pertinent        negatives include normal sphincter tone and no palpable rectal lesions.        A sessile polyp was found in the transverse colon. The polyp was 3 mm in        size. The polyp was removed with a cold biopsy forceps. Resection and        retrieval were complete. The retroflexed view of the anal verge was        normal and showed no anal or rectal abnormalities. The exam was        otherwise without abnormality.                                                                                    Impression:               - One 3 mm polyp in the transverse colon. Resected                             and retrieved.                            - The examination was otherwise normal.  Recommendation:           - Repeat colonoscopy in 3 years for surveillance.                                                                                     Electronically signed by Dr. Pola Arceo  ___________________  Pola Arceo MD  Signed Date: 6/26/2014 14:06:SKYLAR A6/P6  Number of Addenda: 0  I was physically present for the entire viewing portion of the exam.  __________________________  Signature of teaching physician  B4c/D4c  Note Initiated On: 6/26/2014 13:06:SKYLAR A6/P6  Scope Withdrawal Time: 0 hours 0 minutes 0 seconds   Scope Withdrawal Time: 0 hours 0 minutes 0 seconds   Total Procedure Duration: 0 hours 0 minutes 0 seconds   Total Procedure Duration: 0 hours 0 minutes 0 seconds            HISTORY:    Current Outpatient Medications on File Prior to Visit:  aspirin 81 MG tablet Take 81 mg by mouth daily   carvedilol (COREG) 3.125 MG tablet Take 1 tablet (3.125 mg) by mouth 2 times daily   Multiple Vitamin (MULTIVITAMIN) per tablet Take 1 tablet by mouth daily.   simvastatin (ZOCOR) 20 MG tablet Take 1 tablet (20 mg) by mouth At Bedtime   gabapentin (NEURONTIN) 100 MG capsule  Take 1 capsule (100 mg) by mouth nightly as needed (Patient not taking: Reported on 3/14/2019)     No current facility-administered medications on file prior to visit.   Allergies   Allergen Reactions     Nkda [No Known Drug Allergies]      Immunization History   Administered Date(s) Administered     DTaP, Unspecified 2014     FLU 6-35 months 10/14/2010     Influenza (H1N1) 10/01/2009, 2009     Influenza (High Dose) 3 valent vaccine 2015, 10/16/2016, 2017     Influenza (IIV3) PF 10/01/2009, 10/14/2010, 10/11/2011, 10/02/2012, 10/17/2013     Pneumo Conj 13-V (2010&after) 2015     Pneumococcal 23 valent 10/01/2002, 2007     TD (ADULT, 7+) 2004     TDAP Vaccine (Boostrix) 2014     Td (Adult), Adsorbed 2004     Tdap (Adult) Unspecified Formulation 2004     Zoster vaccine, live 2008       Obstetric History       T3      L3     SAB0   TAB0   Ectopic0   Multiple0   Live Births3      Past Medical History:   Diagnosis Date     Breast disorder     Breast Cancer     Cardiomyopathy (H)      Chronic osteoarthritis     resulted in hip replacement     CLL (chronic lymphoblastic leukemia)      Colon adenomas     14 colonoscopy, repeat in 3 years     History of blood transfusion      hyperlipidemia      Primary localized osteoarthrosis, pelvic region and thigh      Tinnitus      Past Surgical History:   Procedure Laterality Date     BIOPSY  within the last 5 years    polyps during colonoscopy     C PELVIS/HIP JOINT SURGERY UNLISTED  2012    left hip replacement     C SHOULDER SURG PROC UNLISTED  ?     COLONOSCOPY  2014    Procedure: COMBINED COLONOSCOPY, SINGLE BIOPSY/POLYPECTOMY BY BIOPSY;  Surgeon: Pola Arceo MD;  Location:  GI     D & C      late PP hemorrhage --> retained placenta     ENDOSCOPIC SINUS SURGERY Right 2018    Procedure: ENDOSCOPIC SINUS SURGERY;  Endoscopic Sphenoidotomy with Removal of  Tissue;  Surgeon: Kishore Webster MD;  Location: [facility] OR     ENT SURGERY  1950    tonselectomy     EYE SURGERY      cataract on left eye     left hip replacemen Left     hip replacement in      LUMPECTOMY BREAST  1990    Left     ORTHOPEDIC SURGERY  age?    right shoulder      ORTHOPEDIC SURGERY  ~     left hip replacement     R hip arthroscopy  11     TONSILLECTOMY  1950     Family History   Problem Relation Age of Onset     Diabetes Maternal Grandmother 85     Coronary Artery Disease Father 76         of MI     Heart Disease Father      Cancer Maternal Grandfather         stomach     Alcoholism Son         Active alcoholic     Dementia Mother      C.A.D. No family hx of      Cancer - colorectal No family hx of      Psychotic Disorder No family hx of      Skin Cancer No family hx of      Melanoma No family hx of      Hypertension No family hx of      Breast Cancer No family hx of      Prostate Cancer No family hx of      Cerebrovascular Disease No family hx of         ,, ,     Social History     Socioeconomic History     Marital status:      Spouse name: None     Number of children: None     Years of education: None     Highest education level: None   Occupational History     None   Social Needs     Financial resource strain: None     Food insecurity:     Worry: None     Inability: None     Transportation needs:     Medical: None     Non-medical: None   Tobacco Use     Smoking status: Never Smoker     Smokeless tobacco: Never Used   Substance and Sexual Activity     Alcohol use: Yes     Alcohol/week: 0.0 oz     Comment: 3 glasses of wine per week     Drug use: No     Sexual activity: Not Currently     Birth control/protection: None   Lifestyle     Physical activity:     Days per week: None     Minutes per session: None     Stress: None   Relationships     Social connections:     Talks on phone: None     Gets together: None     Attends Mandaen service: None     Active member of club or  "organization: None     Attends meetings of clubs or organizations: None     Relationship status: None     Intimate partner violence:     Fear of current or ex partner: None     Emotionally abused: None     Physically abused: None     Forced sexual activity: None   Other Topics Concern     Parent/sibling w/ CABG, MI or angioplasty before 65F 55M? Not Asked   Social History Narrative    How much exercise per week? 3-4x    How much calcium per day? Supplement       How much caffeine per day? 2 cups coffee in morning    How much vitamin D per day? Supplement    Do you/your family wear seatbelts?  Yes    Do you/your family use safety helmets? Yes    Do you/your family use sunscreen? Yes    Do you/your family keep firearms in the home? No    Do you/your family have a smoke detector(s)? Yes        Do you feel safe in your home? Yes    Has anyone ever touched you in an unwanted manner? No     Explain     SEE DADA FRANK    March 25, 2015 Katie Hernández LPN             10pt ROS negative    No flowsheet data found.  STORMY-7 SCORE 5/3/2016 5/11/2017 3/12/2018   Total Score 0 (minimal anxiety) 0 (minimal anxiety) 0 (minimal anxiety)   Total Score - - 0   Some encounter information is confidential and restricted. Go to Review Flowsheets activity to see all data.       EXAM:  Blood pressure 117/69, pulse 75, height 1.676 m (5' 6\"), weight 64.1 kg (141 lb 4.8 oz), not currently breastfeeding. Body mass index is 22.81 kg/m .  General - pleasant female in no acute distress.  Skin - no suspicious lesions or rashes  EENT-  PERRLA  Neck - supple without lymphadenopathy.  Lungs - clear to auscultation bilaterally.  Heart - regular rate and rhythm without murmur.  Abdomen - soft, nontender, nondistended, no masses or organomegaly noted.  Musculoskeletal - no gross deformities.  Neurological - normal strength, sensation, and mental status.    Breast Exam:  Breast: Without visible skin changes. No dimpling or lesions seen.   Breasts supple, " non-tender with palpation, no dominant mass, nodularity, or nipple discharge noted bilaterally. Scar in upper, outer quadrant of left breast with palpable scar tissue, no obvious masses. Left breast with healed biopsy scar at 9o'clock. Axillary nodes negative bilaterally      Pelvic Exam:  EG/BUS: Normal genital architecture without lesions, erythema or abnormal secretions   Urethra: no masses, tenderness, or scarring   Bladder: no masses or tenderness on bimanual   Vagina:  atrophic, thin with minimal clear discharge. Stenosis at introitus  Cervix: atrophic, no obvious lesions  Uterus: small, smooth, firm, mobile w/o pain  Adnexa: Within normal limits and No masses, nodularity, tenderness  Rectum:anus normal       ASSESSMENT:  Encounter Diagnosis   Name Primary?     Visit for preventive health examination Yes        PLAN:   Return in 1 year for breast and pelvic exam. Placed order for mammogram for next year. Up to date on all other screening    Patient discussed with Dr. Ranjan Quinteros MD  Obstetrics and Gynecology, PGY-2  March 14, 2019 , 3:52 PM      The Patient was seen in [facility] by TIFFANY QUINTEROS.  I reviewed the history & exam. Assessment and plan were jointly made.    Lizette Woodruff MD

## 2019-03-20 ENCOUNTER — OFFICE VISIT (OUTPATIENT)
Dept: ORTHOPEDICS | Facility: CLINIC | Age: 79
End: 2019-03-20
Payer: COMMERCIAL

## 2019-03-20 VITALS — HEIGHT: 66 IN | RESPIRATION RATE: 16 BRPM | BODY MASS INDEX: 22.66 KG/M2 | WEIGHT: 141 LBS

## 2019-03-20 DIAGNOSIS — M79.672 LEFT FOOT PAIN: Primary | ICD-10-CM

## 2019-03-20 ASSESSMENT — MIFFLIN-ST. JEOR: SCORE: 1136.32

## 2019-03-20 NOTE — PROGRESS NOTES
Subjective:   Kerri Shook is a 78 year old female who is f/u for left foot pain.  Doing exercises given at PT, didn't improve, walked on trip and things went well.  PT exercises not improving.  Walking on treadmill and noticed sharp pain across the navicular.  plantar fascitis- thinks that's fine    Date last seen: 12/28/2018  Following Therapeutic Plan: Yes   Pain: Worsening  Function: Unchanged  Interval History: Donut pad, PT     PAST MEDICAL, SOCIAL, SURGICAL AND FAMILY HISTORY: She  has a past medical history of Breast disorder (1990), Cardiomyopathy (H), Chronic osteoarthritis (2012), CLL (chronic lymphoblastic leukemia) (1989), Colon adenomas, History of blood transfusion, hyperlipidemia, Primary localized osteoarthrosis, pelvic region and thigh, and Tinnitus. She also has no past medical history of Anemia, Aortic valve disorders, Arrhythmia, Asymptomatic human immunodeficiency virus (HIV) infection status (H), Back injury, Bleeding disorder (H), Bone tumor, Cerebral infarction (H), Chest pain, Chronic kidney disease, Congestive heart failure, unspecified, Coronary artery disease, Deep vein thrombosis (DVT) (H), Depressive disorder, Diabetes (H), Hearing problem, Hepatitis, Hyperlipidaemia, Hypertension, Hypothyroidism, Immune disorder (H), Learning disability, Liver disease, Lung disease, Mental disorder, Neck injuries, RA (rheumatoid arthritis) (H), Reduced vision, Scoliosis, Seizures (H), Shortness of breath, Stomach ulcer, Surgical complication, Thyroid disease, Tuberculosis, Ulcer, gastric, acute, or Uncomplicated asthma.  She  has a past surgical history that includes Lumpectomy breast (1990); R hip arthroscopy (7/19/11); d & c (1962); orthopedic surgery (age?); orthopedic surgery (~ 2013); Colonoscopy (6/26/2014); biopsy (within the last 5 years); ENT surgery (1950); left hip replacemen (Left); PELVIS/HIP JOINT SURGERY UNLISTED (April 2012); SHOULDER SURG PROC UNLISTED (?); Tonsillectomy (1950); Eye  "surgery (2015); and Endoscopic sinus surgery (Right, 7/16/2018).  Her family history includes Alcoholism in her son; Cancer in her maternal grandfather; Coronary Artery Disease (age of onset: 76) in her father; Dementia in her mother; Diabetes (age of onset: 85) in her maternal grandmother; Heart Disease in her father.  She reports that  has never smoked. she has never used smokeless tobacco. She reports that she drinks alcohol. She reports that she does not use drugs.    ALLERGIES: She is allergic to nkda [no known drug allergies].    CURRENT MEDICATIONS: She has a current medication list which includes the following prescription(s): aspirin, carvedilol, multivitamin, simvastatin, and gabapentin.     REVIEW OF SYSTEMS: 10 point review of systems is negative except as noted above.     Exam:   Resp 16   Ht 1.676 m (5' 6\")   Wt 64 kg (141 lb)   BMI 22.76 kg/m             CONSTITUTIONAL: healthy, alert, no distress and cooperative  HEAD: Normocephalic. No masses, lesions, tenderness or abnormalities  SKIN: no suspicious lesions or rashes  GAIT: normal  NEUROLOGIC: Non-focal  PSYCHIATRIC: affect normal/bright and mentation appears normal.    MUSCULOSKELETAL: left foot pain    ANKLE  Inspection/Palpation:     Swelling: no swelling   Tender: medial arch, over navicular, anterior tibialis     Non-tender: ATFL, CFL, PTFL, medial malleolus, deltoid ligament, anterior tib-fib ligament, achilles  tendon, no achilles tendon defect   Range of Motion: dorsiflexion: full, plantarflexion: full, inversion: full, eversion: full  Strength:dorsiflexion: 5/5, plantarflexion: 5/5, inversion: 5/5, eversion: 5/5   Special tests: negative anterior drawer, negative varus stress, negative valgus stress, negative forced external rotation, negative Charles sign     FOOT  Inspection/Palpation:      Swelling: no swelling  Tender: medial arch, navicular     Non-tender: promixal 5th metatarsal, 1st, 2nd, 3rd, 4th, 5th metatarsals, calcaneous, " cuboid, cuneiform lateral, cuneiform middle, cuneiform medial, metatarsal heads, peroneal tendon: at lateral malleolus, at cuboid, at proximal 5th metatarsal, posterior tibial tendon at medial malleolus, posterior tibial tendon at navicular, plantar fascia  Range of Motion: flexion of toes: full, extension of toes: full         Assessment/Plan:   Pt is a 77 yo white female with PMhx of breast cancer on left s/p radiation, CLL presenting with continued medial foot pain  1. Left medial foot pain- OA  Hiking continues to be painful  Trial of superfeet  MRI left foot- checking navicular    RTC after imaging    X-RAY INTERPRETATION:   Reviewed left foot x-ray

## 2019-03-20 NOTE — LETTER
3/20/2019      RE: Kerri Shook  [address] [facility] [address]        Subjective:   Kerri Shook is a 78 year old female who is f/u for left foot pain.  Doing exercises given at PT, didn't improve, walked on trip and things went well.  PT exercises not improving.  Walking on treadmill and noticed sharp pain across the navicular.  plantar fascitis- thinks that's fine    Date last seen: 12/28/2018  Following Therapeutic Plan: Yes   Pain: Worsening  Function: Unchanged  Interval History: Donut pad, PT     PAST MEDICAL, SOCIAL, SURGICAL AND FAMILY HISTORY: She  has a past medical history of Breast disorder (1990), Cardiomyopathy (H), Chronic osteoarthritis (2012), CLL (chronic lymphoblastic leukemia) (1989), Colon adenomas, History of blood transfusion, hyperlipidemia, Primary localized osteoarthrosis, pelvic region and thigh, and Tinnitus. She also has no past medical history of Anemia, Aortic valve disorders, Arrhythmia, Asymptomatic human immunodeficiency virus (HIV) infection status (H), Back injury, Bleeding disorder (H), Bone tumor, Cerebral infarction (H), Chest pain, Chronic kidney disease, Congestive heart failure, unspecified, Coronary artery disease, Deep vein thrombosis (DVT) (H), Depressive disorder, Diabetes (H), Hearing problem, Hepatitis, Hyperlipidaemia, Hypertension, Hypothyroidism, Immune disorder (H), Learning disability, Liver disease, Lung disease, Mental disorder, Neck injuries, RA (rheumatoid arthritis) (H), Reduced vision, Scoliosis, Seizures (H), Shortness of breath, Stomach ulcer, Surgical complication, Thyroid disease, Tuberculosis, Ulcer, gastric, acute, or Uncomplicated asthma.  She  has a past surgical history that includes Lumpectomy breast (1990); R hip arthroscopy (7/19/11); d & c (1962); orthopedic surgery (age?); orthopedic surgery (~ 2013); Colonoscopy (6/26/2014); biopsy (within the last 5 years); ENT surgery (1950); left hip replacemen (Left); PELVIS/HIP JOINT  "SURGERY UNLISTED (April 2012); SHOULDER SURG PROC UNLISTED (?); Tonsillectomy (1950); Eye surgery (2015); and Endoscopic sinus surgery (Right, 7/16/2018).  Her family history includes Alcoholism in her son; Cancer in her maternal grandfather; Coronary Artery Disease (age of onset: 76) in her father; Dementia in her mother; Diabetes (age of onset: 85) in her maternal grandmother; Heart Disease in her father.  She reports that  has never smoked. she has never used smokeless tobacco. She reports that she drinks alcohol. She reports that she does not use drugs.    ALLERGIES: She is allergic to nkda [no known drug allergies].    CURRENT MEDICATIONS: She has a current medication list which includes the following prescription(s): aspirin, carvedilol, multivitamin, simvastatin, and gabapentin.     REVIEW OF SYSTEMS: 10 point review of systems is negative except as noted above.     Exam:   Resp 16   Ht 1.676 m (5' 6\")   Wt 64 kg (141 lb)   BMI 22.76 kg/m              CONSTITUTIONAL: healthy, alert, no distress and cooperative  HEAD: Normocephalic. No masses, lesions, tenderness or abnormalities  SKIN: no suspicious lesions or rashes  GAIT: normal  NEUROLOGIC: Non-focal  PSYCHIATRIC: affect normal/bright and mentation appears normal.    MUSCULOSKELETAL: left foot pain    ANKLE  Inspection/Palpation:     Swelling: no swelling   Tender: medial arch, over navicular, anterior tibialis     Non-tender: ATFL, CFL, PTFL, medial malleolus, deltoid ligament, anterior tib-fib ligament, achilles  tendon, no achilles tendon defect   Range of Motion: dorsiflexion: full, plantarflexion: full, inversion: full, eversion: full  Strength:dorsiflexion: 5/5, plantarflexion: 5/5, inversion: 5/5, eversion: 5/5   Special tests: negative anterior drawer, negative varus stress, negative valgus stress, negative forced external rotation, negative Charles sign     FOOT  Inspection/Palpation:      Swelling: no swelling  Tender: medial arch, " navicular     Non-tender: promixal 5th metatarsal, 1st, 2nd, 3rd, 4th, 5th metatarsals, calcaneous, cuboid, cuneiform lateral, cuneiform middle, cuneiform medial, metatarsal heads, peroneal tendon: at lateral malleolus, at cuboid, at proximal 5th metatarsal, posterior tibial tendon at medial malleolus, posterior tibial tendon at navicular, plantar fascia  Range of Motion: flexion of toes: full, extension of toes: full         Assessment/Plan:   Pt is a 77 yo white female with PMhx of breast cancer on left s/p radiation, CLL presenting with continued medial foot pain  1. Left medial foot pain- OA  Hiking continues to be painful  Trial of superfeet  MRI left foot- checking navicular    RTC after imaging    X-RAY INTERPRETATION:   Reviewed left foot x-ray    Julieta Metz MD

## 2019-03-21 ENCOUNTER — ANCILLARY PROCEDURE (OUTPATIENT)
Dept: MRI IMAGING | Facility: CLINIC | Age: 79
End: 2019-03-21
Attending: FAMILY MEDICINE
Payer: COMMERCIAL

## 2019-03-21 DIAGNOSIS — M79.672 LEFT FOOT PAIN: ICD-10-CM

## 2019-03-27 ENCOUNTER — OFFICE VISIT (OUTPATIENT)
Dept: ORTHOPEDICS | Facility: CLINIC | Age: 79
End: 2019-03-27
Payer: COMMERCIAL

## 2019-03-27 VITALS — BODY MASS INDEX: 22.66 KG/M2 | HEIGHT: 66 IN | WEIGHT: 141 LBS | RESPIRATION RATE: 16 BRPM

## 2019-03-27 DIAGNOSIS — M19.072 PRIMARY OSTEOARTHRITIS OF LEFT FOOT: Primary | ICD-10-CM

## 2019-03-27 ASSESSMENT — MIFFLIN-ST. JEOR: SCORE: 1136.32

## 2019-03-27 NOTE — PROGRESS NOTES
Subjective:   Kerri Shook is a 78 year old female who is f/u for her left foot MRI.   Superfeet didn't really help  Seems to be the same.    Date of injury: N/A  Date last seen: 3/20/2019  Following Therapeutic Plan: Yes   Pain: Worsening with walking  Function: Unchanged  Interval History: MRI    PAST MEDICAL, SOCIAL, SURGICAL AND FAMILY HISTORY: She  has a past medical history of Breast disorder (1990), Cardiomyopathy (H), Chronic osteoarthritis (2012), CLL (chronic lymphoblastic leukemia) (1989), Colon adenomas, History of blood transfusion, hyperlipidemia, Primary localized osteoarthrosis, pelvic region and thigh, and Tinnitus. She also has no past medical history of Anemia, Aortic valve disorders, Arrhythmia, Asymptomatic human immunodeficiency virus (HIV) infection status (H), Back injury, Bleeding disorder (H), Bone tumor, Cerebral infarction (H), Chest pain, Chronic kidney disease, Congestive heart failure, unspecified, Coronary artery disease, Deep vein thrombosis (DVT) (H), Depressive disorder, Diabetes (H), Hearing problem, Hepatitis, Hyperlipidaemia, Hypertension, Hypothyroidism, Immune disorder (H), Learning disability, Liver disease, Lung disease, Mental disorder, Neck injuries, RA (rheumatoid arthritis) (H), Reduced vision, Scoliosis, Seizures (H), Shortness of breath, Stomach ulcer, Surgical complication, Thyroid disease, Tuberculosis, Ulcer, gastric, acute, or Uncomplicated asthma.  She  has a past surgical history that includes Lumpectomy breast (1990); R hip arthroscopy (7/19/11); d & c (1962); orthopedic surgery (age?); orthopedic surgery (~ 2013); Colonoscopy (6/26/2014); biopsy (within the last 5 years); ENT surgery (1950); left hip replacemen (Left); PELVIS/HIP JOINT SURGERY UNLISTED (April 2012); SHOULDER SURG PROC UNLISTED (?); Tonsillectomy (1950); Eye surgery (2015); and Endoscopic sinus surgery (Right, 7/16/2018).  Her family history includes Alcoholism in her son; Cancer in her  "maternal grandfather; Coronary Artery Disease (age of onset: 76) in her father; Dementia in her mother; Diabetes (age of onset: 85) in her maternal grandmother; Heart Disease in her father.  She reports that  has never smoked. she has never used smokeless tobacco. She reports that she drinks alcohol. She reports that she does not use drugs.    ALLERGIES: She is allergic to nkda [no known drug allergies].    CURRENT MEDICATIONS: She has a current medication list which includes the following prescription(s): aspirin, carvedilol, multivitamin, simvastatin, and gabapentin.     REVIEW OF SYSTEMS: 10 point review of systems is negative except as noted above.     Exam:   Resp 16   Ht 1.676 m (5' 6\")   Wt 64 kg (141 lb)   BMI 22.76 kg/m             CONSTITUTIONAL: healthy, alert, no distress and cooperative  HEAD: Normocephalic. No masses, lesions, tenderness or abnormalities  SKIN: no suspicious lesions or rashes  GAIT: normal  NEUROLOGIC: Non-focal  PSYCHIATRIC: affect normal/bright and mentation appears normal.    MUSCULOSKELETAL: left foot arthritis    ANKLE  Inspection/Palpation:     Swelling: no swelling   Tender: anterior tibialis, medial arch     Non-tender: ATFL, CFL, PTFL, medial malleolus, deltoid ligament, anterior tib-fib ligament, achilles  tendon, no achilles tendon defect   Range of Motion: dorsiflexion: full, plantarflexion: full, inversion: full, eversion: full  Strength:dorsiflexion: 5/5, plantarflexion: 5/5, inversion: 5/5, eversion: 5/5   Special tests: negative anterior drawer, negative varus stress, negative valgus stress, negative forced external rotation, negative Charles sign     FOOT  Inspection/Palpation:      Swelling: no swelling  Tender: middle cuneiform, medial cuneiform     Non-tender: promixal 5th metatarsal, 1st, 2nd, 3rd, 4th, 5th metatarsals, calcaneous, cuboid,  navicular, cuneiform lateral, metatarsal heads, peroneal tendon: at lateral malleolus, at cuboid, at proximal 5th " metatarsal, posterior tibial tendon at medial malleolus, posterior tibial tendon at navicular, plantar fascia  Range of Motion: flexion of toes: full, extension of toes: full         Assessment/Plan:   Pt is a 77 yo white female with PMhx of stress-induced CM presenting with left foot pain  1. Left foot OA- middle cuneiform  Offered injection with Radiology  Pt is going on a trip this Sunday, hopes to have injection prior to leaving    RTC 4 weeks    X-RAY INTERPRETATION:   MRI left foot  Impression:  1. Degenerative changes of the second tarsometatarsal joints with  associated subchondral edema/cysts of the middle cuneiform.  2. On a background of tendinosis there is a split thickness tear of  the tibialis anterior tendon near the distal attachment site with  reconstitution more distally.

## 2019-03-27 NOTE — LETTER
3/27/2019      RE: Kerri Shook  [address] [address]        Subjective:   Kerri Shook is a 78 year old female who is f/u for her left foot MRI.   Superfeet didn't really help  Seems to be the same.    Date of injury: N/A  Date last seen: 3/20/2019  Following Therapeutic Plan: Yes   Pain: Worsening with walking  Function: Unchanged  Interval History: MRI    PAST MEDICAL, SOCIAL, SURGICAL AND FAMILY HISTORY: She  has a past medical history of Breast disorder (1990), Cardiomyopathy (H), Chronic osteoarthritis (2012), CLL (chronic lymphoblastic leukemia) (1989), Colon adenomas, History of blood transfusion, hyperlipidemia, Primary localized osteoarthrosis, pelvic region and thigh, and Tinnitus. She also has no past medical history of Anemia, Aortic valve disorders, Arrhythmia, Asymptomatic human immunodeficiency virus (HIV) infection status (H), Back injury, Bleeding disorder (H), Bone tumor, Cerebral infarction (H), Chest pain, Chronic kidney disease, Congestive heart failure, unspecified, Coronary artery disease, Deep vein thrombosis (DVT) (H), Depressive disorder, Diabetes (H), Hearing problem, Hepatitis, Hyperlipidaemia, Hypertension, Hypothyroidism, Immune disorder (H), Learning disability, Liver disease, Lung disease, Mental disorder, Neck injuries, RA (rheumatoid arthritis) (H), Reduced vision, Scoliosis, Seizures (H), Shortness of breath, Stomach ulcer, Surgical complication, Thyroid disease, Tuberculosis, Ulcer, gastric, acute, or Uncomplicated asthma.  She  has a past surgical history that includes Lumpectomy breast (1990); R hip arthroscopy (7/19/11); d & c (1962); orthopedic surgery (age?); orthopedic surgery (~ 2013); Colonoscopy (6/26/2014); biopsy (within the last 5 years); ENT surgery (1950); left hip replacemen (Left); PELVIS/HIP JOINT SURGERY UNLISTED (April 2012); SHOULDER SURG PROC UNLISTED (?); Tonsillectomy (1950); Eye surgery (2015); and Endoscopic sinus surgery  "(Right, 7/16/2018).  Her family history includes Alcoholism in her son; Cancer in her maternal grandfather; Coronary Artery Disease (age of onset: 76) in her father; Dementia in her mother; Diabetes (age of onset: 85) in her maternal grandmother; Heart Disease in her father.  She reports that  has never smoked. she has never used smokeless tobacco. She reports that she drinks alcohol. She reports that she does not use drugs.    ALLERGIES: She is allergic to nkda [no known drug allergies].    CURRENT MEDICATIONS: She has a current medication list which includes the following prescription(s): aspirin, carvedilol, multivitamin, simvastatin, and gabapentin.     REVIEW OF SYSTEMS: 10 point review of systems is negative except as noted above.     Exam:   Resp 16   Ht 1.676 m (5' 6\")   Wt 64 kg (141 lb)   BMI 22.76 kg/m              CONSTITUTIONAL: healthy, alert, no distress and cooperative  HEAD: Normocephalic. No masses, lesions, tenderness or abnormalities  SKIN: no suspicious lesions or rashes  GAIT: normal  NEUROLOGIC: Non-focal  PSYCHIATRIC: affect normal/bright and mentation appears normal.    MUSCULOSKELETAL: left foot arthritis    ANKLE  Inspection/Palpation:     Swelling: no swelling   Tender: anterior tibialis, medial arch     Non-tender: ATFL, CFL, PTFL, medial malleolus, deltoid ligament, anterior tib-fib ligament, achilles  tendon, no achilles tendon defect   Range of Motion: dorsiflexion: full, plantarflexion: full, inversion: full, eversion: full  Strength:dorsiflexion: 5/5, plantarflexion: 5/5, inversion: 5/5, eversion: 5/5   Special tests: negative anterior drawer, negative varus stress, negative valgus stress, negative forced external rotation, negative Charles sign     FOOT  Inspection/Palpation:      Swelling: no swelling  Tender: middle cuneiform, medial cuneiform     Non-tender: promixal 5th metatarsal, 1st, 2nd, 3rd, 4th, 5th metatarsals, calcaneous, cuboid,  navicular, cuneiform lateral, " metatarsal heads, peroneal tendon: at lateral malleolus, at cuboid, at proximal 5th metatarsal, posterior tibial tendon at medial malleolus, posterior tibial tendon at navicular, plantar fascia  Range of Motion: flexion of toes: full, extension of toes: full         Assessment/Plan:   Pt is a 79 yo white female with PMhx of stress-induced CM presenting with left foot pain  1. Left foot OA- middle cuneiform  Offered injection with Radiology  Pt is going on a trip this Sunday, hopes to have injection prior to leaving    RTC 4 weeks    X-RAY INTERPRETATION:   MRI left foot  Impression:  1. Degenerative changes of the second tarsometatarsal joints with  associated subchondral edema/cysts of the middle cuneiform.  2. On a background of tendinosis there is a split thickness tear of  the tibialis anterior tendon near the distal attachment site with  reconstitution more distally.     Julieta Metz MD

## 2019-03-28 ENCOUNTER — HOSPITAL ENCOUNTER (OUTPATIENT)
Dept: GENERAL RADIOLOGY | Facility: CLINIC | Age: 79
Discharge: HOME | End: 2019-03-28
Attending: FAMILY MEDICINE | Admitting: FAMILY MEDICINE
Payer: COMMERCIAL

## 2019-03-28 VITALS — DIASTOLIC BLOOD PRESSURE: 80 MMHG | SYSTOLIC BLOOD PRESSURE: 132 MMHG | HEART RATE: 80 BPM | OXYGEN SATURATION: 97 %

## 2019-03-28 DIAGNOSIS — M19.072 PRIMARY OSTEOARTHRITIS OF LEFT FOOT: ICD-10-CM

## 2019-03-28 PROCEDURE — 25500064 ZZH RX 255 OP 636: Performed by: PHYSICIAN ASSISTANT

## 2019-03-28 PROCEDURE — 25000128 H RX IP 250 OP 636: Performed by: PHYSICIAN ASSISTANT

## 2019-03-28 PROCEDURE — 25000125 ZZHC RX 250: Performed by: PHYSICIAN ASSISTANT

## 2019-03-28 PROCEDURE — 27211111 XR JOINT INJECTION INTERMED LEFT

## 2019-03-28 RX ORDER — ETHYL CHLORIDE 100 %
1 AEROSOL, SPRAY (ML) TOPICAL ONCE
Status: COMPLETED | OUTPATIENT
Start: 2019-03-28 | End: 2019-03-28

## 2019-03-28 RX ORDER — IOPAMIDOL 408 MG/ML
10 INJECTION, SOLUTION INTRATHECAL ONCE
Status: COMPLETED | OUTPATIENT
Start: 2019-03-28 | End: 2019-03-28

## 2019-03-28 RX ORDER — LIDOCAINE HYDROCHLORIDE 10 MG/ML
30 INJECTION, SOLUTION EPIDURAL; INFILTRATION; INTRACAUDAL; PERINEURAL ONCE
Status: COMPLETED | OUTPATIENT
Start: 2019-03-28 | End: 2019-03-28

## 2019-03-28 RX ORDER — TRIAMCINOLONE ACETONIDE 40 MG/ML
40 INJECTION, SUSPENSION INTRA-ARTICULAR; INTRAMUSCULAR ONCE
Status: COMPLETED | OUTPATIENT
Start: 2019-03-28 | End: 2019-03-28

## 2019-03-28 RX ORDER — LIDOCAINE HYDROCHLORIDE 10 MG/ML
5 INJECTION, SOLUTION EPIDURAL; INFILTRATION; INTRACAUDAL; PERINEURAL ONCE
Status: COMPLETED | OUTPATIENT
Start: 2019-03-28 | End: 2019-03-28

## 2019-03-28 RX ADMIN — Medication 1 APPLICATION.: at 14:58

## 2019-03-28 RX ADMIN — TRIAMCINOLONE ACETONIDE 40 MG: 40 INJECTION, SUSPENSION INTRA-ARTICULAR; INTRAMUSCULAR at 15:02

## 2019-03-28 RX ADMIN — LIDOCAINE HYDROCHLORIDE 1.5 ML: 10 INJECTION, SOLUTION EPIDURAL; INFILTRATION; INTRACAUDAL; PERINEURAL at 14:59

## 2019-03-28 RX ADMIN — IOPAMIDOL 1 ML: 408 INJECTION, SOLUTION INTRATHECAL at 15:00

## 2019-03-28 RX ADMIN — LIDOCAINE HYDROCHLORIDE 1 ML: 10 INJECTION, SOLUTION EPIDURAL; INFILTRATION; INTRACAUDAL; PERINEURAL at 15:02

## 2019-03-28 NOTE — IP AVS SNAPSHOT
[facility] Radiology  [address] [address]  Phone:  [phone]                                    After Visit Summary   3/28/2019    Kerri Shook    MRN: [id number]           After Visit Summary Signature Page    I have received my discharge instructions, and my questions have been answered. I have discussed any challenges I see with this plan with the nurse or doctor.    ..........................................................................................................................................  Patient/Patient Representative Signature      ..........................................................................................................................................  Patient Representative Print Name and Relationship to Patient    ..................................................               ................................................  Date                                   Time    ..........................................................................................................................................  Reviewed by Signature/Title    ...................................................              ..............................................  Date                                               Time          22EPIC Rev 08/18

## 2019-03-28 NOTE — DISCHARGE INSTRUCTIONS
Orthopedic Discharge Instructions:   After Your Injection or Aspiration  ________________________________________    Patient Name:  Kerri Shook  Today's Date:  March 28, 2019  The provider who performed your foot injection was Silvestre Iqbal at [facility].  Care of needle site    If you have new numbness down your leg, this may last up to 6 hours, but it should go away. You may need help with walking until your leg feels normal.     Over the next 24 to 48 hours, pain at the needle site may increase before it gets better.     For the next 48 hours, use ice packs for 15 minutes, three to four times a day for pain.    If you have a bandage, you may remove it the next morning.     No tub baths, hot tubs or swimming for 48 hours. You may shower the next day.   Activity    Do not drive until morning.     Limit your activity based on your pain level. Follow your doctor s orders for activity.     You may eat a normal diet.     If you had sedation,   - You may feel sleepy, forgetful or unsteady.   - Do not drink alcohol for 24 hours.  Medicines    If you take aspirin or platelet inhibitors, you can restart them tomorrow.     Restart all other medicines today at your regular dose, including Coumadin (warfarin).    If you are restarting Coumadin, talk to your doctor about having your INR checked.   If you had a steroid shot     The medicine should help reduce swelling and pain. This may take from 7 to 10 days.     Side effects from the shot will be mild and go away in 2 to 3 days. Common side effects may include:  -  Insomnia (trouble sleeping).  -  Heartburn.  -  Flushed face.  -  Water retention (bloating or fluid build-up).  -  Increased appetite (feeling more hungry than usual).  -  Increased blood sugar.  If you have diabetes, watch your blood sugar closely. If needed, call your doctor to help you control your blood sugar.  Some patients will get lasting relief from a single shot. Others may require up  to three shots to get results. If you have more than one steroid shot, they should be given two weeks apart.  Some patients do not have relief of symptoms.    Follow-up:     Call your doctor or go to the Emergency Room if you have severe pain, fever or problems with bowel or bladder control.

## 2019-03-28 NOTE — IP AVS SNAPSHOT
MRN:[id number]                      After Visit Summary   3/28/2019    Kerri Shook    MRN: [id number]           Visit Information        Provider Department      3/28/2019  2:00 PM  [facility]; [facility]           Review of your medicines      UNREVIEWED medicines. Ask your doctor about these medicines       Dose / Directions   aspirin 81 MG tablet  Commonly known as:  ASA      Dose:  81 mg  Take 81 mg by mouth daily  Refills:  0     carvedilol 3.125 MG tablet  Commonly known as:  COREG  Used for:  Stress-induced cardiomyopathy      Dose:  3.125 mg  Take 1 tablet (3.125 mg) by mouth 2 times daily  Quantity:  180 tablet  Refills:  1     gabapentin 100 MG capsule  Commonly known as:  NEURONTIN  Used for:  Pain of left lower extremity      Dose:  100 mg  Take 1 capsule (100 mg) by mouth nightly as needed  Quantity:  30 capsule  Refills:  0     multivitamin per tablet      Dose:  1 tablet  Take 1 tablet by mouth daily.  Refills:  0     simvastatin 20 MG tablet  Commonly known as:  ZOCOR  Used for:  Hyperlipidemia LDL goal <70      Dose:  20 mg  Take 1 tablet (20 mg) by mouth At Bedtime  Quantity:  90 tablet  Refills:  3              Protect others around you: Learn how to safely use, store and throw away your medicines at www.disposemymeds.org.       Follow-ups after your visit       Your next 10 appointments already scheduled    Apr 24, 2019 10:30 AM CDT  (Arrive by 10:15 AM)  Return Visit with Julieta Metz MD  [facility] ([facility]) [address] [place] [id number] [phone]      Care Instructions       Further instructions from your care team         Orthopedic Discharge Instructions:   After Your Injection or Aspiration  ________________________________________    Patient Name:  Kerri Shook  Today's Date:  March 28, 2019  The provider who performed your foot injection was Silvestre Iqbal at  [facility].  Care of needle site    If you have new numbness down your leg, this may last up to 6 hours, but it should go away. You may need help with walking until your leg feels normal.     Over the next 24 to 48 hours, pain at the needle site may increase before it gets better.     For the next 48 hours, use ice packs for 15 minutes, three to four times a day for pain.    If you have a bandage, you may remove it the next morning.     No tub baths, hot tubs or swimming for 48 hours. You may shower the next day.   Activity    Do not drive until morning.     Limit your activity based on your pain level. Follow your doctor s orders for activity.     You may eat a normal diet.     If you had sedation,   - You may feel sleepy, forgetful or unsteady.   - Do not drink alcohol for 24 hours.  Medicines    If you take aspirin or platelet inhibitors, you can restart them tomorrow.     Restart all other medicines today at your regular dose, including Coumadin (warfarin).    If you are restarting Coumadin, talk to your doctor about having your INR checked.   If you had a steroid shot     The medicine should help reduce swelling and pain. This may take from 7 to 10 days.     Side effects from the shot will be mild and go away in 2 to 3 days. Common side effects may include:  -  Insomnia (trouble sleeping).  -  Heartburn.  -  Flushed face.  -  Water retention (bloating or fluid build-up).  -  Increased appetite (feeling more hungry than usual).  -  Increased blood sugar.  If you have diabetes, watch your blood sugar closely. If needed, call your doctor to help you control your blood sugar.  Some patients will get lasting relief from a single shot. Others may require up to three shots to get results. If you have more than one steroid shot, they should be given two weeks apart.  Some patients do not have relief of symptoms.    Follow-up:     Call your doctor or go to the Emergency Room if you have severe pain,  fever or problems with bowel or bladder control.     Additional Information About Your Visit       MyChart Information    [employer] gives you secure access to your electronic health record. If you see a primary care provider, you can also send messages to your care team and make appointments. If you have questions, please call your primary care clinic.  If you do not have a primary care provider, please call [phone] and they will assist you.       Care EveryWhere ID    This is your Care EveryWhere ID. This could be used by other organizations to access your [facility] medical records  [id number]       Your Vitals Were     Blood Pressure   132/80    Pulse   80    Pulse Oximetry   97%            Primary Care Provider Office Phone # Fax #    Juan Westbrook [phone]      Equal Access to Services    KENDALL VILLARREAL : Hadii abdullahi ramoso Sokojoali, waaxda luqadaha, qaybta kaalmada adeegyada, lulu tuttle. So [facility] [phone].    ATENCIÓN: Si habla español, tiene a lopez disposición servicios gratuitos de asistencia lingüística. [facility] [phone].    We comply with applicable federal civil rights laws and [place] laws. We do not discriminate on the basis of race, color, national origin, age, disability, sex, sexual orientation, or gender identity.           Thank you!    Thank you for choosing [facility] for your care. Our goal is always to provide you with excellent care. Hearing back from our patients is one way we can continue to improve our services. Please take a few minutes to complete the written survey that you may receive in the mail after you visit with us. Thank you!            Medication List      ASK your doctor about these medications          Morning Afternoon Evening Bedtime As Needed    aspirin 81 MG tablet  Also known as:  ASA  INSTRUCTIONS:  Take 81 mg by mouth daily                     carvedilol 3.125 MG tablet  Also known as:  COREG  INSTRUCTIONS:   Take 1 tablet (3.125 mg) by mouth 2 times daily                     gabapentin 100 MG capsule  Also known as:  NEURONTIN  INSTRUCTIONS:  Take 1 capsule (100 mg) by mouth nightly as needed                     multivitamin per tablet  INSTRUCTIONS:  Take 1 tablet by mouth daily.                     simvastatin 20 MG tablet  Also known as:  ZOCOR  INSTRUCTIONS:  Take 1 tablet (20 mg) by mouth At Bedtime

## 2019-03-28 NOTE — PROGRESS NOTES
RADIOLOGY PROCEDURE NOTE  Patient name: Kerri Shook  MRN: [id number]  : 1940    Pre-procedure diagnosis: Left foot pain  Post-procedure diagnosis: Same    Procedure Date/Time: 2019  3:07 PM  Procedure: Left 2nd TMT joint steroid injection  Estimated blood loss: None  Specimen(s) collected with description: none  The patient tolerated the procedure well with no immediate complications.  Significant findings:none    See imaging dictation for procedural details.    Provider name: Silvestre Iqbal  Assistant(s):None

## 2019-04-24 ENCOUNTER — OFFICE VISIT (OUTPATIENT)
Dept: ORTHOPEDICS | Facility: CLINIC | Age: 79
End: 2019-04-24
Payer: COMMERCIAL

## 2019-04-24 VITALS — BODY MASS INDEX: 22.52 KG/M2 | HEIGHT: 66 IN | WEIGHT: 140.1 LBS

## 2019-04-24 DIAGNOSIS — M19.072 ARTHRITIS OF MIDTARSAL JOINT OF LEFT FOOT: Primary | ICD-10-CM

## 2019-04-24 ASSESSMENT — MIFFLIN-ST. JEOR: SCORE: 1124.3

## 2019-04-24 NOTE — LETTER
4/24/2019      RE: Kerri Shook  [address] [facility] [id number]        Subjective:   Kerri Shook is a 78 year old female who is following up with her left foot injection. Her pain had significantly improved, however she is still feeling pain while walking.  Trip went well, lots of walking and didn't inhibit her activities.  Injection kicked in and seemed to really help.  Good walking shoes and is using inserts.  Inserts didn't hurt but didn't help much.    Date of injury: 10/2018  Date last seen: 3/27/2019  Following Therapeutic Plan: Yes followed through with injection  Pain: Improving  Function: Improving     PAST MEDICAL, SOCIAL, SURGICAL AND FAMILY HISTORY: She  has a past medical history of Breast disorder (1990), Cardiomyopathy (H), Chronic osteoarthritis (2012), CLL (chronic lymphoblastic leukemia) (1989), Colon adenomas, History of blood transfusion, hyperlipidemia, Primary localized osteoarthrosis, pelvic region and thigh, and Tinnitus. She also has no past medical history of Anemia, Aortic valve disorders, Arrhythmia, Asymptomatic human immunodeficiency virus (HIV) infection status (H), Back injury, Bleeding disorder (H), Bone tumor, Cerebral infarction (H), Chest pain, Chronic kidney disease, Congestive heart failure, unspecified, Coronary artery disease, Deep vein thrombosis (DVT) (H), Depressive disorder, Diabetes (H), Hearing problem, Hepatitis, Hyperlipidaemia, Hypertension, Hypothyroidism, Immune disorder (H), Learning disability, Liver disease, Lung disease, Mental disorder, Neck injuries, RA (rheumatoid arthritis) (H), Reduced vision, Scoliosis, Seizures (H), Shortness of breath, Stomach ulcer, Surgical complication, Thyroid disease, Tuberculosis, Ulcer, gastric, acute, or Uncomplicated asthma.  She  has a past surgical history that includes Lumpectomy breast (1990); R hip arthroscopy (7/19/11); d & c (1962); orthopedic surgery (age?); orthopedic surgery (~ 2013); Colonoscopy  "(6/26/2014); biopsy (within the last 5 years); ENT surgery (1950); left hip replacemen (Left); PELVIS/HIP JOINT SURGERY UNLISTED (April 2012); SHOULDER SURG PROC UNLISTED (?); Tonsillectomy (1950); Eye surgery (2015); and Endoscopic sinus surgery (Right, 7/16/2018).  Her family history includes Alcoholism in her son; Cancer in her maternal grandfather; Coronary Artery Disease (age of onset: 76) in her father; Dementia in her mother; Diabetes (age of onset: 85) in her maternal grandmother; Heart Disease in her father.  She reports that she has never smoked. She has never used smokeless tobacco. She reports that she drinks alcohol. She reports that she does not use drugs.    ALLERGIES: She is allergic to nkda [no known drug allergies].    CURRENT MEDICATIONS: She has a current medication list which includes the following prescription(s): aspirin, carvedilol, multivitamin, simvastatin, and gabapentin.     REVIEW OF SYSTEMS: 10 point review of systems is negative except as noted above.     Exam:   Ht 1.664 m (5' 5.5\")   Wt 63.5 kg (140 lb 1.6 oz)   BMI 22.96 kg/m              CONSTITUTIONAL: healthy, alert, no distress and cooperative  HEAD: Normocephalic. No masses, lesions, tenderness or abnormalities  SKIN: no suspicious lesions or rashes  GAIT: normal  NEUROLOGIC: Non-focal, Normal muscle tone and strength, reflexes normal, sensation grossly normal.  PSYCHIATRIC: affect normal/bright and mentation appears normal.    MUSCULOSKELETAL: left foot arthritis    ANKLE  Inspection/Palpation:     Swelling: no swelling      Non-tender: ATFL, CFL, PTFL, medial malleolus, deltoid ligament, anterior tib-fib ligament, achilles  tendon, no achilles tendon defect   Range of Motion: dorsiflexion: full, plantarflexion: full, inversion: full, eversion: full  Strength:dorsiflexion: 5/5, plantarflexion: 5/5, inversion: 5/5, eversion: 5/5   Special tests: negative anterior drawer, negative varus stress, negative valgus stress, negative " forced external rotation, negative Charles sign     FOOT  Inspection/Palpation:      Swelling: no swelling  Tender: insertion of anterior tibialis     Non-tender: promixal 5th metatarsal, 1st, 2nd, 3rd, 4th, 5th metatarsals, calcaneous, cuboid,  navicular, cuneiform lateral, cuneiform middle, cuneiform medial, metatarsal heads, peroneal tendon: at lateral malleolus, at cuboid, at proximal 5th metatarsal, posterior tibial tendon at medial malleolus, posterior tibial tendon at navicular, plantar fascia  Range of Motion: flexion of toes: full, extension of toes: full         Assessment/Plan:   Pt is a 77 yo white female with PMHx of CLL, breast cancer, CM presenting with f/u for left foot arthritis  1. Left foot arthritis- middle cuneiform arthritis, second tarsometatarsal joint- successful injection  Modify activities, ice/heat, Tylenol/Ibuprofen  RTC prn, new symptoms or injection wearing off      X-RAY INTERPRETATION:   none    Julieta Metz MD

## 2019-04-24 NOTE — PROGRESS NOTES
Subjective:   Kerri Shook is a 78 year old female who is following up with her left foot injection. Her pain had significantly improved, however she is still feeling pain while walking.  Trip went well, lots of walking and didn't inhibit her activities.  Injection kicked in and seemed to really help.  Good walking shoes and is using inserts.  Inserts didn't hurt but didn't help much.    Date of injury: 10/2018  Date last seen: 3/27/2019  Following Therapeutic Plan: Yes followed through with injection  Pain: Improving  Function: Improving     PAST MEDICAL, SOCIAL, SURGICAL AND FAMILY HISTORY: She  has a past medical history of Breast disorder (1990), Cardiomyopathy (H), Chronic osteoarthritis (2012), CLL (chronic lymphoblastic leukemia) (1989), Colon adenomas, History of blood transfusion, hyperlipidemia, Primary localized osteoarthrosis, pelvic region and thigh, and Tinnitus. She also has no past medical history of Anemia, Aortic valve disorders, Arrhythmia, Asymptomatic human immunodeficiency virus (HIV) infection status (H), Back injury, Bleeding disorder (H), Bone tumor, Cerebral infarction (H), Chest pain, Chronic kidney disease, Congestive heart failure, unspecified, Coronary artery disease, Deep vein thrombosis (DVT) (H), Depressive disorder, Diabetes (H), Hearing problem, Hepatitis, Hyperlipidaemia, Hypertension, Hypothyroidism, Immune disorder (H), Learning disability, Liver disease, Lung disease, Mental disorder, Neck injuries, RA (rheumatoid arthritis) (H), Reduced vision, Scoliosis, Seizures (H), Shortness of breath, Stomach ulcer, Surgical complication, Thyroid disease, Tuberculosis, Ulcer, gastric, acute, or Uncomplicated asthma.  She  has a past surgical history that includes Lumpectomy breast (1990); R hip arthroscopy (7/19/11); d & c (1962); orthopedic surgery (age?); orthopedic surgery (~ 2013); Colonoscopy (6/26/2014); biopsy (within the last 5 years); ENT surgery (1950); left hip replacemen  "(Left); PELVIS/HIP JOINT SURGERY UNLISTED (April 2012); SHOULDER SURG PROC UNLISTED (?); Tonsillectomy (1950); Eye surgery (2015); and Endoscopic sinus surgery (Right, 7/16/2018).  Her family history includes Alcoholism in her son; Cancer in her maternal grandfather; Coronary Artery Disease (age of onset: 76) in her father; Dementia in her mother; Diabetes (age of onset: 85) in her maternal grandmother; Heart Disease in her father.  She reports that she has never smoked. She has never used smokeless tobacco. She reports that she drinks alcohol. She reports that she does not use drugs.    ALLERGIES: She is allergic to nkda [no known drug allergies].    CURRENT MEDICATIONS: She has a current medication list which includes the following prescription(s): aspirin, carvedilol, multivitamin, simvastatin, and gabapentin.     REVIEW OF SYSTEMS: 10 point review of systems is negative except as noted above.     Exam:   Ht 1.664 m (5' 5.5\")   Wt 63.5 kg (140 lb 1.6 oz)   BMI 22.96 kg/m             CONSTITUTIONAL: healthy, alert, no distress and cooperative  HEAD: Normocephalic. No masses, lesions, tenderness or abnormalities  SKIN: no suspicious lesions or rashes  GAIT: normal  NEUROLOGIC: Non-focal, Normal muscle tone and strength, reflexes normal, sensation grossly normal.  PSYCHIATRIC: affect normal/bright and mentation appears normal.    MUSCULOSKELETAL: left foot arthritis    ANKLE  Inspection/Palpation:     Swelling: no swelling      Non-tender: ATFL, CFL, PTFL, medial malleolus, deltoid ligament, anterior tib-fib ligament, achilles  tendon, no achilles tendon defect   Range of Motion: dorsiflexion: full, plantarflexion: full, inversion: full, eversion: full  Strength:dorsiflexion: 5/5, plantarflexion: 5/5, inversion: 5/5, eversion: 5/5   Special tests: negative anterior drawer, negative varus stress, negative valgus stress, negative forced external rotation, negative Charles sign     FOOT  Inspection/Palpation:      " Swelling: no swelling  Tender: insertion of anterior tibialis     Non-tender: promixal 5th metatarsal, 1st, 2nd, 3rd, 4th, 5th metatarsals, calcaneous, cuboid,  navicular, cuneiform lateral, cuneiform middle, cuneiform medial, metatarsal heads, peroneal tendon: at lateral malleolus, at cuboid, at proximal 5th metatarsal, posterior tibial tendon at medial malleolus, posterior tibial tendon at navicular, plantar fascia  Range of Motion: flexion of toes: full, extension of toes: full         Assessment/Plan:   Pt is a 77 yo white female with PMHx of CLL, breast cancer, CM presenting with f/u for left foot arthritis  1. Left foot arthritis- middle cuneiform arthritis, second tarsometatarsal joint- successful injection  Modify activities, ice/heat, Tylenol/Ibuprofen  RTC prn, new symptoms or injection wearing off      X-RAY INTERPRETATION:   none

## 2019-04-26 PROBLEM — M79.672 LEFT FOOT PAIN: Status: RESOLVED | Noted: 2019-01-03 | Resolved: 2019-04-26

## 2019-04-26 NOTE — PROGRESS NOTES
Patient seen one time for evaluation and treatment.  Patient did not return for further treatment and current status is unknown.  Please see initial evaluation for further information.

## 2019-07-03 DIAGNOSIS — I51.81 STRESS-INDUCED CARDIOMYOPATHY: ICD-10-CM

## 2019-07-03 DIAGNOSIS — E78.5 HYPERLIPIDEMIA LDL GOAL <70: ICD-10-CM

## 2019-07-03 RX ORDER — CARVEDILOL 3.12 MG/1
3.12 TABLET ORAL 2 TIMES DAILY
Qty: 180 TABLET | Refills: 1 | Status: SHIPPED | OUTPATIENT
Start: 2019-07-03 | End: 2020-01-17

## 2019-07-03 RX ORDER — SIMVASTATIN 20 MG
20 TABLET ORAL AT BEDTIME
Qty: 90 TABLET | Refills: 1 | Status: SHIPPED | OUTPATIENT
Start: 2019-07-03 | End: 2020-01-17

## 2019-07-13 ENCOUNTER — OFFICE VISIT (OUTPATIENT)
Dept: URGENT CARE | Facility: URGENT CARE | Age: 79
End: 2019-07-13
Payer: COMMERCIAL

## 2019-07-13 VITALS
DIASTOLIC BLOOD PRESSURE: 79 MMHG | BODY MASS INDEX: 22.71 KG/M2 | OXYGEN SATURATION: 97 % | HEART RATE: 88 BPM | WEIGHT: 138.6 LBS | TEMPERATURE: 98.4 F | SYSTOLIC BLOOD PRESSURE: 124 MMHG

## 2019-07-13 DIAGNOSIS — H66.012 NON-RECURRENT ACUTE SUPPURATIVE OTITIS MEDIA OF LEFT EAR WITH SPONTANEOUS RUPTURE OF TYMPANIC MEMBRANE: Primary | ICD-10-CM

## 2019-07-13 PROCEDURE — 99213 OFFICE O/P EST LOW 20 MIN: CPT | Performed by: NURSE PRACTITIONER

## 2019-07-13 RX ORDER — AMOXICILLIN 500 MG/1
500 CAPSULE ORAL 2 TIMES DAILY
Qty: 20 CAPSULE | Refills: 0 | Status: SHIPPED | OUTPATIENT
Start: 2019-07-13 | End: 2020-01-22

## 2019-07-13 ASSESSMENT — ENCOUNTER SYMPTOMS
SINUS PRESSURE: 1
HEADACHES: 0
FATIGUE: 1
APPETITE CHANGE: 1
FEVER: 0
VOICE CHANGE: 1
ACTIVITY CHANGE: 1
VOMITING: 0
DYSURIA: 0
MYALGIAS: 0
SINUS PAIN: 0
SORE THROAT: 0
COUGH: 1
WHEEZING: 0
DIARRHEA: 0
SHORTNESS OF BREATH: 0

## 2019-07-13 NOTE — PATIENT INSTRUCTIONS
Patient Education     Middle Ear Infection (Adult)  You have an infection of the middle ear, the space behind the eardrum. This is also called acute otitis media (AOM). Sometimes it is caused by the common cold. This is because congestion can block the internal passage (eustachian tube) that drains fluid from the middle ear. When the middle ear fills with fluid, bacteria can grow there and cause an infection. Oral antibiotics are used to treat this illness, not ear drops. Symptoms usually start to improve within 1 to 2 days of treatment.    Home care  The following are general care guidelines:    Finish all of the antibiotic medicine given, even though you may feel better after the first few days.    You may use over-the-counter medicine, such as acetaminophen or ibuprofen, to control pain and fever, unless something else was prescribed. If you have chronic liver or kidney disease or have ever had a stomach ulcer or gastrointestinal bleeding, talk with your healthcare provider before using these medicines. Do not give aspirin to anyone under 18 years of age who has a fever. It may cause severe illness or death.  Follow-up care  Follow up with your healthcare provider, or as advised, in 2 weeks if all symptoms have not gotten better, or if hearing doesn't go back to normal within 1 month.  When to seek medical advice  Call your healthcare provider right away if any of these occur:    Ear pain gets worse or does not improve after 3 days of treatment    Unusual drowsiness or confusion    Neck pain, stiff neck, or headache    Fluid or blood draining from the ear canal    Fever of 100.4 F (38 C) or as advised     Seizure  Date Last Reviewed: 6/1/2016 2000-2018 [employer]. [address] [place] [address], [place] [address]. All rights reserved. This information is not intended as a substitute for professional medical care. Always follow your healthcare professional's instructions.

## 2019-07-13 NOTE — PROGRESS NOTES
SUBJECTIVE:   Kerri Shook is a 78 year old female presenting with a chief complaint of   Chief Complaint   Patient presents with     Otalgia     Patient complains of pain in left ear        She is an established patient of [place].    URI Adult    Onset of symptoms was 2 weeks of cold symptoms, ear ache pain just this morning.  Course of illness is worsening.    Severity 3/10 on the numeric pain scale  Current and Associated symptoms: runny nose, stuffy nose, cough - productive and ear pain left  Treatment measures tried include Decongestants and DayQuil with some relief.  Predisposing factors include recent illness viral URI as noted above.  Reports increased fatigue, decreased appetite, and disruption in sleep due to cough; however no changes in bowel or bladder habits.     Review of Systems   Constitutional: Positive for activity change, appetite change and fatigue. Negative for fever.   HENT: Positive for congestion, ear pain, sinus pressure and voice change. Negative for sinus pain and sore throat.    Respiratory: Positive for cough. Negative for shortness of breath and wheezing.    Gastrointestinal: Negative for diarrhea and vomiting.   Genitourinary: Negative for dysuria.   Musculoskeletal: Negative for myalgias.   Skin: Negative.    Neurological: Negative for headaches.   All other systems reviewed and are negative.      Past Medical History:   Diagnosis Date     Breast disorder     Breast Cancer     Cardiomyopathy (H)      Chronic osteoarthritis     resulted in hip replacement     CLL (chronic lymphoblastic leukemia)      Colon adenomas     14 colonoscopy, repeat in 3 years     History of blood transfusion      hyperlipidemia      Primary localized osteoarthrosis, pelvic region and thigh      Tinnitus      Family History   Problem Relation Age of Onset     Diabetes Maternal Grandmother 85     Coronary Artery Disease Father 76         of MI     Heart Disease Father      Cancer  Maternal Grandfather         stomach     Alcoholism Son         Active alcoholic     Dementia Mother      C.A.D. No family hx of      Cancer - colorectal No family hx of      Psychotic Disorder No family hx of      Skin Cancer No family hx of      Melanoma No family hx of      Hypertension No family hx of      Breast Cancer No family hx of      Prostate Cancer No family hx of      Cerebrovascular Disease No family hx of         ,, ,     Current Outpatient Medications   Medication Sig Dispense Refill     amoxicillin (AMOXIL) 500 MG capsule Take 1 capsule (500 mg) by mouth 2 times daily 20 capsule 0     aspirin 81 MG tablet Take 81 mg by mouth daily       carvedilol (COREG) 3.125 MG tablet Take 1 tablet (3.125 mg) by mouth 2 times daily 180 tablet 1     Multiple Vitamin (MULTIVITAMIN) per tablet Take 1 tablet by mouth daily.       simvastatin (ZOCOR) 20 MG tablet Take 1 tablet (20 mg) by mouth At Bedtime 90 tablet 1     Social History     Tobacco Use     Smoking status: Never Smoker     Smokeless tobacco: Never Used   Substance Use Topics     Alcohol use: Yes     Alcohol/week: 0.0 oz     Comment: 3 glasses of wine per week       OBJECTIVE  /79 (BP Location: Left arm, Patient Position: Chair, Cuff Size: Adult Regular)   Pulse 88   Temp 98.4  F (36.9  C) (Oral)   Wt 62.9 kg (138 lb 9.6 oz)   SpO2 97%   BMI 22.71 kg/m      Physical Exam   Constitutional: She is oriented to person, place, and time. No distress.   HENT:   Sinuses: nontender to palpation  Ear: RIGHT TM noted with fluid, mild erythema, and dull. LEFT TM with fluid and erythema, dull; canals clear  Nasal congested with clear mucus  Oropharynx: mild erythema, no exudate   Neck: Neck supple.   Cardiovascular: Normal rate, regular rhythm, normal heart sounds and intact distal pulses. Exam reveals no gallop and no friction rub.   No murmur heard.  Pulmonary/Chest: Effort normal and breath sounds normal. No stridor. No respiratory distress. She has no  wheezes.   Lymphadenopathy:     She has no cervical adenopathy.   Neurological: She is alert and oriented to person, place, and time.   Skin: Skin is warm. Capillary refill takes less than 2 seconds. No rash noted. She is not diaphoretic.   Psychiatric: She has a normal mood and affect.       Labs:  No results found for this or any previous visit (from the past 24 hour(s)).      ASSESSMENT:    ICD-10-CM    1. Non-recurrent acute suppurative otitis media of left ear with spontaneous rupture of tympanic membrane H66.012 amoxicillin (AMOXIL) 500 MG capsule        Medical Decision Making:    Differential Diagnosis:  URI Adult/Peds:  Acute otitis media, Otitis externa, Viral syndrome and Viral upper respiratory illness    Serious Comorbid Conditions:  Adult:  Malignancy/CLL    PLAN: Discussed ear infection diagnosis, plan and treatment with antibiotics, advised completion of full course and follow up if symptoms do not improve or persists past 3 days.     Reviewed symptomatic cares with OTC tylenol or NSAIDs as needed for pain over next 3 days.Discontinue decongestant and DayQuil.Follow up with PCP as needed. Patient agreed to the plan of care.     Kaity Jaramillo, APRN, CNP    Patient Instructions     Patient Education     Middle Ear Infection (Adult)  You have an infection of the middle ear, the space behind the eardrum. This is also called acute otitis media (AOM). Sometimes it is caused by the common cold. This is because congestion can block the internal passage (eustachian tube) that drains fluid from the middle ear. When the middle ear fills with fluid, bacteria can grow there and cause an infection. Oral antibiotics are used to treat this illness, not ear drops. Symptoms usually start to improve within 1 to 2 days of treatment.    Home care  The following are general care guidelines:    Finish all of the antibiotic medicine given, even though you may feel better after the first few days.    You may use  over-the-counter medicine, such as acetaminophen or ibuprofen, to control pain and fever, unless something else was prescribed. If you have chronic liver or kidney disease or have ever had a stomach ulcer or gastrointestinal bleeding, talk with your healthcare provider before using these medicines. Do not give aspirin to anyone under 18 years of age who has a fever. It may cause severe illness or death.  Follow-up care  Follow up with your healthcare provider, or as advised, in 2 weeks if all symptoms have not gotten better, or if hearing doesn't go back to normal within 1 month.  When to seek medical advice  Call your healthcare provider right away if any of these occur:    Ear pain gets worse or does not improve after 3 days of treatment    Unusual drowsiness or confusion    Neck pain, stiff neck, or headache    Fluid or blood draining from the ear canal    Fever of 100.4 F (38 C) or as advised     Seizure  Date Last Reviewed: 6/1/2016 2000-2018 [employer] [address] [place] [address], [place] [address]. All rights reserved. This information is not intended as a substitute for professional medical care. Always follow your healthcare professional's instructions.

## 2019-07-29 ENCOUNTER — ANCILLARY PROCEDURE (OUTPATIENT)
Dept: GENERAL RADIOLOGY | Facility: CLINIC | Age: 79
End: 2019-07-29
Attending: INTERNAL MEDICINE
Payer: COMMERCIAL

## 2019-07-29 ENCOUNTER — OFFICE VISIT (OUTPATIENT)
Dept: INTERNAL MEDICINE | Facility: CLINIC | Age: 79
End: 2019-07-29
Payer: COMMERCIAL

## 2019-07-29 VITALS
HEART RATE: 81 BPM | DIASTOLIC BLOOD PRESSURE: 72 MMHG | TEMPERATURE: 97.4 F | SYSTOLIC BLOOD PRESSURE: 110 MMHG | WEIGHT: 137.9 LBS | BODY MASS INDEX: 22.6 KG/M2 | OXYGEN SATURATION: 94 %

## 2019-07-29 DIAGNOSIS — C91.10 CLL (CHRONIC LYMPHOCYTIC LEUKEMIA) (H): ICD-10-CM

## 2019-07-29 DIAGNOSIS — G89.29 OTHER CHRONIC BACK PAIN: ICD-10-CM

## 2019-07-29 DIAGNOSIS — M54.89 OTHER CHRONIC BACK PAIN: ICD-10-CM

## 2019-07-29 DIAGNOSIS — C91.10 CLL (CHRONIC LYMPHOCYTIC LEUKEMIA) (H): Primary | ICD-10-CM

## 2019-07-29 LAB
BASOPHILS # BLD AUTO: 0.1 10E9/L (ref 0–0.2)
BASOPHILS NFR BLD AUTO: 0.5 %
DIFFERENTIAL METHOD BLD: ABNORMAL
EOSINOPHIL # BLD AUTO: 0.2 10E9/L (ref 0–0.7)
EOSINOPHIL NFR BLD AUTO: 0.6 %
ERYTHROCYTE [DISTWIDTH] IN BLOOD BY AUTOMATED COUNT: 13.3 % (ref 10–15)
HCT VFR BLD AUTO: 42.1 % (ref 35–47)
HGB BLD-MCNC: 13.6 G/DL (ref 11.7–15.7)
IMM GRANULOCYTES # BLD: 0.1 10E9/L (ref 0–0.4)
IMM GRANULOCYTES NFR BLD: 0.2 %
LYMPHOCYTES # BLD AUTO: 21.1 10E9/L (ref 0.8–5.3)
LYMPHOCYTES NFR BLD AUTO: 76.7 %
MCH RBC QN AUTO: 31.2 PG (ref 26.5–33)
MCHC RBC AUTO-ENTMCNC: 32.3 G/DL (ref 31.5–36.5)
MCV RBC AUTO: 97 FL (ref 78–100)
MONOCYTES # BLD AUTO: 0.9 10E9/L (ref 0–1.3)
MONOCYTES NFR BLD AUTO: 3.2 %
NEUTROPHILS # BLD AUTO: 5.2 10E9/L (ref 1.6–8.3)
NEUTROPHILS NFR BLD AUTO: 18.8 %
NRBC # BLD AUTO: 0 10*3/UL
NRBC BLD AUTO-RTO: 0 /100
PLATELET # BLD AUTO: 181 10E9/L (ref 150–450)
PLATELET # BLD EST: ABNORMAL 10*3/UL
RBC # BLD AUTO: 4.36 10E12/L (ref 3.8–5.2)
RBC MORPH BLD: NORMAL
WBC # BLD AUTO: 27.5 10E9/L (ref 4–11)

## 2019-07-29 ASSESSMENT — PAIN SCALES - GENERAL: PAINLEVEL: MILD PAIN (3)

## 2019-07-29 NOTE — PATIENT INSTRUCTIONS
[facility] Medication Refill Request Information:  * Please contact your pharmacy regarding ANY request for medication refills.  ** [facility] Prescription Fax = [phone]  * Please allow 3 business days for routine medication refills.  * Please allow 5 business days for controlled substance medication refills.     [facility] Test Result notification information:  *You will be notified with in 7-10 days of your appointment day regarding the results of your test.  If you are on MyChart you will be notified as soon as the provider has reviewed the results and signed off on them.    [facility]: [phone]

## 2019-07-29 NOTE — PROGRESS NOTES
HPI:    Pt. Comes in for follow up today. She has some chronic back pain. She has ongoing L foot pain and saw adrian Jackson for this in the past. She had foot injection 3/2019 with relief of sxs.  She o/w is doing well and denies additional HEENT, cardiopulmonary, abdominal, , GYN, neurological complaints. She has h/o breast CA and was seen by Dr. Sheldon, 11/13.   She was also  followed by Dr. Puente ONC for CLL and was seen 10/13.  She some chronic Low back pain that is getting less with PT.  She had some skin back lesions.   She was seen by Ms. Silverio ONC 2/23/2018  She is active with exercise and denies any rest/exertional CP/SOB/palptations today. She had several months of L foot pain. No injury and can be worse with walking.         Past Medical History:   Diagnosis Date     Breast disorder 1990    Breast Cancer     Cardiomyopathy (H)      Chronic osteoarthritis 2012    resulted in hip replacement     CLL (chronic lymphoblastic leukemia) 1989     Colon adenomas     6/26/14 colonoscopy, repeat in 3 years     History of blood transfusion      hyperlipidemia      Primary localized osteoarthrosis, pelvic region and thigh      Tinnitus          PE:    Vitals noted; HEENT, L ear with redness, R ear with wax/normal oropharynx clear no exudate, neck supple nl rom,  No adenopahty, LCTA, RRR, S1, S2, no MRG.     Normal neurological exam. She has some back skin lesions.  No tenderness to palpation of low back area. Abdomen; positive BS's no masses no tenderness. No B CVA tenderness to palpation. Grossly normal neurological exam. L foot with no gross abnormality. She does not have spinous tenderness to palpation.       A/P:    1.  L foot pain. X-rays today and seen  ortho by Dr. Metz 12/28/2018. She is getting PT (see note 1/3/2019). She was last seen 4/24/2019 by Dr. Metz.    2. She had ENT surgery with Dr. Webster 7/16/2018 and was seen back in his clinic 7/30/2018  3. CLL labs 1/28/2019 and ordered  today 7/29/2019  4. Breast cancer, She had mammogram 2/26/2019  5. Colonoscopy done 10/9/2017 and repeat in 3 years  6. Immunizations; check with insurance regarding new Shingles vaccine.  7. Dermatology; she will think about this for future  8. She was seen in GYN clinic for annual exam 3/13/2018  9.  Fasting lipids and liver tests done 1/28/2019; HDL 78 and LDL 78; she is on Atorvastatin   10. Back pain. She had lumbar MRI imaging 1/31/2017. We will get lumbar and pelvic plain X-rays and refer to adrian Jackson          Total time spent 25 minutes.  More than 50% of the time spent with Ms. Shook on counseling / coordinating her care

## 2019-08-21 ENCOUNTER — OFFICE VISIT (OUTPATIENT)
Dept: ORTHOPEDICS | Facility: CLINIC | Age: 79
End: 2019-08-21
Payer: COMMERCIAL

## 2019-08-21 VITALS — BODY MASS INDEX: 22.02 KG/M2 | HEIGHT: 66 IN | WEIGHT: 137 LBS

## 2019-08-21 DIAGNOSIS — M51.369 LUMBAR DEGENERATIVE DISC DISEASE: Primary | ICD-10-CM

## 2019-08-21 DIAGNOSIS — M19.072 ARTHRITIS OF LEFT FOOT: ICD-10-CM

## 2019-08-21 ASSESSMENT — MIFFLIN-ST. JEOR: SCORE: 1105.24

## 2019-08-21 ASSESSMENT — PAIN SCALES - GENERAL: PAINLEVEL: MILD PAIN (3)

## 2019-08-21 NOTE — PROGRESS NOTES
Subjective:   Kerri Shook is a 79 year old female who presents with bilateral low back pain with more intense pain on left side.   Pt with same consistent low back pain, more pain on the left side.  Painful at L2, top of iliac crest.  Pain isn't going away.  Had a left hip replaced, considered lift due to leg-length discrepancy.  Walking differently since her hip surgery.  Had PT, they wanted to see if there could be a correction.  Doing back exercises, when she does them, it helps.  Ibuprofen on occasion.  Tries not to take routinely.  Walking causes flaring of both back and foot.  No numbness, no radiating pain down the leg.  Right thigh irritation is gone.  Bending over notices stretch in low back  Requesting left foot injection- middle cuneiform prior to [place] trip mid-Sept.  Stiffness in the a.m.    Background:   Date of injury: None  Duration of symptoms: 2-3 months  Mechanism of Injury: Insidious Onset; Unknown   Intensity: 6/10  Aggravating factors: No aggravating factors  Relieving Factors: Ibuprofen or advil  Prior Evaluation: Dr. Westbrook, xrays    PAST MEDICAL, SOCIAL, SURGICAL AND FAMILY HISTORY: She  has a past medical history of Breast disorder (1990), Cardiomyopathy (H), Chronic osteoarthritis (2012), CLL (chronic lymphoblastic leukemia) (1989), Colon adenomas, History of blood transfusion, hyperlipidemia, Primary localized osteoarthrosis, pelvic region and thigh, and Tinnitus. She also has no past medical history of Anemia, Aortic valve disorders, Arrhythmia, Asymptomatic human immunodeficiency virus (HIV) infection status (H), Back injury, Bleeding disorder (H), Bone tumor, Cerebral infarction (H), Chest pain, Chronic kidney disease, Congestive heart failure, unspecified, Coronary artery disease, Deep vein thrombosis (DVT) (H), Depressive disorder, Diabetes (H), Hearing problem, Hepatitis, Hyperlipidaemia, Hypertension, Hypothyroidism, Immune disorder (H), Learning disability, Liver disease,  "Lung disease, Mental disorder, Neck injuries, RA (rheumatoid arthritis) (H), Reduced vision, Scoliosis, Seizures (H), Shortness of breath, Stomach ulcer, Surgical complication, Thyroid disease, Tuberculosis, Ulcer, gastric, acute, or Uncomplicated asthma.  She  has a past surgical history that includes Lumpectomy breast (1990); R hip arthroscopy (7/19/11); d & c (1962); orthopedic surgery (age?); orthopedic surgery (~ 2013); Colonoscopy (6/26/2014); biopsy (within the last 5 years); ENT surgery (1950); left hip replacemen (Left); PELVIS/HIP JOINT SURGERY UNLISTED (April 2012); SHOULDER SURG PROC UNLISTED (?); Tonsillectomy (1950); Eye surgery (2015); and Endoscopic sinus surgery (Right, 7/16/2018).  Her family history includes Alcoholism in her son; Cancer in her maternal grandfather; Coronary Artery Disease (age of onset: 76) in her father; Dementia in her mother; Diabetes (age of onset: 85) in her maternal grandmother; Heart Disease in her father.  She reports that she has never smoked. She has never used smokeless tobacco. She reports that she drinks alcohol. She reports that she does not use drugs.    ALLERGIES: She is allergic to nkda [no known drug allergies].    CURRENT MEDICATIONS: She has a current medication list which includes the following prescription(s): amoxicillin, aspirin, carvedilol, multivitamin, and simvastatin.     REVIEW OF SYSTEMS: 10 point review of systems is negative except as noted above.     Exam:   Ht 1.664 m (5' 5.5\")   Wt 62.1 kg (137 lb)   BMI 22.45 kg/m             CONSTITUTIONAL: healthy, alert, no distress and cooperative  HEAD: Normocephalic. No masses, lesions, tenderness or abnormalities  SKIN: no suspicious lesions or rashes  GAIT: normal  NEUROLOGIC: Non-focal, Normal muscle tone and strength, reflexes normal, sensation grossly normal.  PSYCHIATRIC: affect normal/bright and mentation appears normal.    MUSCULOSKELETAL: bilateral low back pain  Tender:  left para lumbar " muscles  Non-tender:  thoracic spinous processes, left parathoracic muscles, right parathoracic muscles, lumbar spinous processes  Range of Motion:  lumbar flexion  decreased, lumbar extension  full  Strength:  able to heel walk, able to toe walk  Special tests:  negative straight leg raises    Hip Exam: Hip ROM full         Assessment/Plan:   Pt is a 80 yo white female with PMhx of CLL, CM presenting with left foot pain, left sided LBP  1. Left sided LBP- continue PT exercises as this helps when she's consistent with the program  Ice, heat, stretches  Ibuprofen prn  Declines need for formal PT at this time    2. Left foot arthritis- middle cuneiform  xr guided injection back in March got her through that trip and she's requesting a repeat  Will order and arrange with Radiology    RTC prn    X-RAY INTERPRETATION:   X-Ray of the Lumbar: 2-view, ap, lateral  ordered and interpreted in the office today was positive for IMPRESSION:   1. Multilevel degenerative changes this most pronounced moderate grade  disc space narrowing noted at the levels of L1-L2 and L4-L5.  2. Transitional vertebral body at the presumed closed thoracic  vertebral body level.  MRI foot, left  Impression:  1. Degenerative changes of the second tarsometatarsal joints with  associated subchondral edema/cysts of the middle cuneiform.  2. On a background of tendinosis there is a split thickness tear of  the tibialis anterior tendon near the distal attachment site with  reconstitution more distally.

## 2019-08-21 NOTE — LETTER
8/21/2019      RE: Kerri Shook  [address] [facility] [id number]        Subjective:   Kerri Shook is a 79 year old female who presents with bilateral low back pain with more intense pain on left side.   Pt with same consistent low back pain, more pain on the left side.  Painful at L2, top of iliac crest.  Pain isn't going away.  Had a left hip replaced, considered lift due to leg-length discrepancy.  Walking differently since her hip surgery.  Had PT, they wanted to see if there could be a correction.  Doing back exercises, when she does them, it helps.  Ibuprofen on occasion.  Tries not to take routinely.  Walking causes flaring of both back and foot.  No numbness, no radiating pain down the leg.  Right thigh irritation is gone.  Bending over notices stretch in low back  Requesting left foot injection- middle cuneiform prior to [place] trip mid-Sept.  Stiffness in the a.m.    Background:   Date of injury: None  Duration of symptoms: 2-3 months  Mechanism of Injury: Insidious Onset; Unknown   Intensity: 6/10  Aggravating factors: No aggravating factors  Relieving Factors: Ibuprofen or advil  Prior Evaluation: Dr. Westbrook, xrays    PAST MEDICAL, SOCIAL, SURGICAL AND FAMILY HISTORY: She  has a past medical history of Breast disorder (1990), Cardiomyopathy (H), Chronic osteoarthritis (2012), CLL (chronic lymphoblastic leukemia) (1989), Colon adenomas, History of blood transfusion, hyperlipidemia, Primary localized osteoarthrosis, pelvic region and thigh, and Tinnitus. She also has no past medical history of Anemia, Aortic valve disorders, Arrhythmia, Asymptomatic human immunodeficiency virus (HIV) infection status (H), Back injury, Bleeding disorder (H), Bone tumor, Cerebral infarction (H), Chest pain, Chronic kidney disease, Congestive heart failure, unspecified, Coronary artery disease, Deep vein thrombosis (DVT) (H), Depressive disorder, Diabetes (H), Hearing problem, Hepatitis, Hyperlipidaemia,  "Hypertension, Hypothyroidism, Immune disorder (H), Learning disability, Liver disease, Lung disease, Mental disorder, Neck injuries, RA (rheumatoid arthritis) (H), Reduced vision, Scoliosis, Seizures (H), Shortness of breath, Stomach ulcer, Surgical complication, Thyroid disease, Tuberculosis, Ulcer, gastric, acute, or Uncomplicated asthma.  She  has a past surgical history that includes Lumpectomy breast (1990); R hip arthroscopy (7/19/11); d & c (1962); orthopedic surgery (age?); orthopedic surgery (~ 2013); Colonoscopy (6/26/2014); biopsy (within the last 5 years); ENT surgery (1950); left hip replacemen (Left); PELVIS/HIP JOINT SURGERY UNLISTED (April 2012); SHOULDER SURG PROC UNLISTED (?); Tonsillectomy (1950); Eye surgery (2015); and Endoscopic sinus surgery (Right, 7/16/2018).  Her family history includes Alcoholism in her son; Cancer in her maternal grandfather; Coronary Artery Disease (age of onset: 76) in her father; Dementia in her mother; Diabetes (age of onset: 85) in her maternal grandmother; Heart Disease in her father.  She reports that she has never smoked. She has never used smokeless tobacco. She reports that she drinks alcohol. She reports that she does not use drugs.    ALLERGIES: She is allergic to nkda [no known drug allergies].    CURRENT MEDICATIONS: She has a current medication list which includes the following prescription(s): amoxicillin, aspirin, carvedilol, multivitamin, and simvastatin.     REVIEW OF SYSTEMS: 10 point review of systems is negative except as noted above.     Exam:   Ht 1.664 m (5' 5.5\")   Wt 62.1 kg (137 lb)   BMI 22.45 kg/m              CONSTITUTIONAL: healthy, alert, no distress and cooperative  HEAD: Normocephalic. No masses, lesions, tenderness or abnormalities  SKIN: no suspicious lesions or rashes  GAIT: normal  NEUROLOGIC: Non-focal, Normal muscle tone and strength, reflexes normal, sensation grossly normal.  PSYCHIATRIC: affect normal/bright and mentation " appears normal.    MUSCULOSKELETAL: bilateral low back pain  Tender:  left para lumbar muscles  Non-tender:  thoracic spinous processes, left parathoracic muscles, right parathoracic muscles, lumbar spinous processes  Range of Motion:  lumbar flexion  decreased, lumbar extension  full  Strength:  able to heel walk, able to toe walk  Special tests:  negative straight leg raises    Hip Exam: Hip ROM full         Assessment/Plan:   Pt is a 78 yo white female with PMhx of CLL, CM presenting with left foot pain, left sided LBP  1. Left sided LBP- continue PT exercises as this helps when she's consistent with the program  Ice, heat, stretches  Ibuprofen prn  Declines need for formal PT at this time    2. Left foot arthritis- middle cuneiform  xr guided injection back in March got her through that trip and she's requesting a repeat  Will order and arrange with Radiology    RTC prn    X-RAY INTERPRETATION:   X-Ray of the Lumbar: 2-view, ap, lateral  ordered and interpreted in the office today was positive for IMPRESSION:   1. Multilevel degenerative changes this most pronounced moderate grade  disc space narrowing noted at the levels of L1-L2 and L4-L5.  2. Transitional vertebral body at the presumed closed thoracic  vertebral body level.  MRI foot, left  Impression:  1. Degenerative changes of the second tarsometatarsal joints with  associated subchondral edema/cysts of the middle cuneiform.  2. On a background of tendinosis there is a split thickness tear of  the tibialis anterior tendon near the distal attachment site with  reconstitution more distally.     Julieta Metz MD

## 2019-08-31 ENCOUNTER — MYC MEDICAL ADVICE (OUTPATIENT)
Dept: INTERNAL MEDICINE | Facility: CLINIC | Age: 79
End: 2019-08-31

## 2019-08-31 DIAGNOSIS — M79.605 PAIN OF LEFT LOWER EXTREMITY: Primary | ICD-10-CM

## 2019-09-04 NOTE — TELEPHONE ENCOUNTER
Replied to the patient, routed to MD Jeanie Singleton Paramedic on 9/4/2019 at 10:32 AM   gabapentin (NEURONTIN) 100 MG capsule (Discontinued) 30 capsule 0 4/2/2018 7/13/2019 No   Sig - Route: Take 1 capsule (100 mg) by mouth nightly as needed - Oral

## 2019-09-05 RX ORDER — GABAPENTIN 100 MG/1
CAPSULE ORAL
Qty: 30 CAPSULE | Refills: 3 | Status: SHIPPED | OUTPATIENT
Start: 2019-09-05 | End: 2021-03-24

## 2019-09-16 ENCOUNTER — ANCILLARY PROCEDURE (OUTPATIENT)
Dept: GENERAL RADIOLOGY | Facility: CLINIC | Age: 79
End: 2019-09-16
Attending: FAMILY MEDICINE
Payer: COMMERCIAL

## 2019-09-16 DIAGNOSIS — M19.072 ARTHRITIS OF LEFT FOOT: ICD-10-CM

## 2019-09-16 RX ORDER — LIDOCAINE HYDROCHLORIDE 10 MG/ML
30 INJECTION, SOLUTION EPIDURAL; INFILTRATION; INTRACAUDAL; PERINEURAL ONCE
Status: COMPLETED | OUTPATIENT
Start: 2019-09-16 | End: 2019-09-16

## 2019-09-16 RX ORDER — BUPIVACAINE HYDROCHLORIDE 2.5 MG/ML
25 INJECTION, SOLUTION EPIDURAL; INFILTRATION; INTRACAUDAL ONCE
Status: COMPLETED | OUTPATIENT
Start: 2019-09-16 | End: 2019-09-16

## 2019-09-16 RX ORDER — IOPAMIDOL 408 MG/ML
20 INJECTION, SOLUTION INTRATHECAL ONCE
Status: COMPLETED | OUTPATIENT
Start: 2019-09-16 | End: 2019-09-16

## 2019-09-16 RX ORDER — TRIAMCINOLONE ACETONIDE 40 MG/ML
40 INJECTION, SUSPENSION INTRA-ARTICULAR; INTRAMUSCULAR ONCE
Status: COMPLETED | OUTPATIENT
Start: 2019-09-16 | End: 2019-09-16

## 2019-09-16 RX ADMIN — LIDOCAINE HYDROCHLORIDE 4 ML: 10 INJECTION, SOLUTION EPIDURAL; INFILTRATION; INTRACAUDAL; PERINEURAL at 13:06

## 2019-09-16 RX ADMIN — IOPAMIDOL 2 ML: 408 INJECTION, SOLUTION INTRATHECAL at 13:06

## 2019-09-16 RX ADMIN — BUPIVACAINE HYDROCHLORIDE 2 MG: 2.5 INJECTION, SOLUTION EPIDURAL; INFILTRATION; INTRACAUDAL at 13:06

## 2019-09-16 RX ADMIN — TRIAMCINOLONE ACETONIDE 40 MG: 40 INJECTION, SUSPENSION INTRA-ARTICULAR; INTRAMUSCULAR at 13:06

## 2020-01-22 ENCOUNTER — OFFICE VISIT (OUTPATIENT)
Dept: INTERNAL MEDICINE | Facility: CLINIC | Age: 80
End: 2020-01-22
Payer: COMMERCIAL

## 2020-01-22 VITALS
RESPIRATION RATE: 18 BRPM | HEART RATE: 74 BPM | SYSTOLIC BLOOD PRESSURE: 120 MMHG | WEIGHT: 141.1 LBS | DIASTOLIC BLOOD PRESSURE: 77 MMHG | BODY MASS INDEX: 23.12 KG/M2

## 2020-01-22 DIAGNOSIS — M54.89 OTHER CHRONIC BACK PAIN: ICD-10-CM

## 2020-01-22 DIAGNOSIS — Z12.31 ENCOUNTER FOR SCREENING MAMMOGRAM FOR BREAST CANCER: ICD-10-CM

## 2020-01-22 DIAGNOSIS — G89.29 OTHER CHRONIC BACK PAIN: ICD-10-CM

## 2020-01-22 DIAGNOSIS — I10 BENIGN ESSENTIAL HYPERTENSION: ICD-10-CM

## 2020-01-22 DIAGNOSIS — Z12.31 ENCOUNTER FOR SCREENING MAMMOGRAM FOR BREAST CANCER: Primary | ICD-10-CM

## 2020-01-22 LAB
ALBUMIN SERPL-MCNC: 3.9 G/DL (ref 3.4–5)
ALP SERPL-CCNC: 76 U/L (ref 40–150)
ALT SERPL W P-5'-P-CCNC: 35 U/L (ref 0–50)
ANION GAP SERPL CALCULATED.3IONS-SCNC: 4 MMOL/L (ref 3–14)
AST SERPL W P-5'-P-CCNC: 22 U/L (ref 0–45)
BASOPHILS # BLD AUTO: 0 10E9/L (ref 0–0.2)
BASOPHILS NFR BLD AUTO: 0 %
BILIRUB SERPL-MCNC: 0.4 MG/DL (ref 0.2–1.3)
BUN SERPL-MCNC: 16 MG/DL (ref 7–30)
CALCIUM SERPL-MCNC: 9.4 MG/DL (ref 8.5–10.1)
CHLORIDE SERPL-SCNC: 106 MMOL/L (ref 94–109)
CHOLEST SERPL-MCNC: 203 MG/DL
CO2 SERPL-SCNC: 30 MMOL/L (ref 20–32)
CREAT SERPL-MCNC: 0.76 MG/DL (ref 0.52–1.04)
DIFFERENTIAL METHOD BLD: ABNORMAL
EOSINOPHIL # BLD AUTO: 0 10E9/L (ref 0–0.7)
EOSINOPHIL NFR BLD AUTO: 0 %
ERYTHROCYTE [DISTWIDTH] IN BLOOD BY AUTOMATED COUNT: 13.2 % (ref 10–15)
GFR SERPL CREATININE-BSD FRML MDRD: 74 ML/MIN/{1.73_M2}
GLUCOSE SERPL-MCNC: 98 MG/DL (ref 70–99)
HCT VFR BLD AUTO: 46.5 % (ref 35–47)
HDLC SERPL-MCNC: 84 MG/DL
HGB BLD-MCNC: 14.9 G/DL (ref 11.7–15.7)
LDLC SERPL CALC-MCNC: 82 MG/DL
LYMPHOCYTES # BLD AUTO: 27 10E9/L (ref 0.8–5.3)
LYMPHOCYTES NFR BLD AUTO: 80 %
MCH RBC QN AUTO: 30.3 PG (ref 26.5–33)
MCHC RBC AUTO-ENTMCNC: 32 G/DL (ref 31.5–36.5)
MCV RBC AUTO: 95 FL (ref 78–100)
MONOCYTES # BLD AUTO: 1 10E9/L (ref 0–1.3)
MONOCYTES NFR BLD AUTO: 3 %
NEUTROPHILS # BLD AUTO: 5.7 10E9/L (ref 1.6–8.3)
NEUTROPHILS NFR BLD AUTO: 17 %
NONHDLC SERPL-MCNC: 120 MG/DL
PLATELET # BLD AUTO: 205 10E9/L (ref 150–450)
PLATELET # BLD EST: ABNORMAL 10*3/UL
POTASSIUM SERPL-SCNC: 4.1 MMOL/L (ref 3.4–5.3)
PROT SERPL-MCNC: 7.1 G/DL (ref 6.8–8.8)
RBC # BLD AUTO: 4.91 10E12/L (ref 3.8–5.2)
RBC MORPH BLD: NORMAL
SODIUM SERPL-SCNC: 141 MMOL/L (ref 133–144)
TRIGL SERPL-MCNC: 187 MG/DL
WBC # BLD AUTO: 33.7 10E9/L (ref 4–11)

## 2020-01-22 ASSESSMENT — PAIN SCALES - GENERAL: PAINLEVEL: MILD PAIN (2)

## 2020-01-22 NOTE — NURSING NOTE
Chief Complaint   Patient presents with     Recheck Medication     Patient is here for routine follow up       Phu Fox CMA (AAMA) at 3:00 PM on 1/22/2020

## 2020-01-22 NOTE — PROGRESS NOTES
HPI:    Pt. Comes in for follow up today. She has some chronic back pain. She had L foot pain and saw adrian Jackson for this in the past. She had foot injection 3/2019 with relief of sxs.  She o/w is doing well and denies additional HEENT, cardiopulmonary, abdominal, , GYN, neurological complaints. She has h/o breast CA and was seen by Dr. Sheldon, 11/13.   She was also  followed by Dr. Puente ONC for CLL and was seen 10/13.    She was seen by Ms. Silverio ONC 2/23/2018  She is active with exercise and denies any rest/exertional CP/SOB/palptations today.         Past Medical History:   Diagnosis Date     Breast disorder 1990    Breast Cancer     Cardiomyopathy (H)      Chronic osteoarthritis 2012    resulted in hip replacement     CLL (chronic lymphoblastic leukemia) 1989     Colon adenomas     6/26/14 colonoscopy, repeat in 3 years     History of blood transfusion      hyperlipidemia      Primary localized osteoarthrosis, pelvic region and thigh      Tinnitus          PE:    Vitals noted; HEENT, oropharynx clear,  supple nl rom,  No adenopahty, LCTA, RRR, S1, S2, no MRG.      Abdomen; positive BS's no masses no tenderness. No B CVA tenderness to palpation. Grossly normal neurological exam. L foot with no gross abnormality. She does not have spinous tenderness to palpation.       A/P:    1.  L foot pain. X-rays today and seen  ortho by Dr. Metz 12/28/2018. She is getting PT (see note 1/3/2019). She was last seen 8/21/2019  by Dr. Metz.    2. She had ENT surgery with Dr. Webster 7/16/2018 and was seen back in his clinic 7/30/2018  3. CLL labs ordered today 1/22/2020  4. Breast cancer, She had mammogram 2/26/2019 ordered future today  5. Colonoscopy done 10/9/2017 and repeat in 3 years. Will order for fall 2020  6. Immunizations; check with insurance regarding new Shingles vaccine.  7. Dermatology; she will think about this for future  8. She was seen in GYN clinic for annual exam 3/13/2018  9.   Fasting lipids and liver tests done 1/28/2019; HDL 78 and LDL 78; she is on Atorvastatin   10. Back pain. She had lumbar MRI imaging 1/31/2017. Ordered PT. She wants to wait on any further imaging and/or orthopedic evaluation. Ordered PT. She will return if sxs. Worse.        Total time spent 25 minutes.  More than 50% of the time spent with Ms. Shook on counseling / coordinating her care

## 2020-01-22 NOTE — PATIENT INSTRUCTIONS
[facility] [phone]  (2nd Floor)    [facility] [facility] [phone]  ([address])

## 2020-01-25 ENCOUNTER — TELEPHONE (OUTPATIENT)
Dept: INTERNAL MEDICINE | Facility: CLINIC | Age: 80
End: 2020-01-25

## 2020-01-25 DIAGNOSIS — C91.10 CLL (CHRONIC LYMPHOCYTIC LEUKEMIA) (H): ICD-10-CM

## 2020-01-25 DIAGNOSIS — C92.10 CML (CHRONIC MYELOCYTIC LEUKEMIA) (H): Primary | ICD-10-CM

## 2020-01-25 NOTE — TELEPHONE ENCOUNTER
Placed future order this encounter    Dear Kerri;    Your laboratory tests are attached.     I discussed with Dr. Puente, Hematology and she recommends rechecking in about 6 months. I placed a future laboratory order for this today.     ANABELLA Westbrook MD

## 2020-01-29 ENCOUNTER — THERAPY VISIT (OUTPATIENT)
Dept: PHYSICAL THERAPY | Facility: CLINIC | Age: 80
End: 2020-01-29
Payer: COMMERCIAL

## 2020-01-29 DIAGNOSIS — M54.50 ACUTE BILATERAL LOW BACK PAIN WITHOUT SCIATICA: Primary | ICD-10-CM

## 2020-01-29 PROCEDURE — 97530 THERAPEUTIC ACTIVITIES: CPT | Mod: GP | Performed by: PHYSICAL THERAPIST

## 2020-01-29 PROCEDURE — 97110 THERAPEUTIC EXERCISES: CPT | Mod: GP | Performed by: PHYSICAL THERAPIST

## 2020-01-29 PROCEDURE — 97161 PT EVAL LOW COMPLEX 20 MIN: CPT | Mod: GP | Performed by: PHYSICAL THERAPIST

## 2020-01-29 NOTE — PROGRESS NOTES
[facility] Initial Evaluation  Subjective:  The history is provided by the patient. No  was used.   Kerri Shook being seen for Lower Back Pain.   Problem occurred: It is chronic but getting worse - caused by arthritis. There is no accident involved.  and reported as 4/10 on pain scale. General health as reported by patient is good. Pertinent medical history includes:  Cancer, heart problems and implanted device.    Surgeries include:  Orthopedic surgery and cancer surgery.  Current medications:  Other. Other medications details: colesterol lowering.     Pain is described as aching and is constant. Pain is worse in the A.M..  Special tests:  MRI and x-ray. Previous treatment includes physical therapy. There was moderate improvement following previous treatment.   Patient is Retired.   Barriers include:  None as reported by patient.  Red flags:  None as reported by patient.  Type of problem:  Lumbar   Condition occurred with:  Insidious onset. This is a chronic condition   Problem details: Has had back pain on and off for several years but the pain has gotten worse. What has been going on in the last several months, the pain gets worse over night and then pain really hurts a lot when she gets up in the morning. Doesn't wake her up usually but notes the pain when she gets up. Gets better as she moves around. Has some exercises that she has been doing but not as disciplined as she used to.   Did have a BRITTANY several years ago and her L leg has felt longer since that. Didn't use a lift but recently has started to use a lift in her R side. Isn't sure if it helps yet or not. When she's at home, doesn't seem to wear her shoes much and isn't sure if that's the right thing or not.  Does walk on the TM 30 min 4-5x/wk. Does do some mild weight lifting as well but doesn't seem to affect it one way or the other. Pain is 9/10 in the morning. On a good day, pain is down to 2/10..   Patient  reports pain:  Lumbar spine left and lumbar spine right (L>R). Radiates to:  No radiation. Associated symptoms:  Loss of motion/stiffness. Symptoms are exacerbated by lying down, bending and standing (standing >15 min) and relieved by activity/movement and NSAID's.                      Objective:  System         Lumbar/SI Evaluation  ROM:    AROM Lumbar:   Flexion:          To ankles + pain  Ext:                    -25% + pain in L LB   Side Bend:        Left:  To knee +/- pain    Right:  To knee +/- pain  Rotation:           Left:     Right:   Side Glide:        Left:     Right:           Lumbar Myotomes:  normal                  Neural Tension/Mobility:      Left side:SLR or Slump  negative.     Right side:   Slump or SLR  negative.   Lumbar Palpation:  Palpation (lumbar): No specific areas of muscle tenderness but does have significant tightness in lower lumbar paraspinals and lumbosacral fascia area. Pain with PA mobility throughout lumbar spine with drecreased movement noted.  Tenderness present at Left:    PSIS and Vertebral  Tenderness not present at Left:    Quadratus Lumborum; Erector Spinae or Piriformis  Tenderness present at Right: Vertebral  Tenderness not present at Right:  Quadratus Lumborum; Erector Spinae; Piriformis or PSIS    Lumbar Provocation:  Lumbar provocation: Decreased lumbar PA mobility     Left negative with:  PROM hip    Right negative with:  PROM hip    SI joint/Sacrum:    In standing, normal pelvic alignment. In supine, Did have L anterior ilial rotation.                                                           General Evaluation:                        Posture:    Standing:   Lordosis decreased and sway back  Sitting:  Lordosis decreased                                           ROS    Assessment/Plan:    Patient is a 79 year old female with lumbar complaints.    Patient has the following significant findings with corresponding treatment plan.                Diagnosis 1:  LBP likely  due to decreased lumbar lordosis and degenerative changes.  Pain -  hot/cold therapy and manual therapy  Decreased ROM/flexibility - manual therapy, therapeutic exercise, therapeutic activity and home program  Decreased joint mobility - manual therapy, therapeutic exercise, therapeutic activity and home program  Decreased strength - therapeutic exercise, therapeutic activities and home program  Impaired muscle performance - neuro re-education and home program  Decreased function - therapeutic activities and home program  Impaired posture - neuro re-education, therapeutic activities and home program    Therapy Evaluation Codes:   1) History comprised of:   Personal factors that impact the plan of care:      Time since onset of symptoms.    Comorbidity factors that impact the plan of care are:      Cancer and Heart problems.     Medications impacting care: None.  2) Examination of Body Systems comprised of:   Body structures and functions that impact the plan of care:      Lumbar spine.   Activity limitations that impact the plan of care are:      Bending, Standing, Walking, Sleeping and Laying down.  3) Clinical presentation characteristics are:   Stable/Uncomplicated.  4) Decision-Making    Low complexity using standardized patient assessment instrument and/or measureable assessment of functional outcome.  Cumulative Therapy Evaluation is: Low complexity.    Previous and current functional limitations:  (See Goal Flow Sheet for this information)    Short term and Long term goals: (See Goal Flow Sheet for this information)     Communication ability:  Patient appears to be able to clearly communicate and understand verbal and written communication and follow directions correctly.  Treatment Explanation - The following has been discussed with the patient:   RX ordered/plan of care  Anticipated outcomes  Possible risks and side effects  This patient would benefit from PT intervention to resume normal activities.   Rehab  potential is good.    Frequency:  1 X week, once daily  Duration:  for 6-8 weeks  Discharge Plan:  Achieve all LTG.  Independent in home treatment program.  Reach maximal therapeutic benefit.    Please refer to the daily flowsheet for treatment today, total treatment time and time spent performing 1:1 timed codes.

## 2020-02-05 ENCOUNTER — THERAPY VISIT (OUTPATIENT)
Dept: PHYSICAL THERAPY | Facility: CLINIC | Age: 80
End: 2020-02-05
Payer: COMMERCIAL

## 2020-02-05 DIAGNOSIS — M54.50 ACUTE BILATERAL LOW BACK PAIN WITHOUT SCIATICA: ICD-10-CM

## 2020-02-05 PROCEDURE — 97112 NEUROMUSCULAR REEDUCATION: CPT | Mod: GP | Performed by: PHYSICAL THERAPIST

## 2020-02-05 PROCEDURE — 97140 MANUAL THERAPY 1/> REGIONS: CPT | Mod: GP | Performed by: PHYSICAL THERAPIST

## 2020-02-05 PROCEDURE — 97110 THERAPEUTIC EXERCISES: CPT | Mod: GP | Performed by: PHYSICAL THERAPIST

## 2020-02-12 ENCOUNTER — THERAPY VISIT (OUTPATIENT)
Dept: PHYSICAL THERAPY | Facility: CLINIC | Age: 80
End: 2020-02-12
Payer: COMMERCIAL

## 2020-02-12 DIAGNOSIS — M54.50 ACUTE BILATERAL LOW BACK PAIN WITHOUT SCIATICA: ICD-10-CM

## 2020-02-12 PROCEDURE — 97112 NEUROMUSCULAR REEDUCATION: CPT | Mod: GP | Performed by: PHYSICAL THERAPIST

## 2020-02-12 PROCEDURE — 97140 MANUAL THERAPY 1/> REGIONS: CPT | Mod: GP | Performed by: PHYSICAL THERAPIST

## 2020-02-12 PROCEDURE — 97110 THERAPEUTIC EXERCISES: CPT | Mod: GP | Performed by: PHYSICAL THERAPIST

## 2020-02-12 NOTE — TELEPHONE ENCOUNTER
FUTURE VISIT INFORMATION      FUTURE VISIT INFORMATION:    Date: 3/2/20    Time: 1 PM; Audio 11 AM    [place] [facility]  REFERRAL INFORMATION:    Referring provider:  Dr. Westbrook    Referring providers clinic:  [facility]    Reason for visit/diagnosis: Ear Fullness    RECORDS REQUESTED FROM:       Clinic name Comments Records Status Imaging Status   [facility] 2/7/20 - MyChart Referral from Dr. Westbrook  10/16/17 to 1/22/20 - PCC OV with Dr. Westbrook  7/30/18 - ENT OV with Dr. Webster (dx: sinus issues)  11/17/17 - ENT OV with Dr. Sanchez (dx: tinnitus  4/6/15 - ENT OV with Dr. Liliana Bragg    [facility] - Surgery 7/16/18 - OP Note for ENDOSCOPIC SPHENOIDOTOMY WITH REMOVAL OF TISSUE with Dr. Eleanor Bragg    [facility] - Procedures 11/17/17 - Audiogram  4/6/15 - Audiogram [facility] - Imaging 4/13/18 - CT Maxillofacial W  3/16/18 - MRI Brain W/WO [facility] PACs

## 2020-02-23 ENCOUNTER — HEALTH MAINTENANCE LETTER (OUTPATIENT)
Age: 80
End: 2020-02-23

## 2020-03-02 ENCOUNTER — PRE VISIT (OUTPATIENT)
Dept: OTOLARYNGOLOGY | Facility: CLINIC | Age: 80
End: 2020-03-02

## 2020-03-02 ENCOUNTER — OFFICE VISIT (OUTPATIENT)
Dept: OTOLARYNGOLOGY | Facility: CLINIC | Age: 80
End: 2020-03-02
Attending: INTERNAL MEDICINE
Payer: COMMERCIAL

## 2020-03-02 ENCOUNTER — OFFICE VISIT (OUTPATIENT)
Dept: AUDIOLOGY | Facility: CLINIC | Age: 80
End: 2020-03-02
Payer: COMMERCIAL

## 2020-03-02 VITALS
DIASTOLIC BLOOD PRESSURE: 78 MMHG | HEART RATE: 66 BPM | WEIGHT: 140 LBS | BODY MASS INDEX: 22.5 KG/M2 | SYSTOLIC BLOOD PRESSURE: 131 MMHG | HEIGHT: 66 IN | TEMPERATURE: 99.1 F

## 2020-03-02 DIAGNOSIS — H91.90 HEARING LOSS, UNSPECIFIED HEARING LOSS TYPE, UNSPECIFIED LATERALITY: Primary | ICD-10-CM

## 2020-03-02 DIAGNOSIS — H93.8X3 EAR PRESSURE, BILATERAL: Primary | ICD-10-CM

## 2020-03-02 DIAGNOSIS — H90.3 SENSORY HEARING LOSS, BILATERAL: Primary | ICD-10-CM

## 2020-03-02 DIAGNOSIS — H93.13 TINNITUS, BILATERAL: ICD-10-CM

## 2020-03-02 DIAGNOSIS — H61.23 EXCESSIVE CERUMEN IN BOTH EAR CANALS: ICD-10-CM

## 2020-03-02 DIAGNOSIS — H91.90 HEARING LOSS, UNSPECIFIED HEARING LOSS TYPE, UNSPECIFIED LATERALITY: ICD-10-CM

## 2020-03-02 ASSESSMENT — MIFFLIN-ST. JEOR: SCORE: 1126.79

## 2020-03-02 ASSESSMENT — PATIENT HEALTH QUESTIONNAIRE - PHQ9: SUM OF ALL RESPONSES TO PHQ QUESTIONS 1-9: 0

## 2020-03-02 NOTE — NURSING NOTE
"Chief Complaint   Patient presents with     Consult     ear fullness     /78   Pulse 66   Temp 99.1  F (37.3  C)   Ht 1.676 m (5' 6\")   Wt 63.5 kg (140 lb)   BMI 22.60 kg/m      "

## 2020-03-02 NOTE — LETTER
3/2/2020       RE: Kerri Shook  [address] [facility] [address]     Dear Colleague,    Thank you for referring your patient, Kerri Shook, to the [facility] at [facility]. Please see a copy of my visit note below.    Dear Juan Hernandez:    I had the pleasure of meeting Kerri Shook in consultation today at the [facility] at your request.    CHIEF COMPLAINT: Bilateral ear pressure    HISTORY OF PRESENT ILLNESS: Patient is a 79-year-old in today with new complaint of ear pressure.  She was having this for several weeks to a month, it seems to have cleared now.  She denies any pain, no ear drainage.  She feels she hears fairly well, it seems to be symmetrical.  She does have bilateral tinnitus but it is not problematic.  She denies any dizziness or balance concerns.  Further denies any dysphasia, hoarseness, facial paresthesias.  She is an only child.    ALLERGIES:    Allergies   Allergen Reactions     Nkda [No Known Drug Allergies]        HABITS: Social History    Substance and Sexual Activity      Alcohol use: Yes        Alcohol/week: 0.0 standard drinks        Comment: 3 glasses of wine per week     History   Smoking Status     Never Smoker   Smokeless Tobacco     Never Used         PAST MEDICAL HISTORY: Please see today's intake form (for the remainder of the PMH) which I reviewed and signed.  Past Medical History:   Diagnosis Date     Breast disorder 1990    Breast Cancer     Cardiomyopathy (H)      Chronic osteoarthritis 2012    resulted in hip replacement     CLL (chronic lymphoblastic leukemia) 1989     Colon adenomas     6/26/14 colonoscopy, repeat in 3 years     History of blood transfusion      hyperlipidemia      Primary localized osteoarthrosis, pelvic region and thigh      Tinnitus        FAMILY HISTORY/SOCIAL HISTORY:   Family History   Problem Relation Age of Onset     Diabetes Maternal  Grandmother 85     Coronary Artery Disease Father 76         of MI     Heart Disease Father      Cancer Maternal Grandfather         stomach     Alcoholism Son         Active alcoholic     Dementia Mother      C.A.D. No family hx of      Cancer - colorectal No family hx of      Psychotic Disorder No family hx of      Skin Cancer No family hx of      Melanoma No family hx of      Hypertension No family hx of      Breast Cancer No family hx of      Prostate Cancer No family hx of      Cerebrovascular Disease No family hx of         ,, ,      Social History     Socioeconomic History     Marital status:      Spouse name: Not on file     Number of children: Not on file     Years of education: Not on file     Highest education level: Not on file   Occupational History     Not on file   Social Needs     Financial resource strain: Not on file     Food insecurity:     Worry: Not on file     Inability: Not on file     Transportation needs:     Medical: Not on file     Non-medical: Not on file   Tobacco Use     Smoking status: Never Smoker     Smokeless tobacco: Never Used   Substance and Sexual Activity     Alcohol use: Yes     Alcohol/week: 0.0 standard drinks     Comment: 3 glasses of wine per week     Drug use: No     Sexual activity: Not Currently     Birth control/protection: None   Lifestyle     Physical activity:     Days per week: Not on file     Minutes per session: Not on file     Stress: Not on file   Relationships     Social connections:     Talks on phone: Not on file     Gets together: Not on file     Attends Jain service: Not on file     Active member of club or organization: Not on file     Attends meetings of clubs or organizations: Not on file     Relationship status: Not on file     Intimate partner violence:     Fear of current or ex partner: Not on file     Emotionally abused: Not on file     Physically abused: Not on file     Forced sexual activity: Not on file   Other Topics Concern      Parent/sibling w/ CABG, MI or angioplasty before 65F 55M? Not Asked   Social History Narrative    How much exercise per week? 3-4x    How much calcium per day? Supplement       How much caffeine per day? 2 cups coffee in morning    How much vitamin D per day? Supplement    Do you/your family wear seatbelts?  Yes    Do you/your family use safety helmets? Yes    Do you/your family use sunscreen? Yes    Do you/your family keep firearms in the home? No    Do you/your family have a smoke detector(s)? Yes        Do you feel safe in your home? Yes    Has anyone ever touched you in an unwanted manner? No     Explain     SEE DADA FRANK    March 25, 2015 Katie Hernández LPN               REVIEW OF SYSTEMS: Patient Supplied Answers to Review of Systems   ENT ROS 3/2/2020   Ears, Nose, Throat Ringing/noise in ears   Musculoskeletal Back pain            The remainder of the 10 point ROS is negative    PHYSICIAL EXAMINATION:  Constitutional: The patient was well-groomed and in no acute distress.   Skin: Warm and pink.  Psychiatric: The patient's affect was calm, cooperative, and appropriate.   Respiratory: Breathing comfortably without stridor or exertion of accessory muscles.  Eyes: Pupils were equal and reactive. Extraocular movement intact.   Head: Normocephalic and atraumatic. No lesions or scars.  Ears: Patient placed under the microscope for microscopic evaluation and cleaning of cerumen which was obscuring full visualization and complete assessment of both TMs. Under high power magnification, the right ear was examined and cleaned of cerumen using curet, alligator forceps, and suction.  After cleaning, TM is fully visualized and has normal position with normal middle ear aeration. The left ear was then cleaned and inspected using microscope, instruments and similar techniques. After cleaning of cerumen, the TM has normal position with normal aeration to middle ear.  Nose: Sinuses were nontender. Anterior rhinoscopy revealed  midline septum and absence of purulence or polyps.  Oral Cavity: Normal tongue, floor of mouth, buccal mucosa, and palate. No lesions or masses on inspection or palpation. No abnormal lymph tissue in the oropharynx.   Neck: The parotid is soft without masses. Supple with normal laryngeal and tracheal landmarks.   Lymphatic: There is no palpable lymphadenopathy or other masses in the neck.   Neurologic: Alert and oriented x 3. Cranial nerves III-XI within normal limits. Voice quality normal.  Cerebellar Function Tests:  Grossly normal    Audiogram: Audiogram performed shows a very mild high-frequency sensorineural hearing loss above 6000 Hz.  It is symmetrical.  96% discrimination on the right and 100% on the left.  Normal type A tympanograms bilaterally.      IMPRESSION AND PLAN:   1. Bilateral ear pressure: This is cleared, suspect probable eustachian tube dysfunction that has cleared.  No further treatment needed, monitor.    2. Bilateral tinnitus: Not problematic, no treatment needed, monitor.  3. Excessive cerumen: Normal after cleaning, no further treatment needed, monitor.    Thank you very much for the opportunity to participate in the care of your patient.    Rick L Nissen MD

## 2020-03-02 NOTE — PROGRESS NOTES
Dear Juan Hernandez:    I had the pleasure of meeting Kerri Shook in consultation today at the [facility] at your request.    CHIEF COMPLAINT: Bilateral ear pressure    HISTORY OF PRESENT ILLNESS: Patient is a 79-year-old in today with new complaint of ear pressure.  She was having this for several weeks to a month, it seems to have cleared now.  She denies any pain, no ear drainage.  She feels she hears fairly well, it seems to be symmetrical.  She does have bilateral tinnitus but it is not problematic.  She denies any dizziness or balance concerns.  Further denies any dysphasia, hoarseness, facial paresthesias.  She is an only child.    ALLERGIES:    Allergies   Allergen Reactions     Nkda [No Known Drug Allergies]        HABITS: Social History    Substance and Sexual Activity      Alcohol use: Yes        Alcohol/week: 0.0 standard drinks        Comment: 3 glasses of wine per week     History   Smoking Status     Never Smoker   Smokeless Tobacco     Never Used         PAST MEDICAL HISTORY: Please see today's intake form (for the remainder of the PMH) which I reviewed and signed.  Past Medical History:   Diagnosis Date     Breast disorder     Breast Cancer     Cardiomyopathy (H)      Chronic osteoarthritis     resulted in hip replacement     CLL (chronic lymphoblastic leukemia)      Colon adenomas     14 colonoscopy, repeat in 3 years     History of blood transfusion      hyperlipidemia      Primary localized osteoarthrosis, pelvic region and thigh      Tinnitus        FAMILY HISTORY/SOCIAL HISTORY:   Family History   Problem Relation Age of Onset     Diabetes Maternal Grandmother 85     Coronary Artery Disease Father 76         of MI     Heart Disease Father      Cancer Maternal Grandfather         stomach     Alcoholism Son         Active alcoholic     Dementia Mother      C.A.D. No family hx of      Cancer - colorectal No family hx of       Psychotic Disorder No family hx of      Skin Cancer No family hx of      Melanoma No family hx of      Hypertension No family hx of      Breast Cancer No family hx of      Prostate Cancer No family hx of      Cerebrovascular Disease No family hx of         ,, ,      Social History     Socioeconomic History     Marital status:      Spouse name: Not on file     Number of children: Not on file     Years of education: Not on file     Highest education level: Not on file   Occupational History     Not on file   Social Needs     Financial resource strain: Not on file     Food insecurity:     Worry: Not on file     Inability: Not on file     Transportation needs:     Medical: Not on file     Non-medical: Not on file   Tobacco Use     Smoking status: Never Smoker     Smokeless tobacco: Never Used   Substance and Sexual Activity     Alcohol use: Yes     Alcohol/week: 0.0 standard drinks     Comment: 3 glasses of wine per week     Drug use: No     Sexual activity: Not Currently     Birth control/protection: None   Lifestyle     Physical activity:     Days per week: Not on file     Minutes per session: Not on file     Stress: Not on file   Relationships     Social connections:     Talks on phone: Not on file     Gets together: Not on file     Attends Pentecostal service: Not on file     Active member of club or organization: Not on file     Attends meetings of clubs or organizations: Not on file     Relationship status: Not on file     Intimate partner violence:     Fear of current or ex partner: Not on file     Emotionally abused: Not on file     Physically abused: Not on file     Forced sexual activity: Not on file   Other Topics Concern     Parent/sibling w/ CABG, MI or angioplasty before 65F 55M? Not Asked   Social History Narrative    How much exercise per week? 3-4x    How much calcium per day? Supplement       How much caffeine per day? 2 cups coffee in morning    How much vitamin D per day? Supplement    Do  you/your family wear seatbelts?  Yes    Do you/your family use safety helmets? Yes    Do you/your family use sunscreen? Yes    Do you/your family keep firearms in the home? No    Do you/your family have a smoke detector(s)? Yes        Do you feel safe in your home? Yes    Has anyone ever touched you in an unwanted manner? No     Explain     SEE DADA FRANK    March 25, 2015 Katie Hernández LPN               REVIEW OF SYSTEMS: Patient Supplied Answers to Review of Systems   ENT ROS 3/2/2020   Ears, Nose, Throat Ringing/noise in ears   Musculoskeletal Back pain            The remainder of the 10 point ROS is negative    PHYSICIAL EXAMINATION:  Constitutional: The patient was well-groomed and in no acute distress.   Skin: Warm and pink.  Psychiatric: The patient's affect was calm, cooperative, and appropriate.   Respiratory: Breathing comfortably without stridor or exertion of accessory muscles.  Eyes: Pupils were equal and reactive. Extraocular movement intact.   Head: Normocephalic and atraumatic. No lesions or scars.  Ears: Patient placed under the microscope for microscopic evaluation and cleaning of cerumen which was obscuring full visualization and complete assessment of both TMs. Under high power magnification, the right ear was examined and cleaned of cerumen using curet, alligator forceps, and suction.  After cleaning, TM is fully visualized and has normal position with normal middle ear aeration. The left ear was then cleaned and inspected using microscope, instruments and similar techniques. After cleaning of cerumen, the TM has normal position with normal aeration to middle ear.  Nose: Sinuses were nontender. Anterior rhinoscopy revealed midline septum and absence of purulence or polyps.  Oral Cavity: Normal tongue, floor of mouth, buccal mucosa, and palate. No lesions or masses on inspection or palpation. No abnormal lymph tissue in the oropharynx.   Neck: The parotid is soft without masses. Supple with  normal laryngeal and tracheal landmarks.   Lymphatic: There is no palpable lymphadenopathy or other masses in the neck.   Neurologic: Alert and oriented x 3. Cranial nerves III-XI within normal limits. Voice quality normal.  Cerebellar Function Tests:  Grossly normal    Audiogram: Audiogram performed shows a very mild high-frequency sensorineural hearing loss above 6000 Hz.  It is symmetrical.  96% discrimination on the right and 100% on the left.  Normal type A tympanograms bilaterally.      IMPRESSION AND PLAN:   1. Bilateral ear pressure: This is cleared, suspect probable eustachian tube dysfunction that has cleared.  No further treatment needed, monitor.    2. Bilateral tinnitus: Not problematic, no treatment needed, monitor.  3. Excessive cerumen: Normal after cleaning, no further treatment needed, monitor.    Thank you very much for the opportunity to participate in the care of your patient.    Rick L Nissen MD

## 2020-03-02 NOTE — PROGRESS NOTES
AUDIOLOGY REPORT    SUMMARY: Audiology visit completed. See audiogram for results.      RECOMMENDATIONS: Follow-up with ENT.    Darien Cheung.  Licensed Audiologist  [place] # [id number]

## 2020-03-04 ENCOUNTER — THERAPY VISIT (OUTPATIENT)
Dept: PHYSICAL THERAPY | Facility: CLINIC | Age: 80
End: 2020-03-04
Payer: COMMERCIAL

## 2020-03-04 DIAGNOSIS — M54.50 ACUTE BILATERAL LOW BACK PAIN WITHOUT SCIATICA: ICD-10-CM

## 2020-03-04 PROCEDURE — 97110 THERAPEUTIC EXERCISES: CPT | Mod: GP | Performed by: PHYSICAL THERAPIST

## 2020-03-04 PROCEDURE — 97112 NEUROMUSCULAR REEDUCATION: CPT | Mod: GP | Performed by: PHYSICAL THERAPIST

## 2020-03-04 NOTE — LETTER
[facility] PHYSICAL [facility]ER[facility]  [address] [facility] [address] [address]  [phone]    2020    Re: Kerri Shook   :   1940  MRN:  [id number]   REFERRING PHYSICIAN:   Juan Westbrook    [facility]YS[facility]ER[facility]    Date of Initial Evaluation:  2020  Visits:  Rxs Used: 4  Reason for Referral:  Acute bilateral low back pain without sciatica    PROGRESS  REPORT    Progress reporting period is from 2020 to 3/4/2020.       SUBJECTIVE  Subjective changes noted by patient:   returns from vacation today and ntoes she did well while she was gone. Was able to get her exercises done and is feeling pretty good now. When she gets up in the morning, still feels some stffness but not as significant as it was before. In terms of sleeping on a different bed, was still somewhat stiff in the morning but isn't sure if that made a difference o not. Does think she may get a new mattress anyway. Has been using a thicker pillow between her knees as well, which really seems to be helping.    Current pain level is 1/10  .     Previous pain level was  4/10  .   Changes in function:  Yes (See Goal flowsheet attached for changes in current functional level)  Adverse reaction to treatment or activity: None    OBJECTIVE  Changes noted in objective findings:  Yes, Able to bend and touch toes without pain. Mild end range stiffness. Minimal to no tightness remains in LB paraspinals. Pt has excellent abdominal control with good TA contraction and able to tolerate progression of HEP without difficulty.         ASSESSMENT/PLAN  Updated problem list and treatment plan: Diagnosis 1:  LBP  Pain -  manual therapy, self management, education and home program  Decreased ROM/flexibility - manual therapy, therapeutic exercise, therapeutic activity and home program  Decreased strength - therapeutic exercise, therapeutic activities and home program  Impaired muscle performance - neuro re-education and home  program  Decreased function - therapeutic activities and home program  STG/LTGs have been met or progress has been made towards goals:  Yes (See Goal flow sheet completed today.)  Assessment of Progress: Patient is meeting short term goals and is progressing towards long term goals.  Self Management Plans:  Patient is independent in a home treatment program.  Patient is independent in self management of symptoms.    Kerri continues to require the following intervention to meet STG and LTG's:  PT intervention is no longer required to meet STG/LTG.    Recommendations:  Pt would like to try things on her own for 3-4 weeks. Will call if problems arise or will discontinue at that time if tolerated.    Please refer to the daily flowsheet for treatment today, total treatment time and time spent performing 1:1 timed codes.    Thank you for your referral.    INQUIRIES  Therapist: [facility] PH[facility]ER[facility]  [address] [facility] [address]  Phone: [phone]  Fax: [phone]

## 2020-03-04 NOTE — PROGRESS NOTES
Subjective:  HPI  Physical Exam                    Objective:  System    Physical Exam    General     ROS    Assessment/Plan:    PROGRESS  REPORT    Progress reporting period is from 1/29/2020 to 3/4/2020.       SUBJECTIVE  Subjective changes noted by patient:   returns from vacation today and ntoes she did well while she was gone. Was able to get her exercises done and is feeling pretty good now. When she gets up in the morning, still feels some stffness but not as significant as it was before. In terms of sleeping on a different bed, was still somewhat stiff in the morning but isn't sure if that made a difference o not. Does think she may get a new mattress anyway. Has been using a thicker pillow between her knees as well, which really seems to be helping.    Current pain level is 1/10  .     Previous pain level was  4/10  .   Changes in function:  Yes (See Goal flowsheet attached for changes in current functional level)  Adverse reaction to treatment or activity: None    OBJECTIVE  Changes noted in objective findings:  Yes, Able to bend and touch toes without pain. Mild end range stiffness. Minimal to no tightness remains in LB paraspinals. Pt has excellent abdominal control with good TA contraction and able to tolerate progression of HEP without difficulty.         ASSESSMENT/PLAN  Updated problem list and treatment plan: Diagnosis 1:  LBP  Pain -  manual therapy, self management, education and home program  Decreased ROM/flexibility - manual therapy, therapeutic exercise, therapeutic activity and home program  Decreased strength - therapeutic exercise, therapeutic activities and home program  Impaired muscle performance - neuro re-education and home program  Decreased function - therapeutic activities and home program  STG/LTGs have been met or progress has been made towards goals:  Yes (See Goal flow sheet completed today.)  Assessment of Progress: Patient is meeting short term goals and is progressing towards  long term goals.  Self Management Plans:  Patient is independent in a home treatment program.  Patient is independent in self management of symptoms.    Kerri continues to require the following intervention to meet STG and LTG's:  PT intervention is no longer required to meet STG/LTG.    Recommendations:  Pt would like to try things on her own for 3-4 weeks. Will call if problems arise or will discontinue at that time if tolerated.    Please refer to the daily flowsheet for treatment today, total treatment time and time spent performing 1:1 timed codes.

## 2020-03-11 ENCOUNTER — ANCILLARY PROCEDURE (OUTPATIENT)
Dept: MAMMOGRAPHY | Facility: CLINIC | Age: 80
End: 2020-03-11
Attending: INTERNAL MEDICINE
Payer: COMMERCIAL

## 2020-03-11 DIAGNOSIS — Z12.31 ENCOUNTER FOR SCREENING MAMMOGRAM FOR BREAST CANCER: ICD-10-CM

## 2020-03-11 DIAGNOSIS — I10 BENIGN ESSENTIAL HYPERTENSION: ICD-10-CM

## 2020-03-11 DIAGNOSIS — G89.29 OTHER CHRONIC BACK PAIN: ICD-10-CM

## 2020-03-11 DIAGNOSIS — M54.89 OTHER CHRONIC BACK PAIN: ICD-10-CM

## 2020-04-08 ENCOUNTER — MYC MEDICAL ADVICE (OUTPATIENT)
Dept: INTERNAL MEDICINE | Facility: CLINIC | Age: 80
End: 2020-04-08

## 2020-04-09 NOTE — PROGRESS NOTES
Telephone visit    Ms. Shook agrees to a telephone visit.     I last saw Pt. 1/22/2020. He has h/o breast cancer (last mammogram 3/11/2020), CLL seen by Dr. Puente in the past and last CBC 1/22/2020 with absolute lymphocyte count 27.0. She remains on Simvastatin and lipids on 1/22/2020 with HDL 84 and LDL 82. She was seen by Dr. Nissen, ENT 3/2/2020 for B ear pressure.

## 2020-04-10 ENCOUNTER — VIRTUAL VISIT (OUTPATIENT)
Dept: INTERNAL MEDICINE | Facility: CLINIC | Age: 80
End: 2020-04-10
Payer: COMMERCIAL

## 2020-04-10 DIAGNOSIS — R20.0 NUMBNESS OF LEFT HAND: Primary | ICD-10-CM

## 2020-04-10 NOTE — PROGRESS NOTES
Telephone visit     Ms. Shook agrees to a telephone visit.      I last saw Pt. 1/22/2020. He has h/o breast cancer (last mammogram 3/11/2020), CLL seen by Dr. Puente in the past and last CBC 1/22/2020 with absolute lymphocyte count 27.0. She remains on Simvastatin and lipids on 1/22/2020 with HDL 84 and LDL 82. She was seen by Dr. Nissen, ENT 3/2/2020 for B ear pressure. I called her at home today. Overall she is doing well. She is trying to get out and walk every day. She is trying to avoid personal contact with others. She states somewhat chronic L hand numbness/tingling that worse over the last two weeks. She denies any rest/exertional CP/SOB. She states L hand sxs. Worse at night. No neck pain. She states if she immobilizes her hand/wrist that sxs. Are less. She has not tried a wrist splint. She denies any focal weakness, clumsiness, or other neurological complaints. She will get a wrist splint. She will contact me if sxs. Get worse and I could always see her in clinic.    ANABELLA Westbrook MD    Total time 9 minutes 22 seconds

## 2020-04-17 DIAGNOSIS — I51.81 STRESS-INDUCED CARDIOMYOPATHY: ICD-10-CM

## 2020-04-19 RX ORDER — CARVEDILOL 3.12 MG/1
3.12 TABLET ORAL 2 TIMES DAILY
Qty: 180 TABLET | Refills: 1 | Status: SHIPPED | OUTPATIENT
Start: 2020-04-19 | End: 2020-10-22

## 2020-06-10 ENCOUNTER — OFFICE VISIT (OUTPATIENT)
Dept: URGENT CARE | Facility: URGENT CARE | Age: 80
End: 2020-06-10
Payer: COMMERCIAL

## 2020-06-10 VITALS
BODY MASS INDEX: 22.34 KG/M2 | SYSTOLIC BLOOD PRESSURE: 159 MMHG | OXYGEN SATURATION: 97 % | DIASTOLIC BLOOD PRESSURE: 85 MMHG | TEMPERATURE: 98.4 F | HEART RATE: 80 BPM | WEIGHT: 138.4 LBS

## 2020-06-10 DIAGNOSIS — S46.912A MUSCLE STRAIN OF LEFT UPPER EXTREMITY, INITIAL ENCOUNTER: Primary | ICD-10-CM

## 2020-06-10 PROCEDURE — 99213 OFFICE O/P EST LOW 20 MIN: CPT | Performed by: FAMILY MEDICINE

## 2020-06-10 ASSESSMENT — ENCOUNTER SYMPTOMS
WOUND: 0
SEIZURES: 0
PSYCHIATRIC NEGATIVE: 1
WHEEZING: 0
ABDOMINAL PAIN: 0
NECK PAIN: 0
SHORTNESS OF BREATH: 0
DIARRHEA: 0
LIGHT-HEADEDNESS: 0
HEMATURIA: 0
VOMITING: 0
COUGH: 0
ADENOPATHY: 0
NAUSEA: 0
BACK PAIN: 1
FEVER: 0
EYE PAIN: 0
CONSTIPATION: 0
CHILLS: 0
RHINORRHEA: 0
SORE THROAT: 0
HEADACHES: 0
BRUISES/BLEEDS EASILY: 0
DIZZINESS: 0
PALPITATIONS: 0

## 2020-06-11 NOTE — PROGRESS NOTES
SUBJECTIVE:   Kerri Shook is a 79 year old female presenting with a chief complaint of   Chief Complaint   Patient presents with     Musculoskeletal Problem     Patient complains of shooting pain in left arm        She is an established patient of [facility].      Indu is a 79-year-old female presenting today with concerns with left upper arm and forearm pain.  Starting along the left dorsal lateral distal humerus and runs to the dorsum of her left hand and into the interdigital space between her ring and long finger.  This pain is been present for approximately 3 days.  Seems to be slightly worse today.  Currently 5/10 pain.  Seems to be constant to some degree although does weight wax and wane in terms of severity but never goes away entirely over the past 3 days.  She denies any associated chest pain.  Denies any sweating nausea or vomiting.  Denies any syncopal or feeling faint episodes.        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   Echo and angiogram at that time was reviewed and normal.           Review of Systems   Constitutional: Negative for chills and fever.   HENT: Negative for congestion, ear pain, rhinorrhea and sore throat.    Eyes: Negative for pain and visual disturbance.   Respiratory: Negative for cough, shortness of breath and wheezing.    Cardiovascular: Negative for chest pain and palpitations.   Gastrointestinal: Negative for abdominal pain, constipation, diarrhea, nausea and vomiting.   Genitourinary: Negative for dyspareunia, genital sores, hematuria, vaginal bleeding and vaginal discharge.   Musculoskeletal: Positive for back pain (lumbar area pain seems to worse in the morning. midline lumbar arthitis improves throughout the day and does do PT and exercises for years for 4 years ). Negative for neck pain.   Skin: Negative for rash and wound.   Allergic/Immunologic: Negative for environmental allergies and food allergies.   Neurological: Negative  for dizziness, seizures, light-headedness and headaches.   Hematological: Negative for adenopathy. Does not bruise/bleed easily.   Psychiatric/Behavioral: Negative.        Past Medical History:   Diagnosis Date     Breast disorder     Breast Cancer     Cardiomyopathy (H)      Chronic osteoarthritis 2012    resulted in hip replacement     CLL (chronic lymphoblastic leukemia)      Colon adenomas     14 colonoscopy, repeat in 3 years     History of blood transfusion      hyperlipidemia      Primary localized osteoarthrosis, pelvic region and thigh      Tinnitus      Family History   Problem Relation Age of Onset     Diabetes Maternal Grandmother 85     Coronary Artery Disease Father 76         of MI     Heart Disease Father      Cancer Maternal Grandfather         stomach     Alcoholism Son         Active alcoholic     Dementia Mother      C.A.D. No family hx of      Cancer - colorectal No family hx of      Psychotic Disorder No family hx of      Skin Cancer No family hx of      Melanoma No family hx of      Hypertension No family hx of      Breast Cancer No family hx of      Prostate Cancer No family hx of      Cerebrovascular Disease No family hx of         ,, ,     Current Outpatient Medications   Medication Sig Dispense Refill     aspirin 81 MG tablet Take 81 mg by mouth daily       carvedilol (COREG) 3.125 MG tablet Take 1 tablet (3.125 mg) by mouth 2 times daily 180 tablet 1     gabapentin (NEURONTIN) 100 MG capsule Route: Take 1 capsule (100 mg) by mouth nightly as needed - Oral 30 capsule 3     Multiple Vitamin (MULTIVITAMIN) per tablet Take 1 tablet by mouth daily.       simvastatin (ZOCOR) 20 MG tablet Take 1 tablet (20 mg) by mouth At Bedtime 90 tablet 1     Social History     Tobacco Use     Smoking status: Never Smoker     Smokeless tobacco: Never Used   Substance Use Topics     Alcohol use: Yes     Alcohol/week: 0.0 standard drinks     Comment: 3 glasses of wine per week        OBJECTIVE  BP (!) 159/85 (BP Location: Left arm, Patient Position: Chair, Cuff Size: Adult Regular)   Pulse 80   Temp 98.4  F (36.9  C) (Oral)   Wt 62.8 kg (138 lb 6.4 oz)   SpO2 97%   BMI 22.34 kg/m      Physical Exam  HENT:      Head: Normocephalic and atraumatic.      Right Ear: External ear normal.      Left Ear: External ear normal.      Nose: Nose normal.      Mouth/Throat:      Pharynx: No oropharyngeal exudate.   Eyes:      General: No scleral icterus.        Right eye: No discharge.         Left eye: No discharge.      Conjunctiva/sclera: Conjunctivae normal.      Pupils: Pupils are equal, round, and reactive to light.   Neck:      Musculoskeletal: Neck supple.      Thyroid: No thyromegaly.      Trachea: No tracheal deviation.   Cardiovascular:      Rate and Rhythm: Normal rate and regular rhythm.      Heart sounds: Normal heart sounds. No murmur. No friction rub. No gallop.    Pulmonary:      Effort: Pulmonary effort is normal. No respiratory distress.      Breath sounds: Normal breath sounds. No stridor. No wheezing or rales.   Chest:      Chest wall: No tenderness.   Abdominal:      Palpations: Abdomen is soft.   Musculoskeletal:         General: No tenderness or deformity.   Lymphadenopathy:      Cervical: No cervical adenopathy.   Skin:     General: Skin is warm and dry.      Findings: No erythema or rash.   Neurological:      Mental Status: She is alert and oriented to person, place, and time.      Cranial Nerves: No cranial nerve deficit.   Psychiatric:         Judgment: Judgment normal.         I did review her history of angiogram from 2012 which was reassuring she is also had reassuring echocardiograms in the past.    ASSESSMENT:    ICD-10-CM    1. Muscle strain of left upper extremity, initial encounter  S46.912A         PLAN:  Her exam is reassuring her pain localizes to the triceps and extensor forearm muscles primarily.  Although she does not recall any particular injury. I think is  most consistent with muscle strain or tendinitis.  Regardless, I did emphasize classic atypical presentations for cardiac related chest pain including unstable chest pain at rest for indication go right to the ER if she develops any of these atypical symptoms.  I did emphasize that this is highly unlikely based upon her exam.  The fact that she has not had any ongoing unstable history or nausea vomiting sweating or even any chest pressure.  She may try short-term ibuprofen for a couple days intermittent ice and heat for any additional muscle spasm.  Tutu Young MD

## 2020-07-24 DIAGNOSIS — E78.5 HYPERLIPIDEMIA LDL GOAL <70: ICD-10-CM

## 2020-07-28 RX ORDER — SIMVASTATIN 20 MG
20 TABLET ORAL AT BEDTIME
Qty: 90 TABLET | Refills: 2 | Status: SHIPPED | OUTPATIENT
Start: 2020-07-28 | End: 2021-04-23

## 2020-10-19 DIAGNOSIS — I51.81 STRESS-INDUCED CARDIOMYOPATHY: ICD-10-CM

## 2020-10-22 RX ORDER — CARVEDILOL 3.12 MG/1
3.12 TABLET ORAL 2 TIMES DAILY
Qty: 180 TABLET | Refills: 1 | Status: SHIPPED | OUTPATIENT
Start: 2020-10-22 | End: 2021-05-26

## 2020-10-22 NOTE — TELEPHONE ENCOUNTER
Last Clinic Visit: 4/10/2020  [facility]  NCV: 11-12-20  BP above protocol: 6-10-20 UC Visit, muscle strain. FYI Clinic  RF 90 day

## 2020-11-06 ENCOUNTER — OFFICE VISIT (OUTPATIENT)
Dept: URGENT CARE | Facility: URGENT CARE | Age: 80
End: 2020-11-06
Payer: COMMERCIAL

## 2020-11-06 ENCOUNTER — ANCILLARY PROCEDURE (OUTPATIENT)
Dept: GENERAL RADIOLOGY | Facility: CLINIC | Age: 80
End: 2020-11-06
Attending: FAMILY MEDICINE
Payer: COMMERCIAL

## 2020-11-06 VITALS
BODY MASS INDEX: 22.27 KG/M2 | SYSTOLIC BLOOD PRESSURE: 125 MMHG | DIASTOLIC BLOOD PRESSURE: 77 MMHG | WEIGHT: 138 LBS | HEART RATE: 74 BPM | TEMPERATURE: 98.1 F | OXYGEN SATURATION: 96 %

## 2020-11-06 DIAGNOSIS — M79.675 PAIN OF TOE OF LEFT FOOT: ICD-10-CM

## 2020-11-06 DIAGNOSIS — S92.515A CLOSED NONDISPLACED FRACTURE OF PROXIMAL PHALANX OF LESSER TOE OF LEFT FOOT, INITIAL ENCOUNTER: Primary | ICD-10-CM

## 2020-11-06 PROCEDURE — 99214 OFFICE O/P EST MOD 30 MIN: CPT | Performed by: FAMILY MEDICINE

## 2020-11-06 PROCEDURE — 73660 X-RAY EXAM OF TOE(S): CPT | Mod: LT | Performed by: RADIOLOGY

## 2020-11-06 ASSESSMENT — ENCOUNTER SYMPTOMS
COLOR CHANGE: 1
WOUND: 0
NUMBNESS: 0
WEAKNESS: 0

## 2020-11-06 NOTE — PATIENT INSTRUCTIONS
Patient Education     Closed Toe Fracture  Your toe is broken (fractured). This causes local pain, swelling, and sometimes bruising. This injury usually takes about 4 to 6 weeks to heal, but can sometimes take longer. Toe injuries are often treated by taping the injured toe to the next one (buddy taping). Or a hard shoe, splint or cast may be used. This protects the injured toe and holds it in position.  If the toenail has been severely injured, it may fall off in 1 to 2 weeks. It takes up to 12 months for a new toenail to grow back.  Home care  Follow these guidelines when caring for yourself at home:    You may be given a cast shoe to wear to keep your toe from moving. If not, you can use a sandal or any shoe that doesn t put pressure on the injured toe until the swelling and pain go away. If using a sandal, be careful not to strike your foot against anything. Another injury could make the fracture worse. If you were given crutches, don t put full weight on the injured foot until you can do so without pain, or as directed by your healthcare provider.    Keep your foot elevated to reduce pain and swelling. When sleeping, put a pillow under the injured leg. When sitting, support the injured leg so it is above your waist. This is very important during the first 2 days (48 hours).    Put an ice pack on the injured area. Do this for 20 minutes every 1 to 2 hours the first day for pain relief. You can make an ice pack by wrapping a plastic bag of ice cubes in a thin towel. As the ice melts, be careful that any cloth or paper tape doesn t get wet. Continue using the ice pack 3 to 4 times a day for the next 2 days. Then use the ice pack as needed to ease pain and swelling.    If buddy tape was used and it becomes wet or dirty, change it. You may replace it with paper, plastic, or cloth tape. Cloth tape and paper tapes must be kept dry.    You may use acetaminophen or ibuprofen to control pain, unless another pain medicine  was prescribed. If you have chronic liver or kidney disease, talk with your healthcare provider before using these medicines. Also talk with your provider if you ve had a stomach ulcer or gastrointestinal bleeding.    You may return to sports or physical education activities after 4 weeks when you can run without pain, or as directed by your healthcare provider.  Follow-up care  Follow up with your healthcare provider in 1 week, or as advised. This is to make sure the bone is healing the way it should.  X-rays may be taken. You will be told of any new findings that may affect your care.  When to seek medical advice  Call your healthcare provider right away if any of these occur:    Pain or swelling gets worse    The cast/splint cracks    The cast and padding get wet and stays wet more than 24 hours    Bad odor from the cast/splint or wound fluid stains the cast    Tightness or pressure under the cast/splint gets worse    Toe becomes cold, blue, numb, or tingly    You can t move the toe    Signs of infection: fever, redness, warmth, swelling, or drainage from the wound or cast    Fever of 100.4 F (38 C) or higher, chills, oras directed by your healthcare provider  Jayme last reviewed this educational content on 2/1/2017 2000-2020 [employer]. [address] [place] [address], [place] [address]. All rights reserved. This information is not intended as a substitute for professional medical care. Always follow your healthcare professional's instructions.

## 2020-11-06 NOTE — PROGRESS NOTES
Subjective     Kerri Shook is a 80 year old female who presents to clinic today for the following health issues:    HPI      Patient is a pleasant 80-year-old female presents to urgent care with left toe pain.  Middle night last night, got up and stopped her foot against the wall.  Has been causing her pain letting her from her daily walks.  Pain is getting worse subsequently to urgent care for additional evaluation.  Localized achy pain at the second and now the third left toe.  No associated numbness.  There are is no lesions and not bleeding.  Pain not improved with ibuprofen.    Review of Systems   Skin: Positive for color change. Negative for wound.   Neurological: Negative for weakness and numbness.      Medications updated and reviewed.  Past, family and surgical history is updated and reviewed in the record.  Past Medical History:   Diagnosis Date     Breast disorder 1990    Breast Cancer     Cardiomyopathy (H)      Chronic osteoarthritis 2012    resulted in hip replacement     CLL (chronic lymphoblastic leukemia) 1989     Closed fracture of second toe of left foot      Colon adenomas     6/26/14 colonoscopy, repeat in 3 years     History of blood transfusion      hyperlipidemia      Primary localized osteoarthrosis, pelvic region and thigh      Tinnitus           Objective    /77   Pulse 74   Temp 98.1  F (36.7  C) (Tympanic)   Wt 62.6 kg (138 lb)   SpO2 96%   BMI 22.27 kg/m    Body mass index is 22.27 kg/m .  Physical Exam  Musculoskeletal: Normal range of motion.         General: Swelling (Second left toe edematous) and tenderness (Tenderness at the second DIP and no tenderness at the second PIP) present.   Skin:     Capillary Refill: Capillary refill takes less than 2 seconds.      Comments: Purpuric hue over the second and third left toes.   Neurological:      General: No focal deficit present.      T: -0.8 DEXA's scan on 1-2017        Assessment & Plan     Closed nondisplaced fracture of  proximal phalanx of lesser toe of left foot, initial encounter  Acute problem status post accidental injury.  As she was not having significant pain with ambulation there was no pain at the left second PIP, we did not place her in a cam walking boot, left second and third toes were buddy taped.  Tylenol/Ibuprofen OTC as needed for pain.  Encouraged continuing taking vitamin D and calcium supplementation.  Referral to podiatry.  - XR Toe Left G/E 2 Views; Future  - Orthopedic & Spine  Referral; Future          Return in about 3 weeks (around 11/27/2020) for If symptoms do not improve or gets worse..    Sterling Gomez MD  [facility] [place]

## 2020-11-09 NOTE — TELEPHONE ENCOUNTER
DIAGNOSIS: Closed nondisplaced fracture of proximal phalanx of lesser toe of left foot/XR/Sterling Gomez MD/[facility]   APPOINTMENT DATE: 11.10.20   NOTES STATUS DETAILS   OFFICE NOTE from referring provider Internal 11.6.20 Dr. Gomez, [facility]    OFFICE NOTE from other specialist N/A    DISCHARGE SUMMARY from hospital N/A    DISCHARGE REPORT from the ER N/A    OPERATIVE REPORT N/A    MEDICATION LIST Internal    EMG (for Spine) N/A    IMPLANT RECORD/STICKER N/A    LABS     CBC/DIFF Internal 1.22.20   CULTURES N/A    INJECTIONS DONE IN RADIOLOGY N/A    MRI N/A    CT SCAN N/A    XRAYS (IMAGES & REPORTS) Internal 11.6.20 L toe   TUMOR     PATHOLOGY  Slides & report N/A

## 2020-11-10 ENCOUNTER — OFFICE VISIT (OUTPATIENT)
Dept: ORTHOPEDICS | Facility: CLINIC | Age: 80
End: 2020-11-10
Payer: COMMERCIAL

## 2020-11-10 ENCOUNTER — PRE VISIT (OUTPATIENT)
Dept: ORTHOPEDICS | Facility: CLINIC | Age: 80
End: 2020-11-10

## 2020-11-10 ENCOUNTER — TELEPHONE (OUTPATIENT)
Dept: ORTHOPEDICS | Facility: CLINIC | Age: 80
End: 2020-11-10

## 2020-11-10 VITALS — WEIGHT: 140 LBS | BODY MASS INDEX: 22.5 KG/M2 | HEIGHT: 66 IN | RESPIRATION RATE: 16 BRPM

## 2020-11-10 DIAGNOSIS — S92.515A CLOSED NONDISPLACED FRACTURE OF PROXIMAL PHALANX OF LESSER TOE OF LEFT FOOT, INITIAL ENCOUNTER: Primary | ICD-10-CM

## 2020-11-10 PROCEDURE — 99213 OFFICE O/P EST LOW 20 MIN: CPT | Performed by: FAMILY MEDICINE

## 2020-11-10 ASSESSMENT — MIFFLIN-ST. JEOR: SCORE: 1121.79

## 2020-11-10 NOTE — TELEPHONE ENCOUNTER
Kerri was scheduled with Dr Ruiz for left lesser toe fracture.  Dr Ruiz reviewed the images and referred to a non-surgeon.  Pt was called with this update, and she will see Dr Neil today at 12:00 noon.  Appt has been changed accordingly.  Isa Nunez RN

## 2020-11-10 NOTE — PROGRESS NOTES
CHIEF COMPLAINT:  Consult (left foot )     HISTORY OF PRESENT ILLNESS  Ms. Shook is a pleasant 80 year old year old female who presents to clinic today with left toe fracture.  Left foot pain began on 11/5 after striking her toes against corner of wall while attempting to toilet in the middle of the night.  After this time, pain persisted with ambulation and had difficulty walking. Seen in Urgent care on 11/6 with finding of a fracture of second phalanx. Pain persists at left second and third toe.  Swelling and bruising present.      What part of your body is injured / painful?  Left 2nd digit    What caused the injury /pain? Direct blow to corner of wall    How long ago did your injury occur or pain begin? 11/2/20    What are your most bothersome symptoms? Pain    How would you characterize your symptom?  throbing    What makes your symptoms better? Rest and Ice    What makes your symptoms worse? Standing and Walking    Have you been previously seen for this problem? Yes, UC 11/6    Additional history: as documented    MEDICAL HISTORY  Patient Active Problem List   Diagnosis     Breast cancer -- Left     S/P coronary angiogram     Stress-induced cardiomyopathy     Foot injury     Advance care planning     Rosacea     KP (keratosis pilaris)     CLL (chronic lymphocytic leukemia) (H)     Hx of cardiomyopathy     Encounter for routine gynecological examination     Menopause--age 50     Colon adenomas     SK (seborrheic keratosis)     Seborrheic keratosis     Plantar fasciitis       Current Outpatient Medications   Medication Sig Dispense Refill     aspirin 81 MG tablet Take 81 mg by mouth daily       carvedilol (COREG) 3.125 MG tablet Take 1 tablet (3.125 mg) by mouth 2 times daily 180 tablet 1     gabapentin (NEURONTIN) 100 MG capsule Route: Take 1 capsule (100 mg) by mouth nightly as needed - Oral 30 capsule 3     Multiple Vitamin (MULTIVITAMIN) per tablet Take 1 tablet by mouth daily.       simvastatin (ZOCOR) 20  "MG tablet Take 1 tablet (20 mg) by mouth At Bedtime 90 tablet 2       Allergies   Allergen Reactions     Nkda [No Known Drug Allergies]        Family History   Problem Relation Age of Onset     Diabetes Maternal Grandmother 85     Coronary Artery Disease Father 76         of MI     Heart Disease Father      Cancer Maternal Grandfather         stomach     Alcoholism Son         Active alcoholic     Dementia Mother      C.A.D. No family hx of      Cancer - colorectal No family hx of      Psychotic Disorder No family hx of      Skin Cancer No family hx of      Melanoma No family hx of      Hypertension No family hx of      Breast Cancer No family hx of      Prostate Cancer No family hx of      Cerebrovascular Disease No family hx of         ,, ,       Additional medical/Social/Surgical histories reviewed in [facility] and updated as appropriate.     REVIEW OF SYSTEMS (11/10/2020)  A 10-point review of systems was obtained and is negative except for as noted in the HPI.      PHYSICAL EXAM  Resp 16   Ht 1.676 m (5' 6\")   Wt 63.5 kg (140 lb)   BMI 22.60 kg/m      General  - normal appearance, in no obvious distress  HEENT  - conjunctivae not injected, moist mucous membranes  CV  - normal radial pulse  Pulm  - normal respiratory pattern, non-labored  Musculoskeletal - left foot and toes  - inspection: Swelling of second toe of left foot from MP to distal phalanx.  - palpation: Tenderness circumferentially at proximal phalanx and middle phalanx of second toe.  - ROM:  Limited motion passively of second digit.  - strength: Deferred strength of toe, ankle and remaining toes 5/5 strength.  Neuro  - no numbness, no motor deficit, grossly normal coordination, normal muscle tone  Skin  - ecchymosis at proximal phalanx of toe  Psych  - interactive, appropriate, normal mood and affect    IMAGING :  Examination:  XR TOE LT G/E 2 VW     Date:  2020 1:34 PM      Clinical Information: Accidental injury; stubbed foot into wall " middle  of night. 80F.; Pain of toe of left foot.      Comparison: none.                                                                      Impression:     1.  Cortical irregularity in the distal metaphysis of the proximal  phalanx of the left second toe, suspicious for nondisplaced, minimally  impacted fracture. Soft tissue swelling is present in the second toe.  Recommend correlation with pain and tenderness in this location.     2.  No fracture in the left foot otherwise. No joint subluxation or  dislocation. The great toe is excluded from the image.     DARSHAN HILARIO MD      ASSESSMENT & PLAN  Ms. Shook is a 80 year old year old female who presents to clinic today with fracture of second left toe.    Diagnosis: Nondisplaced fracture of proximal phalanx of left second toe.    -Post-op shoe provided  -Discontinue metal splint  -Irving taping  -Ice, elevation  -Tylenol use discussed as mainstay of pain relief  -Follow up 4 weeks    It was a pleasure seeing Kerri today.    Juan Neil DO, CAQSM  Primary Care Sports Medicine

## 2020-11-10 NOTE — PROGRESS NOTES
SPORTS & ORTHOPEDIC WALK-IN VISIT 11/10/2020    Primary Care Physician: Dr. Westbrook    Reason for visit:     What part of your body is injured / painful?  Left 2nd digit    What caused the injury /pain? Direct blow to corner of wall    How long ago did your injury occur or pain begin? 11/2/20    What are your most bothersome symptoms? Pain    How would you characterize your symptom?  throbing    What makes your symptoms better? Rest and Ice    What makes your symptoms worse? Standing and Walking    Have you been previously seen for this problem? Yes,  11/6    Medical History:    Any recent changes to your medical history? No    Any new medication prescribed since last visit? No    Have you had surgery on this body part before? No    Social History:    Occupation: [employer]    Handedness: Right    Exercise: 3-4 days/week    Review of Systems:    Do you have fever, chills, weight loss? No    Do you have any vision problems? No    Do you have any chest pain or edema? No    Do you have any shortness of breath or wheezing?  No    Do you have stomach problems? No    Do you have any numbness or focal weakness? No    Do you have diabetes? No    Do you have problems with bleeding or clotting? No    Do you have an rashes or other skin lesions? No

## 2020-11-10 NOTE — LETTER
11/10/2020         RE: Kerri Shook  [address]  [facility] [address]        Dear Colleague,    Thank you for referring your patient, Kerri Shook, to the [facility]THOP[facility] [place]. Please see a copy of my visit note below.    CHIEF COMPLAINT:  Consult (left foot )     HISTORY OF PRESENT ILLNESS  Ms. Shook is a pleasant 80 year old year old female who presents to clinic today with left toe fracture.  Left foot pain began on 11/5 after striking her toes against corner of wall while attempting to toilet in the middle of the night.  After this time, pain persisted with ambulation and had difficulty walking. Seen in Urgent care on 11/6 with finding of a fracture of second phalanx. Pain persists at left second and third toe.  Swelling and bruising present.      What part of your body is injured / painful?  Left 2nd digit    What caused the injury /pain? Direct blow to corner of wall    How long ago did your injury occur or pain begin? 11/2/20    What are your most bothersome symptoms? Pain    How would you characterize your symptom?  throbing    What makes your symptoms better? Rest and Ice    What makes your symptoms worse? Standing and Walking    Have you been previously seen for this problem? Yes, UC 11/6    Additional history: as documented    MEDICAL HISTORY  Patient Active Problem List   Diagnosis     Breast cancer -- Left     S/P coronary angiogram     Stress-induced cardiomyopathy     Foot injury     Advance care planning     Rosacea     KP (keratosis pilaris)     CLL (chronic lymphocytic leukemia) (H)     Hx of cardiomyopathy     Encounter for routine gynecological examination     Menopause--age 50     Colon adenomas     SK (seborrheic keratosis)     Seborrheic keratosis     Plantar fasciitis       Current Outpatient Medications   Medication Sig Dispense Refill     aspirin 81 MG tablet Take 81 mg by mouth daily       carvedilol (COREG) 3.125 MG tablet Take 1  "tablet (3.125 mg) by mouth 2 times daily 180 tablet 1     gabapentin (NEURONTIN) 100 MG capsule Route: Take 1 capsule (100 mg) by mouth nightly as needed - Oral 30 capsule 3     Multiple Vitamin (MULTIVITAMIN) per tablet Take 1 tablet by mouth daily.       simvastatin (ZOCOR) 20 MG tablet Take 1 tablet (20 mg) by mouth At Bedtime 90 tablet 2       Allergies   Allergen Reactions     Nkda [No Known Drug Allergies]        Family History   Problem Relation Age of Onset     Diabetes Maternal Grandmother 85     Coronary Artery Disease Father 76         of MI     Heart Disease Father      Cancer Maternal Grandfather         stomach     Alcoholism Son         Active alcoholic     Dementia Mother      C.A.D. No family hx of      Cancer - colorectal No family hx of      Psychotic Disorder No family hx of      Skin Cancer No family hx of      Melanoma No family hx of      Hypertension No family hx of      Breast Cancer No family hx of      Prostate Cancer No family hx of      Cerebrovascular Disease No family hx of         ,, ,       Additional medical/Social/Surgical histories reviewed in [facility] and updated as appropriate.     REVIEW OF SYSTEMS (11/10/2020)  A 10-point review of systems was obtained and is negative except for as noted in the HPI.      PHYSICAL EXAM  Resp 16   Ht 1.676 m (5' 6\")   Wt 63.5 kg (140 lb)   BMI 22.60 kg/m      General  - normal appearance, in no obvious distress  HEENT  - conjunctivae not injected, moist mucous membranes  CV  - normal radial pulse  Pulm  - normal respiratory pattern, non-labored  Musculoskeletal - left foot and toes  - inspection: Swelling of second toe of left foot from MP to distal phalanx.  - palpation: Tenderness circumferentially at proximal phalanx and middle phalanx of second toe.  - ROM:  Limited motion passively of second digit.  - strength: Deferred strength of toe, ankle and remaining toes 5/5 strength.  Neuro  - no numbness, no motor deficit, grossly normal " coordination, normal muscle tone  Skin  - ecchymosis at proximal phalanx of toe  Psych  - interactive, appropriate, normal mood and affect    IMAGING :  Examination:  XR TOE LT G/E 2 VW     Date:  11/6/2020 1:34 PM      Clinical Information: Accidental injury; stubbed foot into wall middle  of night. 80F.; Pain of toe of left foot.      Comparison: none.                                                                      Impression:     1.  Cortical irregularity in the distal metaphysis of the proximal  phalanx of the left second toe, suspicious for nondisplaced, minimally  impacted fracture. Soft tissue swelling is present in the second toe.  Recommend correlation with pain and tenderness in this location.     2.  No fracture in the left foot otherwise. No joint subluxation or  dislocation. The great toe is excluded from the image.     DARSHAN HILARIO MD      ASSESSMENT & PLAN  Ms. Shook is a 80 year old year old female who presents to clinic today with fracture of second left toe.    Diagnosis: Nondisplaced fracture of proximal phalanx of left second toe.    -Post-op shoe provided  -Discontinue metal splint  -Irving taping  -Ice, elevation  -Tylenol use discussed as mainstay of pain relief  -Follow up 4 weeks    It was a pleasure seeing Kerri today.    Juan Neil DO, CAQSM  Primary Care Sports Medicine            SPORTS & ORTHOPEDIC WALK-IN VISIT 11/10/2020    Primary Care Physician: Dr. Westbrook    Reason for visit:     What part of your body is injured / painful?  Left 2nd digit    What caused the injury /pain? Direct blow to corner of wall    How long ago did your injury occur or pain begin? 11/2/20    What are your most bothersome symptoms? Pain    How would you characterize your symptom?  throbing    What makes your symptoms better? Rest and Ice    What makes your symptoms worse? Standing and Walking    Have you been previously seen for this problem? Yes,  11/6    Medical History:    Any recent  changes to your medical history? No    Any new medication prescribed since last visit? No    Have you had surgery on this body part before? No    Social History:    Occupation: [employer]    Handedness: Right    Exercise: 3-4 days/week    Review of Systems:    Do you have fever, chills, weight loss? No    Do you have any vision problems? No    Do you have any chest pain or edema? No    Do you have any shortness of breath or wheezing?  No    Do you have stomach problems? No    Do you have any numbness or focal weakness? No    Do you have diabetes? No    Do you have problems with bleeding or clotting? No    Do you have an rashes or other skin lesions? No

## 2020-11-12 ENCOUNTER — OFFICE VISIT (OUTPATIENT)
Dept: INTERNAL MEDICINE | Facility: CLINIC | Age: 80
End: 2020-11-12
Payer: COMMERCIAL

## 2020-11-12 VITALS
DIASTOLIC BLOOD PRESSURE: 78 MMHG | HEART RATE: 74 BPM | WEIGHT: 137 LBS | BODY MASS INDEX: 22.11 KG/M2 | SYSTOLIC BLOOD PRESSURE: 121 MMHG | OXYGEN SATURATION: 97 %

## 2020-11-12 DIAGNOSIS — Z91.89 AT RISK FOR DECREASED BONE DENSITY: ICD-10-CM

## 2020-11-12 DIAGNOSIS — E78.00 HIGH BLOOD CHOLESTEROL: ICD-10-CM

## 2020-11-12 DIAGNOSIS — Z85.3 HX: BREAST CANCER: ICD-10-CM

## 2020-11-12 DIAGNOSIS — Z12.11 SPECIAL SCREENING FOR MALIGNANT NEOPLASMS, COLON: ICD-10-CM

## 2020-11-12 DIAGNOSIS — E78.00 HIGH BLOOD CHOLESTEROL: Primary | ICD-10-CM

## 2020-11-12 DIAGNOSIS — Z79.818 LONG TERM (CURRENT) USE OF OTHER AGENTS AFFECTING ESTROGEN RECEPTORS AND ESTROGEN LEVELS: ICD-10-CM

## 2020-11-12 LAB
ALBUMIN SERPL-MCNC: 4 G/DL (ref 3.4–5)
ALP SERPL-CCNC: 81 U/L (ref 40–150)
ALT SERPL W P-5'-P-CCNC: 37 U/L (ref 0–50)
ANION GAP SERPL CALCULATED.3IONS-SCNC: 3 MMOL/L (ref 3–14)
AST SERPL W P-5'-P-CCNC: 24 U/L (ref 0–45)
BASOPHILS # BLD AUTO: 0 10E9/L (ref 0–0.2)
BASOPHILS NFR BLD AUTO: 0 %
BILIRUB SERPL-MCNC: 0.6 MG/DL (ref 0.2–1.3)
BUN SERPL-MCNC: 18 MG/DL (ref 7–30)
CALCIUM SERPL-MCNC: 9.1 MG/DL (ref 8.5–10.1)
CHLORIDE SERPL-SCNC: 101 MMOL/L (ref 94–109)
CHOLEST SERPL-MCNC: 208 MG/DL
CO2 SERPL-SCNC: 32 MMOL/L (ref 20–32)
CREAT SERPL-MCNC: 1 MG/DL (ref 0.52–1.04)
DIFFERENTIAL METHOD BLD: ABNORMAL
EOSINOPHIL # BLD AUTO: 0 10E9/L (ref 0–0.7)
EOSINOPHIL NFR BLD AUTO: 0 %
ERYTHROCYTE [DISTWIDTH] IN BLOOD BY AUTOMATED COUNT: 13.2 % (ref 10–15)
GFR SERPL CREATININE-BSD FRML MDRD: 53 ML/MIN/{1.73_M2}
GLUCOSE SERPL-MCNC: 120 MG/DL (ref 70–99)
HCT VFR BLD AUTO: 46 % (ref 35–47)
HDLC SERPL-MCNC: 80 MG/DL
HGB BLD-MCNC: 14.7 G/DL (ref 11.7–15.7)
LDLC SERPL CALC-MCNC: 88 MG/DL
LYMPHOCYTES # BLD AUTO: 39.6 10E9/L (ref 0.8–5.3)
LYMPHOCYTES NFR BLD AUTO: 85 %
MCH RBC QN AUTO: 30.5 PG (ref 26.5–33)
MCHC RBC AUTO-ENTMCNC: 32 G/DL (ref 31.5–36.5)
MCV RBC AUTO: 95 FL (ref 78–100)
MONOCYTES # BLD AUTO: 0.9 10E9/L (ref 0–1.3)
MONOCYTES NFR BLD AUTO: 2 %
NEUTROPHILS # BLD AUTO: 6.1 10E9/L (ref 1.6–8.3)
NEUTROPHILS NFR BLD AUTO: 13 %
NONHDLC SERPL-MCNC: 128 MG/DL
PLATELET # BLD AUTO: 194 10E9/L (ref 150–450)
PLATELET # BLD EST: ABNORMAL 10*3/UL
POTASSIUM SERPL-SCNC: 3.9 MMOL/L (ref 3.4–5.3)
PROT SERPL-MCNC: 7 G/DL (ref 6.8–8.8)
RBC # BLD AUTO: 4.82 10E12/L (ref 3.8–5.2)
RBC MORPH BLD: NORMAL
SODIUM SERPL-SCNC: 136 MMOL/L (ref 133–144)
TRIGL SERPL-MCNC: 202 MG/DL
WBC # BLD AUTO: 46.6 10E9/L (ref 4–11)

## 2020-11-12 PROCEDURE — 36415 COLL VENOUS BLD VENIPUNCTURE: CPT | Performed by: PATHOLOGY

## 2020-11-12 PROCEDURE — 99214 OFFICE O/P EST MOD 30 MIN: CPT | Performed by: INTERNAL MEDICINE

## 2020-11-12 PROCEDURE — 80061 LIPID PANEL: CPT | Performed by: PATHOLOGY

## 2020-11-12 PROCEDURE — 85025 COMPLETE CBC W/AUTO DIFF WBC: CPT | Performed by: PATHOLOGY

## 2020-11-12 PROCEDURE — 83036 HEMOGLOBIN GLYCOSYLATED A1C: CPT | Performed by: PATHOLOGY

## 2020-11-12 PROCEDURE — 80053 COMPREHEN METABOLIC PANEL: CPT | Performed by: PATHOLOGY

## 2020-11-12 ASSESSMENT — PAIN SCALES - GENERAL: PAINLEVEL: NO PAIN (0)

## 2020-11-12 NOTE — PATIENT INSTRUCTIONS
Primary Care Center Medication Refill Request Information:  * Please contact your pharmacy regarding ANY request for medication refills.  ** [facility] Prescription Fax = [phone]  * Please allow 3 business days for routine medication refills.  * Please allow 5 business days for controlled substance medication refills.     Primary Care Center Test Result notification information:  *You will be notified with in 7-10 days of your appointment day regarding the results of your test.  If you are on MyChart you will be notified as soon as the provider has reviewed the results and signed off on them.    [facility] Center: [phone]     Radiology:  [phone] [facility]  [phone] [facility]  [phone] [facility]    Gastroenterology [phone] (4th Floor [facility])

## 2020-11-12 NOTE — NURSING NOTE
Chief Complaint   Patient presents with     Recheck Medication     Pt comes in for medication check      Crystal Hannah EMT at 3:11 PM sign on 11/12/2020

## 2020-11-12 NOTE — PROGRESS NOTES
HPI:    Pt. With h/o breast cancer and CLL comes in for follow up today. She had L 2nd toe fracture (xray from 11/6/2020) and was sen in ortho. She has some chronic lower back pain and has gotten physical therapy. Sxs. Are stable and not worse. She is due for colonoscopy. No other HEENT, cardiopulmonary, abdominal, , GYN, neurological, systemic, psychiatric, lymphatic, endocrine complaints.     Past Medical History:   Diagnosis Date     Breast disorder 1990    Breast Cancer     Cardiomyopathy (H)      Chronic osteoarthritis 2012    resulted in hip replacement     CLL (chronic lymphoblastic leukemia) 1989     Closed fracture of second toe of left foot      Colon adenomas     6/26/14 colonoscopy, repeat in 3 years     History of blood transfusion      hyperlipidemia      Primary localized osteoarthrosis, pelvic region and thigh      Tinnitus      Past Surgical History:   Procedure Laterality Date     BIOPSY  within the last 5 years    polyps during colonoscopy     C PELVIS/HIP JOINT SURGERY UNLISTED  April 2012    left hip replacement     C SHOULDER SURG PROC UNLISTED  ?     COLONOSCOPY  6/26/2014    Procedure: COMBINED COLONOSCOPY, SINGLE BIOPSY/POLYPECTOMY BY BIOPSY;  Surgeon: Pola Arceo MD;  Location:  GI     D & C  1962    late PP hemorrhage --> retained placenta     ENDOSCOPIC SINUS SURGERY Right 7/16/2018    Procedure: ENDOSCOPIC SINUS SURGERY;  Endoscopic Sphenoidotomy with Removal of Tissue;  Surgeon: Kishore Webster MD;  Location:  OR     ENT SURGERY  1950    tonselectomy     EYE SURGERY  2015    cataract on left eye     left hip replacemen Left     hip replacement in 2013     LUMPECTOMY BREAST  1990    Left     ORTHOPEDIC SURGERY  age?    right shoulder      ORTHOPEDIC SURGERY  ~ 2013    left hip replacement     R hip arthroscopy  7/19/11     TONSILLECTOMY  1950     PE:    Vitals noted, gen, nad, cooperative, alert, she is wearing a mask, neck, supple nl rom, lungs with good air movement, RRR, S1,  S2, no MRG, abdomen, no acute findings, grossly normal neurological exam. She is wearing a soft boot on L foot.      A/P:    1. Breast cancer; mammogram 3/11/2020  2. L 2nd toe fracture; she was seen by Dr. Neil 11/10/2010  3. CLL; ordered labs today  4. Ordered colonoscopy today  5. Ordered future DEXA scan  6. She will follow up accordingly with dermatology for skin screening  7. Immunizations; influenza vaccination 9/21/2020  8. Chronic back pain if worse/changes would evaluate with  Imaging.   9. Increased cholesterol on Atorvastatin and ordered labs today    Total time spent 25 minutes.  More than 50% of the time spent with Ms. Shook on counseling / coordinating her care

## 2020-11-14 ENCOUNTER — TELEPHONE (OUTPATIENT)
Dept: INTERNAL MEDICINE | Facility: CLINIC | Age: 80
End: 2020-11-14

## 2020-11-14 DIAGNOSIS — R73.09 INCREASED GLUCOSE LEVEL: Primary | ICD-10-CM

## 2020-11-14 LAB — HBA1C MFR BLD: 5.9 % (ref 0–5.6)

## 2020-11-14 NOTE — TELEPHONE ENCOUNTER
----- Message from Giulia Puente MD sent at 11/14/2020  9:57 AM CST -----  WBC definitely up a bit but Hgb and platelets still great and the lymphocyte doubling time at around 1.5 years. If the lymphocyte doubling time is nearing 6 month doubling or Hgb or platelets start going down then that would be my indication to consider treatment.    Keep me posted    giulia  ----- Message -----  From: Juan Westbrook MD  Sent: 11/14/2020   8:46 AM CST  To: Giulia Puente MD    Dear Dr. Puente;    When  you get  chance, please take a look at Ms. Shook's recent CBC. Lymphocyte count still rising slowly. Clinically she is fine.    Thanks, Nas Westbrook

## 2020-11-19 ENCOUNTER — ANCILLARY PROCEDURE (OUTPATIENT)
Dept: BONE DENSITY | Facility: CLINIC | Age: 80
End: 2020-11-19
Attending: INTERNAL MEDICINE
Payer: COMMERCIAL

## 2020-11-19 DIAGNOSIS — Z85.3 HX: BREAST CANCER: ICD-10-CM

## 2020-11-19 DIAGNOSIS — Z91.89 AT RISK FOR DECREASED BONE DENSITY: ICD-10-CM

## 2020-11-19 DIAGNOSIS — Z79.818 LONG TERM (CURRENT) USE OF OTHER AGENTS AFFECTING ESTROGEN RECEPTORS AND ESTROGEN LEVELS: ICD-10-CM

## 2020-11-19 DIAGNOSIS — Z12.11 SPECIAL SCREENING FOR MALIGNANT NEOPLASMS, COLON: ICD-10-CM

## 2020-11-19 DIAGNOSIS — E78.00 HIGH BLOOD CHOLESTEROL: ICD-10-CM

## 2020-11-19 PROCEDURE — 77080 DXA BONE DENSITY AXIAL: CPT | Performed by: INTERNAL MEDICINE

## 2020-11-20 DIAGNOSIS — Z11.59 ENCOUNTER FOR SCREENING FOR OTHER VIRAL DISEASES: Primary | ICD-10-CM

## 2020-12-09 ENCOUNTER — OFFICE VISIT (OUTPATIENT)
Dept: ORTHOPEDICS | Facility: CLINIC | Age: 80
End: 2020-12-09
Payer: COMMERCIAL

## 2020-12-09 ENCOUNTER — ANCILLARY PROCEDURE (OUTPATIENT)
Dept: GENERAL RADIOLOGY | Facility: CLINIC | Age: 80
End: 2020-12-09
Attending: FAMILY MEDICINE
Payer: COMMERCIAL

## 2020-12-09 VITALS — HEIGHT: 65 IN | WEIGHT: 137 LBS | BODY MASS INDEX: 22.82 KG/M2

## 2020-12-09 DIAGNOSIS — S92.515D CLOSED NONDISPLACED FRACTURE OF PROXIMAL PHALANX OF LESSER TOE OF LEFT FOOT WITH ROUTINE HEALING, SUBSEQUENT ENCOUNTER: Primary | ICD-10-CM

## 2020-12-09 DIAGNOSIS — S92.515A CLOSED NONDISPLACED FRACTURE OF PROXIMAL PHALANX OF LESSER TOE OF LEFT FOOT, INITIAL ENCOUNTER: ICD-10-CM

## 2020-12-09 PROCEDURE — 73660 X-RAY EXAM OF TOE(S): CPT | Mod: LT | Performed by: RADIOLOGY

## 2020-12-09 PROCEDURE — 99213 OFFICE O/P EST LOW 20 MIN: CPT | Performed by: FAMILY MEDICINE

## 2020-12-09 ASSESSMENT — MIFFLIN-ST. JEOR: SCORE: 1092.31

## 2020-12-09 NOTE — PROGRESS NOTES
"ESTABLISHED PATIENT FOLLOW-UP:  Follow Up of the Left Foot       HISTORY OF PRESENT ILLNESS  Ms. Shook is a pleasant 80 year old year old female who presents to clinic today for follow-up of left foot second proximal phalangeal fracture.    Date of injury: 4 weeks ago  Date last seen: 11/10/20  Following Therapeutic Plan: Yes  Pain: Improved  Function: Improved  Interval History: Kerri notes she has been sanaz taping. Initially used post-op shoe regularly, but now sometimes utilizing matching tennis shoe.  Pain has nearly completely resolved. Swelling and ecchymosis persist.  No pain with toe motion.     Additional medical/Social/Surgical histories reviewed in [facility] and updated as appropriate.    REVIEW OF SYSTEMS (12/9/2020)  CONSTITUTIONAL: Denies fever and weight loss  GASTROINTESTINAL: Denies abdominal pain, nausea, vomiting  MUSCULOSKELETAL: See HPI  SKIN: Denies any recent rash or lesion  NEUROLOGICAL: Denies numbness or focal weakness     PHYSICAL EXAM  Ht 1.651 m (5' 5\")   Wt 62.1 kg (137 lb)   BMI 22.80 kg/m      General  - normal appearance, in no obvious distress  Musculoskeletal - left foot and toes  - inspection: Mild swelling of second toe of left foot from MPJ to distal phalanx.  - palpation: Minimal tenderness at proximal phalanx only.  - ROM:  ROM of second digit full without pain.  - strength: Grossly intact to resistance in flexion and extension.  Neuro  - no numbness, no motor deficit, grossly normal coordination, normal muscle tone  Skin  - ecchymosis at proximal phalanx of toe circumferentially  Psych  - interactive, appropriate, normal mood and affect    IMAGING :XR Left second toe 2V. Final results and radiologist's interpretation, available in the [facility] health record. Images were reviewed with the patient/family members in the office today. My personal interpretation of the performed imaging Is stable appearing proximal phalangeal fracture of left second toe. Callus formation.   "   ASSESSMENT & PLAN  Ms. Shook is a 80 year old year old female who presents to clinic today with Follow Up of the Left Foot    Diagnosis:   (V67.632R) Closed nondisplaced fracture of proximal phalanx of lesser toe of left foot with routine healing, subsequent encounter  (primary encounter diagnosis)    -Weaning post-op shoe  -May sanaz tape additional two weeks  -Expectant management for swelling  -Consider silicone toe sleeve OTC if persisting  -Walking as tolerated, activity as tolerated  -Follow up PRN    It was a pleasure seeing Gavin Neil DO, CAQSM  Primary Care Sports Medicine

## 2020-12-09 NOTE — LETTER
"    12/9/2020         RE: Kerri Shook  [address]  [facility] [address]        Dear Colleague,    Thank you for referring your patient, Kerri Shook, to the [facility] ORTHOPEDIC WALKIN CLINIC [place]. Please see a copy of my visit note below.    ESTABLISHED PATIENT FOLLOW-UP:  Follow Up of the Left Foot       HISTORY OF PRESENT ILLNESS  Ms. Shook is a pleasant 80 year old year old female who presents to clinic today for follow-up of left foot second proximal phalangeal fracture.    Date of injury: 4 weeks ago  Date last seen: 11/10/20  Following Therapeutic Plan: Yes  Pain: Improved  Function: Improved  Interval History: Kerri notes she has been sanaz taping. Initially used post-op shoe regularly, but now sometimes utilizing matching tennis shoe.  Pain has nearly completely resolved. Swelling and ecchymosis persist.  No pain with toe motion.     Additional medical/Social/Surgical histories reviewed in [facility] and updated as appropriate.    REVIEW OF SYSTEMS (12/9/2020)  CONSTITUTIONAL: Denies fever and weight loss  GASTROINTESTINAL: Denies abdominal pain, nausea, vomiting  MUSCULOSKELETAL: See HPI  SKIN: Denies any recent rash or lesion  NEUROLOGICAL: Denies numbness or focal weakness     PHYSICAL EXAM  Ht 1.651 m (5' 5\")   Wt 62.1 kg (137 lb)   BMI 22.80 kg/m      General  - normal appearance, in no obvious distress  Musculoskeletal - left foot and toes  - inspection: Mild swelling of second toe of left foot from MPJ to distal phalanx.  - palpation: Minimal tenderness at proximal phalanx only.  - ROM:  ROM of second digit full without pain.  - strength: Grossly intact to resistance in flexion and extension.  Neuro  - no numbness, no motor deficit, grossly normal coordination, normal muscle tone  Skin  - ecchymosis at proximal phalanx of toe circumferentially  Psych  - interactive, appropriate, normal mood and affect    IMAGING :XR Left second toe 2V. Final results and " radiologist's interpretation, available in the [facility] health record. Images were reviewed with the patient/family members in the office today. My personal interpretation of the performed imaging Is stable appearing proximal phalangeal fracture of left second toe. Callus formation.     ASSESSMENT & PLAN  Ms. Shook is a 80 year old year old female who presents to clinic today with Follow Up of the Left Foot    Diagnosis:   (S92.515D) Closed nondisplaced fracture of proximal phalanx of lesser toe of left foot with routine healing, subsequent encounter  (primary encounter diagnosis)    -Weaning post-op shoe  -May sanaz tape additional two weeks  -Expectant management for swelling  -Consider silicone toe sleeve OTC if persisting  -Walking as tolerated, activity as tolerated  -Follow up PRN    It was a pleasure seeing Gavin Neil DO, CAQSM  Primary Care Sports Medicine                SPORTS & ORTHOPEDIC WALK-IN FOLLOW-UP VISIT 12/9/2020    Interval History:     Follow up reason: Left foot, 2nd digit     Date of injury/onset: 11/2/20    Date last seen: 11/10/20    Following Therapeutic Plan: Yes     Pain: Improving    Function: Improving    Interval History: Has been doing fine and doing much better, has walked without the shoe on and has been fine. No pain with it.      Medical History:    Any recent changes to your medical history? No    Any new medication prescribed since last visit? No    Review of Systems:    Do you have fever, chills, weight loss? No    Do you have any vision problems? No    Do you have any chest pain or edema? No    Do you have any shortness of breath or wheezing?  No    Do you have stomach problems? No    Do you have any numbness or focal weakness? No    Do you have diabetes? No    Do you have problems with bleeding or clotting? No    Do you have an rashes or other skin lesions? No

## 2020-12-09 NOTE — PROGRESS NOTES
SPORTS & ORTHOPEDIC WALK-IN FOLLOW-UP VISIT 12/9/2020    Interval History:     Follow up reason: Left foot, 2nd digit     Date of injury/onset: 11/2/20    Date last seen: 11/10/20    Following Therapeutic Plan: Yes     Pain: Improving    Function: Improving    Interval History: Has been doing fine and doing much better, has walked without the shoe on and has been fine. No pain with it.      Medical History:    Any recent changes to your medical history? No    Any new medication prescribed since last visit? No    Review of Systems:    Do you have fever, chills, weight loss? No    Do you have any vision problems? No    Do you have any chest pain or edema? No    Do you have any shortness of breath or wheezing?  No    Do you have stomach problems? No    Do you have any numbness or focal weakness? No    Do you have diabetes? No    Do you have problems with bleeding or clotting? No    Do you have an rashes or other skin lesions? No

## 2021-01-02 ENCOUNTER — HOSPITAL ENCOUNTER (OUTPATIENT)
Facility: CLINIC | Age: 81
Setting detail: SPECIMEN
Discharge: HOME | End: 2021-01-02
Admitting: PATHOLOGY
Payer: COMMERCIAL

## 2021-01-02 DIAGNOSIS — Z11.59 ENCOUNTER FOR SCREENING FOR OTHER VIRAL DISEASES: ICD-10-CM

## 2021-01-02 LAB
SARS-COV-2 RNA SPEC QL NAA+PROBE: NORMAL
SPECIMEN SOURCE: NORMAL

## 2021-01-02 PROCEDURE — 87635 SARS-COV-2 COVID-19 AMP PRB: CPT | Performed by: PATHOLOGY

## 2021-01-03 LAB
LABORATORY COMMENT REPORT: NORMAL
SARS-COV-2 RNA SPEC QL NAA+PROBE: NEGATIVE
SPECIMEN SOURCE: NORMAL

## 2021-01-05 ENCOUNTER — HOSPITAL ENCOUNTER (OUTPATIENT)
Facility: AMBULATORY SURGERY CENTER | Age: 81
Discharge: HOME | End: 2021-01-05
Attending: INTERNAL MEDICINE | Admitting: INTERNAL MEDICINE
Payer: COMMERCIAL

## 2021-01-05 VITALS
DIASTOLIC BLOOD PRESSURE: 69 MMHG | TEMPERATURE: 98 F | OXYGEN SATURATION: 96 % | HEIGHT: 65 IN | RESPIRATION RATE: 12 BRPM | WEIGHT: 137 LBS | SYSTOLIC BLOOD PRESSURE: 124 MMHG | HEART RATE: 72 BPM | BODY MASS INDEX: 22.82 KG/M2

## 2021-01-05 LAB — COLONOSCOPY: NORMAL

## 2021-01-05 PROCEDURE — 45385 COLONOSCOPY W/LESION REMOVAL: CPT | Mod: PT

## 2021-01-05 PROCEDURE — 88305 TISSUE EXAM BY PATHOLOGIST: CPT | Mod: GC | Performed by: PATHOLOGY

## 2021-01-05 PROCEDURE — 45380 COLONOSCOPY AND BIOPSY: CPT | Mod: PT,59

## 2021-01-05 RX ORDER — LIDOCAINE 40 MG/G
CREAM TOPICAL
Status: DISCONTINUED | OUTPATIENT
Start: 2021-01-05 | End: 2021-01-06 | Stop reason: HOSPADM

## 2021-01-05 RX ORDER — ONDANSETRON 2 MG/ML
4 INJECTION INTRAMUSCULAR; INTRAVENOUS
Status: DISCONTINUED | OUTPATIENT
Start: 2021-01-05 | End: 2021-01-06 | Stop reason: HOSPADM

## 2021-01-05 RX ORDER — FENTANYL CITRATE 50 UG/ML
INJECTION, SOLUTION INTRAMUSCULAR; INTRAVENOUS PRN
Status: DISCONTINUED | OUTPATIENT
Start: 2021-01-05 | End: 2021-01-05 | Stop reason: HOSPADM

## 2021-01-05 RX ORDER — SIMETHICONE
LIQUID (ML) MISCELLANEOUS PRN
Status: DISCONTINUED | OUTPATIENT
Start: 2021-01-05 | End: 2021-01-05 | Stop reason: HOSPADM

## 2021-01-05 ASSESSMENT — MIFFLIN-ST. JEOR: SCORE: 1092.31

## 2021-01-07 LAB — COPATH REPORT: NORMAL

## 2021-01-08 NOTE — RESULT ENCOUNTER NOTE
Kerri  We removed six small polyps at your colonoscopy- all of there tubular adenomas (the kind that are good to get removed to prevent future cancer).   We would recommend follow up in 3 years.    Probably we would stop doing this after that exam.    Also I would check with Dr. Westbrook, me, or one of the gastroenterologists here prior to scheduling that exam, to make sure its reasonable for you at that time.  Dr. Wang

## 2021-02-18 ENCOUNTER — ANCILLARY PROCEDURE (OUTPATIENT)
Dept: GENERAL RADIOLOGY | Facility: CLINIC | Age: 81
End: 2021-02-18
Attending: INTERNAL MEDICINE
Payer: COMMERCIAL

## 2021-02-18 ENCOUNTER — OFFICE VISIT (OUTPATIENT)
Dept: INTERNAL MEDICINE | Facility: CLINIC | Age: 81
End: 2021-02-18
Payer: COMMERCIAL

## 2021-02-18 VITALS
HEART RATE: 71 BPM | WEIGHT: 143 LBS | SYSTOLIC BLOOD PRESSURE: 130 MMHG | OXYGEN SATURATION: 100 % | BODY MASS INDEX: 23.8 KG/M2 | DIASTOLIC BLOOD PRESSURE: 84 MMHG

## 2021-02-18 DIAGNOSIS — Z12.83 SKIN CANCER SCREENING: ICD-10-CM

## 2021-02-18 DIAGNOSIS — C91.10 CLL (CHRONIC LYMPHOCYTIC LEUKEMIA) (H): ICD-10-CM

## 2021-02-18 DIAGNOSIS — M79.601 PAIN OF RIGHT UPPER EXTREMITY: ICD-10-CM

## 2021-02-18 DIAGNOSIS — Z12.31 ENCOUNTER FOR SCREENING MAMMOGRAM FOR BREAST CANCER: Primary | ICD-10-CM

## 2021-02-18 DIAGNOSIS — Z12.31 ENCOUNTER FOR SCREENING MAMMOGRAM FOR BREAST CANCER: ICD-10-CM

## 2021-02-18 LAB
BASOPHILS # BLD AUTO: 0 10E9/L (ref 0–0.2)
BASOPHILS NFR BLD AUTO: 0 %
DIFFERENTIAL METHOD BLD: ABNORMAL
EOSINOPHIL # BLD AUTO: 1.2 10E9/L (ref 0–0.7)
EOSINOPHIL NFR BLD AUTO: 2 %
ERYTHROCYTE [DISTWIDTH] IN BLOOD BY AUTOMATED COUNT: 13.2 % (ref 10–15)
HCT VFR BLD AUTO: 45 % (ref 35–47)
HGB BLD-MCNC: 14.3 G/DL (ref 11.7–15.7)
LYMPHOCYTES # BLD AUTO: 49.4 10E9/L (ref 0.8–5.3)
LYMPHOCYTES NFR BLD AUTO: 84 %
MCH RBC QN AUTO: 30 PG (ref 26.5–33)
MCHC RBC AUTO-ENTMCNC: 31.8 G/DL (ref 31.5–36.5)
MCV RBC AUTO: 94 FL (ref 78–100)
MONOCYTES # BLD AUTO: 1.2 10E9/L (ref 0–1.3)
MONOCYTES NFR BLD AUTO: 2 %
NEUTROPHILS # BLD AUTO: 7.1 10E9/L (ref 1.6–8.3)
NEUTROPHILS NFR BLD AUTO: 12 %
PLATELET # BLD AUTO: 189 10E9/L (ref 150–450)
PLATELET # BLD EST: ABNORMAL 10*3/UL
RBC # BLD AUTO: 4.77 10E12/L (ref 3.8–5.2)
RBC MORPH BLD: NORMAL
WBC # BLD AUTO: 58.8 10E9/L (ref 4–11)

## 2021-02-18 PROCEDURE — 36415 COLL VENOUS BLD VENIPUNCTURE: CPT | Performed by: PATHOLOGY

## 2021-02-18 PROCEDURE — 99214 OFFICE O/P EST MOD 30 MIN: CPT | Performed by: INTERNAL MEDICINE

## 2021-02-18 PROCEDURE — 85025 COMPLETE CBC W/AUTO DIFF WBC: CPT | Performed by: PATHOLOGY

## 2021-02-18 PROCEDURE — 73130 X-RAY EXAM OF HAND: CPT | Mod: RT | Performed by: RADIOLOGY

## 2021-02-18 ASSESSMENT — PAIN SCALES - GENERAL: PAINLEVEL: NO PAIN (0)

## 2021-02-18 NOTE — NURSING NOTE
Chief Complaint   Patient presents with     Finger     Pt would like to discuss sore ring finger, right hand     YEVGENIY Garcia at 2:53 PM sign on 2/18/2021

## 2021-02-18 NOTE — PROGRESS NOTES
HPI:    Ms. Shook comes in for follow up today. She has some minor R hip pain. She did injure her R ring finger a few weeks ago and still has some pain. She is still trying to get exercise. She has chronic low back pain in the morning that goes away during the day. No other HEENT, cardiopulmonary, neurological, systemic, psychiatric complaints. She would like to follow up in Dermatology for skin check.     Past Medical History:   Diagnosis Date     Breast disorder 1990    Breast Cancer     Cardiomyopathy (H)      Chronic osteoarthritis 2012    resulted in hip replacement     CLL (chronic lymphoblastic leukemia) 1989     Closed fracture of second toe of left foot      Colon adenomas     6/26/14 colonoscopy, repeat in 3 years     History of blood transfusion      hyperlipidemia      Primary localized osteoarthrosis, pelvic region and thigh      Tinnitus      Past Surgical History:   Procedure Laterality Date     BIOPSY  within the last 5 years    polyps during colonoscopy     C PELVIS/HIP JOINT SURGERY UNLISTED  April 2012    left hip replacement     C SHOULDER SURG PROC UNLISTED  ?     COLONOSCOPY  6/26/2014    Procedure: COMBINED COLONOSCOPY, SINGLE BIOPSY/POLYPECTOMY BY BIOPSY;  Surgeon: Pola Arceo MD;  Location:  GI     COLONOSCOPY N/A 1/5/2021    Procedure: COLONOSCOPY, WITH POLYPECTOMY AND CLIP;  Surgeon: Charlie Wang MD;  [place] [facility] [place]     D & C  1962    late PP hemorrhage --> retained placenta     ENDOSCOPIC SINUS SURGERY Right 7/16/2018    Procedure: ENDOSCOPIC SINUS SURGERY;  Endoscopic Sphenoidotomy with Removal of Tissue;  Surgeon: Kishore Webster MD;  Location:  OR     ENT SURGERY  1950    tonselectomy     EYE SURGERY  2015    cataract on left eye     left hip replacemen Left     hip replacement in 2013     LUMPECTOMY BREAST  1990    Left     ORTHOPEDIC SURGERY  age?    right shoulder      ORTHOPEDIC SURGERY  ~ 2013    left hip replacement     R hip arthroscopy  7/19/11     TONSILLECTOMY   1950     PE:    Vitals noted, gen, nad, cooperative, alert. She is wearing a mask, neck supple nl rom, lungs with good air movement, RRR, S1, S2, no MRG, abdomen, no acute findings. Grossly normal neurological exam.     A/P:    1. H/o breast cancer; mammogram done 3/11/2020 and ordered future today  2. CLL Total WBC a little higher today at 58.8  3. She got the first COVID vaccination  4. Placed orthopedic referral to Dr. Neil, or R hip pain  5. Ordered R hand X-ray for R ring finger injury.  6. Colonoscopy 1/5/2021 and repeat in 3 years  7. A1c on 11/12/2020 was 5.9%  8. She is on Simvastatin and lipid panel done 11/12/2020 with HDL 80 and LDL 88  9. Dermatology referral placed today.     30 minutes spent on the date of the encounter doing chart review, history and exam, documentation and further activities as noted above

## 2021-02-18 NOTE — PATIENT INSTRUCTIONS
Breast Center ([facility]) [phone] (2nd Floor [facility]) CompleteChecklist in Smart Text   Breast Center ([facility]) [phone] ([address])     Dermatology [phone] (3rd Floor [facility])     U Ortho [phone] (4th Floor [facility])

## 2021-02-19 NOTE — TELEPHONE ENCOUNTER
DIAGNOSIS: Left hip pain/ Dr Westbrook/ no images/ [facility]/ ortho con   APPOINTMENT DATE: 2/23   NOTES STATUS DETAILS   OFFICE NOTE from referring provider Internal Juan Westbrook MD in [facility] INTERNAL [facility]IC[facility]   OFFICE NOTE from other specialist Internal    DISCHARGE SUMMARY from hospital N/A    DISCHARGE REPORT from the ER N/A    OPERATIVE REPORT N/A    EMG report N/A    MEDICATION LIST Internal    MRI Internal MRI Lumbar spine w & w/o contrast (Order: [id number]) - 1/31/2017       MR LUMBAR SPINE W/O CONTRAST 5/9/2016 1:30 PM......   DEXA (osteoporosis/bone health) Internal 11/19/20 1/31/17...   CT SCAN N/A    XRAYS (IMAGES & REPORTS) Internal   XR PELVIS AND HIP BILATERAL 2 VIEWS  7/29/2019 1:31 PM     XR Lumbar Spine G/E 4 Views (Order: [id number]) - 7/29/2019...

## 2021-02-20 ENCOUNTER — TELEPHONE (OUTPATIENT)
Dept: INTERNAL MEDICINE | Facility: CLINIC | Age: 81
End: 2021-02-20

## 2021-02-20 DIAGNOSIS — M79.601 PAIN OF RIGHT UPPER EXTREMITY: Primary | ICD-10-CM

## 2021-02-23 ENCOUNTER — PRE VISIT (OUTPATIENT)
Dept: ORTHOPEDICS | Facility: CLINIC | Age: 81
End: 2021-02-23

## 2021-02-23 ENCOUNTER — OFFICE VISIT (OUTPATIENT)
Dept: ORTHOPEDICS | Facility: CLINIC | Age: 81
End: 2021-02-23
Payer: COMMERCIAL

## 2021-02-23 VITALS — BODY MASS INDEX: 23.82 KG/M2 | HEIGHT: 65 IN | WEIGHT: 143 LBS

## 2021-02-23 DIAGNOSIS — M21.70 LEG LENGTH DISCREPANCY: ICD-10-CM

## 2021-02-23 DIAGNOSIS — S62.654A CLOSED NONDISPLACED FRACTURE OF MIDDLE PHALANX OF RIGHT RING FINGER, INITIAL ENCOUNTER: ICD-10-CM

## 2021-02-23 DIAGNOSIS — M67.951 TENDINOPATHY OF RIGHT GLUTEUS MEDIUS: Primary | ICD-10-CM

## 2021-02-23 PROCEDURE — 99213 OFFICE O/P EST LOW 20 MIN: CPT | Performed by: FAMILY MEDICINE

## 2021-02-23 ASSESSMENT — MIFFLIN-ST. JEOR: SCORE: 1119.52

## 2021-02-23 NOTE — LETTER
2/23/2021         RE: Kerri Shook  [address]  [facility] [address]        Dear Colleague,    Thank you for referring your patient, Kerri Shook, to the [facility] [place]. Please see a copy of my visit note below.          SPORTS & ORTHOPEDIC WALK-IN VISIT 2/23/2021    Primary Care Physician: Dr. Westbrook, Juan JOHNSON    Pt had R hip surgery in the past. Concerned for leg length discrepancy that is causing some contralateral hip pain from walking. Prior hx of left BRITTANY a few years ago. She also experienced a right ring fracture about 6 weeks ago. She has been icing and using a sleeve. She reports a plateau in her improvement.     Reason for visit:     What part of your body is injured / painful?  right lateral hip    What caused the injury /pain? No inciting event     How long ago did your injury occur or pain begin? several months    What are your most bothersome symptoms? Pain    What makes your symptoms better? Nothing has been tried.     What makes your symptoms worse? Walking    Have you been previously seen for this problem? Yes, Dr. Westbrook     Medical History:    Any recent changes to your medical history? No    Any new medication prescribed since last visit? No    Have you had surgery on this body part before? No    Social History:    Occupation: Retired    Handedness: Right    Exercise: Walking 3-4x/week    Review of Systems:    Do you have fever, chills, weight loss? No    Do you have any vision problems? No    Do you have any chest pain or edema? No    Do you have any shortness of breath or wheezing?  No    Do you have stomach problems? No    Do you have any numbness or focal weakness? No    Do you have diabetes? No    Do you have problems with bleeding or clotting? No    Do you have an rashes or other skin lesions? No           CHIEF COMPLAINT:  Consult (Right hip pain )       HISTORY OF PRESENT ILLNESS  Ms. Shook is a pleasant 80 year old year  old female who presents to clinic today with pain of right fourth finger and right hip pain. Kerri notes she has pain of right hip.  She attributes this to her leg length discrepancy that is causing some contralateral hip pain from walking. Prior hx of left BRITTANY a few years ago. Since this time she was told it was a functional leg length discrepancy.  She tried PT to correct functionally but remained.  She has been increasing her walking and notes right lateral hip pain may be related to this as well.  No radiation.  No low back pain.    She also experienced a right ring finger injury about 6 weeks ago. She has been icing and using a sleeve. She reports a plateau in her improvement. Swelling persists. Stiffness with full flexion.  Saw PCP today who ordered xrays.       What part of your body is injured / painful?  right lateral hip    What caused the injury /pain? No inciting event     How long ago did your injury occur or pain begin? several months    What are your most bothersome symptoms? Pain    What makes your symptoms better? Nothing has been tried.     What makes your symptoms worse? Walking    Have you been previously seen for this problem? Yes, Dr. Westbrook     Additional history: as documented    MEDICAL HISTORY  Patient Active Problem List   Diagnosis     Breast cancer -- Left     S/P coronary angiogram     Stress-induced cardiomyopathy     Foot injury     Advance care planning     Rosacea     KP (keratosis pilaris)     CLL (chronic lymphocytic leukemia) (H)     Hx of cardiomyopathy     Encounter for routine gynecological examination     Menopause--age 50     Colon adenomas     SK (seborrheic keratosis)     Seborrheic keratosis     Plantar fasciitis       Current Outpatient Medications   Medication Sig Dispense Refill     aspirin 81 MG tablet Take 81 mg by mouth daily       carvedilol (COREG) 3.125 MG tablet Take 1 tablet (3.125 mg) by mouth 2 times daily 180 tablet 1     gabapentin (NEURONTIN) 100 MG  "capsule Route: Take 1 capsule (100 mg) by mouth nightly as needed - Oral 30 capsule 3     Multiple Vitamin (MULTIVITAMIN) per tablet Take 1 tablet by mouth daily.       simvastatin (ZOCOR) 20 MG tablet Take 1 tablet (20 mg) by mouth At Bedtime 90 tablet 2       Allergies   Allergen Reactions     Nkda [No Known Drug Allergies]        Family History   Problem Relation Age of Onset     Diabetes Maternal Grandmother 85     Coronary Artery Disease Father 76         of MI     Heart Disease Father      Cancer Maternal Grandfather         stomach     Alcoholism Son         Active alcoholic     Dementia Mother      C.A.D. No family hx of      Cancer - colorectal No family hx of      Psychotic Disorder No family hx of      Skin Cancer No family hx of      Melanoma No family hx of      Hypertension No family hx of      Breast Cancer No family hx of      Prostate Cancer No family hx of      Cerebrovascular Disease No family hx of         ,, ,       Additional medical/Social/Surgical histories reviewed in [facility] and updated as appropriate.     REVIEW OF SYSTEMS (2021)  A 10-point review of systems was obtained and is negative except for as noted in the HPI.      PHYSICAL EXAM  Ht 1.651 m (5' 5\")   Wt 64.9 kg (143 lb)   BMI 23.80 kg/m      General  - normal appearance, in no obvious distress  Musculoskeletal -right ring finger  - inspection: no atrophy, normal joint alignment, mild swelling at PIPJ  - palpation: Tender volar and radial aspect of PIPJ  - ROM:  MCP 90 deg flexion   0 deg extension   PIP 90 deg flexion   0 deg extension   DIP 70 deg flexion   0 deg extension  - strength: 5/5  strength, 5/5 wrist abduction, 5/5 flexion, extension, pronation, supination, adduction  - special tests:  (-) varus  (-) valgus  General  - normal appearance, in no obvious distress  HEENT  - conjunctivae not injected, moist mucous membranes  CV  - normal femoral pulse  Pulm  - normal respiratory pattern, " non-labored  Musculoskeletal - hip right  - stance: normal gait without limp, no obvious leg length discrepancy, normal heel and toe walk,+trendelenberg for right hip weakness.  Roughly 2cm leg length discrepancy long on left.  - inspection: no swelling or effusion,  normal bone and joint alignment, no obvious deformity  - palpation: tender over the greater trochanter  - ROM: pain with active abduction, normal and painless flexion, extension, IR, ER, adduction  - strength: 5/5 in all planes  - special tests:  (-) SANJEEV  (-) FADIR  no pain with axial femoral load  Neuro  - no sensory or motor deficit, grossly normal coordination, normal muscle tone  Skin  - no ecchymosis, erythema, warmth, or induration, no obvious rash  Psych  - interactive, appropriate, normal mood and affect      IMAGING : XR Finger right 3V Final results and radiologist's interpretation, available in the [facility] health record. Images were reviewed with the patient/family members in the office today. My personal interpretation of the performed imaging is nondisplaced intraarticular fracture of fourth middle phalangeal base.     ASSESSMENT & PLAN  Ms. Shook is a 80 year old year old female who presents to clinic today with     Diagnosis:   1. intra-articular fracture at the fourth middle phalangeal base  2. Gluteus medius tendinopathy right hip  3. Acquired leg length discrepancy    -HEP provided for gluteus medius   -Provided 1cm heel lift to right lower extremity.  -Subacute fracture now >6 weeks, discussed consideration for hand therapy vs. Expectant management with range of motion and finger sleeve.  No splint necessary due to subacute injury. She wishes to proceed with latter and will use padded sleeve for provocative activity.   -Follow up 6 weeks. Consider formal therapy, trochanteric injection and reassess gluteus strength if persisting.    It was a pleasure seeing Kerri today.    Juan Neil, DO, CAM  Primary Care Sports Medicine

## 2021-02-23 NOTE — PROGRESS NOTES
SPORTS & ORTHOPEDIC WALK-IN VISIT 2/23/2021    Primary Care Physician: Dr. Westbrook, Juan JOHNSON    Pt had R hip surgery in the past. Concerned for leg length discrepancy that is causing some contralateral hip pain from walking. Prior hx of left BRITTANY a few years ago. She also experienced a right ring fracture about 6 weeks ago. She has been icing and using a sleeve. She reports a plateau in her improvement.     Reason for visit:     What part of your body is injured / painful?  right lateral hip    What caused the injury /pain? No inciting event     How long ago did your injury occur or pain begin? several months    What are your most bothersome symptoms? Pain    What makes your symptoms better? Nothing has been tried.     What makes your symptoms worse? Walking    Have you been previously seen for this problem? Yes, Dr. Westbrook     Medical History:    Any recent changes to your medical history? No    Any new medication prescribed since last visit? No    Have you had surgery on this body part before? No    Social History:    Occupation: Retired    Handedness: Right    Exercise: Walking 3-4x/week    Review of Systems:    Do you have fever, chills, weight loss? No    Do you have any vision problems? No    Do you have any chest pain or edema? No    Do you have any shortness of breath or wheezing?  No    Do you have stomach problems? No    Do you have any numbness or focal weakness? No    Do you have diabetes? No    Do you have problems with bleeding or clotting? No    Do you have an rashes or other skin lesions? No

## 2021-02-24 NOTE — PROGRESS NOTES
CHIEF COMPLAINT:  Consult (Right hip pain )       HISTORY OF PRESENT ILLNESS  Ms. Shook is a pleasant 80 year old year old female who presents to clinic today with pain of right fourth finger and right hip pain. Kerri notes she has pain of right hip.  She attributes this to her leg length discrepancy that is causing some contralateral hip pain from walking. Prior hx of left BRITTANY a few years ago. Since this time she was told it was a functional leg length discrepancy.  She tried PT to correct functionally but remained.  She has been increasing her walking and notes right lateral hip pain may be related to this as well.  No radiation.  No low back pain.    She also experienced a right ring finger injury about 6 weeks ago. She has been icing and using a sleeve. She reports a plateau in her improvement. Swelling persists. Stiffness with full flexion.  Saw PCP today who ordered xrays.       What part of your body is injured / painful?  right lateral hip    What caused the injury /pain? No inciting event     How long ago did your injury occur or pain begin? several months    What are your most bothersome symptoms? Pain    What makes your symptoms better? Nothing has been tried.     What makes your symptoms worse? Walking    Have you been previously seen for this problem? Yes, Dr. Westbrook     Additional history: as documented    MEDICAL HISTORY  Patient Active Problem List   Diagnosis     Breast cancer -- Left     S/P coronary angiogram     Stress-induced cardiomyopathy     Foot injury     Advance care planning     Rosacea     KP (keratosis pilaris)     CLL (chronic lymphocytic leukemia) (H)     Hx of cardiomyopathy     Encounter for routine gynecological examination     Menopause--age 50     Colon adenomas     SK (seborrheic keratosis)     Seborrheic keratosis     Plantar fasciitis       Current Outpatient Medications   Medication Sig Dispense Refill     aspirin 81 MG tablet Take 81 mg by mouth daily       carvedilol  "(COREG) 3.125 MG tablet Take 1 tablet (3.125 mg) by mouth 2 times daily 180 tablet 1     gabapentin (NEURONTIN) 100 MG capsule Route: Take 1 capsule (100 mg) by mouth nightly as needed - Oral 30 capsule 3     Multiple Vitamin (MULTIVITAMIN) per tablet Take 1 tablet by mouth daily.       simvastatin (ZOCOR) 20 MG tablet Take 1 tablet (20 mg) by mouth At Bedtime 90 tablet 2       Allergies   Allergen Reactions     Nkda [No Known Drug Allergies]        Family History   Problem Relation Age of Onset     Diabetes Maternal Grandmother 85     Coronary Artery Disease Father 76         of MI     Heart Disease Father      Cancer Maternal Grandfather         stomach     Alcoholism Son         Active alcoholic     Dementia Mother      C.A.D. No family hx of      Cancer - colorectal No family hx of      Psychotic Disorder No family hx of      Skin Cancer No family hx of      Melanoma No family hx of      Hypertension No family hx of      Breast Cancer No family hx of      Prostate Cancer No family hx of      Cerebrovascular Disease No family hx of         ,, ,       Additional medical/Social/Surgical histories reviewed in [facility] and updated as appropriate.     REVIEW OF SYSTEMS (2021)  A 10-point review of systems was obtained and is negative except for as noted in the HPI.      PHYSICAL EXAM  Ht 1.651 m (5' 5\")   Wt 64.9 kg (143 lb)   BMI 23.80 kg/m      General  - normal appearance, in no obvious distress  Musculoskeletal -right ring finger  - inspection: no atrophy, normal joint alignment, mild swelling at PIPJ  - palpation: Tender volar and radial aspect of PIPJ  - ROM:  MCP 90 deg flexion   0 deg extension   PIP 90 deg flexion   0 deg extension   DIP 70 deg flexion   0 deg extension  - strength: 5/5  strength, 5/5 wrist abduction, 5/5 flexion, extension, pronation, supination, adduction  - special tests:  (-) varus  (-) valgus  General  - normal appearance, in no obvious distress  HEENT  - conjunctivae not " injected, moist mucous membranes  CV  - normal femoral pulse  Pulm  - normal respiratory pattern, non-labored  Musculoskeletal - hip right  - stance: normal gait without limp, no obvious leg length discrepancy, normal heel and toe walk,+trendelenberg for right hip weakness.  Roughly 2cm leg length discrepancy long on left.  - inspection: no swelling or effusion,  normal bone and joint alignment, no obvious deformity  - palpation: tender over the greater trochanter  - ROM: pain with active abduction, normal and painless flexion, extension, IR, ER, adduction  - strength: 5/5 in all planes  - special tests:  (-) SANJEEV  (-) FADIR  no pain with axial femoral load  Neuro  - no sensory or motor deficit, grossly normal coordination, normal muscle tone  Skin  - no ecchymosis, erythema, warmth, or induration, no obvious rash  Psych  - interactive, appropriate, normal mood and affect      IMAGING : XR Finger right 3V Final results and radiologist's interpretation, available in the [facility] health record. Images were reviewed with the patient/family members in the office today. My personal interpretation of the performed imaging is nondisplaced intraarticular fracture of fourth middle phalangeal base.     ASSESSMENT & PLAN  Ms. Shook is a 80 year old year old female who presents to clinic today with     Diagnosis:   1. intra-articular fracture at the fourth middle phalangeal base  2. Gluteus medius tendinopathy right hip  3. Acquired leg length discrepancy    -HEP provided for gluteus medius   -Provided 1cm heel lift to right lower extremity.  -Subacute fracture now >6 weeks, discussed consideration for hand therapy vs. Expectant management with range of motion and finger sleeve.  No splint necessary due to subacute injury. She wishes to proceed with latter and will use padded sleeve for provocative activity.   -Follow up 6 weeks. Consider formal therapy, trochanteric injection and reassess gluteus strength if persisting.    It was  a pleasure seeing Kerri today.    Juan Neil DO, CAQSM  Primary Care Sports Medicine

## 2021-03-15 DIAGNOSIS — Z12.31 ENCOUNTER FOR SCREENING MAMMOGRAM FOR BREAST CANCER: ICD-10-CM

## 2021-03-15 PROCEDURE — 77067 SCR MAMMO BI INCL CAD: CPT | Mod: GC

## 2021-04-11 ENCOUNTER — HEALTH MAINTENANCE LETTER (OUTPATIENT)
Age: 81
End: 2021-04-11

## 2021-04-22 DIAGNOSIS — E78.5 HYPERLIPIDEMIA LDL GOAL <70: ICD-10-CM

## 2021-04-23 RX ORDER — SIMVASTATIN 20 MG
20 TABLET ORAL AT BEDTIME
Qty: 90 TABLET | Refills: 1 | Status: SHIPPED | OUTPATIENT
Start: 2021-04-23 | End: 2021-10-26

## 2021-04-23 NOTE — TELEPHONE ENCOUNTER
simvastatin (ZOCOR) 20 MG tablet    Take 1 tablet (20 mg) by mouth At Bedtime     Last Written Prescription Date:  7/28/20  Last Fill Quantity: 90,   # refills: 2  Last Office Visit : 2/18/21  Future Office visit:  8/18/21    Refill to pharmacy.

## 2021-05-25 DIAGNOSIS — I51.81 STRESS-INDUCED CARDIOMYOPATHY: ICD-10-CM

## 2021-05-26 RX ORDER — CARVEDILOL 3.12 MG/1
3.12 TABLET ORAL 2 TIMES DAILY
Qty: 180 TABLET | Refills: 2 | Status: SHIPPED | OUTPATIENT
Start: 2021-05-26 | End: 2022-03-28

## 2021-08-13 ENCOUNTER — OFFICE VISIT (OUTPATIENT)
Dept: ORTHOPEDICS | Facility: CLINIC | Age: 81
End: 2021-08-13
Payer: COMMERCIAL

## 2021-08-13 VITALS — WEIGHT: 143 LBS | BODY MASS INDEX: 23.82 KG/M2 | HEIGHT: 65 IN

## 2021-08-13 DIAGNOSIS — M67.951 TENDINOPATHY OF RIGHT GLUTEUS MEDIUS: Primary | ICD-10-CM

## 2021-08-13 DIAGNOSIS — M21.70 LEG LENGTH DISCREPANCY: ICD-10-CM

## 2021-08-13 DIAGNOSIS — M51.369 LUMBAR DEGENERATIVE DISC DISEASE: ICD-10-CM

## 2021-08-13 PROCEDURE — 99214 OFFICE O/P EST MOD 30 MIN: CPT | Performed by: FAMILY MEDICINE

## 2021-08-13 ASSESSMENT — MIFFLIN-ST. JEOR: SCORE: 1114.52

## 2021-08-13 NOTE — LETTER
8/13/2021      RE: Kerri Shook  [address]  [facility] [address]        Subjective:   Kerri Shook is a 81 year old female who c/o right leg pain. Pt noted that she began feeling random pain down from her buttock to her lateral  Thigh and lower leg.  Stands for awhile, can't do regular exercises.  Extremely painful.  Has low back pain, starts in glute and radiates down to mid lateral calf.  No pins and needles or numbness.  This pain is new, seen for lower back pain in the past. Per notes right gluteal tendinopathy pain.  Tylenol and ibuprofen when worse.  Quit regular walking and standing for long periods for past 2 weeks.  Moves around and pain is worse.  Leg length changes after hip surgery, orthotics don't seem to make a difference  No falls or trauma  Pain across the low back    Background:   Date of injury: No specific injury   Duration of symptoms: 2 days  Mechanism of Injury: Insidious Onset; Unknown   Intensity: 2/10 at rest, 8/10 with activity   Aggravating factors: Standing for extended periods, Walking  Relieving Factors: rest, heat and NSAIDs  Prior Evaluation: None    PAST MEDICAL, SOCIAL, SURGICAL AND FAMILY HISTORY: She  has a past medical history of Breast disorder (1990), Cardiomyopathy (H), Chronic osteoarthritis (2012), CLL (chronic lymphoblastic leukemia) (1989), Closed fracture of second toe of left foot, Colon adenomas, History of blood transfusion, hyperlipidemia, Primary localized osteoarthrosis, pelvic region and thigh, and Tinnitus. She also has no past medical history of Anemia, Aortic valve disorders, Arrhythmia, Asymptomatic human immunodeficiency virus (HIV) infection status (H), Back injury, Bleeding disorder (H), Bone tumor, Cerebral artery occlusion with cerebral infarction (H), Cerebral infarction (H), Chest pain, Chronic kidney disease, Coagulation disorder (H), Congestive heart failure, unspecified, Coronary artery disease, Deep vein thrombosis (DVT) (H),  "Depressive disorder, Diabetes (H), Hearing problem, Heart murmur, Hepatitis, Hyperlipidaemia, Hypertension, Hypothyroidism, Immune disorder (H), Learning disability, Liver disease, Lung disease, Mental disorder, Neck injuries, PONV (postoperative nausea and vomiting), RA (rheumatoid arthritis) (H), Reduced vision, Scoliosis, Seizures (H), Shortness of breath, Sleep apnea, Stomach ulcer, Surgical complication, Thyroid disease, Tuberculosis, Ulcer, gastric, acute, or Uncomplicated asthma.  She  has a past surgical history that includes Lumpectomy breast (1990); R hip arthroscopy (7/19/11); d & c (1962); orthopedic surgery (age?); orthopedic surgery (~ 2013); Colonoscopy (6/26/2014); biopsy (within the last 5 years); ENT surgery (1950); left hip replacemen (Left); PELVIS/HIP JOINT SURGERY UNLISTED (April 2012); SHOULDER SURG PROC UNLISTED (?); Tonsillectomy (1950); Eye surgery (2015); Endoscopic sinus surgery (Right, 7/16/2018); and Colonoscopy (N/A, 1/5/2021).  Her family history includes Alcoholism in her son; Cancer in her maternal grandfather; Coronary Artery Disease (age of onset: 76) in her father; Dementia in her mother; Diabetes (age of onset: 85) in her maternal grandmother; Heart Disease in her father.  She reports that she has never smoked. She has never used smokeless tobacco. She reports current alcohol use. She reports that she does not use drugs.    ALLERGIES: She is allergic to nkda [no known drug allergies].    CURRENT MEDICATIONS: She has a current medication list which includes the following prescription(s): aspirin, carvedilol, gabapentin, multivitamin, and simvastatin.     REVIEW OF SYSTEMS: 10 point review of systems is negative except as noted above.     Exam:   Ht 1.651 m (5' 5\")   Wt 64.9 kg (143 lb)   BMI 23.80 kg/m             CONSTITUTIONAL: alert, mild distress and cooperative  HEAD: Normocephalic. No masses, lesions, tenderness or abnormalities  SKIN: no suspicious lesions or " rashes  GAIT: normal  NEUROLOGIC: Non-focal, Normal muscle tone and strength, reflexes normal, sensation grossly normal.  PSYCHIATRIC: affect normal/bright and mentation appears normal.    MUSCULOSKELETAL: Lumbar  Tender:  Right buttock, radiates down right leg, glute med  Non-tender:  thoracic spinous processes, left parathoracic muscles, right parathoracic muscles, lumbar spinous processes  Range of Motion:  lumbar flexion  decreased, lumbar extension  decreased  Strength:  able to heel walk, able to toe walk  Special tests:  negative straight leg raises    Hip Exam: Hip ROM - decreased on right, moderate OA degenerative changes on right hip, left BRITTANY         Assessment/Plan:   Pt is an 80 yo white female with PMhx of stress-induced cardiomyopathy presenting to discuss LBP that radiates down her right leg  1. Right gluteal tendinopathy-  Agreeable to PT  2. Leg length discrepancy after Left hip surgery- trial of heel lift on right  3. Lumbar degenerative disc disease-  Trial of PT  If not improving consider MRI Lumbar, right L4-5 of interest    RTC 6-8 weeks  X-RAY INTERPRETATION:   Lumbar films 2019-IMPRESSION:   1. Multilevel degenerative changes this most pronounced moderate grade  disc space narrowing noted at the levels of L1-L2 and L4-L5.  2. Transitional vertebral body at the presumed closed thoracic  vertebral body level.      Julieta Metz MD

## 2021-08-13 NOTE — PROGRESS NOTES
Subjective:   Kerri Shook is a 81 year old female who c/o right leg pain. Pt noted that she began feeling random pain down from her buttock to her lateral  Thigh and lower leg.  Stands for awhile, can't do regular exercises.  Extremely painful.  Has low back pain, starts in glute and radiates down to mid lateral calf.  No pins and needles or numbness.  This pain is new, seen for lower back pain in the past. Per notes right gluteal tendinopathy pain.  Tylenol and ibuprofen when worse.  Quit regular walking and standing for long periods for past 2 weeks.  Moves around and pain is worse.  Leg length changes after hip surgery, orthotics don't seem to make a difference  No falls or trauma  Pain across the low back    Background:   Date of injury: No specific injury   Duration of symptoms: 2 days  Mechanism of Injury: Insidious Onset; Unknown   Intensity: 2/10 at rest, 8/10 with activity   Aggravating factors: Standing for extended periods, Walking  Relieving Factors: rest, heat and NSAIDs  Prior Evaluation: None    PAST MEDICAL, SOCIAL, SURGICAL AND FAMILY HISTORY: She  has a past medical history of Breast disorder (1990), Cardiomyopathy (H), Chronic osteoarthritis (2012), CLL (chronic lymphoblastic leukemia) (1989), Closed fracture of second toe of left foot, Colon adenomas, History of blood transfusion, hyperlipidemia, Primary localized osteoarthrosis, pelvic region and thigh, and Tinnitus. She also has no past medical history of Anemia, Aortic valve disorders, Arrhythmia, Asymptomatic human immunodeficiency virus (HIV) infection status (H), Back injury, Bleeding disorder (H), Bone tumor, Cerebral artery occlusion with cerebral infarction (H), Cerebral infarction (H), Chest pain, Chronic kidney disease, Coagulation disorder (H), Congestive heart failure, unspecified, Coronary artery disease, Deep vein thrombosis (DVT) (H), Depressive disorder, Diabetes (H), Hearing problem, Heart murmur, Hepatitis,  "Hyperlipidaemia, Hypertension, Hypothyroidism, Immune disorder (H), Learning disability, Liver disease, Lung disease, Mental disorder, Neck injuries, PONV (postoperative nausea and vomiting), RA (rheumatoid arthritis) (H), Reduced vision, Scoliosis, Seizures (H), Shortness of breath, Sleep apnea, Stomach ulcer, Surgical complication, Thyroid disease, Tuberculosis, Ulcer, gastric, acute, or Uncomplicated asthma.  She  has a past surgical history that includes Lumpectomy breast (1990); R hip arthroscopy (7/19/11); d & c (1962); orthopedic surgery (age?); orthopedic surgery (~ 2013); Colonoscopy (6/26/2014); biopsy (within the last 5 years); ENT surgery (1950); left hip replacemen (Left); PELVIS/HIP JOINT SURGERY UNLISTED (April 2012); SHOULDER SURG PROC UNLISTED (?); Tonsillectomy (1950); Eye surgery (2015); Endoscopic sinus surgery (Right, 7/16/2018); and Colonoscopy (N/A, 1/5/2021).  Her family history includes Alcoholism in her son; Cancer in her maternal grandfather; Coronary Artery Disease (age of onset: 76) in her father; Dementia in her mother; Diabetes (age of onset: 85) in her maternal grandmother; Heart Disease in her father.  She reports that she has never smoked. She has never used smokeless tobacco. She reports current alcohol use. She reports that she does not use drugs.    ALLERGIES: She is allergic to nkda [no known drug allergies].    CURRENT MEDICATIONS: She has a current medication list which includes the following prescription(s): aspirin, carvedilol, gabapentin, multivitamin, and simvastatin.     REVIEW OF SYSTEMS: 10 point review of systems is negative except as noted above.     Exam:   Ht 1.651 m (5' 5\")   Wt 64.9 kg (143 lb)   BMI 23.80 kg/m             CONSTITUTIONAL: alert, mild distress and cooperative  HEAD: Normocephalic. No masses, lesions, tenderness or abnormalities  SKIN: no suspicious lesions or rashes  GAIT: normal  NEUROLOGIC: Non-focal, Normal muscle tone and strength, reflexes " normal, sensation grossly normal.  PSYCHIATRIC: affect normal/bright and mentation appears normal.    MUSCULOSKELETAL: Lumbar  Tender:  Right buttock, radiates down right leg, glute med  Non-tender:  thoracic spinous processes, left parathoracic muscles, right parathoracic muscles, lumbar spinous processes  Range of Motion:  lumbar flexion  decreased, lumbar extension  decreased  Strength:  able to heel walk, able to toe walk  Special tests:  negative straight leg raises    Hip Exam: Hip ROM - decreased on right, moderate OA degenerative changes on right hip, left BRITTANY         Assessment/Plan:   Pt is an 82 yo white female with PMhx of stress-induced cardiomyopathy presenting to discuss LBP that radiates down her right leg  1. Right gluteal tendinopathy-  Agreeable to PT  2. Leg length discrepancy after Left hip surgery- trial of heel lift on right  3. Lumbar degenerative disc disease-  Trial of PT  If not improving consider MRI Lumbar, right L4-5 of interest    RTC 6-8 weeks  X-RAY INTERPRETATION:   Lumbar films 2019-IMPRESSION:   1. Multilevel degenerative changes this most pronounced moderate grade  disc space narrowing noted at the levels of L1-L2 and L4-L5.  2. Transitional vertebral body at the presumed closed thoracic  vertebral body level.

## 2021-08-18 ENCOUNTER — OFFICE VISIT (OUTPATIENT)
Dept: INTERNAL MEDICINE | Facility: CLINIC | Age: 81
End: 2021-08-18
Payer: COMMERCIAL

## 2021-08-18 ENCOUNTER — THERAPY VISIT (OUTPATIENT)
Dept: PHYSICAL THERAPY | Facility: CLINIC | Age: 81
End: 2021-08-18
Payer: COMMERCIAL

## 2021-08-18 ENCOUNTER — LAB (OUTPATIENT)
Dept: LAB | Facility: CLINIC | Age: 81
End: 2021-08-18
Payer: COMMERCIAL

## 2021-08-18 VITALS
WEIGHT: 140.6 LBS | DIASTOLIC BLOOD PRESSURE: 72 MMHG | RESPIRATION RATE: 16 BRPM | OXYGEN SATURATION: 96 % | SYSTOLIC BLOOD PRESSURE: 112 MMHG | HEIGHT: 65 IN | BODY MASS INDEX: 23.43 KG/M2 | HEART RATE: 94 BPM

## 2021-08-18 DIAGNOSIS — C91.10 CLL (CHRONIC LYMPHOCYTIC LEUKEMIA) (H): ICD-10-CM

## 2021-08-18 DIAGNOSIS — M54.41 ACUTE RIGHT-SIDED LOW BACK PAIN WITH RIGHT-SIDED SCIATICA: ICD-10-CM

## 2021-08-18 DIAGNOSIS — C91.10 CLL (CHRONIC LYMPHOCYTIC LEUKEMIA) (H): Primary | ICD-10-CM

## 2021-08-18 LAB
BASOPHILS # BLD MANUAL: 0 10E3/UL (ref 0–0.2)
BASOPHILS NFR BLD MANUAL: 0 %
EOSINOPHIL # BLD MANUAL: 0 10E3/UL (ref 0–0.7)
EOSINOPHIL NFR BLD MANUAL: 0 %
ERYTHROCYTE [DISTWIDTH] IN BLOOD BY AUTOMATED COUNT: 13.9 % (ref 10–15)
HCT VFR BLD AUTO: 44.4 % (ref 35–47)
HGB BLD-MCNC: 14.5 G/DL (ref 11.7–15.7)
LYMPHOCYTES # BLD MANUAL: 66 10E3/UL (ref 0.8–5.3)
LYMPHOCYTES NFR BLD MANUAL: 93 %
MCH RBC QN AUTO: 30 PG (ref 26.5–33)
MCHC RBC AUTO-ENTMCNC: 32.7 G/DL (ref 31.5–36.5)
MCV RBC AUTO: 92 FL (ref 78–100)
MONOCYTES # BLD MANUAL: 1.4 10E3/UL (ref 0–1.3)
MONOCYTES NFR BLD MANUAL: 2 %
NEUTROPHILS # BLD MANUAL: 3.6 10E3/UL (ref 1.6–8.3)
NEUTROPHILS NFR BLD MANUAL: 5 %
PLAT MORPH BLD: ABNORMAL
PLATELET # BLD AUTO: 171 10E3/UL (ref 150–450)
RBC # BLD AUTO: 4.83 10E6/UL (ref 3.8–5.2)
RBC MORPH BLD: ABNORMAL
WBC # BLD AUTO: 71 10E3/UL (ref 4–11)

## 2021-08-18 PROCEDURE — 97161 PT EVAL LOW COMPLEX 20 MIN: CPT | Mod: GP | Performed by: PHYSICAL THERAPIST

## 2021-08-18 PROCEDURE — 99214 OFFICE O/P EST MOD 30 MIN: CPT | Performed by: INTERNAL MEDICINE

## 2021-08-18 PROCEDURE — 97110 THERAPEUTIC EXERCISES: CPT | Mod: GP | Performed by: PHYSICAL THERAPIST

## 2021-08-18 PROCEDURE — 85027 COMPLETE CBC AUTOMATED: CPT | Performed by: PATHOLOGY

## 2021-08-18 PROCEDURE — 36415 COLL VENOUS BLD VENIPUNCTURE: CPT | Performed by: PATHOLOGY

## 2021-08-18 PROCEDURE — 97530 THERAPEUTIC ACTIVITIES: CPT | Mod: GP | Performed by: PHYSICAL THERAPIST

## 2021-08-18 ASSESSMENT — PAIN SCALES - GENERAL: PAINLEVEL: NO PAIN (0)

## 2021-08-18 ASSESSMENT — MIFFLIN-ST. JEOR: SCORE: 1106.13

## 2021-08-18 NOTE — PROGRESS NOTES
HPI:    Last visit with me 2/18/2021 and additional details in that note. She has some chronic R leg pain and has been seen in orthopedics. She is going to start PT tomorrow. She states she should follow up in dermatology. Otherwise, no additional HEENT, cardiopulmonary, abdominal, , GYN, neurological, systemic, psychiatric, lymphatic, endocrine, vascular complaints.     Past Medical History:   Diagnosis Date     Breast disorder 1990    Breast Cancer     Cardiomyopathy (H)      Chronic osteoarthritis 2012    resulted in hip replacement     CLL (chronic lymphoblastic leukemia) 1989     Closed fracture of second toe of left foot      Colon adenomas     6/26/14 colonoscopy, repeat in 3 years     History of blood transfusion      hyperlipidemia      Primary localized osteoarthrosis, pelvic region and thigh      Tinnitus      Past Surgical History:   Procedure Laterality Date     BIOPSY  within the last 5 years    polyps during colonoscopy     C PELVIS/HIP JOINT SURGERY UNLISTED  April 2012    left hip replacement     C SHOULDER SURG PROC UNLISTED  ?     COLONOSCOPY  6/26/2014    Procedure: COMBINED COLONOSCOPY, SINGLE BIOPSY/POLYPECTOMY BY BIOPSY;  Surgeon: Pola Arceo MD;  Location:  GI     COLONOSCOPY N/A 1/5/2021    Procedure: COLONOSCOPY, WITH POLYPECTOMY AND CLIP;  Surgeon: Charlie Wang MD;  Location: [facility] OR     D & C  1962    late PP hemorrhage --> retained placenta     ENDOSCOPIC SINUS SURGERY Right 7/16/2018    Procedure: ENDOSCOPIC SINUS SURGERY;  Endoscopic Sphenoidotomy with Removal of Tissue;  Surgeon: Kishore Webster MD;  Location:  OR     ENT SURGERY  1950    tonselectomy     EYE SURGERY  2015    cataract on left eye     left hip replacemen Left     hip replacement in 2013     LUMPECTOMY BREAST  1990    Left     ORTHOPEDIC SURGERY  age?    right shoulder      ORTHOPEDIC SURGERY  ~ 2013    left hip replacement     R hip arthroscopy  7/19/11     TONSILLECTOMY  1950     PE:    Vitals noted, gen,  nad, cooperative, alert, neck supple nl rom, lungs with good air movement, RRR, S1, S2, no MRG, abdomen, no acute findings. Grossly normal neurological exam.     A/P:    1. She has orthopedic follow up with Dr. Metz 10/22/2021. She was seen 8/13/2021 regarding R leg pain. She is currently getting PT  2. Immunizations; she has completed the Moderna COVID vaccination series. She has completed the Shingrix vaccination series.   3. CLL; labs checked 2/18/2021 and I discuss with Dr. Puente, ONC regarding this. Ordered today.   4. Breast cancer; Mammogram 3/15/2021.   5. Colonoscopy; 1/5/2021 and repeat in 3 years.   6. Dermatology; placed referral 2/18/2021  7. Lipid panel HDL 80 and LDL 88 on 11/12/2020; she is on Simvastatin  8. She remains on low dose Carvedilol and ASA.     30 minutes spent on the date of the encounter doing chart review, history and exam, documentation and further activities as noted above

## 2021-08-18 NOTE — PROGRESS NOTES
Physical Therapy Initial Evaluation  Subjective:  The history is provided by the patient. No  was used.   Patient Health History  Kerri Shook being seen for Pain from my right hip down the leg.       Problem occurred: It is not an injury. It just started on its own.   Pain is reported as 2/10 on pain scale.  General health as reported by patient is good.  Pertinent medical history includes: cancer and osteoarthritis.   Red flags:  None as reported by patient.  Medical allergies: none.   Surgeries include:  Orthopedic surgery and cancer surgery.        Current occupation is retired.                     Therapist Generated HPI Evaluation  Problem details: The patient notes that her right glute pain started about two weeks ago that traveled down the back of her leg. The last few days her pain has been better. Prior to her injury her major exercise was walking for 30 minutes a couple times a week. The past few days she has been taking short walks for about a mile. Currently she is having pain down her right thigh. She also has DDD and chronic low back pain that she does some strengthening exercises for..         Type of problem:  Lumbar.    This is a new condition.  Condition occurred with:  Insidious onset.  Where condition occurred: for unknown reasons.  Patient reports pain:  Mid lumbar spine.  Pain is described as aching and is intermittent.  Pain radiates to:  Gluteals right and thigh right. Pain is worse during the day.  Since onset symptoms are gradually improving.  Symptoms are exacerbated by standing and walking  and relieved by rest and NSAID's (sitting).      Work activity restrictions: retired.  Barriers include:  None as reported by patient.                        Objective:  Standing Alignment:        Lumbar:  Lordosis decr                           Lumbar/SI Evaluation  ROM:    AROM Lumbar:   Flexion:        Finger tips to toes, no increase in pain  Ext:                    50%  increase in LBP   Side Bend:        Left:     Right:   Rotation:           Left:     Right:   Side Glide:        Left:     Right:         Strength: trendelenburg bilaterally, active straight leg raise: positive on right side  Lumbar Myotomes:  Lumbar myotomes: able to heel toe walk.  T12-L3 (Hip Flex):  Left: 5    Right: 5  L2-4 (Quads):  Left:  5    Right:  5  L4 (Ankle DF):  Left:  5    Right:  5            Neural Tension/Mobility:      Left side:SLR or Slump  negative.     Right side:   Slump or SLR  negative.   Lumbar Palpation:      Tenderness present at Right: Gluteus Medius                                                     General     ROS    Assessment/Plan:    Patient is a 81 year old female with lumbar complaints.    Patient has the following significant findings with corresponding treatment plan.                Diagnosis 1:  Right low back pain acute  Pain -  hot/cold therapy, US, electric stimulation, mechanical traction, manual therapy, STS, splint/taping/bracing/orthotics, self management, education, directional preference exercise and home program  Decreased strength - therapeutic exercise, therapeutic activities and home program  Impaired posture - neuro re-education, therapeutic activities and home program    Therapy Evaluation Codes:   Cumulative Therapy Evaluation is: Low complexity.    Previous and current functional limitations:  (See Goal Flow Sheet for this information)    Short term and Long term goals: (See Goal Flow Sheet for this information)     Communication ability:  Patient appears to be able to clearly communicate and understand verbal and written communication and follow directions correctly.  Treatment Explanation - The following has been discussed with the patient:   RX ordered/plan of care  Anticipated outcomes  Possible risks and side effects  This patient would benefit from PT intervention to resume normal activities.   Rehab potential is good.    Frequency:  1 X week, once  daily  Duration:  for 6 weeks  Discharge Plan:  Achieve all LTG.  Independent in home treatment program.  Reach maximal therapeutic benefit.    Please refer to the daily flowsheet for treatment today, total treatment time and time spent performing 1:1 timed codes.

## 2021-08-18 NOTE — NURSING NOTE
Kerri Shook is a 81 year old female patient that presents today in clinic for the following:    Chief Complaint   Patient presents with     RECHECK     Six month follow-up     Derm Problem     She reports redness on her neck that appeared ten days ago. She hypothesizes that it may be an insect bite; however, she would like it evaluated today.      Leg Pain     She reports previous pain in her right leg after a trip. She is in PT which is helping.        The patient's allergies and medications were reviewed as noted. A set of vitals were recorded as noted without incident. The patient does not have any other questions for the provider.    Jeremy Morse, EMT at 10:56 AM on 8/18/2021

## 2021-08-21 ENCOUNTER — TELEPHONE (OUTPATIENT)
Dept: INTERNAL MEDICINE | Facility: CLINIC | Age: 81
End: 2021-08-21

## 2021-08-21 DIAGNOSIS — C91.10 CLL (CHRONIC LYMPHOCYTIC LEUKEMIA) (H): Primary | ICD-10-CM

## 2021-08-21 NOTE — TELEPHONE ENCOUNTER
Placed onc referral this encounter    Dear Kerri;    Your lymphocyte count is rising faster than before and I recommend you visit with the Hematology doctors. Unfortunately, Dr. Puente is no longer here. I placed a referral today and you can call [phone] to schedule an appointment.    ANABELLA Westbrook MD

## 2021-08-24 ENCOUNTER — PATIENT OUTREACH (OUTPATIENT)
Dept: ONCOLOGY | Facility: CLINIC | Age: 81
End: 2021-08-24

## 2021-08-24 NOTE — PROGRESS NOTES
New Patient Oncology Nurse Navigator Note     Referral Date: August 21, 2021     Referring provider:   Juan Westbrook MD [facility]     Referred to: Medical Oncology  [facility]    Evaluation for : CLL  Lab Results        Component                Value               Date                        WBC                      71.0                08/18/2021                  WBC                      58.8                02/18/2021            no differentials    Referral updates and Plan:   August 24, 2021 OUTGOING CALL to pt, no answer. LVM introducing my role as nurse navigator with [facility] and that we have recd the referral  from Dr Westbrook. Requested callback to intake team at number below to schedule consult, ask for me if questions I can answer.  9/9/2021  2:45 PM Pop Centeno MD [facility]     August 27, 2021 OUTGOING CALL: notified pt that Dr Centeno has ordered some pre-consult labs and pt will self-schedule at the [facility] lab close to home for next week. She also agrees to change the video consult to in person consult and scheduling team notified to reschedule same date/time accordingly.    Keesha Guerrero, RN, BSN, OCN  Hematology/Oncology Nurse Navigator  [facility]  [phone]

## 2021-08-25 DIAGNOSIS — C91.10 CLL (CHRONIC LYMPHOCYTIC LEUKEMIA) (H): Primary | ICD-10-CM

## 2021-08-30 NOTE — TELEPHONE ENCOUNTER
RECORDS STATUS - ALL OTHER DIAGNOSIS      RECORDS RECEIVED FROM: [facility]   DATE RECEIVED: 8/30   NOTES STATUS DETAILS   OFFICE NOTE from referring provider Juan Álvarez MD in [facility]: 8/18/21   OFFICE NOTE from medical oncologist     DISCHARGE SUMMARY from hospital     DISCHARGE REPORT from the ER     OPERATIVE REPORT [facility] 1/5/21, 6/26/14: Colonoscopy   MEDICATION LIST [facility] 5/26/21   CLINICAL TRIAL TREATMENTS TO DATE     LABS     PATHOLOGY REPORTS NA    ANYTHING RELATED TO DIAGNOSIS Epic 8/18/21   GENONOMIC TESTING     TYPE:     IMAGING (NEED IMAGES & REPORT)     CT SCANS     MRI     MAMMO     ULTRASOUND     PET

## 2021-08-31 ENCOUNTER — LAB (OUTPATIENT)
Dept: LAB | Facility: CLINIC | Age: 81
End: 2021-08-31

## 2021-08-31 ENCOUNTER — TELEPHONE (OUTPATIENT)
Facility: CLINIC | Age: 81
End: 2021-08-31

## 2021-08-31 DIAGNOSIS — C91.10 CLL (CHRONIC LYMPHOCYTIC LEUKEMIA) (H): ICD-10-CM

## 2021-08-31 LAB
BASOPHILS # BLD MANUAL: 0 10E3/UL (ref 0–0.2)
BASOPHILS NFR BLD MANUAL: 0 %
EOSINOPHIL # BLD MANUAL: 0 10E3/UL (ref 0–0.7)
EOSINOPHIL NFR BLD MANUAL: 0 %
ERYTHROCYTE [DISTWIDTH] IN BLOOD BY AUTOMATED COUNT: 14.1 % (ref 10–15)
HCT VFR BLD AUTO: 43.4 % (ref 35–47)
HGB BLD-MCNC: 14.6 G/DL (ref 11.7–15.7)
LDH SERPL L TO P-CCNC: 189 U/L (ref 81–234)
LYMPHOCYTES # BLD MANUAL: 60.9 10E3/UL (ref 0.8–5.3)
LYMPHOCYTES NFR BLD MANUAL: 92 %
MCH RBC QN AUTO: 30 PG (ref 26.5–33)
MCHC RBC AUTO-ENTMCNC: 33.6 G/DL (ref 31.5–36.5)
MCV RBC AUTO: 89 FL (ref 78–100)
MONOCYTES # BLD MANUAL: 0 10E3/UL (ref 0–1.3)
MONOCYTES NFR BLD MANUAL: 0 %
NEUTROPHILS # BLD MANUAL: 5.3 10E3/UL (ref 1.6–8.3)
NEUTROPHILS NFR BLD MANUAL: 8 %
PLAT MORPH BLD: ABNORMAL
PLATELET # BLD AUTO: 162 10E3/UL (ref 150–450)
RBC # BLD AUTO: 4.87 10E6/UL (ref 3.8–5.2)
RBC MORPH BLD: ABNORMAL
WBC # BLD AUTO: 66.2 10E3/UL (ref 4–11)

## 2021-08-31 PROCEDURE — 36415 COLL VENOUS BLD VENIPUNCTURE: CPT

## 2021-08-31 PROCEDURE — 88184 FLOWCYTOMETRY/ TC 1 MARKER: CPT | Performed by: STUDENT IN AN ORGANIZED HEALTH CARE EDUCATION/TRAINING PROGRAM

## 2021-08-31 PROCEDURE — 36415 COLL VENOUS BLD VENIPUNCTURE: CPT | Performed by: STUDENT IN AN ORGANIZED HEALTH CARE EDUCATION/TRAINING PROGRAM

## 2021-08-31 PROCEDURE — 88188 FLOWCYTOMETRY/READ 9-15: CPT | Performed by: PATHOLOGY

## 2021-08-31 PROCEDURE — 88185 FLOWCYTOMETRY/TC ADD-ON: CPT | Performed by: STUDENT IN AN ORGANIZED HEALTH CARE EDUCATION/TRAINING PROGRAM

## 2021-08-31 PROCEDURE — 84550 ASSAY OF BLOOD/URIC ACID: CPT

## 2021-08-31 PROCEDURE — 83615 LACTATE (LD) (LDH) ENZYME: CPT

## 2021-08-31 PROCEDURE — 85027 COMPLETE CBC AUTOMATED: CPT

## 2021-08-31 PROCEDURE — 82232 ASSAY OF BETA-2 PROTEIN: CPT

## 2021-08-31 NOTE — TELEPHONE ENCOUNTER
S: Critical lab result, this is preliminary  B: DATE:  8/31/2021   TIME OF RECEIPT FROM LAB:  1521  LAB TEST/VALUE:  WBC: 62.8 ran it twice, sent to hospital to look at slides, final is not in  HGB 14  ANC: 7.0  Platelets: 183  Lymph 88.5  A: Paged Dr. Centeno with result. [id number]  Dr. Centeno called back at 1530 to acknowledge receipt of lab result.    R: Routed to Dr. Centeno and pt care team.

## 2021-09-01 LAB — B2 MICROGLOB TUMOR MARKER SER-MCNC: 2.1 MG/L

## 2021-09-02 DIAGNOSIS — C91.10 CLL (CHRONIC LYMPHOCYTIC LEUKEMIA) (H): Primary | ICD-10-CM

## 2021-09-02 LAB
PATH REPORT.COMMENTS IMP SPEC: NORMAL
PATH REPORT.FINAL DX SPEC: NORMAL
PATH REPORT.MICROSCOPIC SPEC OTHER STN: NORMAL
PATH REPORT.RELEVANT HX SPEC: NORMAL
URATE SERPL-MCNC: 5.2 MG/DL (ref 2.6–6)

## 2021-09-07 ENCOUNTER — LAB (OUTPATIENT)
Dept: LAB | Facility: CLINIC | Age: 81
End: 2021-09-07
Payer: COMMERCIAL

## 2021-09-07 DIAGNOSIS — C91.10 CLL (CHRONIC LYMPHOCYTIC LEUKEMIA) (H): ICD-10-CM

## 2021-09-07 LAB — HOLD SPECIMEN: NORMAL

## 2021-09-07 PROCEDURE — 36415 COLL VENOUS BLD VENIPUNCTURE: CPT | Performed by: PATHOLOGY

## 2021-09-07 PROCEDURE — 84999 UNLISTED CHEMISTRY PROCEDURE: CPT | Performed by: STUDENT IN AN ORGANIZED HEALTH CARE EDUCATION/TRAINING PROGRAM

## 2021-09-07 PROCEDURE — 80053 COMPREHEN METABOLIC PANEL: CPT | Performed by: PATHOLOGY

## 2021-09-07 PROCEDURE — 81263 IGH VARI REGIONAL MUTATION: CPT | Performed by: STUDENT IN AN ORGANIZED HEALTH CARE EDUCATION/TRAINING PROGRAM

## 2021-09-08 LAB
Lab: NORMAL
PERFORMING LABORATORY: NORMAL
SPECIMEN STATUS: NORMAL
TEST NAME: NORMAL

## 2021-09-08 NOTE — PROGRESS NOTES
[facility]    HEMATOLOGY & ONCOLOGY  NEW CONSULTATION    PATIENT NAME: Kerri Shook MRN # [id number]  DATE OF VISIT: September 8, 2021 YOB: 1940    REFERRING PROVIDER:  Dr. 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    1. CLL in 1986. Found to have an elevated white blood count on routine testing. This was consistent with CLL.Saw Dr. Puente who recommended observation. WBCs 15-20 during this time. She has required no treatment for CLL. Followed by PCP with yearly CBCs.  2. 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 The patient had a screening mammogram in 1990, which showed an abnormality in the left breast. She underwent a lumpectomy on 03/02/1990, which was consistent with a 1.5 cm primary, mucinous adenocarcinoma with 0 of 8 lymph nodes positive. She was staged as a stage I (T1 N0 M0). The tumor was ER positive. She did have radiation to the left breast with a boost having completed 6600 cGy in 33 fractions from 03/28/1990 until 05/15/1990. She went on to take tamoxifen for 6 years from 03/1990 until 03/1996.  3. 1/2019 labs showing WBC increasing to 27. This has continued with WBCs 47 on 11/12/2020 and 71.0 on 8//18/21  4. 8/31/21 Peripheral flow showing CD5+ lambda CLL cells. Hb 14.6, WBC 66.2, Plts 162, ANC 5.3  5. 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 TP53 mutation analysis pending      SUBJECTIVE            PAST MEDICAL HISTORY  Past Medical History:   Diagnosis Date     Breast disorder 1990    Breast Cancer     Cardiomyopathy (H)      Chronic osteoarthritis 2012    resulted in hip replacement     CLL (chronic lymphoblastic leukemia) 1989     Closed fracture of second toe of left foot      Colon adenomas     6/26/14 colonoscopy, repeat in  3 years     History of blood transfusion      hyperlipidemia      Primary localized osteoarthrosis, pelvic region and thigh      Tinnitus        FAMILY HISTORY  Family History   Problem Relation Age of Onset     Diabetes Maternal Grandmother 85     Coronary Artery Disease Father 76         of MI     Heart Disease Father      Cancer Maternal Grandfather         stomach     Alcoholism Son         Active alcoholic     Dementia Mother      C.A.D. No family hx of      Cancer - colorectal No family hx of      Psychotic Disorder No family hx of      Skin Cancer No family hx of      Melanoma No family hx of      Hypertension No family hx of      Breast Cancer No family hx of      Prostate Cancer No family hx of      Cerebrovascular Disease No family hx of         ,, ,       SOCIAL HISTORY  Social History     Socioeconomic History     Marital status:      Spouse name: Not on file     Number of children: Not on file     Years of education: Not on file     Highest education level: Not on file   Occupational History     Not on file   Tobacco Use     Smoking status: Never Smoker     Smokeless tobacco: Never Used   Substance and Sexual Activity     Alcohol use: Yes     Alcohol/week: 0.0 standard drinks     Comment: 3 glasses of wine per week     Drug use: No     Sexual activity: Not Currently     Birth control/protection: None   Other Topics Concern     Parent/sibling w/ CABG, MI or angioplasty before 65F 55M? Not Asked   Social History Narrative    How much exercise per week? 3-4x    How much calcium per day? Supplement       How much caffeine per day? 2 cups coffee in morning    How much vitamin D per day? Supplement    Do you/your family wear seatbelts?  Yes    Do you/your family use safety helmets? Yes    Do you/your family use sunscreen? Yes    Do you/your family keep firearms in the home? No    Do you/your family have a smoke detector(s)? Yes        Do you feel safe in your home? Yes    Has anyone ever touched  you in an unwanted manner? No     Explain     SEE DADA FRANK    March 25, 2015 Katie Hernández LPN             Social Determinants of Health     Financial Resource Strain:      Difficulty of Paying Living Expenses:    Food Insecurity:      Worried About Running Out of Food in the Last Year:      Ran Out of Food in the Last Year:    Transportation Needs:      Lack of Transportation (Medical):      Lack of Transportation (Non-Medical):    Physical Activity:      Days of Exercise per Week:      Minutes of Exercise per Session:    Stress:      Feeling of Stress :    Social Connections:      Frequency of Communication with Friends and Family:      Frequency of Social Gatherings with Friends and Family:      Attends Mandaeism Services:      Active Member of Clubs or Organizations:      Attends Club or Organization Meetings:      Marital Status:    Intimate Partner Violence:      Fear of Current or Ex-Partner:      Emotionally Abused:      Physically Abused:      Sexually Abused:        CURRENT OUTPATIENT MEDICATIONS  Current Outpatient Medications   Medication Sig Dispense Refill     aspirin 81 MG tablet Take 81 mg by mouth daily       carvedilol (COREG) 3.125 MG tablet Take 1 tablet (3.125 mg) by mouth 2 times daily 180 tablet 2     gabapentin (NEURONTIN) 100 MG capsule Route: Take 1 capsule (100 mg) by mouth nightly as needed - Oral 30 capsule 3     Multiple Vitamin (MULTIVITAMIN) per tablet Take 1 tablet by mouth daily.       simvastatin (ZOCOR) 20 MG tablet Take 1 tablet (20 mg) by mouth At Bedtime 90 tablet 1       ALLERGIES  Allergies   Allergen Reactions     Nkda [No Known Drug Allergies]        REVIEW OF SYSTEMS  A complete ROS was performed and was negative except as mentioned in HPI    PHYSICAL EXAM  /78   Pulse 89   Temp 98.5  F (36.9  C) (Oral)   Wt 64.1 kg (141 lb 4.8 oz)   SpO2 94%   BMI 23.40 kg/m    @LASTSAO2(4)@  Wt Readings from Last 3 Encounters:   08/18/21 63.8 kg (140 lb 9.6 oz)   08/13/21 64.9  kg (143 lb)   02/23/21 64.9 kg (143 lb)       Gen: alert, pleasant and conversational, elderly female, NAD  HEET: NC/AT, EOMI w/ PERRL, anicteric sclera. OP clear. MMM.   Neck: supple no JVP  Lymph: No cervical, supraclavicular, axillary or inguinal LAD  CV: normal S1,S2 with RRR no m/r/g  Resp: lungs CTA bilaterally with adequate air movement to bases. No wheezes or crackles  Abd: soft NTND no organomegaly or masses. BS normoactive.   Ext: WWP no edema or cyanosis  Skin: no concerning lesions or rashes  Neuro: A&Ox4, no lateralizing sx. Grossly nonfocal.      LABORATORY AND IMAGING STUDIES     Ref. Range 8/31/2021 13:18   WBC Latest Ref Range: 4.0 - 11.0 10e3/uL 66.2 (HH)   Hemoglobin Latest Ref Range: 11.7 - 15.7 g/dL 14.6   Hematocrit Latest Ref Range: 35.0 - 47.0 % 43.4   Platelet Count Latest Ref Range: 150 - 450 10e3/uL 162   RBC Count Latest Ref Range: 3.80 - 5.20 10e6/uL 4.87   MCV Latest Ref Range: 78 - 100 fL 89   MCH Latest Ref Range: 26.5 - 33.0 pg 30.0   MCHC Latest Ref Range: 31.5 - 36.5 g/dL 33.6   RDW Latest Ref Range: 10.0 - 15.0 % 14.1   % Neutrophils Latest Units: % 8   % Lymphocytes Latest Units: % 92   % Monocytes Latest Units: % 0   % Eosinophils Latest Units: % 0   Absolute Basophils Latest Ref Range: 0.0 - 0.2 10e3/uL 0.0   % Basophils Latest Units: % 0   Absolute Neutrophil Latest Ref Range: 1.6 - 8.3 10e3/uL 5.3   Absolute Lymphocytes Latest Ref Range: 0.8 - 5.3 10e3/uL 60.9 (H)   Absolute Monocytes Latest Ref Range: 0.0 - 1.3 10e3/uL 0.0   Absolute Eosinophils Latest Ref Range: 0.0 - 0.7 10e3/uL 0.0      Ref. 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    # CLL - increasing WBCs over last several months but overal lymphocyte doubling time is still >6 months. She has no palpable adenopathy, no new symptoms and no cytopenias. Arboleda stage 0 and continued observation is warranted. I have sent out TP53 mutation (this will be drawn at next blood check) and IgHV mutational status to fully evaluate the CLL cells. Additionally, FISH is pending.    We discussed the natural history of CLL and how it was hard to predict if the counts would continue to increase at faster rates. We discussed indications for initiation of therapy which would include bulky adenopathy, cytopenias or HSM.     Final plan:  - Labs monthly  - TP53 mutation status on peripheral blood  - follow up with me in 6 months or sooner PRN if count increases accelerate.    Total time spent on this encounter today including reviewing the EMR, documentation and direct patient care counselin minutes    Pop Centeno MD  Instructor  Division of Hematology, Oncology and Transplantation  [facility]

## 2021-09-09 ENCOUNTER — LAB (OUTPATIENT)
Dept: LAB | Facility: CLINIC | Age: 81
End: 2021-09-09
Payer: COMMERCIAL

## 2021-09-09 ENCOUNTER — ONCOLOGY VISIT (OUTPATIENT)
Dept: ONCOLOGY | Facility: CLINIC | Age: 81
End: 2021-09-09
Attending: INTERNAL MEDICINE
Payer: COMMERCIAL

## 2021-09-09 ENCOUNTER — PRE VISIT (OUTPATIENT)
Dept: ONCOLOGY | Facility: CLINIC | Age: 81
End: 2021-09-09

## 2021-09-09 VITALS
DIASTOLIC BLOOD PRESSURE: 78 MMHG | SYSTOLIC BLOOD PRESSURE: 133 MMHG | WEIGHT: 141.3 LBS | HEART RATE: 89 BPM | TEMPERATURE: 98.5 F | OXYGEN SATURATION: 94 % | BODY MASS INDEX: 23.4 KG/M2

## 2021-09-09 DIAGNOSIS — C91.10 CLL (CHRONIC LYMPHOCYTIC LEUKEMIA) (H): ICD-10-CM

## 2021-09-09 LAB
ALBUMIN SERPL-MCNC: 3.7 G/DL (ref 3.4–5)
ALP SERPL-CCNC: 78 U/L (ref 40–150)
ALT SERPL W P-5'-P-CCNC: 29 U/L (ref 0–50)
ANION GAP SERPL CALCULATED.3IONS-SCNC: 5 MMOL/L (ref 3–14)
AST SERPL W P-5'-P-CCNC: 21 U/L (ref 0–45)
BILIRUB SERPL-MCNC: 0.5 MG/DL (ref 0.2–1.3)
BUN SERPL-MCNC: 14 MG/DL (ref 7–30)
CALCIUM SERPL-MCNC: 8.8 MG/DL (ref 8.5–10.1)
CHLORIDE BLD-SCNC: 108 MMOL/L (ref 94–109)
CO2 SERPL-SCNC: 29 MMOL/L (ref 20–32)
CREAT SERPL-MCNC: 0.89 MG/DL (ref 0.52–1.04)
GFR SERPL CREATININE-BSD FRML MDRD: 61 ML/MIN/1.73M2
GLUCOSE BLD-MCNC: 78 MG/DL (ref 70–99)
POTASSIUM BLD-SCNC: 5.1 MMOL/L (ref 3.4–5.3)
PROT SERPL-MCNC: 6.4 G/DL (ref 6.8–8.8)
SODIUM SERPL-SCNC: 142 MMOL/L (ref 133–144)

## 2021-09-09 PROCEDURE — G0463 HOSPITAL OUTPT CLINIC VISIT: HCPCS

## 2021-09-09 PROCEDURE — 88369 M/PHMTRC ALYSISHQUANT/SEMIQ: CPT | Mod: 26 | Performed by: MEDICAL GENETICS

## 2021-09-09 PROCEDURE — 99205 OFFICE O/P NEW HI 60 MIN: CPT | Performed by: STUDENT IN AN ORGANIZED HEALTH CARE EDUCATION/TRAINING PROGRAM

## 2021-09-09 PROCEDURE — 88368 INSITU HYBRIDIZATION MANUAL: CPT | Mod: 26 | Performed by: MEDICAL GENETICS

## 2021-09-09 ASSESSMENT — PAIN SCALES - GENERAL: PAINLEVEL: NO PAIN (0)

## 2021-09-09 NOTE — NURSING NOTE
"Oncology Rooming Note    September 9, 2021 2:32 PM   Kerri Shook is a 81 year old female who presents for:    Chief Complaint   Patient presents with     Oncology Clinic Visit     CLL      Initial Vitals: There were no vitals taken for this visit. Estimated body mass index is 23.28 kg/m  as calculated from the following:    Height as of 8/18/21: 1.655 m (5' 5.16\").    Weight as of 8/18/21: 63.8 kg (140 lb 9.6 oz). There is no height or weight on file to calculate BSA.  Data Unavailable Comment: Data Unavailable   No LMP recorded. Patient is postmenopausal.  Allergies reviewed: Yes  Medications reviewed: Yes    Medications: Medication refills not needed today.  Pharmacy name entered into [facility]: [id number] - [place], [place] - [address] AT [facility] [address]    Clinical concerns: NONE       Jayshree Palacios CMA              "

## 2021-09-09 NOTE — LETTER
9/9/2021         RE: Kerri Shook  [address]  [place] [facility] [address]        Dear Colleague,    Thank you for referring your patient, Kerri Shook, to the [facility]. Please see a copy of my visit note below.    [facility]    HEMATOLOGY & ONCOLOGY  NEW CONSULTATION    PATIENT NAME: Kerri Shook MRN # [id number]  DATE OF VISIT: September 8, 2021 YOB: 1940    REFERRING PROVIDER:  Dr. 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    1. CLL in 1986. Found to have an elevated white blood count on routine testing. This was consistent with CLL.Saw Dr. Puente who recommended observation. WBCs 15-20 during this time. She has required no treatment for CLL. Followed by PCP with yearly CBCs.  2. 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 The patient had a screening mammogram in 1990, which showed an abnormality in the left breast. She underwent a lumpectomy on 03/02/1990, which was consistent with a 1.5 cm primary, mucinous adenocarcinoma with 0 of 8 lymph nodes positive. She was staged as a stage I (T1 N0 M0). The tumor was ER positive. She did have radiation to the left breast with a boost having completed 6600 cGy in 33 fractions from 03/28/1990 until 05/15/1990. She went on to take tamoxifen for 6 years from 03/1990 until 03/1996.  3. 1/2019 labs showing WBC increasing to 27. This has continued with WBCs 47 on 11/12/2020 and 71.0 on 8//18/21  4. 8/31/21 Peripheral flow showing CD5+ lambda CLL cells. Hb 14.6, WBC 66.2, Plts 162, ANC 5.3  5. 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 TP53 mutation analysis pending      SUBJECTIVE            PAST MEDICAL HISTORY  Past Medical History:   Diagnosis Date     Breast  disorder     Breast Cancer     Cardiomyopathy (H)      Chronic osteoarthritis     resulted in hip replacement     CLL (chronic lymphoblastic leukemia)      Closed fracture of second toe of left foot      Colon adenomas     14 colonoscopy, repeat in 3 years     History of blood transfusion      hyperlipidemia      Primary localized osteoarthrosis, pelvic region and thigh      Tinnitus        FAMILY HISTORY  Family History   Problem Relation Age of Onset     Diabetes Maternal Grandmother 85     Coronary Artery Disease Father 76         of MI     Heart Disease Father      Cancer Maternal Grandfather         stomach     Alcoholism Son         Active alcoholic     Dementia Mother      C.A.D. No family hx of      Cancer - colorectal No family hx of      Psychotic Disorder No family hx of      Skin Cancer No family hx of      Melanoma No family hx of      Hypertension No family hx of      Breast Cancer No family hx of      Prostate Cancer No family hx of      Cerebrovascular Disease No family hx of         ,, ,       SOCIAL HISTORY  Social History     Socioeconomic History     Marital status:      Spouse name: Not on file     Number of children: Not on file     Years of education: Not on file     Highest education level: Not on file   Occupational History     Not on file   Tobacco Use     Smoking status: Never Smoker     Smokeless tobacco: Never Used   Substance and Sexual Activity     Alcohol use: Yes     Alcohol/week: 0.0 standard drinks     Comment: 3 glasses of wine per week     Drug use: No     Sexual activity: Not Currently     Birth control/protection: None   Other Topics Concern     Parent/sibling w/ CABG, MI or angioplasty before 65F 55M? Not Asked   Social History Narrative    How much exercise per week? 3-4x    How much calcium per day? Supplement       How much caffeine per day? 2 cups coffee in morning    How much vitamin D per day? Supplement    Do you/your family wear seatbelts?   Yes    Do you/your family use safety helmets? Yes    Do you/your family use sunscreen? Yes    Do you/your family keep firearms in the home? No    Do you/your family have a smoke detector(s)? Yes        Do you feel safe in your home? Yes    Has anyone ever touched you in an unwanted manner? No     Explain     SEE DADA FRANK    March 25, 2015 Katie Hernández LPN             Social Determinants of Health     Financial Resource Strain:      Difficulty of Paying Living Expenses:    Food Insecurity:      Worried About Running Out of Food in the Last Year:      Ran Out of Food in the Last Year:    Transportation Needs:      Lack of Transportation (Medical):      Lack of Transportation (Non-Medical):    Physical Activity:      Days of Exercise per Week:      Minutes of Exercise per Session:    Stress:      Feeling of Stress :    Social Connections:      Frequency of Communication with Friends and Family:      Frequency of Social Gatherings with Friends and Family:      Attends Anabaptism Services:      Active Member of Clubs or Organizations:      Attends Club or Organization Meetings:      Marital Status:    Intimate Partner Violence:      Fear of Current or Ex-Partner:      Emotionally Abused:      Physically Abused:      Sexually Abused:        CURRENT OUTPATIENT MEDICATIONS  Current Outpatient Medications   Medication Sig Dispense Refill     aspirin 81 MG tablet Take 81 mg by mouth daily       carvedilol (COREG) 3.125 MG tablet Take 1 tablet (3.125 mg) by mouth 2 times daily 180 tablet 2     gabapentin (NEURONTIN) 100 MG capsule Route: Take 1 capsule (100 mg) by mouth nightly as needed - Oral 30 capsule 3     Multiple Vitamin (MULTIVITAMIN) per tablet Take 1 tablet by mouth daily.       simvastatin (ZOCOR) 20 MG tablet Take 1 tablet (20 mg) by mouth At Bedtime 90 tablet 1       ALLERGIES  Allergies   Allergen Reactions     Nkda [No Known Drug Allergies]        REVIEW OF SYSTEMS  A complete ROS was performed and was negative  except as mentioned in HPI    PHYSICAL EXAM  /78   Pulse 89   Temp 98.5  F (36.9  C) (Oral)   Wt 64.1 kg (141 lb 4.8 oz)   SpO2 94%   BMI 23.40 kg/m    @LASTSAO2(4)@  Wt Readings from Last 3 Encounters:   08/18/21 63.8 kg (140 lb 9.6 oz)   08/13/21 64.9 kg (143 lb)   02/23/21 64.9 kg (143 lb)       Gen: alert, pleasant and conversational, elderly female, NAD  HEET: NC/AT, EOMI w/ PERRL, anicteric sclera. OP clear. MMM.   Neck: supple no JVP  Lymph: No cervical, supraclavicular, axillary or inguinal LAD  CV: normal S1,S2 with RRR no m/r/g  Resp: lungs CTA bilaterally with adequate air movement to bases. No wheezes or crackles  Abd: soft NTND no organomegaly or masses. BS normoactive.   Ext: WWP no edema or cyanosis  Skin: no concerning lesions or rashes  Neuro: A&Ox4, no lateralizing sx. Grossly nonfocal.      LABORATORY AND IMAGING STUDIES     Ref. Range 8/31/2021 13:18   WBC Latest Ref Range: 4.0 - 11.0 10e3/uL 66.2 (HH)   Hemoglobin Latest Ref Range: 11.7 - 15.7 g/dL 14.6   Hematocrit Latest Ref Range: 35.0 - 47.0 % 43.4   Platelet Count Latest Ref Range: 150 - 450 10e3/uL 162   RBC Count Latest Ref Range: 3.80 - 5.20 10e6/uL 4.87   MCV Latest Ref Range: 78 - 100 fL 89   MCH Latest Ref Range: 26.5 - 33.0 pg 30.0   MCHC Latest Ref Range: 31.5 - 36.5 g/dL 33.6   RDW Latest Ref Range: 10.0 - 15.0 % 14.1   % Neutrophils Latest Units: % 8   % Lymphocytes Latest Units: % 92   % Monocytes Latest Units: % 0   % Eosinophils Latest Units: % 0   Absolute Basophils Latest Ref Range: 0.0 - 0.2 10e3/uL 0.0   % Basophils Latest Units: % 0   Absolute Neutrophil Latest Ref Range: 1.6 - 8.3 10e3/uL 5.3   Absolute Lymphocytes Latest Ref Range: 0.8 - 5.3 10e3/uL 60.9 (H)   Absolute Monocytes Latest Ref Range: 0.0 - 1.3 10e3/uL 0.0   Absolute Eosinophils Latest Ref Range: 0.0 - 0.7 10e3/uL 0.0      Ref. 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    # CLL - increasing WBCs over last several months but overal lymphocyte doubling time is still >6 months. She has no palpable adenopathy, no new symptoms and no cytopenias. Arboleda stage 0 and continued observation is warranted. I have sent out TP53 mutation (this will be drawn at next blood check) and IgHV mutational status to fully evaluate the CLL cells. Additionally, FISH is pending.    We discussed the natural history of CLL and how it was hard to predict if the counts would continue to increase at faster rates. We discussed indications for initiation of therapy which would include bulky adenopathy, cytopenias or HSM.     Final plan:  - Labs monthly  - TP53 mutation status on peripheral blood  - follow up with me in 6 months or sooner PRN if count increases accelerate.    Total time spent on this encounter today including reviewing the EMR, documentation and direct patient care counselin minutes    Pop Centeno MD  Instructor  Division of Hematology, Oncology and Transplantation  [facility]            Again, thank you for allowing me to participate in the care of your patient.        Sincerely,        Pop Centeno MD

## 2021-09-13 ENCOUNTER — MYC MEDICAL ADVICE (OUTPATIENT)
Dept: ONCOLOGY | Facility: CLINIC | Age: 81
End: 2021-09-13

## 2021-09-26 ENCOUNTER — HEALTH MAINTENANCE LETTER (OUTPATIENT)
Age: 81
End: 2021-09-26

## 2021-10-09 LAB
CULTURE HARVEST COMPLETE DATE: NORMAL
CULTURE HARVEST COMPLETE DATE: NORMAL

## 2021-10-12 LAB — INTERPRETATION: NORMAL

## 2021-10-14 ENCOUNTER — LAB (OUTPATIENT)
Dept: LAB | Facility: CLINIC | Age: 81
End: 2021-10-14
Payer: COMMERCIAL

## 2021-10-14 DIAGNOSIS — C91.10 CLL (CHRONIC LYMPHOCYTIC LEUKEMIA) (H): ICD-10-CM

## 2021-10-14 LAB
BASOPHILS # BLD AUTO: 0.1 10E3/UL (ref 0–0.2)
BASOPHILS NFR BLD AUTO: 0 %
EOSINOPHIL # BLD AUTO: 0.1 10E3/UL (ref 0–0.7)
EOSINOPHIL NFR BLD AUTO: 0 %
ERYTHROCYTE [DISTWIDTH] IN BLOOD BY AUTOMATED COUNT: 14.1 % (ref 10–15)
HCT VFR BLD AUTO: 43.6 % (ref 35–47)
HGB BLD-MCNC: 14 G/DL (ref 11.7–15.7)
IMM GRANULOCYTES # BLD: 0.1 10E3/UL
IMM GRANULOCYTES NFR BLD: 0 %
LYMPHOCYTES # BLD AUTO: 84.8 10E3/UL (ref 0.8–5.3)
LYMPHOCYTES NFR BLD AUTO: 89 %
MCH RBC QN AUTO: 29.8 PG (ref 26.5–33)
MCHC RBC AUTO-ENTMCNC: 32.1 G/DL (ref 31.5–36.5)
MCV RBC AUTO: 93 FL (ref 78–100)
MONOCYTES # BLD AUTO: 4 10E3/UL (ref 0–1.3)
MONOCYTES NFR BLD AUTO: 4 %
NEUTROPHILS # BLD AUTO: 5.9 10E3/UL (ref 1.6–8.3)
NEUTROPHILS NFR BLD AUTO: 6 %
PLATELET # BLD AUTO: 174 10E3/UL (ref 150–450)
RBC # BLD AUTO: 4.7 10E6/UL (ref 3.8–5.2)
WBC # BLD AUTO: 95 10E3/UL (ref 4–11)

## 2021-10-14 PROCEDURE — 85025 COMPLETE CBC W/AUTO DIFF WBC: CPT

## 2021-10-14 PROCEDURE — 36415 COLL VENOUS BLD VENIPUNCTURE: CPT

## 2021-10-19 LAB — MAYO MISC RESULT: NORMAL

## 2021-10-21 ENCOUNTER — ONCOLOGY VISIT (OUTPATIENT)
Dept: ONCOLOGY | Facility: CLINIC | Age: 81
End: 2021-10-21
Attending: STUDENT IN AN ORGANIZED HEALTH CARE EDUCATION/TRAINING PROGRAM
Payer: COMMERCIAL

## 2021-10-21 ENCOUNTER — APPOINTMENT (OUTPATIENT)
Dept: LAB | Facility: CLINIC | Age: 81
End: 2021-10-21
Attending: STUDENT IN AN ORGANIZED HEALTH CARE EDUCATION/TRAINING PROGRAM
Payer: COMMERCIAL

## 2021-10-21 VITALS
TEMPERATURE: 97.7 F | WEIGHT: 143.7 LBS | BODY MASS INDEX: 23.8 KG/M2 | OXYGEN SATURATION: 97 % | SYSTOLIC BLOOD PRESSURE: 133 MMHG | DIASTOLIC BLOOD PRESSURE: 74 MMHG | RESPIRATION RATE: 16 BRPM | HEART RATE: 78 BPM

## 2021-10-21 DIAGNOSIS — C91.10 CLL (CHRONIC LYMPHOCYTIC LEUKEMIA) (H): ICD-10-CM

## 2021-10-21 LAB
BASOPHILS # BLD MANUAL: 0 10E3/UL (ref 0–0.2)
BASOPHILS NFR BLD MANUAL: 0 %
EOSINOPHIL # BLD MANUAL: 1.8 10E3/UL (ref 0–0.7)
EOSINOPHIL NFR BLD MANUAL: 2 %
ERYTHROCYTE [DISTWIDTH] IN BLOOD BY AUTOMATED COUNT: 14.5 % (ref 10–15)
HCT VFR BLD AUTO: 44.3 % (ref 35–47)
HGB BLD-MCNC: 14 G/DL (ref 11.7–15.7)
HOLD SPECIMEN: NORMAL
INTERPRETATION: NORMAL
LYMPHOCYTES # BLD MANUAL: 81 10E3/UL (ref 0.8–5.3)
LYMPHOCYTES NFR BLD MANUAL: 90 %
MCH RBC QN AUTO: 29.7 PG (ref 26.5–33)
MCHC RBC AUTO-ENTMCNC: 31.6 G/DL (ref 31.5–36.5)
MCV RBC AUTO: 94 FL (ref 78–100)
MONOCYTES # BLD MANUAL: 1.8 10E3/UL (ref 0–1.3)
MONOCYTES NFR BLD MANUAL: 2 %
NEUTROPHILS # BLD MANUAL: 5.4 10E3/UL (ref 1.6–8.3)
NEUTROPHILS NFR BLD MANUAL: 6 %
PLAT MORPH BLD: ABNORMAL
PLATELET # BLD AUTO: 180 10E3/UL (ref 150–450)
RBC # BLD AUTO: 4.72 10E6/UL (ref 3.8–5.2)
RBC MORPH BLD: ABNORMAL
SIGNIFICANT RESULTS: NORMAL
SPECIMEN DESCRIPTION: NORMAL
TEST DETAILS, MDL: NORMAL
WBC # BLD AUTO: 90 10E3/UL (ref 4–11)

## 2021-10-21 PROCEDURE — 250N000011 HC RX IP 250 OP 636: Performed by: STUDENT IN AN ORGANIZED HEALTH CARE EDUCATION/TRAINING PROGRAM

## 2021-10-21 PROCEDURE — 85027 COMPLETE CBC AUTOMATED: CPT | Performed by: STUDENT IN AN ORGANIZED HEALTH CARE EDUCATION/TRAINING PROGRAM

## 2021-10-21 PROCEDURE — 99215 OFFICE O/P EST HI 40 MIN: CPT | Performed by: STUDENT IN AN ORGANIZED HEALTH CARE EDUCATION/TRAINING PROGRAM

## 2021-10-21 PROCEDURE — G0463 HOSPITAL OUTPT CLINIC VISIT: HCPCS | Mod: 25

## 2021-10-21 PROCEDURE — 81351 TP53 GENE FULL GENE SEQUENCE: CPT | Performed by: STUDENT IN AN ORGANIZED HEALTH CARE EDUCATION/TRAINING PROGRAM

## 2021-10-21 PROCEDURE — 36415 COLL VENOUS BLD VENIPUNCTURE: CPT | Performed by: STUDENT IN AN ORGANIZED HEALTH CARE EDUCATION/TRAINING PROGRAM

## 2021-10-21 PROCEDURE — G0009 ADMIN PNEUMOCOCCAL VACCINE: HCPCS | Performed by: STUDENT IN AN ORGANIZED HEALTH CARE EDUCATION/TRAINING PROGRAM

## 2021-10-21 PROCEDURE — 90732 PPSV23 VACC 2 YRS+ SUBQ/IM: CPT | Performed by: STUDENT IN AN ORGANIZED HEALTH CARE EDUCATION/TRAINING PROGRAM

## 2021-10-21 PROCEDURE — G0463 HOSPITAL OUTPT CLINIC VISIT: HCPCS

## 2021-10-21 RX ADMIN — PNEUMOCOCCAL VACCINE POLYVALENT 0.5 ML
25; 25; 25; 25; 25; 25; 25; 25; 25; 25; 25; 25; 25; 25; 25; 25; 25; 25; 25; 25; 25; 25; 25 INJECTION, SOLUTION INTRAMUSCULAR; SUBCUTANEOUS at 14:30

## 2021-10-21 ASSESSMENT — PAIN SCALES - GENERAL: PAINLEVEL: NO PAIN (0)

## 2021-10-21 NOTE — LETTER
10/21/2021         RE: Kerri Shook  [address]  [facility] [address]        Dear Colleague,    Thank you for referring your patient, Kerri Shook, to the [facility]. Please see a copy of my visit note below.    [facility]    HEMATOLOGY & ONCOLOGY  NEW CONSULTATION    PATIENT NAME: Kerri Shook MRN # [id number]  DATE OF VISIT: September 8, 2021 YOB: 1940    REFERRING PROVIDER:  Dr. 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    1. CLL in 1986. Found to have an elevated white blood count on routine testing. This was consistent with CLL.Saw Dr. Puente who recommended observation. WBCs 15-20 during this time. She has required no treatment for CLL. Followed by PCP with yearly CBCs.  2. 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 The patient had a screening mammogram in 1990, which showed an abnormality in the left breast. She underwent a lumpectomy on 03/02/1990, which was consistent with a 1.5 cm primary, mucinous adenocarcinoma with 0 of 8 lymph nodes positive. She was staged as a stage I (T1 N0 M0). The tumor was ER positive. She did have radiation to the left breast with a boost having completed 6600 cGy in 33 fractions from 03/28/1990 until 05/15/1990. She went on to take tamoxifen for 6 years from 03/1990 until 03/1996.  3. 1/2019 labs showing WBC increasing to 27. This has continued with WBCs 47 on 11/12/2020 and 71.0 on 8//18/21  4. 8/31/21 Peripheral flow showing CD5+ lambda CLL cells. Hb 14.6, WBC 66.2, Plts 162, ANC 5.3  5. TP53 pending + IgH mutated      SUBJECTIVE  Kerri presents for follow up. She reports no significant changes since last visit. Denies f/c/s or n/v/d. No new lumps/bumps. Feels OK.      PAST MEDICAL HISTORY  Past Medical History:   Diagnosis Date     Breast disorder 1990    Breast Cancer     Cardiomyopathy (H)      Chronic osteoarthritis 2012    resulted in hip replacement     CLL (chronic lymphoblastic leukemia) 1989     Closed fracture of second toe of left foot      Colon adenomas     6/26/14 colonoscopy, repeat in 3 years     History of blood transfusion      hyperlipidemia      Primary localized osteoarthrosis, pelvic region and thigh      Tinnitus      CURRENT OUTPATIENT MEDICATIONS  Current Outpatient Medications   Medication Sig Dispense Refill     aspirin 81 MG tablet Take 81 mg by mouth daily       carvedilol (COREG) 3.125 MG tablet Take 1 tablet (3.125 mg) by mouth 2 times daily 180 tablet 2     gabapentin (NEURONTIN) 100 MG capsule Route: Take 1 capsule (100 mg) by mouth nightly as needed - Oral 30 capsule 3     Multiple Vitamin (MULTIVITAMIN) per tablet Take 1 tablet by mouth daily.       simvastatin (ZOCOR) 20 MG tablet Take 1 tablet (20 mg) by mouth At Bedtime 90 tablet 1       REVIEW OF SYSTEMS  A complete ROS was performed and was negative except as mentioned in HPI    PHYSICAL EXAM  /74   Pulse 78   Temp 97.7  F (36.5  C) (Oral)   Resp 16   Wt 65.2 kg (143 lb 11.2 oz)   SpO2 97%   BMI 23.80 kg/m    Wt Readings from Last 3 Encounters:   10/21/21 65.2 kg (143 lb 11.2 oz)   09/09/21 64.1 kg (141 lb 4.8 oz)   08/18/21 63.8 kg (140 lb 9.6 oz)       Gen: alert, pleasant and conversational, elderly female, NAD  HEET: NC/AT, EOMI, anicteric sclera.   Resp: breathing comfortably on RA  Abd: nondistended  Ext: no edema or cyanosis  Skin: no concerning lesions or rashes  Neuro: A&Ox4. Grossly nonfocal.      LABORATORY AND IMAGING STUDIES    !HEMATOLOGY Latest Ref Rng & Units 10/14/2021   WBC 4.0 - 11.0 10e3/uL 95.0 (HH)   RBC 3.80 - 5.20 10e6/uL 4.70   HEMOGLOBIN 11.7 - 15.7 g/dL 14.0   HCT 35.0 - 47.0 % 43.6   MCV 78 - 100 fL 93   MCH 26.5 - 33.0 pg 29.8   MCHC 31.5 - 36.5  g/dL 32.1   RDW 10.0 - 15.0 % 14.1    - 450 10e3/uL 174   PLTE     % NEUTROPHILS % 6   % LYMPHOCYTES % 89   ABSOLUTE LYMPHOCYTES 0.8 - 5.3 10e3/uL    ABSOLUTE MONOCYTES 0.0 - 1.3 10e3/uL    % EOSINOPHILS % 0   ABSOLUTE EOSINOPHILS 0.0 - 0.7 10e3/uL    ABAS 0.0 - 0.2 10e3/uL    RBC MORPHOLOGY     ESR 0 - 30 mm/h    ABSOLUTE NEUTROPHIL 1.6 - 8.3 10e3/uL      !HEMATOLOGY Latest Ref Rng & Units 10/21/2021   WBC 4.0 - 11.0 10e3/uL 90.0 (HH)   RBC 3.80 - 5.20 10e6/uL 4.72   HEMOGLOBIN 11.7 - 15.7 g/dL 14.0   HCT 35.0 - 47.0 % 44.3   MCV 78 - 100 fL 94   MCH 26.5 - 33.0 pg 29.7   MCHC 31.5 - 36.5 g/dL 31.6   RDW 10.0 - 15.0 % 14.5    - 450 10e3/uL 180   PLTE     % NEUTROPHILS % 6   % LYMPHOCYTES % 90   ABSOLUTE LYMPHOCYTES 0.8 - 5.3 10e3/uL 81.0 (H)   ABSOLUTE MONOCYTES 0.0 - 1.3 10e3/uL 1.8 (H)   % EOSINOPHILS % 2   ABSOLUTE EOSINOPHILS 0.0 - 0.7 10e3/uL 1.8 (H)   ABAS 0.0 - 0.2 10e3/uL 0.0   RBC MORPHOLOGY  Confirmed RBC Indices   ESR 0 - 30 mm/h    ABSOLUTE NEUTROPHIL 1.6 - 8.3 10e3/uL 5.4         FISH 9/9/21  RESULTS:     ABNORMAL  - Loss of TP53 (83.5%)  - Monoallelic loss of [id number] (78%)  - Biallellic loss of C66L015 (17.5%)  - Loss of CCND1 (11q13.3)  (81%)     ASSESSMENT AND PLAN  Kerri Shook is a 81 year old female with PMH significant for 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    # CLL - Arboleda Stage 0, CLL-IPI = 5 = High risk. Has loss of TP53 on FISH. IgHV mutated. NGS for TP53 mutation pending. WBC initially increasing logrithmically with >50% increase between  8/31/21 (66) and 10/21/21 (95). Today, WBC relatively stable at 90 (ALC 81 from 85).     We discussed that given evolution of disease to include a TP53 mutation, this cancer has a much higher risk of growing uncontrollably and causing problems. We discussed options for therapy including Jose+Obinituzumab for 1  year vs daily Acalabrutinib including the risks and benefits. Together we made the decision to pursue Acalabrutinib. She acknowledged the risks including cytopenias, increased risk of infections, risk of hemorrage and rare risk of Afib. I will ask pharmacy to run prior authorization. We would stop aspirin prior to starting as she does not carry a diagnosis of CAD.     In meantime, will schedule labs for 1 month from now. Should ALC continue to increase will recommend that we start Acalabrutinib shortly thereafter.     Final plan:  - Labs monthly  - TP53 mutation status on peripheral blood sent today  - Follow up with me in 3 months or sooner PRN  - pneumovax 23 booster today  - already had flu shot    Total time spent on this encounter today including reviewing the EMR, documentation and direct patient care counselin minutes    Pop Centeno MD  Instructor  Division of Hematology, Oncology and Transplantation  [facility]            Again, thank you for allowing me to participate in the care of your patient.        Sincerely,        Pop Centeno MD

## 2021-10-21 NOTE — NURSING NOTE
Chief Complaint   Patient presents with     Blood Draw     Labs drawn via VPT by RN in lab. Vs taken.      Labs collected from venipuncture by RN. Vitals taken. Checked in for appointment(s).    [facility] tube drawn.      Aimee PÉREZ RN PHN BSN  BMT/Oncology Lab

## 2021-10-21 NOTE — NURSING NOTE
"Oncology Rooming Note    October 21, 2021 1:27 PM   Kerri Shook is a 81 year old female who presents for:    Chief Complaint   Patient presents with     Blood Draw     Labs drawn via VPT by RN in lab. Vs taken.      Oncology Clinic Visit     CLL     Initial Vitals: /74   Pulse 78   Temp 97.7  F (36.5  C) (Oral)   Resp 16   Wt 65.2 kg (143 lb 11.2 oz)   SpO2 97%   BMI 23.80 kg/m   Estimated body mass index is 23.8 kg/m  as calculated from the following:    Height as of 8/18/21: 1.655 m (5' 5.16\").    Weight as of this encounter: 65.2 kg (143 lb 11.2 oz). Body surface area is 1.73 meters squared.  No Pain (0) Comment: Data Unavailable   No LMP recorded. Patient is postmenopausal.  Allergies reviewed: Yes  Medications reviewed: Yes    Medications: Medication refills not needed today.  Pharmacy name entered into [employer]: [facility] #[id number] - [place], [place] - [address] AT [facility] [address]    Clinical concerns: none       Radha Falk CMA            "

## 2021-10-21 NOTE — NURSING NOTE
Pneumovax 23 given in right deltoid. Patient tolerated well. VIS given to patient.     Jayshree Palacios CMA

## 2021-10-21 NOTE — PROGRESS NOTES
[facility]    HEMATOLOGY & ONCOLOGY  NEW CONSULTATION    PATIENT NAME: Kerri Shook MRN # [id number]  DATE OF VISIT: September 8, 2021 YOB: 1940    REFERRING PROVIDER:  Dr. 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    1. CLL in 1986. Found to have an elevated white blood count on routine testing. This was consistent with CLL.Saw Dr. Puente who recommended observation. WBCs 15-20 during this time. She has required no treatment for CLL. Followed by PCP with yearly CBCs.  2. 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 The patient had a screening mammogram in 1990, which showed an abnormality in the left breast. She underwent a lumpectomy on 03/02/1990, which was consistent with a 1.5 cm primary, mucinous adenocarcinoma with 0 of 8 lymph nodes positive. She was staged as a stage I (T1 N0 M0). The tumor was ER positive. She did have radiation to the left breast with a boost having completed 6600 cGy in 33 fractions from 03/28/1990 until 05/15/1990. She went on to take tamoxifen for 6 years from 03/1990 until 03/1996.  3. 1/2019 labs showing WBC increasing to 27. This has continued with WBCs 47 on 11/12/2020 and 71.0 on 8//18/21  4. 8/31/21 Peripheral flow showing CD5+ lambda CLL cells. Hb 14.6, WBC 66.2, Plts 162, ANC 5.3  5. TP53 pending + IgH mutated      SUBJECTIVE  Kerri presents for follow up. She reports no significant changes since last visit. Denies f/c/s or n/v/d. No new lumps/bumps. Feels OK.     PAST MEDICAL HISTORY  Past Medical History:   Diagnosis Date     Breast disorder 1990    Breast Cancer     Cardiomyopathy (H)      Chronic osteoarthritis 2012    resulted in hip replacement     CLL (chronic lymphoblastic leukemia) 1989     Closed fracture of  second toe of left foot      Colon adenomas     6/26/14 colonoscopy, repeat in 3 years     History of blood transfusion      hyperlipidemia      Primary localized osteoarthrosis, pelvic region and thigh      Tinnitus      CURRENT OUTPATIENT MEDICATIONS  Current Outpatient Medications   Medication Sig Dispense Refill     aspirin 81 MG tablet Take 81 mg by mouth daily       carvedilol (COREG) 3.125 MG tablet Take 1 tablet (3.125 mg) by mouth 2 times daily 180 tablet 2     gabapentin (NEURONTIN) 100 MG capsule Route: Take 1 capsule (100 mg) by mouth nightly as needed - Oral 30 capsule 3     Multiple Vitamin (MULTIVITAMIN) per tablet Take 1 tablet by mouth daily.       simvastatin (ZOCOR) 20 MG tablet Take 1 tablet (20 mg) by mouth At Bedtime 90 tablet 1       REVIEW OF SYSTEMS  A complete ROS was performed and was negative except as mentioned in HPI    PHYSICAL EXAM  /74   Pulse 78   Temp 97.7  F (36.5  C) (Oral)   Resp 16   Wt 65.2 kg (143 lb 11.2 oz)   SpO2 97%   BMI 23.80 kg/m    Wt Readings from Last 3 Encounters:   10/21/21 65.2 kg (143 lb 11.2 oz)   09/09/21 64.1 kg (141 lb 4.8 oz)   08/18/21 63.8 kg (140 lb 9.6 oz)       Gen: alert, pleasant and conversational, elderly female, NAD  HEET: NC/AT, EOMI, anicteric sclera.   Resp: breathing comfortably on RA  Abd: nondistended  Ext: no edema or cyanosis  Skin: no concerning lesions or rashes  Neuro: A&Ox4. Grossly nonfocal.      LABORATORY AND IMAGING STUDIES    !HEMATOLOGY Latest Ref Rng & Units 10/14/2021   WBC 4.0 - 11.0 10e3/uL 95.0 (HH)   RBC 3.80 - 5.20 10e6/uL 4.70   HEMOGLOBIN 11.7 - 15.7 g/dL 14.0   HCT 35.0 - 47.0 % 43.6   MCV 78 - 100 fL 93   MCH 26.5 - 33.0 pg 29.8   MCHC 31.5 - 36.5 g/dL 32.1   RDW 10.0 - 15.0 % 14.1    - 450 10e3/uL 174   PLTE     % NEUTROPHILS % 6   % LYMPHOCYTES % 89   ABSOLUTE LYMPHOCYTES 0.8 - 5.3 10e3/uL    ABSOLUTE MONOCYTES 0.0 - 1.3 10e3/uL    % EOSINOPHILS % 0   ABSOLUTE EOSINOPHILS 0.0 - 0.7 10e3/uL    ABAS  0.0 - 0.2 10e3/uL    RBC MORPHOLOGY     ESR 0 - 30 mm/h    ABSOLUTE NEUTROPHIL 1.6 - 8.3 10e3/uL      !HEMATOLOGY Latest Ref Rng & Units 10/21/2021   WBC 4.0 - 11.0 10e3/uL 90.0 (HH)   RBC 3.80 - 5.20 10e6/uL 4.72   HEMOGLOBIN 11.7 - 15.7 g/dL 14.0   HCT 35.0 - 47.0 % 44.3   MCV 78 - 100 fL 94   MCH 26.5 - 33.0 pg 29.7   MCHC 31.5 - 36.5 g/dL 31.6   RDW 10.0 - 15.0 % 14.5    - 450 10e3/uL 180   PLTE     % NEUTROPHILS % 6   % LYMPHOCYTES % 90   ABSOLUTE LYMPHOCYTES 0.8 - 5.3 10e3/uL 81.0 (H)   ABSOLUTE MONOCYTES 0.0 - 1.3 10e3/uL 1.8 (H)   % EOSINOPHILS % 2   ABSOLUTE EOSINOPHILS 0.0 - 0.7 10e3/uL 1.8 (H)   ABAS 0.0 - 0.2 10e3/uL 0.0   RBC MORPHOLOGY  Confirmed RBC Indices   ESR 0 - 30 mm/h    ABSOLUTE NEUTROPHIL 1.6 - 8.3 10e3/uL 5.4         FISH 9/9/21  RESULTS:     ABNORMAL  - Loss of TP53 (83.5%)  - Monoallelic loss of Z05L108 (78%)  - Biallellic loss of [id number] (17.5%)  - Loss of CCND1 (11q13.3)  (81%)     ASSESSMENT AND PLAN  Kerri Shook is a 81 year old female with PMH significant for 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    # CLL - Arboleda Stage 0, CLL-IPI = 5 = High risk. Has loss of TP53 on FISH. IgHV mutated. NGS for TP53 mutation pending. WBC initially increasing logrithmically with >50% increase between  8/31/21 (66) and 10/21/21 (95). Today, WBC relatively stable at 90 (ALC 81 from 85).     We discussed that given evolution of disease to include a TP53 mutation, this cancer has a much higher risk of growing uncontrollably and causing problems. We discussed options for therapy including Jose+Obinituzumab for 1 year vs daily Acalabrutinib including the risks and benefits. Together we made the decision to pursue Acalabrutinib. She acknowledged the risks including cytopenias, increased risk of infections, risk of hemorrage and rare risk of Afib. I will ask pharmacy to run  prior authorization. We would stop aspirin prior to starting as she does not carry a diagnosis of CAD.     In meantime, will schedule labs for 1 month from now. Should ALC continue to increase will recommend that we start Acalabrutinib shortly thereafter.     Final plan:  - Labs monthly  - TP53 mutation status on peripheral blood sent today  - Follow up with me in 3 months or sooner PRN  - pneumovax 23 booster today  - already had flu shot    Total time spent on this encounter today including reviewing the EMR, documentation and direct patient care counselin minutes    Pop Centeno MD  Instructor  Division of Hematology, Oncology and Transplantation  [facility]

## 2021-10-24 DIAGNOSIS — E78.5 HYPERLIPIDEMIA LDL GOAL <70: ICD-10-CM

## 2021-10-26 ENCOUNTER — MYC REFILL (OUTPATIENT)
Dept: FAMILY MEDICINE | Facility: CLINIC | Age: 81
End: 2021-10-26

## 2021-10-26 DIAGNOSIS — E78.5 HYPERLIPIDEMIA LDL GOAL <70: ICD-10-CM

## 2021-10-26 RX ORDER — SIMVASTATIN 20 MG
20 TABLET ORAL AT BEDTIME
Qty: 90 TABLET | Refills: 0 | Status: CANCELLED | OUTPATIENT
Start: 2021-10-26

## 2021-10-26 RX ORDER — SIMVASTATIN 20 MG
20 TABLET ORAL AT BEDTIME
Qty: 90 TABLET | Refills: 0 | Status: SHIPPED | OUTPATIENT
Start: 2021-10-26 | End: 2022-01-28

## 2021-10-26 NOTE — TELEPHONE ENCOUNTER
Last Clinic Visit: 8/18/2021  [facility] [place]       (0) understands/communicates without difficulty

## 2021-10-27 ENCOUNTER — TELEPHONE (OUTPATIENT)
Dept: ONCOLOGY | Facility: CLINIC | Age: 81
End: 2021-10-27

## 2021-10-27 NOTE — TELEPHONE ENCOUNTER
PA Initiation    Medication: Calquence - Submitted  Insurance Company: [employer] - Phone [phone] Fax [phone]  Pharmacy Filling the Rx:    Filling Pharmacy Phone:    Filling Pharmacy Fax:    Start Date: 10/27/2021        Autumn Rodriguez CPhT  [facility]  Oncology Pharmacy Liaison  [email or web address]  Phone: [phone]  Fax: [phone]

## 2021-10-27 NOTE — TELEPHONE ENCOUNTER
Prior Authorization Approval    Authorization Effective Date: 10/27/2021  Authorization Expiration Date: 10/26/2024  Medication: Calquence - APPROVED  Approved Dose/Quantity:   Reference #:     Insurance Company: [employer] - Phone [phone] Fax [phone]  Expected CoPay:       CoPay Card Available:      Foundation Assistance Needed:    Which Pharmacy is filling the prescription (Not needed for infusion/clinic administered):    Pharmacy Notified:    Patient Notified:          Autumn Rodriguez CPhT  [facility]  Oncology Pharmacy Liaison  [email or web address]  Phone: [phone]  Fax: [phone]

## 2021-10-27 NOTE — TELEPHONE ENCOUNTER
simvastatin (ZOCOR) 20 MG tablet    simvastatin (ZOCOR) 20 MG tablet 90 tablet 0 10/26/2021  No   Sig - Route: Take 1 tablet (20 mg) by mouth At Bedtime - Oral   Sent to pharmacy as: Simvastatin 20 MG Oral Tablet (ZOCOR)   Class: E-Prescribe   Order: [id number]   E-Prescribing Status: Receipt confirmed by pharmacy (10/26/2021  5:18 PM CDT)       Printout Tracking    External Result Report     Possibly Related Medication Notes    SIMVASTATIN ([facility] COA REDUCTASE INHIBITORS) medications were reorganized on 5/15/2014. The possibly related notes have not been reviewed. Relevant information could exist in the related notes.     Pharmacy    [facility] #[id number] - [place], [place] - [address] AT [facility] [address]

## 2021-10-28 ENCOUNTER — TELEPHONE (OUTPATIENT)
Dept: ONCOLOGY | Facility: CLINIC | Age: 81
End: 2021-10-28
Payer: COMMERCIAL

## 2021-10-28 DIAGNOSIS — C91.10 CLL (CHRONIC LYMPHOCYTIC LEUKEMIA) (H): Primary | ICD-10-CM

## 2021-10-28 NOTE — TELEPHONE ENCOUNTER
Called pt to check in    She states that she's feeling OK. May have a cold with some runny nose and congestion but denies f/c/s or n/v/d. No bleeding. No excessive fatigue.    We discussed that her acalabrutinib has been approved. She has a large copay for 1st month but this will likely go down substantially as she hits her out of pocket maximum. I referred her to the financial folks is she wants specifics.    We discussed starting now vs waiting to check counts again in 1 month. She would prefer this route as she would like to hold off as long as possible.    This is reasonable as her hemoglobin and platelets are normal and she is asymptomatic from leukocytosis.    Will plan to check labs early/Mid nov and if counts continue to increase will recommend that we get started    Pop Centeno MD   Instructor  Division of Hematology, Oncology and Transplantation  [facility]  P: [phone]

## 2021-10-29 PROCEDURE — G0452 MOLECULAR PATHOLOGY INTERPR: HCPCS | Mod: 26 | Performed by: PATHOLOGY

## 2021-11-17 ENCOUNTER — OFFICE VISIT (OUTPATIENT)
Dept: DERMATOLOGY | Facility: CLINIC | Age: 81
End: 2021-11-17
Attending: INTERNAL MEDICINE
Payer: COMMERCIAL

## 2021-11-17 DIAGNOSIS — L81.4 SOLAR LENTIGO: ICD-10-CM

## 2021-11-17 DIAGNOSIS — D18.01 CHERRY ANGIOMA: ICD-10-CM

## 2021-11-17 DIAGNOSIS — L82.1 SK (SEBORRHEIC KERATOSIS): Primary | ICD-10-CM

## 2021-11-17 DIAGNOSIS — Z12.83 SKIN EXAM, SCREENING FOR CANCER: ICD-10-CM

## 2021-11-17 DIAGNOSIS — D22.9 MULTIPLE BENIGN MELANOCYTIC NEVI: ICD-10-CM

## 2021-11-17 PROCEDURE — 99203 OFFICE O/P NEW LOW 30 MIN: CPT | Performed by: DERMATOLOGY

## 2021-11-17 ASSESSMENT — PAIN SCALES - GENERAL: PAINLEVEL: NO PAIN (0)

## 2021-11-17 NOTE — PATIENT INSTRUCTIONS
Benign thickenings = seborrheic keratosis (gift of wisdom)      Patient Education     Checking for Skin Cancer  You can find cancer early by checking your skin each month. There are 3 kinds of skin cancer. They are melanoma, basal cell carcinoma, and squamous cell carcinoma. Doing monthly skin checks is the best way to find new marks or skin changes. Follow the instructions below for checking your skin.   The ABCDEs of checking moles for melanoma   Check your moles or growths for signs of melanoma using ABCDE:     Asymmetry: the sides of the mole or growth don t match    Border: the edges are ragged, notched, or blurred    Color: the color within the mole or growth varies    Diameter: the mole or growth is larger than 6 mm (size of a pencil eraser)    Evolving: the size, shape, or color of the mole or growth is changing (evolving is not shown in the images below)    Checking for other types of skin cancer  Basal cell carcinoma or squamous cell carcinoma have symptoms such as:       A spot or mole that looks different from all other marks on your skin    Changes in how an area feels, such as itching, tenderness, or pain    Changes in the skin's surface, such as oozing, bleeding, or scaliness    A sore that does not heal    New swelling or redness beyond the border of a mole    Who s at risk?  Anyone can get skin cancer. But you are at greater risk if you have:     Fair skin, light-colored hair, or light-colored eyes    Many moles or abnormal moles on your skin    A history of sunburns from sunlight or tanning beds    A family history of skin cancer    A history of exposure to radiation or chemicals    A weakened immune system  If you have had skin cancer in the past, you are at risk for recurring skin cancer.   How to check your skin  Do your monthly skin checkups in front of a full-length mirror. Check all parts of your body, including your:     Head (ears, face, neck, and scalp)    Torso (front, back, and  sides)    Arms (tops, undersides, upper, and lower armpits)    Hands (palms, backs, and fingers, including under the nails)    Buttocks and genitals    Legs (front, back, and sides)    Feet (tops, soles, toes, including under the nails, and between toes)  If you have a lot of moles, take digital photos of them each month. Make sure to take photos both up close and from a distance. These can help you see if any moles change over time.   Most skin changes are not cancer. But if you see any changes in your skin, call your doctor right away. Only he or she can diagnose a problem. If you have skin cancer, seeing your doctor can be the first step toward getting the treatment that could save your life.   [employer] last reviewed this educational content on 4/1/2019 2000-2020 [employer]. [address] [place] [address]. All rights reserved. This information is not intended as a substitute for professional medical care. Always follow your healthcare professional's instructions.       When should I call my doctor?    If you are worsening or not improving, please, contact us or seek urgent care as noted below.     Who should I call with questions (adults)?    [facility] (adult and pediatric): [phone]    [facility] (adult): [phone]    For urgent needs outside of business hours call the [facility] at [phone] and ask for the dermatology resident on call to be paged    If this is a medical emergency and you are unable to reach an ER, Call [phone]    Who should I call with questions (pediatric)?  [facility]- Pediatric Dermatology  Dr. Mckenzie Cardenas, Dr. Franko Damon, Dr. Cleo Pascal, Savannah Perez, PA, Dr. Elvi Barros, Dr. Sara Bustamante & Dr. Juan Martínez  Non-urgent nurse triage line; [phone]- Florecita and Penny GENAO Care Coordinatorivett Sykes (/Complex )  [phone]    If you need a prescription refill, please contact your pharmacy. Refills are approved or denied by our Physicians during normal business hours, Monday through Fridays  Per office policy, refills will not be granted if you have not been seen within the past year (or sooner depending on your child's condition)    Scheduling Information:  Pediatric Appointment Scheduling and Call Center [phone]  Radiology Scheduling- [phone]  Sedation Unit Scheduling- [phone]  [place] Scheduling- General [phone]; Pediatric Dermatology [phone]  Main  Services: [phone]  Scottish: [phone]  Scottish: [phone]  Hmong/Uruguayan/Juve: [phone]  Preadmission Nursing Department Fax Number: [phone] (Fax all pre-operative paperwork to this number)    For urgent matters arising during evenings, weekends, or holidays that cannot wait for normal business hours please call [phone] and ask for the dermatology resident on call to be paged.    Dry Skin    What is dry skin?    Common skin problem    Can be worse during the winter     Affects all ages    Occurs in people with or without other skin problems    What does it look like?    Fine lines in the skin become more visible     Rough feeling skin     Flaky skin    Most common on the arms and legs    Skin can become cracked, especially on the hands and feet    What are some problems caused by dry skin?     Itching    Rubbing or scratching can cause thickened, rough skin patches    Cracks in skin can be painful    Red, itchy, scaly skin (called eczema) can occur    Yellow crusting or pus could be signs of an infection    What causes dry skin?    A lack of water in the top layer of the skin    Too much soapy water,  hot water, or harsh chemicals    Aging and sun damage    How do I treat dry skin?    Shower or bathe daily for under ten minutes with lukewarm water and mild soap.    Pat yourself dry with a towel gently and leave  your skin slightly damp.    Use moisturizing cream or ointment right away.  Avoid lotions.    What kind of mild soap should I be using?    Camay , Dove , Tone , Neutrogena , Purpose , or Oil of Olay     A non-detergent cleanser, like Cetaphil , can be used.    What should I stay away from?    Scented soaps     Bath oils    What moisturizers should I be using?    Cetaphil Cream,CeraVe Cream, Vanicream, Aquaphilic, Eucerin, Aquaphor, or Vaseline     Always apply after showering or bathing.    Reapply throughout the day, if possible.    If dry skin affects your hands, always reapply after handwashing.    What else should I know?    Using a humidifier during winter months may help.    If dry skin gets worse or if eczema develops, a steroid cream may be needed.

## 2021-11-17 NOTE — NURSING NOTE
Dermatology Rooming Note    Kerri Shook's goals for this visit include:   Chief Complaint   Patient presents with     Skin Check     states that she has no spots of concern      Mariam Cortes CMA on 11/17/2021 at 12:24 PM

## 2021-11-17 NOTE — LETTER
11/17/2021       RE: Kerri Shook  [address]  [facility] [address]     Dear Colleague,    Thank you for referring your patient, Kerri Shook, to the [facility] [place] at [facility]. Please see a copy of my visit note below.    [facility] Dermatology Note  Encounter Date: Nov 17, 2021  Office Visit     Dermatology Problem List:  FBSE 11/17/2021   #. ISKs, s/p cryo  ____________________________________________    Assessment & Plan:    # Multiple benign nevi.   - No concerning lesions today  - Counseled on ABCDEs of melanoma and sun protection - recommend SPF 30 or higher with frequent application   - Return sooner if noticing changing or symptomatic lesions    #. Benign lesions: solar lentigos, seborrheic keratoses, cherry angiomas. Explained to patient benign nature of lesion. No treatment is necessary at this time unless the lesion changes or becomes symptomatic.     #. Nail brittleness  - Dicussed that there is no great treatment for this.   - encouraged daily moisturization, consider clear nail lacquer, keep nails trimmed short    Procedures Performed:   None    Follow-up: 2 year(s) in-person, or earlier for new or changing lesions    Staff and Scribe:     Scribe Disclosure:  I, Fernanda Garcia, am serving as a scribe to document services personally performed by Vielka Ann MD based on data collection and the provider's statements to me.     Provider Disclosure:   The documentation recorded by the scribe accurately reflects the services I personally performed and the decisions made by me.    Vielka Ann MD    Department of Dermatology  [facility]: Phone: [phone], Fax: [phone]  11/23/2021       ____________________________________________    CC: Skin Check (states that she has no spots of  concern )      HPI:  Ms. Kerri Shook is a(n) 81 year old female who presents today as a return patient for FBSE. Last seen by Rica Moran PA-C on 7/24/18, at which time the patient was treated with cryo for one irritated SK.     Today, the patient reports no spots of concern today. She does note bumps under her breasts bilaterally. Denies any painful, bleeding, growing, or changing spots. Denies any significant sun exposure throughout her life and is diligent with sun protection. She does notes ongoing weak nails.     Patient is otherwise feeling well, without additional skin concerns.    Labs Reviewed:  N/A    Physical Exam:  Vitals: There were no vitals taken for this visit.  SKIN: Full body skin exam excluding the genitals was performed including face, scalp, neck, ears, chest, back, bilateral arms, hands, bilateral legs, feet, and buttocks.   - There are dome shaped bright red papules on the trunk and extremities.   There are waxy stuck on tan to brown papules on the trunk and extremities.   - Scattered brown macules on sun exposed areas.  - Multiple regular brown pigmented macules and papules are identified on the trunk and extremities.    - No other lesions of concern on areas examined.     Medications:  Current Outpatient Medications   Medication     aspirin 81 MG tablet     carvedilol (COREG) 3.125 MG tablet     gabapentin (NEURONTIN) 100 MG capsule     Multiple Vitamin (MULTIVITAMIN) per tablet     simvastatin (ZOCOR) 20 MG tablet     No current facility-administered medications for this visit.        Past Medical History:   Patient Active Problem List   Diagnosis     Breast cancer -- Left     S/P coronary angiogram     Stress-induced cardiomyopathy     Foot injury     Advance care planning     Rosacea     KP (keratosis pilaris)     CLL (chronic lymphocytic leukemia) (H)     Hx of cardiomyopathy     Encounter for routine gynecological examination     Menopause--age 50     Colon adenomas     SK  (seborrheic keratosis)     Seborrheic keratosis     Plantar fasciitis     Acute right-sided low back pain with right-sided sciatica     Past Medical History:   Diagnosis Date     Breast disorder 1990    Breast Cancer     Cardiomyopathy (H)      Chronic osteoarthritis 2012    resulted in hip replacement     CLL (chronic lymphoblastic leukemia) 1989     Closed fracture of second toe of left foot      Colon adenomas     6/26/14 colonoscopy, repeat in 3 years     History of blood transfusion      hyperlipidemia      Primary localized osteoarthrosis, pelvic region and thigh      Tinnitus         CC Juan Westbrook MD  [address] [place] [phone] on close of this encounter.

## 2021-11-17 NOTE — PROGRESS NOTES
[facility] Dermatology Note  Encounter Date: Nov 17, 2021  Office Visit     Dermatology Problem List:  FBSE 11/17/2021   #. ISKs, s/p cryo  ____________________________________________    Assessment & Plan:    # Multiple benign nevi.   - No concerning lesions today  - Counseled on ABCDEs of melanoma and sun protection - recommend SPF 30 or higher with frequent application   - Return sooner if noticing changing or symptomatic lesions    #. Benign lesions: solar lentigos, seborrheic keratoses, cherry angiomas. Explained to patient benign nature of lesion. No treatment is necessary at this time unless the lesion changes or becomes symptomatic.     #. Nail brittleness  - Dicussed that there is no great treatment for this.   - encouraged daily moisturization, consider clear nail lacquer, keep nails trimmed short    Procedures Performed:   None    Follow-up: 2 year(s) in-person, or earlier for new or changing lesions    Staff and Scribe:     Scribe Disclosure:  I, Fernanda Garcia, am serving as a scribe to document services personally performed by Vielka Ann MD based on data collection and the provider's statements to me.     Provider Disclosure:   The documentation recorded by the scribe accurately reflects the services I personally performed and the decisions made by me.    Vielka Ann MD    Department of Dermatology  [facility]: Phone: [phone], Fax: [phone]  11/23/2021       ____________________________________________    CC: Skin Check (states that she has no spots of concern )      HPI:  Ms. Kerri Shook is a(n) 81 year old female who presents today as a return patient for FBSE. Last seen by Rica Moran PA-C on 7/24/18, at which time the patient was treated with cryo for one irritated SK.     Today, the patient reports no spots of concern today. She does note bumps under her  breasts bilaterally. Denies any painful, bleeding, growing, or changing spots. Denies any significant sun exposure throughout her life and is diligent with sun protection. She does notes ongoing weak nails.     Patient is otherwise feeling well, without additional skin concerns.    Labs Reviewed:  N/A    Physical Exam:  Vitals: There were no vitals taken for this visit.  SKIN: Full body skin exam excluding the genitals was performed including face, scalp, neck, ears, chest, back, bilateral arms, hands, bilateral legs, feet, and buttocks.   - There are dome shaped bright red papules on the trunk and extremities.   There are waxy stuck on tan to brown papules on the trunk and extremities.   - Scattered brown macules on sun exposed areas.  - Multiple regular brown pigmented macules and papules are identified on the trunk and extremities.    - No other lesions of concern on areas examined.     Medications:  Current Outpatient Medications   Medication     aspirin 81 MG tablet     carvedilol (COREG) 3.125 MG tablet     gabapentin (NEURONTIN) 100 MG capsule     Multiple Vitamin (MULTIVITAMIN) per tablet     simvastatin (ZOCOR) 20 MG tablet     No current facility-administered medications for this visit.        Past Medical History:   Patient Active Problem List   Diagnosis     Breast cancer -- Left     S/P coronary angiogram     Stress-induced cardiomyopathy     Foot injury     Advance care planning     Rosacea     KP (keratosis pilaris)     CLL (chronic lymphocytic leukemia) (H)     Hx of cardiomyopathy     Encounter for routine gynecological examination     Menopause--age 50     Colon adenomas     SK (seborrheic keratosis)     Seborrheic keratosis     Plantar fasciitis     Acute right-sided low back pain with right-sided sciatica     Past Medical History:   Diagnosis Date     Breast disorder 1990    Breast Cancer     Cardiomyopathy (H)      Chronic osteoarthritis 2012    resulted in hip replacement     CLL (chronic  lymphoblastic leukemia) 1989     Closed fracture of second toe of left foot      Colon adenomas     6/26/14 colonoscopy, repeat in 3 years     History of blood transfusion      hyperlipidemia      Primary localized osteoarthrosis, pelvic region and thigh      Tinnitus         CC Juan Westbrook MD  [address] [place] [phone] on close of this encounter.

## 2021-11-18 ENCOUNTER — LAB (OUTPATIENT)
Dept: LAB | Facility: CLINIC | Age: 81
End: 2021-11-18
Payer: COMMERCIAL

## 2021-11-18 DIAGNOSIS — C91.10 CLL (CHRONIC LYMPHOCYTIC LEUKEMIA) (H): ICD-10-CM

## 2021-11-18 LAB
ALBUMIN SERPL-MCNC: 3.6 G/DL (ref 3.4–5)
ALP SERPL-CCNC: 82 U/L (ref 40–150)
ALT SERPL W P-5'-P-CCNC: 29 U/L (ref 0–50)
ANION GAP SERPL CALCULATED.3IONS-SCNC: 6 MMOL/L (ref 3–14)
AST SERPL W P-5'-P-CCNC: 19 U/L (ref 0–45)
BILIRUB SERPL-MCNC: 0.6 MG/DL (ref 0.2–1.3)
BUN SERPL-MCNC: 16 MG/DL (ref 7–30)
CALCIUM SERPL-MCNC: 9.1 MG/DL (ref 8.5–10.1)
CHLORIDE BLD-SCNC: 104 MMOL/L (ref 94–109)
CO2 SERPL-SCNC: 26 MMOL/L (ref 20–32)
CREAT SERPL-MCNC: 0.86 MG/DL (ref 0.52–1.04)
GFR SERPL CREATININE-BSD FRML MDRD: 64 ML/MIN/1.73M2
GLUCOSE BLD-MCNC: 164 MG/DL (ref 70–99)
LDH SERPL L TO P-CCNC: 230 U/L (ref 81–234)
MAGNESIUM SERPL-MCNC: 2.5 MG/DL (ref 1.6–2.3)
PHOSPHATE SERPL-MCNC: 3.9 MG/DL (ref 2.5–4.5)
POTASSIUM BLD-SCNC: 4.5 MMOL/L (ref 3.4–5.3)
PROT SERPL-MCNC: 6.6 G/DL (ref 6.8–8.8)
SODIUM SERPL-SCNC: 136 MMOL/L (ref 133–144)

## 2021-11-18 PROCEDURE — 83615 LACTATE (LD) (LDH) ENZYME: CPT

## 2021-11-18 PROCEDURE — 84100 ASSAY OF PHOSPHORUS: CPT

## 2021-11-18 PROCEDURE — 83735 ASSAY OF MAGNESIUM: CPT

## 2021-11-18 PROCEDURE — 80053 COMPREHEN METABOLIC PANEL: CPT

## 2021-11-18 PROCEDURE — 36415 COLL VENOUS BLD VENIPUNCTURE: CPT

## 2021-11-20 ENCOUNTER — LAB (OUTPATIENT)
Dept: LAB | Facility: CLINIC | Age: 81
End: 2021-11-20
Payer: COMMERCIAL

## 2021-11-20 DIAGNOSIS — C91.10 CLL (CHRONIC LYMPHOCYTIC LEUKEMIA) (H): ICD-10-CM

## 2021-11-20 PROCEDURE — 36415 COLL VENOUS BLD VENIPUNCTURE: CPT

## 2021-11-20 PROCEDURE — 85025 COMPLETE CBC W/AUTO DIFF WBC: CPT

## 2021-11-22 LAB
BASOPHILS # BLD MANUAL: 0 10E3/UL (ref 0–0.2)
BASOPHILS NFR BLD MANUAL: 0 %
BURR CELLS BLD QL SMEAR: SLIGHT
EOSINOPHIL # BLD MANUAL: 0 10E3/UL (ref 0–0.7)
EOSINOPHIL NFR BLD MANUAL: 0 %
ERYTHROCYTE [DISTWIDTH] IN BLOOD BY AUTOMATED COUNT: 13.6 % (ref 10–15)
HCT VFR BLD AUTO: 44.2 % (ref 35–47)
HGB BLD-MCNC: 14.4 G/DL (ref 11.7–15.7)
LYMPHOCYTES # BLD MANUAL: 70.9 10E3/UL (ref 0.8–5.3)
LYMPHOCYTES NFR BLD MANUAL: 76 %
MCH RBC QN AUTO: 30.4 PG (ref 26.5–33)
MCHC RBC AUTO-ENTMCNC: 32.6 G/DL (ref 31.5–36.5)
MCV RBC AUTO: 93 FL (ref 78–100)
MONOCYTES # BLD MANUAL: 2.8 10E3/UL (ref 0–1.3)
MONOCYTES NFR BLD MANUAL: 3 %
NEUTROPHILS # BLD MANUAL: 5.6 10E3/UL (ref 1.6–8.3)
NEUTROPHILS NFR BLD MANUAL: 6 %
OTHER CELLS # BLD MANUAL: 14 10E3/UL
OTHER CELLS NFR BLD MANUAL: 15 %
PATH REV: ABNORMAL
PLAT MORPH BLD: ABNORMAL
PLATELET # BLD AUTO: 185 10E3/UL (ref 150–450)
RBC # BLD AUTO: 4.73 10E6/UL (ref 3.8–5.2)
RBC MORPH BLD: ABNORMAL
WBC # BLD AUTO: 91.1 10E3/UL (ref 4–11)

## 2021-11-23 ENCOUNTER — TELEPHONE (OUTPATIENT)
Dept: ONCOLOGY | Facility: CLINIC | Age: 81
End: 2021-11-23
Payer: COMMERCIAL

## 2021-11-23 NOTE — TELEPHONE ENCOUNTER
----- Message from Onesimo Rivera MD sent at 11/22/2021  5:45 PM CST -----  Regarding: RE: CBC Results - acalabrutinib hold?  I am covering for Dr. Centeno who is off this week. Looks like her CBC is stable and the plan was to hold off starting as long as possible, so yes, hold off on starting for now. 12/16 recheck is ok given her stability in counts.     Onesimo Rivera MD, PhD  Instructor, Division of Hematology, Oncology, and Transplantation  [facility]  [phone]      ----- Message -----  From: Marion Balderas  Sent: 11/22/2021  10:56 AM CST  To: Pop Centeno MD, #  Subject: CBC Results - acalabrutinib hold?                Hi Dr. Centeno,    Just reviewing Kerri's CBC results. Her hemoglobin (14.4) and platelets (185) are normal. Is the plan to continue to hold on starting acalabrutinib? Her next lab appt is scheduled for 12/16.     If holding, OK to recheck on 12/16 or would you like her to recheck earlier? I can follow-up with pt in regards to the plan.    Thanks!  Marion Balderas, PharmD, BCACP  Oral Chemotherapy Monitoring Program  [facility]  [phone]  November 22, 2021

## 2021-11-23 NOTE — TELEPHONE ENCOUNTER
Oral Chemotherapy Monitoring Program     Placed call to Kerri Shook in follow up of [place] oral chemotherapy and to notify plan to continue to hold until 12/16 lab appt.    Left a message requesting a call back. No drug names were mentioned in the voicemail.       Marion Balderas, PharmD, BCACP  Oral Chemotherapy Monitoring Program  [facility]  [phone]  November 23, 2021

## 2021-12-16 ENCOUNTER — LAB (OUTPATIENT)
Dept: LAB | Facility: CLINIC | Age: 81
End: 2021-12-16
Payer: COMMERCIAL

## 2021-12-16 ENCOUNTER — TELEPHONE (OUTPATIENT)
Facility: CLINIC | Age: 81
End: 2021-12-16

## 2021-12-16 DIAGNOSIS — C91.10 CLL (CHRONIC LYMPHOCYTIC LEUKEMIA) (H): ICD-10-CM

## 2021-12-16 LAB
BASOPHILS # BLD AUTO: 0.1 10E3/UL (ref 0–0.2)
BASOPHILS NFR BLD AUTO: 0 %
EOSINOPHIL # BLD AUTO: 0.1 10E3/UL (ref 0–0.7)
EOSINOPHIL NFR BLD AUTO: 0 %
ERYTHROCYTE [DISTWIDTH] IN BLOOD BY AUTOMATED COUNT: 13.6 % (ref 10–15)
HCT VFR BLD AUTO: 44.9 % (ref 35–47)
HGB BLD-MCNC: 14.3 G/DL (ref 11.7–15.7)
IMM GRANULOCYTES # BLD: 0.1 10E3/UL
IMM GRANULOCYTES NFR BLD: 0 %
LYMPHOCYTES # BLD AUTO: 86.8 10E3/UL (ref 0.8–5.3)
LYMPHOCYTES NFR BLD AUTO: 89 %
MCH RBC QN AUTO: 29.9 PG (ref 26.5–33)
MCHC RBC AUTO-ENTMCNC: 31.8 G/DL (ref 31.5–36.5)
MCV RBC AUTO: 94 FL (ref 78–100)
MONOCYTES # BLD AUTO: 3.2 10E3/UL (ref 0–1.3)
MONOCYTES NFR BLD AUTO: 3 %
NEUTROPHILS # BLD AUTO: 7 10E3/UL (ref 1.6–8.3)
NEUTROPHILS NFR BLD AUTO: 7 %
PLATELET # BLD AUTO: 184 10E3/UL (ref 150–450)
RBC # BLD AUTO: 4.78 10E6/UL (ref 3.8–5.2)
WBC # BLD AUTO: 97.2 10E3/UL (ref 4–11)

## 2021-12-16 PROCEDURE — 36415 COLL VENOUS BLD VENIPUNCTURE: CPT

## 2021-12-16 PROCEDURE — 85025 COMPLETE CBC W/AUTO DIFF WBC: CPT

## 2021-12-16 NOTE — TELEPHONE ENCOUNTER
DATE:  12/16/2021   TIME OF RECEIPT FROM LAB:  1127  LAB TEST:   WBC 97.2 (WBC 11/20: 91.1)  Acalabrutinib started on 10/26/21.    Paged Dr. Centeno at 1133  Routed to Dr. Centeno and Care Team    Spoke with Dr. Centeno who acknowledged receipt of critical value.  No action needed by Triage at this time.

## 2022-01-13 ENCOUNTER — TELEPHONE (OUTPATIENT)
Facility: CLINIC | Age: 82
End: 2022-01-13

## 2022-01-13 ENCOUNTER — LAB (OUTPATIENT)
Dept: LAB | Facility: CLINIC | Age: 82
End: 2022-01-13
Payer: COMMERCIAL

## 2022-01-13 DIAGNOSIS — C91.10 CLL (CHRONIC LYMPHOCYTIC LEUKEMIA) (H): ICD-10-CM

## 2022-01-13 LAB
BASOPHILS # BLD AUTO: 0.1 10E3/UL (ref 0–0.2)
BASOPHILS NFR BLD AUTO: 0 %
EOSINOPHIL # BLD AUTO: 0.1 10E3/UL (ref 0–0.7)
EOSINOPHIL NFR BLD AUTO: 0 %
ERYTHROCYTE [DISTWIDTH] IN BLOOD BY AUTOMATED COUNT: 13.9 % (ref 10–15)
HCT VFR BLD AUTO: 44.7 % (ref 35–47)
HGB BLD-MCNC: 14.4 G/DL (ref 11.7–15.7)
IMM GRANULOCYTES # BLD: 0.1 10E3/UL
IMM GRANULOCYTES NFR BLD: 0 %
LYMPHOCYTES # BLD AUTO: 83.3 10E3/UL (ref 0.8–5.3)
LYMPHOCYTES NFR BLD AUTO: 90 %
MCH RBC QN AUTO: 29.9 PG (ref 26.5–33)
MCHC RBC AUTO-ENTMCNC: 32.2 G/DL (ref 31.5–36.5)
MCV RBC AUTO: 93 FL (ref 78–100)
MONOCYTES # BLD AUTO: 3.6 10E3/UL (ref 0–1.3)
MONOCYTES NFR BLD AUTO: 4 %
NEUTROPHILS # BLD AUTO: 5.8 10E3/UL (ref 1.6–8.3)
NEUTROPHILS NFR BLD AUTO: 6 %
PLATELET # BLD AUTO: 189 10E3/UL (ref 150–450)
RBC # BLD AUTO: 4.81 10E6/UL (ref 3.8–5.2)
WBC # BLD AUTO: 93.1 10E3/UL (ref 4–11)

## 2022-01-13 PROCEDURE — 36415 COLL VENOUS BLD VENIPUNCTURE: CPT

## 2022-01-13 PROCEDURE — 85025 COMPLETE CBC W/AUTO DIFF WBC: CPT

## 2022-01-13 NOTE — TELEPHONE ENCOUNTER
Critical Lab value    DATE:  1/13/2022   TIME OF RECEIPT FROM LAB:  1031  LAB TEST:  WBC: 93, lymphs 84  Other values: Hgb: 14/4, plts: 189  Past labs: 12/16: (WBC: 97.2, Hgb: 14.3, plts: 184)  Paged Dr. Centeno with result at 1104  Dr. Centeno acknowledged receipt of this value  No further action required from Triage at this time.

## 2022-01-24 DIAGNOSIS — E78.5 HYPERLIPIDEMIA LDL GOAL <70: ICD-10-CM

## 2022-01-28 RX ORDER — SIMVASTATIN 20 MG
20 TABLET ORAL AT BEDTIME
Qty: 90 TABLET | Refills: 0 | Status: SHIPPED | OUTPATIENT
Start: 2022-01-28 | End: 2022-04-26

## 2022-01-28 NOTE — TELEPHONE ENCOUNTER
simvastatin (ZOCOR) 20 MG tablet   Take 1 tablet (20 mg) by mouth At Bedtime -     Last Written Prescription Date:  10/26/21  Last Fill Quantity: 90,   # refills: 0  Last Office Visit : 8/18/21  Future Office visit:  2/15/22    Routing  because:  Overdue LDL. 90 day RF. Clinic notified.      Lab Test 11/12/20  1626   LDL 88

## 2022-02-02 NOTE — TELEPHONE ENCOUNTER
Lipid lab signed per standing order  Tegan Napoles, EMT at 8:50 AM on 2/2/2022.  [facility]  Clinics and Surgery [facility]  [phone]

## 2022-02-14 NOTE — PROGRESS NOTES
HPI:    Kerri comes in for follow up today. Some stress with her son (EtOH issues) and her change in status of CLL. She had some abdominal complaints more than one month ago and was taking TUMS. No abdominal sxs. No heart burn. No  Nausea, no vomiting. No change in bowel habits. She is trying to stay save from COVID. No other HEENT, cardiopulmonary, abdominal, , GYN, neurological, systemic, psychiatric, lymphatic, endocrine, vascular complaints.     Past Medical History:   Diagnosis Date     Breast disorder 1990    Breast Cancer     Cardiomyopathy (H)      Chronic osteoarthritis 2012    resulted in hip replacement     CLL (chronic lymphoblastic leukemia) 1989     Closed fracture of second toe of left foot      Colon adenomas     6/26/14 colonoscopy, repeat in 3 years     History of blood transfusion      hyperlipidemia      Primary localized osteoarthrosis, pelvic region and thigh      Tinnitus      Past Surgical History:   Procedure Laterality Date     BIOPSY  within the last 5 years    polyps during colonoscopy     COLONOSCOPY  6/26/2014    Procedure: COMBINED COLONOSCOPY, SINGLE BIOPSY/POLYPECTOMY BY BIOPSY;  Surgeon: Pola Arceo MD;  Location:  GI     COLONOSCOPY N/A 1/5/2021    Procedure: COLONOSCOPY, WITH POLYPECTOMY AND CLIP;  Surgeon: Charlie Wang MD;  Location: [facility] [place]     D & C  1962    late PP hemorrhage --> retained placenta     ENDOSCOPIC SINUS SURGERY Right 7/16/2018    Procedure: ENDOSCOPIC SINUS SURGERY;  Endoscopic Sphenoidotomy with Removal of Tissue;  Surgeon: Kishore Webster MD;  Location:  OR     ENT SURGERY  1950    tonselectomy     EYE SURGERY  2015    cataract on left eye     left hip replacemen Left     hip replacement in 2013     LUMPECTOMY BREAST  1990    Left     ORTHOPEDIC SURGERY  age?    right shoulder      ORTHOPEDIC SURGERY  ~ 2013    left hip replacement     R hip arthroscopy  7/19/11     TONSILLECTOMY  1950     RUST PELVIS/HIP JOINT SURGERY UNLISTED  April 2012    left  hip replacement     ZC SHOULDER SURG PROC UNLISTED  ?     PE:    Vitals noted, gen, nad, cooperative, alert, neck supple nl rom, lungs with good air movement, RRR, S1, S2, no MRG, abdomen, no acute findings. Grossly normal neurological exam.     A/P:    1. CLL; ONC follow up 3/10/2022. Last visit 10/21/2021 and acalabrutinib may be consider for treatment.   2. H/o breast cancer; mammogram 3/15/2021  3. Immunizations; COVID Moderna vaccination x 3. She has completed the Shingrix vaccination series. TdaP 1/27/2014. Prevnar 13 done 2/4/2015. Pneumococcal 23 10/21/2021. Influenza vaccine 10/21/2021. If she starts treatment for CLL she should probably get the 4th COVID vaccine (2nd booster).   4. Dermatology 11/17/2021  5. Increased cholesterol on Simvastatin; ordered labs today   6. HTN; on Coreg and stable today   7. A1c 5.9% on 11/12/2020 and ordered today  8. Anxiety/stress. I will try and see her more frequently in person. For now she does not want to see Health Psychology nor start medication. As far as GI sxs. She will let me know and discussed PPI and if much worse consider EGD.     30 minutes spent on the date of the encounter doing chart review, history and exam, documentation and further activities as noted above

## 2022-02-15 ENCOUNTER — OFFICE VISIT (OUTPATIENT)
Dept: INTERNAL MEDICINE | Facility: CLINIC | Age: 82
End: 2022-02-15
Payer: COMMERCIAL

## 2022-02-15 VITALS
DIASTOLIC BLOOD PRESSURE: 72 MMHG | BODY MASS INDEX: 23.32 KG/M2 | HEART RATE: 81 BPM | OXYGEN SATURATION: 96 % | WEIGHT: 140 LBS | RESPIRATION RATE: 16 BRPM | HEIGHT: 65 IN | SYSTOLIC BLOOD PRESSURE: 138 MMHG

## 2022-02-15 DIAGNOSIS — E78.00 HIGH CHOLESTEROL: Primary | ICD-10-CM

## 2022-02-15 DIAGNOSIS — R73.09 INCREASED GLUCOSE LEVEL: ICD-10-CM

## 2022-02-15 PROCEDURE — 99214 OFFICE O/P EST MOD 30 MIN: CPT | Performed by: INTERNAL MEDICINE

## 2022-02-15 ASSESSMENT — PAIN SCALES - GENERAL: PAINLEVEL: NO PAIN (0)

## 2022-02-15 ASSESSMENT — MIFFLIN-ST. JEOR: SCORE: 1100.92

## 2022-02-15 NOTE — PATIENT INSTRUCTIONS
Thank you for visiting the Primary Care Center today at the [facility]! The following is some information about our clinic:     Primary Care Center Frequently-Asked Questions    (1) How do I schedule appointments at the [facility]?     Primary Care--to schedule or make changes to an existing appointment, please call our primary care line at [phone].    Labs--to schedule a lab appointment at the [facility] you can use [employer] or call [phone]. If you have a [place] location that is closer to home, you can reach out to that location for scheduling options.     Imaging--if you need to schedule a CT, X-ray, MRI, ultrasound, or other imaging study you can call [phone] to schedule at the [facility] or any other [facility] imaging location.     Referrals--if a referral to another specialty was ordered you can expect a phone call from their scheduling team. If you have not heard from them in a week, please call us or send us a [employer] message to check the status or get a scheduling number. Please note that this only applies to internal [facility] referrals. If the referral is external you would need to contact their office for scheduling.     (2) I have a question about my visit, who do I contact?     You can call us at the primary care line at [phone] to ask questions about your visit. You can also send a secure message through [employer], which is reviewed by clinic staff. Please note that [employer] messages have a twenty-four to forty-eight business hour turnaround time and should not be used for urgent concerns.    (3) How will I get the results of my tests?    If you are signed up for [email or web address] all tests will be released to you within twenty-four hours of resulting. Please allow three to five days for your doctor to review your results and place a note interpreting the results. If you do not have [employer] you will receive your  results through mail seven to ten business days following the return of the tests. Please note that if there should be any urgent or concerning results that your doctor or their registered nurse will reach out to you the same day as the tests come back. If you have follow up questions about your results or would like to discuss the results in detail please schedule a follow up with your provider either in person or virtually.     (4) How do I get refills of my prescriptions?     You should always first contact your pharmacy for refills of your medications. If submitting a refill request on [employer], please be sure to submit the request only once--repeat requests can cause delays in refill. If you are requesting a NEW medication or a medication related to new symptoms you will need to schedule an appointment with a provider prior to approval. Please note: Routine medication refills have up to one to three business day turnaround whereas controlled substances refills have up to five to seven business day turnaround.    (5) I have new symptoms, what do I do?     If you are having an immediate medical emergency, you should dial 911 for assistance.   For anything urgent that needs to be seen within a few hours to one day you should visit a local urgent care for assistance.  For non-urgent symptoms that need to be seen within a few days to a week you can schedule with an available provider in primary care by going to [employer] or calling [phone].   If you are not sure how serious your symptoms are or you would like to receive medical advice you can always call [phone] to speak with a triage nurse.

## 2022-02-15 NOTE — NURSING NOTE
Kerri Shook is a 81 year old female patient that presents today in clinic for the following:    Chief Complaint   Patient presents with     RECHECK     Discuss booster and GI issues     The patient's allergies and medications were reviewed as noted. A set of vitals were recorded as noted without incident. The patient does not have any other questions for the provider.    Mercedes Cordova, EMT at 10:10 AM on 2/15/2022

## 2022-02-17 ENCOUNTER — TELEPHONE (OUTPATIENT)
Dept: ONCOLOGY | Facility: CLINIC | Age: 82
End: 2022-02-17

## 2022-02-17 ENCOUNTER — LAB (OUTPATIENT)
Dept: LAB | Facility: CLINIC | Age: 82
End: 2022-02-17
Payer: COMMERCIAL

## 2022-02-17 DIAGNOSIS — C91.10 CLL (CHRONIC LYMPHOCYTIC LEUKEMIA) (H): ICD-10-CM

## 2022-02-17 DIAGNOSIS — E78.5 HYPERLIPIDEMIA LDL GOAL <70: ICD-10-CM

## 2022-02-17 DIAGNOSIS — R73.09 INCREASED GLUCOSE LEVEL: ICD-10-CM

## 2022-02-17 DIAGNOSIS — E78.00 HIGH CHOLESTEROL: ICD-10-CM

## 2022-02-17 LAB
BASOPHILS # BLD MANUAL: 0 10E3/UL (ref 0–0.2)
BASOPHILS NFR BLD MANUAL: 0 %
CHOLEST SERPL-MCNC: 189 MG/DL
EOSINOPHIL # BLD MANUAL: 0 10E3/UL (ref 0–0.7)
EOSINOPHIL NFR BLD MANUAL: 0 %
ERYTHROCYTE [DISTWIDTH] IN BLOOD BY AUTOMATED COUNT: 14 % (ref 10–15)
FASTING STATUS PATIENT QL REPORTED: NORMAL
HBA1C MFR BLD: 6.2 % (ref 0–5.6)
HCT VFR BLD AUTO: 47.2 % (ref 35–47)
HDLC SERPL-MCNC: 71 MG/DL
HGB BLD-MCNC: 14.8 G/DL (ref 11.7–15.7)
LDLC SERPL CALC-MCNC: 95 MG/DL
LYMPHOCYTES # BLD MANUAL: 86.6 10E3/UL (ref 0.8–5.3)
LYMPHOCYTES NFR BLD MANUAL: 86 %
MCH RBC QN AUTO: 29.5 PG (ref 26.5–33)
MCHC RBC AUTO-ENTMCNC: 31.4 G/DL (ref 31.5–36.5)
MCV RBC AUTO: 94 FL (ref 78–100)
MONOCYTES # BLD MANUAL: 7 10E3/UL (ref 0–1.3)
MONOCYTES NFR BLD MANUAL: 7 %
NEUTROPHILS # BLD MANUAL: 7 10E3/UL (ref 1.6–8.3)
NEUTROPHILS NFR BLD MANUAL: 7 %
NONHDLC SERPL-MCNC: 118 MG/DL
PLAT MORPH BLD: ABNORMAL
PLATELET # BLD AUTO: 196 10E3/UL (ref 150–450)
RBC # BLD AUTO: 5.01 10E6/UL (ref 3.8–5.2)
RBC MORPH BLD: ABNORMAL
TRIGL SERPL-MCNC: 114 MG/DL
WBC # BLD AUTO: 100.7 10E3/UL (ref 4–11)

## 2022-02-17 PROCEDURE — 80061 LIPID PANEL: CPT

## 2022-02-17 PROCEDURE — 85027 COMPLETE CBC AUTOMATED: CPT

## 2022-02-17 PROCEDURE — 83036 HEMOGLOBIN GLYCOSYLATED A1C: CPT

## 2022-02-17 PROCEDURE — 36415 COLL VENOUS BLD VENIPUNCTURE: CPT

## 2022-02-17 NOTE — TELEPHONE ENCOUNTER
DATE:  2/17/22, lab from today  TIME OF RECEIPT FROM LAB:  1030  SITUATION: LAB TEST & VALUE is   WBC 95.7  ---  Hemoglobin 14.8  Platelets 185  ANC -6.8  BACKGROUND: Dx CLL and Breast Cancer  Previous Lab Values from Date:1/13/22  WBC 93.1, Hemoglobin 14.4, Platelet 189  RESULTS PAGED TO (PROVIDER):  Dr. Pop Centeno  TIME LAB VALUE REPORTED TO PROVIDER: 1038    RECOMMENDATION:  1038 RNDARRYL Padilla aware and will also communicate message to Dr. Centeno.     FOLLOW-UP PLAN:  1102 Dr. Centeno aware, no changes to txt plan.

## 2022-03-03 ENCOUNTER — ONCOLOGY VISIT (OUTPATIENT)
Dept: ONCOLOGY | Facility: CLINIC | Age: 82
End: 2022-03-03
Attending: PHYSICIAN ASSISTANT
Payer: COMMERCIAL

## 2022-03-03 VITALS
BODY MASS INDEX: 23.66 KG/M2 | TEMPERATURE: 98 F | HEART RATE: 72 BPM | HEIGHT: 65 IN | DIASTOLIC BLOOD PRESSURE: 72 MMHG | WEIGHT: 142 LBS | OXYGEN SATURATION: 94 % | SYSTOLIC BLOOD PRESSURE: 115 MMHG

## 2022-03-03 DIAGNOSIS — C91.10 CLL (CHRONIC LYMPHOCYTIC LEUKEMIA) (H): Primary | ICD-10-CM

## 2022-03-03 PROCEDURE — 99214 OFFICE O/P EST MOD 30 MIN: CPT | Performed by: PHYSICIAN ASSISTANT

## 2022-03-03 PROCEDURE — G0463 HOSPITAL OUTPT CLINIC VISIT: HCPCS

## 2022-03-03 ASSESSMENT — PAIN SCALES - GENERAL: PAINLEVEL: NO PAIN (0)

## 2022-03-03 NOTE — NURSING NOTE
"Oncology Rooming Note    March 3, 2022 12:46 PM   Kerri Shook is a 81 year old female who presents for:    Chief Complaint   Patient presents with     Oncology Clinic Visit     Increasing WBC count      Initial Vitals: /72 (BP Location: Right arm, Patient Position: Sitting, Cuff Size: Adult Regular)   Pulse 72   Temp 98  F (36.7  C) (Oral)   Ht 1.655 m (5' 5.14\")   Wt 64.4 kg (142 lb)   SpO2 94%   BMI 23.53 kg/m   Estimated body mass index is 23.53 kg/m  as calculated from the following:    Height as of this encounter: 1.655 m (5' 5.14\").    Weight as of this encounter: 64.4 kg (142 lb). Body surface area is 1.72 meters squared.  No Pain (0) Comment: Data Unavailable   No LMP recorded. Patient is postmenopausal.  Allergies reviewed: Yes  Medications reviewed: Yes    Medications: Medication refills not needed today.  Pharmacy name entered into [employer]:    [id number] - [place], [place] - [address] AT [address]  [facility] #[id number] - [place], [place] - [address] AT [address]    Clinical concerns: None     Jass Ramos            "

## 2022-03-03 NOTE — LETTER
3/3/2022    RE: Kerri Shook  [address]  [facility] [address]    Dear Colleague,    Thank you for referring your patient, Kerri Shook, to the [facility]. Please see a copy of my visit note below.    [facility]    HEMATOLOGY & ONCOLOGY  NEW CONSULTATION    PATIENT NAME: Kerri Shook MRN # [id number]  DATE OF VISIT: September 8, 2021 YOB: 1940    REFERRING PROVIDER:  Dr. 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    1. CLL in 1986. Found to have an elevated white blood count on routine testing. This was consistent with CLL.Saw Dr. Puente who recommended observation. WBCs 15-20 during this time. She has required no treatment for CLL. Followed by PCP with yearly CBCs.  2. 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 The patient had a screening mammogram in 1990, which showed an abnormality in the left breast. She underwent a lumpectomy on 03/02/1990, which was consistent with a 1.5 cm primary, mucinous adenocarcinoma with 0 of 8 lymph nodes positive. She was staged as a stage I (T1 N0 M0). The tumor was ER positive. She did have radiation to the left breast with a boost having completed 6600 cGy in 33 fractions from 03/28/1990 until 05/15/1990. She went on to take tamoxifen for 6 years from 03/1990 until 03/1996.  3. 1/2019 labs showing WBC increasing to 27. This has continued with WBCs 47 on 11/12/2020 and 71.0 on 8//18/21  4. 8/31/21 Peripheral flow showing CD5+ lambda CLL cells. Hb 14.6, WBC 66.2, Plts 162, ANC 5.3  5. TP53 pending + IgH mutated    Presents on active surveillance.     SUBJECTIVE  -No f/c  -Hot at night, no ns  -Appetite good, no wt loss  -No new lump or bump  -No abdominal pain or  "fullness  -Chronic low back  -Energy is about the same  -No edema, bleeding, neuropathy, Ha or vision changes    ROS: 10 point ROS neg other than the symptoms noted above in the HPI.      PAST MEDICAL HISTORY  Past Medical History:   Diagnosis Date     Breast disorder 1990    Breast Cancer     Cardiomyopathy (H)      Chronic osteoarthritis 2012    resulted in hip replacement     CLL (chronic lymphoblastic leukemia) 1989     Closed fracture of second toe of left foot      Colon adenomas     6/26/14 colonoscopy, repeat in 3 years     History of blood transfusion      hyperlipidemia      Primary localized osteoarthrosis, pelvic region and thigh      Tinnitus      CURRENT OUTPATIENT MEDICATIONS  Current Outpatient Medications   Medication Sig Dispense Refill     aspirin 81 MG tablet Take 81 mg by mouth daily       carvedilol (COREG) 3.125 MG tablet Take 1 tablet (3.125 mg) by mouth 2 times daily 180 tablet 2     gabapentin (NEURONTIN) 100 MG capsule Route: Take 1 capsule (100 mg) by mouth nightly as needed - Oral 30 capsule 3     Multiple Vitamin (MULTIVITAMIN) per tablet Take 1 tablet by mouth daily.       simvastatin (ZOCOR) 20 MG tablet Take 1 tablet (20 mg) by mouth At Bedtime 90 tablet 0       REVIEW OF SYSTEMS  A complete ROS was performed and was negative except as mentioned in HPI    PHYSICAL EXAM  /72 (BP Location: Right arm, Patient Position: Sitting, Cuff Size: Adult Regular)   Pulse 72   Temp 98  F (36.7  C) (Oral)   Ht 1.655 m (5' 5.14\")   Wt 64.4 kg (142 lb)   SpO2 94%   BMI 23.53 kg/m    Wt Readings from Last 3 Encounters:   03/03/22 64.4 kg (142 lb)   02/15/22 63.5 kg (140 lb)   10/21/21 65.2 kg (143 lb 11.2 oz)       Gen: alert, pleasant and conversational, elderly female, NAD  HEET: NC/AT, EOMI, anicteric sclera.   Resp: breathing comfortably on RA  Abd: nondistended  Ext: no edema or cyanosis  Skin: no concerning lesions or rashes  Neuro: A&Ox4. Grossly nonfocal.      LABORATORY AND " IMAGING STUDIES    I have personally reviewed labs today     2/17/2022 10:05   .7 (HH)   Hemoglobin 14.8   Hematocrit 47.2 (H)   Platelet Count 196   RBC Count 5.01   MCV 94   MCH 29.5   MCHC 31.4 (L)   RDW 14.0   % Neutrophils 7   % Lymphocytes 86   % Monocytes 7   % Eosinophils 0   % Basophils 0   Absolute Basophils 0.0   Absolute Neutrophil 7.0   Absolute Lymphocytes 86.6 (H)   Absolute Monocytes 7.0 (H)   Absolute Eosinophils 0.0   RBC Morphology Confirmed RBC Indices   Platelet Morphology Automated Count Confirmed. Platelet morphology is normal.      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    # CLL - Arboleda Stage 0, CLL-IPI = 5 = High risk. Has loss of TP53 on FISH. IgHV mutated. NGS for TP53 mutation with 83.5% loss. WBC initially increasing logrithmically with >50% increase between  8/31/21 (66) and 10/21/21 (95). ALC has seemed to stabilize now, now 86.6, previously 83.3 and 86.8, 1 and 2 months ago respectively.   -her cancer is now evolved and at higher risk, though currently there is no indications to treat as WBC now stable and she does not have any cytopenias and symptomatically doing well.  -will continue monthly labs for now  -if she were to have indications to treat, therapy including Jose+Obinituzumab for 1 year vs daily Acalabrutinib; previously expressed interest in  Acalabrutinib.   -will have her follow-up with Dr. Centeno in 3 months, sooner if needed    Katherin Shipley PA-C  [facility] [place] [address] [place] [address] [phone]

## 2022-03-03 NOTE — PROGRESS NOTES
[facility]    HEMATOLOGY & ONCOLOGY  NEW CONSULTATION    PATIENT NAME: Kerri Shook MRN # [id number]  DATE OF VISIT: September 8, 2021 YOB: 1940    REFERRING PROVIDER:  Dr. 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    1. CLL in 1986. Found to have an elevated white blood count on routine testing. This was consistent with CLL.Saw Dr. Puente who recommended observation. WBCs 15-20 during this time. She has required no treatment for CLL. Followed by PCP with yearly CBCs.  2. 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 The patient had a screening mammogram in 1990, which showed an abnormality in the left breast. She underwent a lumpectomy on 03/02/1990, which was consistent with a 1.5 cm primary, mucinous adenocarcinoma with 0 of 8 lymph nodes positive. She was staged as a stage I (T1 N0 M0). The tumor was ER positive. She did have radiation to the left breast with a boost having completed 6600 cGy in 33 fractions from 03/28/1990 until 05/15/1990. She went on to take tamoxifen for 6 years from 03/1990 until 03/1996.  3. 1/2019 labs showing WBC increasing to 27. This has continued with WBCs 47 on 11/12/2020 and 71.0 on 8//18/21  4. 8/31/21 Peripheral flow showing CD5+ lambda CLL cells. Hb 14.6, WBC 66.2, Plts 162, ANC 5.3  5. TP53 pending + IgH mutated    Presents on active surveillance.     SUBJECTIVE  -No f/c  -Hot at night, no ns  -Appetite good, no wt loss  -No new lump or bump  -No abdominal pain or fullness  -Chronic low back  -Energy is about the same  -No edema, bleeding, neuropathy, Ha or vision changes    ROS: 10 point ROS neg other than the symptoms noted above in the HPI.      PAST MEDICAL HISTORY  Past Medical History:   Diagnosis Date     Breast  "disorder 1990    Breast Cancer     Cardiomyopathy (H)      Chronic osteoarthritis 2012    resulted in hip replacement     CLL (chronic lymphoblastic leukemia) 1989     Closed fracture of second toe of left foot      Colon adenomas     6/26/14 colonoscopy, repeat in 3 years     History of blood transfusion      hyperlipidemia      Primary localized osteoarthrosis, pelvic region and thigh      Tinnitus      CURRENT OUTPATIENT MEDICATIONS  Current Outpatient Medications   Medication Sig Dispense Refill     aspirin 81 MG tablet Take 81 mg by mouth daily       carvedilol (COREG) 3.125 MG tablet Take 1 tablet (3.125 mg) by mouth 2 times daily 180 tablet 2     gabapentin (NEURONTIN) 100 MG capsule Route: Take 1 capsule (100 mg) by mouth nightly as needed - Oral 30 capsule 3     Multiple Vitamin (MULTIVITAMIN) per tablet Take 1 tablet by mouth daily.       simvastatin (ZOCOR) 20 MG tablet Take 1 tablet (20 mg) by mouth At Bedtime 90 tablet 0       REVIEW OF SYSTEMS  A complete ROS was performed and was negative except as mentioned in HPI    PHYSICAL EXAM  /72 (BP Location: Right arm, Patient Position: Sitting, Cuff Size: Adult Regular)   Pulse 72   Temp 98  F (36.7  C) (Oral)   Ht 1.655 m (5' 5.14\")   Wt 64.4 kg (142 lb)   SpO2 94%   BMI 23.53 kg/m    Wt Readings from Last 3 Encounters:   03/03/22 64.4 kg (142 lb)   02/15/22 63.5 kg (140 lb)   10/21/21 65.2 kg (143 lb 11.2 oz)       Gen: alert, pleasant and conversational, elderly female, NAD  HEET: NC/AT, EOMI, anicteric sclera.   Resp: breathing comfortably on RA  Abd: nondistended  Ext: no edema or cyanosis  Skin: no concerning lesions or rashes  Neuro: A&Ox4. Grossly nonfocal.      LABORATORY AND IMAGING STUDIES    I have personally reviewed labs today     2/17/2022 10:05   .7 (HH)   Hemoglobin 14.8   Hematocrit 47.2 (H)   Platelet Count 196   RBC Count 5.01   MCV 94   MCH 29.5   MCHC 31.4 (L)   RDW 14.0   % Neutrophils 7   % Lymphocytes 86   % " Monocytes 7   % Eosinophils 0   % Basophils 0   Absolute Basophils 0.0   Absolute Neutrophil 7.0   Absolute Lymphocytes 86.6 (H)   Absolute Monocytes 7.0 (H)   Absolute Eosinophils 0.0   RBC Morphology Confirmed RBC Indices   Platelet Morphology Automated Count Confirmed. Platelet morphology is normal.      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    # CLL - Arboleda Stage 0, CLL-IPI = 5 = High risk. Has loss of TP53 on FISH. IgHV mutated. NGS for TP53 mutation with 83.5% loss. WBC initially increasing logrithmically with >50% increase between  8/31/21 (66) and 10/21/21 (95). ALC has seemed to stabilize now, now 86.6, previously 83.3 and 86.8, 1 and 2 months ago respectively.   -her cancer is now evolved and at higher risk, though currently there is no indications to treat as WBC now stable and she does not have any cytopenias and symptomatically doing well.  -will continue monthly labs for now  -if she were to have indications to treat, therapy including Jose+Obinituzumab for 1 year vs daily Acalabrutinib; previously expressed interest in Acalabrutinib.   -will have her follow-up with Dr. Centeno in 3 months, sooner if needed    Katherin Shipley PA-C  [facility]  [address] [place] [address] [phone]

## 2022-03-17 NOTE — PROGRESS NOTES
HPI:    Last seen by me 2/15/2022. She has h/o breast cancer and CLL. She has significant stress with her son's EtOH abuse. He has significant recidivism and has had many treatment interventions. She has about one month of lower abdominal complaints of mild nausea and a nervous stomach feeling. No vomiting. She still has an appetite but less. No weight loss. No change in bowel habits. No other HEENT, cardiopulmonary, abdominal, , GYN, neurological, systemic, psychiatric, lymphatic, endocrine, vascular complaints. She has taken some antacids and/or peptobismol with some relief.     Past Medical History:   Diagnosis Date     Breast disorder 1990    Breast Cancer     Cardiomyopathy (H)      Chronic osteoarthritis 2012    resulted in hip replacement     CLL (chronic lymphoblastic leukemia) 1989     Closed fracture of second toe of left foot      Colon adenomas     6/26/14 colonoscopy, repeat in 3 years     History of blood transfusion      hyperlipidemia      Primary localized osteoarthrosis, pelvic region and thigh      Tinnitus      Past Surgical History:   Procedure Laterality Date     BIOPSY  within the last 5 years    polyps during colonoscopy     COLONOSCOPY  6/26/2014    Procedure: COMBINED COLONOSCOPY, SINGLE BIOPSY/POLYPECTOMY BY BIOPSY;  Surgeon: Pola Arceo MD;  Location:  GI     COLONOSCOPY N/A 1/5/2021    Procedure: COLONOSCOPY, WITH POLYPECTOMY AND CLIP;  Surgeon: Charlie Wang MD;  Location: [facility]     D & C  1962    late PP hemorrhage --> retained placenta     ENDOSCOPIC SINUS SURGERY Right 7/16/2018    Procedure: ENDOSCOPIC SINUS SURGERY;  Endoscopic Sphenoidotomy with Removal of Tissue;  Surgeon: Kishore Webster MD;  Location:  OR     ENT SURGERY  1950    tonselectomy     EYE SURGERY  2015    cataract on left eye     left hip replacemen Left     hip replacement in 2013     LUMPECTOMY BREAST  1990    Left     ORTHOPEDIC SURGERY  age?    right shoulder      ORTHOPEDIC SURGERY  ~ 2013    left  hip replacement     R hip arthroscopy  7/19/11     TONSILLECTOMY  1950     [facility] PELVIS/HIP JOINT SURGERY UNLISTED  April 2012    left hip replacement     [facility] SHOULDER SURG PROC UNLISTED  ?     PE:    Vitals noted, gen, nad, cooperative, alert, neck supple nl rom, lungs with good air movement, RRR, S1, S2, no MRG, abdomen, positive bowel sounds, no masses, not tender, no rebound, Grossly normal neurological exam.       A/P:    1. Immunizations; Moderna COVID vaccination x 3. Prevnar 13 done 2/4/2015, Pneumococcal 23 done 10/21/2021. Tdap 1/27/2014. She has completed the Shingrix vaccine series  2. CLL: Onc follow up with Dr. Centeno 6/9/2022. Last seen Ms. Shipley 3/3/2022  3. Breast cancer; mammogram 3/15/2021; ordered mammogram today 3/18/2022  4. HTN; on Coreg  5. Increased Cholesterol on Simvastatin; lipids 2/17/2022 TG's 114, HDL 71 and LDL 95  6. Colonoscopy 1/5/2021 repeat in 3 years  7. A1c 6.2% 2/17/2022  8. Dermatology 11/17/2021  9. Abdominal complaints. Ordered labs today including amylase/lipase. Ordered abdomina/pelvic CT scan and will follow up in a few weeks. If CT imaging unremarkable consider EGD (H. Pylori?).     30 minutes spent on the date of the encounter doing chart review, history and exam, documentation and further activities as noted above

## 2022-03-18 ENCOUNTER — LAB (OUTPATIENT)
Dept: LAB | Facility: CLINIC | Age: 82
End: 2022-03-18
Attending: STUDENT IN AN ORGANIZED HEALTH CARE EDUCATION/TRAINING PROGRAM
Payer: COMMERCIAL

## 2022-03-18 ENCOUNTER — OFFICE VISIT (OUTPATIENT)
Dept: INTERNAL MEDICINE | Facility: CLINIC | Age: 82
End: 2022-03-18
Payer: COMMERCIAL

## 2022-03-18 VITALS
RESPIRATION RATE: 16 BRPM | SYSTOLIC BLOOD PRESSURE: 129 MMHG | HEART RATE: 68 BPM | OXYGEN SATURATION: 97 % | DIASTOLIC BLOOD PRESSURE: 79 MMHG | HEIGHT: 64 IN | BODY MASS INDEX: 24.37 KG/M2

## 2022-03-18 DIAGNOSIS — C91.10 CLL (CHRONIC LYMPHOCYTIC LEUKEMIA) (H): ICD-10-CM

## 2022-03-18 DIAGNOSIS — R11.0 NAUSEA: ICD-10-CM

## 2022-03-18 DIAGNOSIS — Z12.31 ENCOUNTER FOR SCREENING MAMMOGRAM FOR BREAST CANCER: Primary | ICD-10-CM

## 2022-03-18 DIAGNOSIS — R10.84 ABDOMINAL PAIN, GENERALIZED: ICD-10-CM

## 2022-03-18 DIAGNOSIS — Z12.31 ENCOUNTER FOR SCREENING MAMMOGRAM FOR BREAST CANCER: ICD-10-CM

## 2022-03-18 LAB
ALBUMIN SERPL-MCNC: 4 G/DL (ref 3.4–5)
ALP SERPL-CCNC: 86 U/L (ref 40–150)
ALT SERPL W P-5'-P-CCNC: 29 U/L (ref 0–50)
AMYLASE SERPL-CCNC: 94 U/L (ref 30–110)
ANION GAP SERPL CALCULATED.3IONS-SCNC: 3 MMOL/L (ref 3–14)
AST SERPL W P-5'-P-CCNC: 24 U/L (ref 0–45)
BASOPHILS # BLD MANUAL: 0 10E3/UL (ref 0–0.2)
BASOPHILS NFR BLD MANUAL: 0 %
BILIRUB SERPL-MCNC: 0.6 MG/DL (ref 0.2–1.3)
BUN SERPL-MCNC: 15 MG/DL (ref 7–30)
CALCIUM SERPL-MCNC: 8.8 MG/DL (ref 8.5–10.1)
CHLORIDE BLD-SCNC: 107 MMOL/L (ref 94–109)
CO2 SERPL-SCNC: 28 MMOL/L (ref 20–32)
CREAT SERPL-MCNC: 0.93 MG/DL (ref 0.52–1.04)
EOSINOPHIL # BLD MANUAL: 0 10E3/UL (ref 0–0.7)
EOSINOPHIL NFR BLD MANUAL: 0 %
ERYTHROCYTE [DISTWIDTH] IN BLOOD BY AUTOMATED COUNT: 13.8 % (ref 10–15)
GFR SERPL CREATININE-BSD FRML MDRD: 61 ML/MIN/1.73M2
GLUCOSE BLD-MCNC: 138 MG/DL (ref 70–99)
HCT VFR BLD AUTO: 47.3 % (ref 35–47)
HGB BLD-MCNC: 14.6 G/DL (ref 11.7–15.7)
LIPASE SERPL-CCNC: 167 U/L (ref 73–393)
LYMPHOCYTES # BLD MANUAL: 85.9 10E3/UL (ref 0.8–5.3)
LYMPHOCYTES NFR BLD MANUAL: 91 %
MCH RBC QN AUTO: 29.1 PG (ref 26.5–33)
MCHC RBC AUTO-ENTMCNC: 30.9 G/DL (ref 31.5–36.5)
MCV RBC AUTO: 94 FL (ref 78–100)
MONOCYTES # BLD MANUAL: 1.9 10E3/UL (ref 0–1.3)
MONOCYTES NFR BLD MANUAL: 2 %
NEUTROPHILS # BLD MANUAL: 6.6 10E3/UL (ref 1.6–8.3)
NEUTROPHILS NFR BLD MANUAL: 7 %
PLAT MORPH BLD: ABNORMAL
PLATELET # BLD AUTO: 158 10E3/UL (ref 150–450)
POTASSIUM BLD-SCNC: 4.5 MMOL/L (ref 3.4–5.3)
PROT SERPL-MCNC: 6.8 G/DL (ref 6.8–8.8)
RBC # BLD AUTO: 5.01 10E6/UL (ref 3.8–5.2)
RBC MORPH BLD: ABNORMAL
SODIUM SERPL-SCNC: 138 MMOL/L (ref 133–144)
WBC # BLD AUTO: 94.4 10E3/UL (ref 4–11)

## 2022-03-18 PROCEDURE — 99214 OFFICE O/P EST MOD 30 MIN: CPT | Performed by: INTERNAL MEDICINE

## 2022-03-18 PROCEDURE — 83690 ASSAY OF LIPASE: CPT

## 2022-03-18 PROCEDURE — 80053 COMPREHEN METABOLIC PANEL: CPT

## 2022-03-18 PROCEDURE — 36415 COLL VENOUS BLD VENIPUNCTURE: CPT

## 2022-03-18 PROCEDURE — 82150 ASSAY OF AMYLASE: CPT

## 2022-03-18 PROCEDURE — 85027 COMPLETE CBC AUTOMATED: CPT

## 2022-03-18 ASSESSMENT — PAIN SCALES - GENERAL: PAINLEVEL: NO PAIN (0)

## 2022-03-18 NOTE — NURSING NOTE
Kerri Shook is a 81 year old female patient that presents today in clinic for the following:    Chief Complaint   Patient presents with     RECHECK     The patient's allergies and medications were reviewed as noted. A set of vitals were recorded as noted without incident. The patient does not have any other questions for the provider.    Prosper Padilla, EMT at 8:52 AM on 3/18/2022

## 2022-03-18 NOTE — PATIENT INSTRUCTIONS
Thank you for visiting the Primary Care Center today at the [facility]! The following is some information about our clinic:     Primary Care Center Frequently-Asked Questions    (1) How do I schedule appointments at the [facility]?     Primary Care--to schedule or make changes to an existing appointment, please call our primary care line at [phone].    Labs--to schedule a lab appointment at the [facility] you can use [employer] or call [phone]. If you have a [place] location that is closer to home, you can reach out to that location for scheduling options.     Imaging--if you need to schedule a CT, X-ray, MRI, ultrasound, or other imaging study you can call [phone] to schedule at the [facility] or any other [facility] imaging location.     Referrals--if a referral to another specialty was ordered you can expect a phone call from their scheduling team. If you have not heard from them in a week, please call us or send us a [employer] message to check the status or get a scheduling number. Please note that this only applies to internal [facility] referrals. If the referral is external you would need to contact their office for scheduling.     (2) I have a question about my visit, who do I contact?     You can call us at the primary care line at [phone] to ask questions about your visit. You can also send a secure message through [employer], which is reviewed by clinic staff. Please note that [employer] messages have a twenty-four to forty-eight business hour turnaround time and should not be used for urgent concerns.    (3) How will I get the results of my tests?    If you are signed up for [employer] all tests will be released to you within twenty-four hours of resulting. Please allow three to five days for your doctor to review your results and place a note interpreting the results. If you do not have [employer] you will receive your  results through mail seven to ten business days following the return of the tests. Please note that if there should be any urgent or concerning results that your doctor or their registered nurse will reach out to you the same day as the tests come back. If you have follow up questions about your results or would like to discuss the results in detail please schedule a follow up with your provider either in person or virtually.     (4) How do I get refills of my prescriptions?     You should always first contact your pharmacy for refills of your medications. If submitting a refill request on [employer], please be sure to submit the request only once--repeat requests can cause delays in refill. If you are requesting a NEW medication or a medication related to new symptoms you will need to schedule an appointment with a provider prior to approval. Please note: Routine medication refills have up to one to three business day turnaround whereas controlled substances refills have up to five to seven business day turnaround.    (5) I have new symptoms, what do I do?     If you are having an immediate medical emergency, you should dial 911 for assistance.   For anything urgent that needs to be seen within a few hours to one day you should visit a local urgent care for assistance.  For non-urgent symptoms that need to be seen within a few days to a week you can schedule with an available provider in primary care by going to [employer] or calling [phone].   If you are not sure how serious your symptoms are or you would like to receive medical advice you can always call [phone] to speak with a triage nurse.

## 2022-03-18 NOTE — NURSING NOTE
Chief Complaint   Patient presents with     Labs Only     venipuncture,      Eryn Ibarra RN on 3/18/2022 at 9:54 AM

## 2022-03-25 DIAGNOSIS — I51.81 STRESS-INDUCED CARDIOMYOPATHY: ICD-10-CM

## 2022-03-28 RX ORDER — CARVEDILOL 3.12 MG/1
3.12 TABLET ORAL 2 TIMES DAILY
Qty: 180 TABLET | Refills: 3 | Status: SHIPPED | OUTPATIENT
Start: 2022-03-28 | End: 2023-06-26

## 2022-04-07 ENCOUNTER — ANCILLARY PROCEDURE (OUTPATIENT)
Dept: MAMMOGRAPHY | Facility: CLINIC | Age: 82
End: 2022-04-07
Attending: INTERNAL MEDICINE
Payer: COMMERCIAL

## 2022-04-07 ENCOUNTER — ANCILLARY PROCEDURE (OUTPATIENT)
Dept: CT IMAGING | Facility: CLINIC | Age: 82
End: 2022-04-07
Attending: INTERNAL MEDICINE
Payer: COMMERCIAL

## 2022-04-07 DIAGNOSIS — R10.84 ABDOMINAL PAIN, GENERALIZED: ICD-10-CM

## 2022-04-07 DIAGNOSIS — Z12.31 ENCOUNTER FOR SCREENING MAMMOGRAM FOR BREAST CANCER: ICD-10-CM

## 2022-04-07 DIAGNOSIS — R11.0 NAUSEA: ICD-10-CM

## 2022-04-07 PROCEDURE — 77067 SCR MAMMO BI INCL CAD: CPT | Performed by: STUDENT IN AN ORGANIZED HEALTH CARE EDUCATION/TRAINING PROGRAM

## 2022-04-07 PROCEDURE — 74177 CT ABD & PELVIS W/CONTRAST: CPT | Mod: GC | Performed by: RADIOLOGY

## 2022-04-07 RX ORDER — IOPAMIDOL 755 MG/ML
86 INJECTION, SOLUTION INTRAVASCULAR ONCE
Status: COMPLETED | OUTPATIENT
Start: 2022-04-07 | End: 2022-04-07

## 2022-04-07 RX ADMIN — IOPAMIDOL 86 ML: 755 INJECTION, SOLUTION INTRAVASCULAR at 15:32

## 2022-04-14 ENCOUNTER — TELEPHONE (OUTPATIENT)
Dept: ONCOLOGY | Facility: CLINIC | Age: 82
End: 2022-04-14

## 2022-04-14 ENCOUNTER — OFFICE VISIT (OUTPATIENT)
Dept: INTERNAL MEDICINE | Facility: CLINIC | Age: 82
End: 2022-04-14
Payer: COMMERCIAL

## 2022-04-14 ENCOUNTER — LAB (OUTPATIENT)
Dept: LAB | Facility: CLINIC | Age: 82
End: 2022-04-14

## 2022-04-14 VITALS
DIASTOLIC BLOOD PRESSURE: 61 MMHG | HEART RATE: 74 BPM | SYSTOLIC BLOOD PRESSURE: 116 MMHG | BODY MASS INDEX: 24.37 KG/M2 | OXYGEN SATURATION: 94 % | HEIGHT: 64 IN

## 2022-04-14 DIAGNOSIS — E78.00 HIGH CHOLESTEROL: Primary | ICD-10-CM

## 2022-04-14 DIAGNOSIS — C91.10 CLL (CHRONIC LYMPHOCYTIC LEUKEMIA) (H): ICD-10-CM

## 2022-04-14 LAB
BASOPHILS # BLD MANUAL: 0 10E3/UL (ref 0–0.2)
BASOPHILS NFR BLD MANUAL: 0 %
BURR CELLS BLD QL SMEAR: SLIGHT
EOSINOPHIL # BLD MANUAL: 0 10E3/UL (ref 0–0.7)
EOSINOPHIL NFR BLD MANUAL: 0 %
ERYTHROCYTE [DISTWIDTH] IN BLOOD BY AUTOMATED COUNT: 13.6 % (ref 10–15)
HCT VFR BLD AUTO: 44.2 % (ref 35–47)
HGB BLD-MCNC: 14.3 G/DL (ref 11.7–15.7)
LYMPHOCYTES # BLD MANUAL: 73.3 10E3/UL (ref 0.8–5.3)
LYMPHOCYTES NFR BLD MANUAL: 93 %
MCH RBC QN AUTO: 29.4 PG (ref 26.5–33)
MCHC RBC AUTO-ENTMCNC: 32.4 G/DL (ref 31.5–36.5)
MCV RBC AUTO: 91 FL (ref 78–100)
MONOCYTES # BLD MANUAL: 2.4 10E3/UL (ref 0–1.3)
MONOCYTES NFR BLD MANUAL: 3 %
NEUTROPHILS # BLD MANUAL: 3.2 10E3/UL (ref 1.6–8.3)
NEUTROPHILS NFR BLD MANUAL: 4 %
PLAT MORPH BLD: ABNORMAL
PLATELET # BLD AUTO: 168 10E3/UL (ref 150–450)
RBC # BLD AUTO: 4.86 10E6/UL (ref 3.8–5.2)
RBC MORPH BLD: ABNORMAL
WBC # BLD AUTO: 78.8 10E3/UL (ref 4–11)

## 2022-04-14 PROCEDURE — 85027 COMPLETE CBC AUTOMATED: CPT

## 2022-04-14 PROCEDURE — 80053 COMPREHEN METABOLIC PANEL: CPT

## 2022-04-14 PROCEDURE — 36415 COLL VENOUS BLD VENIPUNCTURE: CPT

## 2022-04-14 PROCEDURE — 99214 OFFICE O/P EST MOD 30 MIN: CPT | Performed by: INTERNAL MEDICINE

## 2022-04-14 ASSESSMENT — PAIN SCALES - GENERAL: PAINLEVEL: NO PAIN (0)

## 2022-04-14 NOTE — PROGRESS NOTES
HPI:    Last visit with us 3/18/2022. She still has stress with her son's EtOH abuse. She is worried about his health and safety. Medically her abdominal complaints are much less and she is eating OK and takes occ. Antacid. She still tries to get some exercise. She is going to her grand-daughter's college graduation in [place] in a few weeks and she is looking forward to this. No other HEENT, cardiopulmonary, abdominal, , GYN, neurological, systemic, psychiatric, lymphatic, endocrine,vascular complaints.     Past Medical History:   Diagnosis Date     Breast disorder 1990    Breast Cancer     Cardiomyopathy (H)      Chronic osteoarthritis 2012    resulted in hip replacement     CLL (chronic lymphoblastic leukemia) 1989     Closed fracture of second toe of left foot      Colon adenomas     6/26/14 colonoscopy, repeat in 3 years     History of blood transfusion      hyperlipidemia      Primary localized osteoarthrosis, pelvic region and thigh      Tinnitus      Past Surgical History:   Procedure Laterality Date     BIOPSY  within the last 5 years    polyps during colonoscopy     COLONOSCOPY  6/26/2014    Procedure: COMBINED COLONOSCOPY, SINGLE BIOPSY/POLYPECTOMY BY BIOPSY;  Surgeon: Pola Arceo MD;  Location:  GI     COLONOSCOPY N/A 1/5/2021    Procedure: COLONOSCOPY, WITH POLYPECTOMY AND CLIP;  Surgeon: Charlie Wang MD;  [place] [facility] [place]     [place] & C  1962    late PP hemorrhage --> retained placenta     ENDOSCOPIC SINUS SURGERY Right 7/16/2018    Procedure: ENDOSCOPIC SINUS SURGERY;  Endoscopic Sphenoidotomy with Removal of Tissue;  Surgeon: Kishore Webster MD;  Location:  OR     ENT SURGERY  1950    tonselectomy     EYE SURGERY  2015    cataract on left eye     left hip replacemen Left     hip replacement in 2013     LUMPECTOMY BREAST  1990    Left     ORTHOPEDIC SURGERY  age?    right shoulder      ORTHOPEDIC SURGERY  ~ 2013    left hip replacement     R hip arthroscopy  7/19/11     TONSILLECTOMY  [address] [place] [address]  PELVIS/HIP JOINT SURGERY UNLISTED  April 2012    left hip replacement     [facility] SHOULDER SURG PROC UNLISTED  ?     PE:    Vitals noted, gen, nad, cooperative, alert, neck supple nl rom, lungs with good air movement, RRR, S1, S2, no MRG, abdomen, no acute findings. Grossly normal neurological exam.     A/P;    1. CLL; she has ONC follow up with Dr. Centeon 6/9/2022  2. Immunizations; Moderna COVID vaccine x 4. She has completed the Zoster Shingrix vaccine series. Pneumococcal 23 10/21/2021. Prevnar 13 done 2/4/2015. Tdap 1/27/2014  3. Breast cancer; mammogram 4/7/2022  4. Dermatology; 11/17/2021  5. CT abdomen/pelvis 4/7/2022; no acute findings. Currently no complaints and if worse/persistent consider EGD.   6. DEXA scan 11/19/2020  7. Increased cholesterol on Simvastatin; lipids 2/17/2022 TG's 114, HDL 71 and LDL 95  8. Colonscopy 1/5/2021 and repeat in 3 years  9. A1c 6.2% 2/17/2022    I will see her back in one month    30 minutes spent on the date of the encounter doing chart review, history and exam, documentation and further activities as noted above

## 2022-04-14 NOTE — NURSING NOTE
Chief Complaint   Patient presents with     Recheck Medication       YEVGENIY Woodall at 10:18 AM on 4/14/2022.

## 2022-04-14 NOTE — TELEPHONE ENCOUNTER
DATE:  4/14/22  TIME OF RECEIPT FROM LAB:  2202  SITUATION: LAB TEST & VALUE is   WBC 74  --  Hemoglobin 14.3  Platelet 172  ANC-not available.   BACKGROUND: Dx   Previous Lab Values from Date:  From 3/18/22 WBC 94.4 Hemoglobin 14.6, Platelet 158  RESULTS PAGED TO (PROVIDER):  Dr. Centeno  TIME LAB VALUE REPORTED TO PROVIDER: 1500    RECOMMENDATION:  Next provider visit with Dr. Centeno on 6/9/22    FOLLOW-UP PLAN:  1510 Per Dr. Centeno Acknowledged value,  Plan will be to continue observation

## 2022-04-15 LAB
ALBUMIN SERPL-MCNC: 3.7 G/DL (ref 3.4–5)
ALP SERPL-CCNC: 85 U/L (ref 40–150)
ALT SERPL W P-5'-P-CCNC: 34 U/L (ref 0–50)
ANION GAP SERPL CALCULATED.3IONS-SCNC: 8 MMOL/L (ref 3–14)
AST SERPL W P-5'-P-CCNC: 22 U/L (ref 0–45)
BILIRUB SERPL-MCNC: 0.6 MG/DL (ref 0.2–1.3)
BUN SERPL-MCNC: 17 MG/DL (ref 7–30)
CALCIUM SERPL-MCNC: 9.1 MG/DL (ref 8.5–10.1)
CHLORIDE BLD-SCNC: 103 MMOL/L (ref 94–109)
CO2 SERPL-SCNC: 25 MMOL/L (ref 20–32)
CREAT SERPL-MCNC: 0.89 MG/DL (ref 0.52–1.04)
GFR SERPL CREATININE-BSD FRML MDRD: 65 ML/MIN/1.73M2
GLUCOSE BLD-MCNC: 157 MG/DL (ref 70–99)
POTASSIUM BLD-SCNC: 4.2 MMOL/L (ref 3.4–5.3)
PROT SERPL-MCNC: 6.6 G/DL (ref 6.8–8.8)
SODIUM SERPL-SCNC: 136 MMOL/L (ref 133–144)

## 2022-04-24 DIAGNOSIS — E78.5 HYPERLIPIDEMIA LDL GOAL <70: ICD-10-CM

## 2022-04-26 RX ORDER — SIMVASTATIN 20 MG
20 TABLET ORAL AT BEDTIME
Qty: 90 TABLET | Refills: 3 | Status: SHIPPED | OUTPATIENT
Start: 2022-04-26 | End: 2023-05-04

## 2022-05-08 ENCOUNTER — HEALTH MAINTENANCE LETTER (OUTPATIENT)
Age: 82
End: 2022-05-08

## 2022-05-12 ENCOUNTER — LAB (OUTPATIENT)
Dept: LAB | Facility: CLINIC | Age: 82
End: 2022-05-12
Attending: STUDENT IN AN ORGANIZED HEALTH CARE EDUCATION/TRAINING PROGRAM
Payer: COMMERCIAL

## 2022-05-12 DIAGNOSIS — C91.10 CLL (CHRONIC LYMPHOCYTIC LEUKEMIA) (H): ICD-10-CM

## 2022-05-12 LAB
ALBUMIN SERPL-MCNC: 3.7 G/DL (ref 3.4–5)
ALP SERPL-CCNC: 78 U/L (ref 40–150)
ALT SERPL W P-5'-P-CCNC: 29 U/L (ref 0–50)
ANION GAP SERPL CALCULATED.3IONS-SCNC: 7 MMOL/L (ref 3–14)
AST SERPL W P-5'-P-CCNC: 20 U/L (ref 0–45)
BILIRUB SERPL-MCNC: 0.7 MG/DL (ref 0.2–1.3)
BUN SERPL-MCNC: 13 MG/DL (ref 7–30)
CALCIUM SERPL-MCNC: 9.3 MG/DL (ref 8.5–10.1)
CHLORIDE BLD-SCNC: 106 MMOL/L (ref 94–109)
CO2 SERPL-SCNC: 25 MMOL/L (ref 20–32)
CREAT SERPL-MCNC: 0.82 MG/DL (ref 0.52–1.04)
GFR SERPL CREATININE-BSD FRML MDRD: 71 ML/MIN/1.73M2
GLUCOSE BLD-MCNC: 159 MG/DL (ref 70–99)
POTASSIUM BLD-SCNC: 4 MMOL/L (ref 3.4–5.3)
PROT SERPL-MCNC: 6.7 G/DL (ref 6.8–8.8)
SODIUM SERPL-SCNC: 138 MMOL/L (ref 133–144)

## 2022-05-12 PROCEDURE — 80053 COMPREHEN METABOLIC PANEL: CPT

## 2022-05-12 PROCEDURE — 36415 COLL VENOUS BLD VENIPUNCTURE: CPT

## 2022-05-17 ENCOUNTER — OFFICE VISIT (OUTPATIENT)
Dept: PODIATRY | Facility: CLINIC | Age: 82
End: 2022-05-17
Attending: INTERNAL MEDICINE
Payer: COMMERCIAL

## 2022-05-17 VITALS — SYSTOLIC BLOOD PRESSURE: 114 MMHG | BODY MASS INDEX: 24.37 KG/M2 | DIASTOLIC BLOOD PRESSURE: 72 MMHG | HEIGHT: 64 IN

## 2022-05-17 DIAGNOSIS — L60.1 ONYCHOLYSIS: ICD-10-CM

## 2022-05-17 PROCEDURE — 99203 OFFICE O/P NEW LOW 30 MIN: CPT | Performed by: PODIATRIST

## 2022-05-17 NOTE — PATIENT INSTRUCTIONS
PATIENT INSTRUCTIONS - Podiatry / Foot & Ankle Surgery    Return for permanent removal if needed

## 2022-05-17 NOTE — PROGRESS NOTES
Assessment:      ICD-10-CM    1. Onycholysis  L60.1 Orthopedic  Referral          Plan:  No orders of the defined types were placed in this encounter.      Discussed the etiology and treatment of the condition with the patient.  Discussed treatment options.    I can remove permanently    Otherwise, let grow out fully & see if it is acceptable    Return:  No follow-ups on file.    Alba Sigala DPM                Chief Complaint:     Patient presents with:  Right Foot - Pain     right onycholysis    HPI:  Kerri Shook is a 81 year old year old female who presents for evaluation of ingrown toenail.    Not ingrown    Ripped off    Did not go to her PCP     Past Medical & Surgical History:  Past Medical History:   Diagnosis Date     Breast disorder 1990    Breast Cancer     Cardiomyopathy (H)      Chronic osteoarthritis 2012    resulted in hip replacement     CLL (chronic lymphoblastic leukemia) 1989     Closed fracture of second toe of left foot      Colon adenomas     6/26/14 colonoscopy, repeat in 3 years     History of blood transfusion      hyperlipidemia      Primary localized osteoarthrosis, pelvic region and thigh      Tinnitus       Past Surgical History:   Procedure Laterality Date     BIOPSY  within the last 5 years    polyps during colonoscopy     COLONOSCOPY  6/26/2014    Procedure: COMBINED COLONOSCOPY, SINGLE BIOPSY/POLYPECTOMY BY BIOPSY;  Surgeon: Pola Arceo MD;  Location:  GI     COLONOSCOPY N/A 1/5/2021    Procedure: COLONOSCOPY, WITH POLYPECTOMY AND CLIP;  Surgeon: Charlie Wang MD;  [place] [facility] [place]     D & C  1962    late PP hemorrhage --> retained placenta     ENDOSCOPIC SINUS SURGERY Right 7/16/2018    Procedure: ENDOSCOPIC SINUS SURGERY;  Endoscopic Sphenoidotomy with Removal of Tissue;  Surgeon: Kishore Webster MD;  Location:  OR     ENT SURGERY  1950    tonselectomy     EYE SURGERY  2015    cataract on left eye     left hip replacemen Left     hip replacement in  "     LUMPECTOMY BREAST  1990    Left     ORTHOPEDIC SURGERY  age?    right shoulder      ORTHOPEDIC SURGERY  ~     left hip replacement     R hip arthroscopy  11     TONSILLECTOMY  1950     [facility] PELVIS/HIP JOINT SURGERY UNLISTED  2012    left hip replacement     [facility] SHOULDER SURG PROC UNLISTED  ?      Family History   Problem Relation Age of Onset     Diabetes Maternal Grandmother 85     Coronary Artery Disease Father 76         of MI     Heart Disease Father      Cancer Maternal Grandfather         stomach     Alcoholism Son         Active alcoholic     Dementia Mother      C.A.D. No family hx of      Cancer - colorectal No family hx of      Psychotic Disorder No family hx of      Skin Cancer No family hx of      Melanoma No family hx of      Hypertension No family hx of      Breast Cancer No family hx of      Prostate Cancer No family hx of      Cerebrovascular Disease No family hx of         ,, ,        Social History:  ?  History   Smoking Status     Never Smoker   Smokeless Tobacco     Never Used     History   Drug Use No     Social History    Substance and Sexual Activity      Alcohol use: Yes        Alcohol/week: 0.0 standard drinks        Comment: 3 glasses of wine per week      Allergies:  ?   Allergies   Allergen Reactions     Nkda [No Known Drug Allergies]         Medications:    Current Outpatient Medications   Medication     aspirin 81 MG tablet     carvedilol (COREG) 3.125 MG tablet     gabapentin (NEURONTIN) 100 MG capsule     Multiple Vitamin (MULTIVITAMIN) per tablet     simvastatin (ZOCOR) 20 MG tablet     No current facility-administered medications for this visit.       Physical Exam:  ?  Vitals:  /72   Ht 1.626 m (5' 4\")   BMI 24.37 kg/m     General:  WD/WN, in NAD.  A&O x3.  Dermatologic:    Onycholyssis present hallux right.  Paronychia absent.  Purulence absent.  Skin texture, turgor is normal.  Vascular:  Pulses palpable right.  Digital capillary refill time " normal right.  Skin temperature is normal to affected digit(s)..  Neurologic:    Gross sensation normal.  Gait and balance normal.  Musculoskeletal:  Maximal pain to palpation of affected nail border(s).  Gross deformity absent.

## 2022-05-17 NOTE — PROGRESS NOTES
HPI:    Last visit with us 4/14/2022. Overall doing well. She has some minor R hip pain. She had L hip replacement surgery (with L groin pain). She states this R hip pain feels different. She states her son is now sober and doing better. She will continue to get some exercise. No other HEENT, cardiopulmonary, abdominal, , GYN, neurological, systemic, psychiatric, lymphatic, endocrine, vascular complaints.     Past Medical History:   Diagnosis Date     Breast disorder 1990    Breast Cancer     Cardiomyopathy (H)      Chronic osteoarthritis 2012    resulted in hip replacement     CLL (chronic lymphoblastic leukemia) 1989     Closed fracture of second toe of left foot      Colon adenomas     6/26/14 colonoscopy, repeat in 3 years     History of blood transfusion      hyperlipidemia      Primary localized osteoarthrosis, pelvic region and thigh      Tinnitus      Past Surgical History:   Procedure Laterality Date     BIOPSY  within the last 5 years    polyps during colonoscopy     COLONOSCOPY  6/26/2014    Procedure: COMBINED COLONOSCOPY, SINGLE BIOPSY/POLYPECTOMY BY BIOPSY;  Surgeon: Pola Arceo MD;  Location:  GI     COLONOSCOPY N/A 1/5/2021    Procedure: COLONOSCOPY, WITH POLYPECTOMY AND CLIP;  Surgeon: Charlie Wang MD;  [place] [facility] [place]     D & C  1962    late PP hemorrhage --> retained placenta     ENDOSCOPIC SINUS SURGERY Right 7/16/2018    Procedure: ENDOSCOPIC SINUS SURGERY;  Endoscopic Sphenoidotomy with Removal of Tissue;  Surgeon: Kishore Webster MD;  Location:  OR     ENT SURGERY  1950    tonselectomy     EYE SURGERY  2015    cataract on left eye     left hip replacemen Left     hip replacement in 2013     LUMPECTOMY BREAST  1990    Left     ORTHOPEDIC SURGERY  age?    right shoulder      ORTHOPEDIC SURGERY  ~ 2013    left hip replacement     R hip arthroscopy  7/19/11     TONSILLECTOMY  1950     [facility] PELVIS/HIP JOINT SURGERY UNLISTED  April 2012    left hip replacement     [facility] SHOULDER SURG PROC  "UNLISTED  ?     PE:    Vitals noted, gen, nad, cooperative, alert, neck supple nl rom, lungs with good air movement, RRR, S1, S2, no MRG, abdomen, no acute findings. Grossly normal neurological exam. R hip w/o significant tenderness to palpation.     A/P:      1. CLL; she has ONC follow up with Dr. Centeno 6/9/2022. CBC done 4/14/2022. Ordered CBC today. 5/18/2022.   2. Immunizations; Moderna COVID vaccine x 4. She has completed the Zoster Shingrix vaccine series. Pneumococcal 23 10/21/2021. Prevnar 13 done 2/4/2015. Tdap 1/27/2014  3. Breast cancer; mammogram 4/7/2022  4. Dermatology; 11/17/2021  5. CT abdomen/pelvis 4/7/2022; no acute findings. Currently no complaints and if worse/persistent consider EGD.   6. DEXA scan 11/19/2020  7. Increased cholesterol on Simvastatin; lipids 2/17/2022 TG's 114, HDL 71 and LDL 95  8. Colonscopy 1/5/2021 and repeat in 3 years  9. A1c 6.2% 2/17/2022  10. Seen Dr. Sigala, Podiatry 5/17/2022  11. She can follow up with me or orthopedics regarding R hip pain sxs.     40 minutes spent on the date of the encounter doing chart review, history and exam, documentation and further activities as noted above \"exclusive of procedures and other billable interpretations  "

## 2022-05-18 ENCOUNTER — OFFICE VISIT (OUTPATIENT)
Dept: INTERNAL MEDICINE | Facility: CLINIC | Age: 82
End: 2022-05-18
Payer: COMMERCIAL

## 2022-05-18 ENCOUNTER — LAB (OUTPATIENT)
Dept: LAB | Facility: CLINIC | Age: 82
End: 2022-05-18
Payer: COMMERCIAL

## 2022-05-18 VITALS
DIASTOLIC BLOOD PRESSURE: 60 MMHG | WEIGHT: 139.3 LBS | HEART RATE: 77 BPM | OXYGEN SATURATION: 97 % | BODY MASS INDEX: 23.78 KG/M2 | SYSTOLIC BLOOD PRESSURE: 117 MMHG | HEIGHT: 64 IN

## 2022-05-18 DIAGNOSIS — C91.10 CLL (CHRONIC LYMPHOCYTIC LEUKEMIA) (H): Primary | ICD-10-CM

## 2022-05-18 DIAGNOSIS — C91.10 CLL (CHRONIC LYMPHOCYTIC LEUKEMIA) (H): ICD-10-CM

## 2022-05-18 LAB
ALBUMIN SERPL-MCNC: 3.7 G/DL (ref 3.4–5)
ALP SERPL-CCNC: 89 U/L (ref 40–150)
ALT SERPL W P-5'-P-CCNC: 34 U/L (ref 0–50)
ANION GAP SERPL CALCULATED.3IONS-SCNC: 6 MMOL/L (ref 3–14)
AST SERPL W P-5'-P-CCNC: 26 U/L (ref 0–45)
BASOPHILS # BLD MANUAL: 0 10E3/UL (ref 0–0.2)
BASOPHILS NFR BLD MANUAL: 0 %
BILIRUB SERPL-MCNC: 0.5 MG/DL (ref 0.2–1.3)
BUN SERPL-MCNC: 17 MG/DL (ref 7–30)
CALCIUM SERPL-MCNC: 9.2 MG/DL (ref 8.5–10.1)
CHLORIDE BLD-SCNC: 105 MMOL/L (ref 94–109)
CO2 SERPL-SCNC: 30 MMOL/L (ref 20–32)
CREAT SERPL-MCNC: 0.97 MG/DL (ref 0.52–1.04)
EOSINOPHIL # BLD MANUAL: 0 10E3/UL (ref 0–0.7)
EOSINOPHIL NFR BLD MANUAL: 0 %
ERYTHROCYTE [DISTWIDTH] IN BLOOD BY AUTOMATED COUNT: 14.1 % (ref 10–15)
GFR SERPL CREATININE-BSD FRML MDRD: 58 ML/MIN/1.73M2
GLUCOSE BLD-MCNC: 133 MG/DL (ref 70–99)
HCT VFR BLD AUTO: 45 % (ref 35–47)
HGB BLD-MCNC: 14.2 G/DL (ref 11.7–15.7)
LYMPHOCYTES # BLD MANUAL: 89.3 10E3/UL (ref 0.8–5.3)
LYMPHOCYTES NFR BLD MANUAL: 94 %
MCH RBC QN AUTO: 29.8 PG (ref 26.5–33)
MCHC RBC AUTO-ENTMCNC: 31.6 G/DL (ref 31.5–36.5)
MCV RBC AUTO: 95 FL (ref 78–100)
MONOCYTES # BLD MANUAL: 1 10E3/UL (ref 0–1.3)
MONOCYTES NFR BLD MANUAL: 1 %
NEUTROPHILS # BLD MANUAL: 4.8 10E3/UL (ref 1.6–8.3)
NEUTROPHILS NFR BLD MANUAL: 5 %
PLAT MORPH BLD: ABNORMAL
PLATELET # BLD AUTO: 175 10E3/UL (ref 150–450)
POTASSIUM BLD-SCNC: 4.5 MMOL/L (ref 3.4–5.3)
PROT SERPL-MCNC: 6.6 G/DL (ref 6.8–8.8)
RBC # BLD AUTO: 4.76 10E6/UL (ref 3.8–5.2)
RBC MORPH BLD: ABNORMAL
SODIUM SERPL-SCNC: 141 MMOL/L (ref 133–144)
WBC # BLD AUTO: 95 10E3/UL (ref 4–11)

## 2022-05-18 PROCEDURE — 36415 COLL VENOUS BLD VENIPUNCTURE: CPT | Performed by: PATHOLOGY

## 2022-05-18 PROCEDURE — 99214 OFFICE O/P EST MOD 30 MIN: CPT | Performed by: INTERNAL MEDICINE

## 2022-05-18 PROCEDURE — 85027 COMPLETE CBC AUTOMATED: CPT | Performed by: PATHOLOGY

## 2022-05-18 PROCEDURE — 85007 BL SMEAR W/DIFF WBC COUNT: CPT | Performed by: PATHOLOGY

## 2022-05-18 PROCEDURE — 80053 COMPREHEN METABOLIC PANEL: CPT | Performed by: PATHOLOGY

## 2022-05-18 ASSESSMENT — PAIN SCALES - GENERAL: PAINLEVEL: NO PAIN (0)

## 2022-05-18 NOTE — NURSING NOTE
Chief Complaint   Patient presents with     Recheck Medication       YEVGENIY Woodall at 2:01 PM on 5/18/2022.

## 2022-06-09 ENCOUNTER — APPOINTMENT (OUTPATIENT)
Dept: LAB | Facility: CLINIC | Age: 82
End: 2022-06-09
Attending: STUDENT IN AN ORGANIZED HEALTH CARE EDUCATION/TRAINING PROGRAM
Payer: COMMERCIAL

## 2022-06-09 ENCOUNTER — ONCOLOGY VISIT (OUTPATIENT)
Dept: ONCOLOGY | Facility: CLINIC | Age: 82
End: 2022-06-09
Attending: STUDENT IN AN ORGANIZED HEALTH CARE EDUCATION/TRAINING PROGRAM
Payer: COMMERCIAL

## 2022-06-09 VITALS
DIASTOLIC BLOOD PRESSURE: 76 MMHG | BODY MASS INDEX: 24.1 KG/M2 | OXYGEN SATURATION: 98 % | HEART RATE: 76 BPM | TEMPERATURE: 98 F | RESPIRATION RATE: 16 BRPM | WEIGHT: 140.4 LBS | SYSTOLIC BLOOD PRESSURE: 119 MMHG

## 2022-06-09 DIAGNOSIS — C91.10 CLL (CHRONIC LYMPHOCYTIC LEUKEMIA) (H): ICD-10-CM

## 2022-06-09 LAB
BASOPHILS # BLD MANUAL: 0 10E3/UL (ref 0–0.2)
BASOPHILS NFR BLD MANUAL: 0 %
EOSINOPHIL # BLD MANUAL: 0 10E3/UL (ref 0–0.7)
EOSINOPHIL NFR BLD MANUAL: 0 %
ERYTHROCYTE [DISTWIDTH] IN BLOOD BY AUTOMATED COUNT: 14.1 % (ref 10–15)
HCT VFR BLD AUTO: 43.2 % (ref 35–47)
HGB BLD-MCNC: 13.7 G/DL (ref 11.7–15.7)
HOLD SPECIMEN: NORMAL
LYMPHOCYTES # BLD MANUAL: 75.5 10E3/UL (ref 0.8–5.3)
LYMPHOCYTES NFR BLD MANUAL: 82 %
MCH RBC QN AUTO: 29.7 PG (ref 26.5–33)
MCHC RBC AUTO-ENTMCNC: 31.7 G/DL (ref 31.5–36.5)
MCV RBC AUTO: 94 FL (ref 78–100)
MONOCYTES # BLD MANUAL: 11.1 10E3/UL (ref 0–1.3)
MONOCYTES NFR BLD MANUAL: 12 %
NEUTROPHILS # BLD MANUAL: 5.5 10E3/UL (ref 1.6–8.3)
NEUTROPHILS NFR BLD MANUAL: 6 %
PLAT MORPH BLD: ABNORMAL
PLATELET # BLD AUTO: 153 10E3/UL (ref 150–450)
RBC # BLD AUTO: 4.61 10E6/UL (ref 3.8–5.2)
RBC MORPH BLD: ABNORMAL
WBC # BLD AUTO: 92.1 10E3/UL (ref 4–11)

## 2022-06-09 PROCEDURE — 85014 HEMATOCRIT: CPT | Performed by: STUDENT IN AN ORGANIZED HEALTH CARE EDUCATION/TRAINING PROGRAM

## 2022-06-09 PROCEDURE — 36415 COLL VENOUS BLD VENIPUNCTURE: CPT | Performed by: STUDENT IN AN ORGANIZED HEALTH CARE EDUCATION/TRAINING PROGRAM

## 2022-06-09 PROCEDURE — 99215 OFFICE O/P EST HI 40 MIN: CPT | Performed by: STUDENT IN AN ORGANIZED HEALTH CARE EDUCATION/TRAINING PROGRAM

## 2022-06-09 PROCEDURE — G0463 HOSPITAL OUTPT CLINIC VISIT: HCPCS

## 2022-06-09 PROCEDURE — 85007 BL SMEAR W/DIFF WBC COUNT: CPT | Performed by: STUDENT IN AN ORGANIZED HEALTH CARE EDUCATION/TRAINING PROGRAM

## 2022-06-09 ASSESSMENT — PAIN SCALES - GENERAL: PAINLEVEL: NO PAIN (0)

## 2022-06-09 NOTE — NURSING NOTE
"Oncology Rooming Note    June 9, 2022 11:22 AM   Kerri Shook is a 81 year old female who presents for:    Chief Complaint   Patient presents with     Blood Draw     Labs drawn via VPT by RN in lab. VS taken.     Oncology Clinic Visit     CLL     Initial Vitals: /76   Pulse 76   Temp 98  F (36.7  C) (Tympanic)   Resp 16   Wt 63.7 kg (140 lb 6.4 oz)   SpO2 98%   BMI 24.10 kg/m   Estimated body mass index is 24.1 kg/m  as calculated from the following:    Height as of 5/18/22: 1.626 m (5' 4\").    Weight as of this encounter: 63.7 kg (140 lb 6.4 oz). Body surface area is 1.7 meters squared.  No Pain (0) Comment: Data Unavailable   No LMP recorded. Patient is postmenopausal.  Allergies reviewed: Yes  Medications reviewed: Yes    Medications: Medication refills not needed today.  Pharmacy name entered into [employer]:    [facility] #[id number] - [place], [place] - [address] AT [facility] [address]  [id number] - [place], [place] - [address] AT [facility] & [address] [place] [address]    Clinical concerns: Pt presents today for f/u.  Pt states no new concerns today.       Nicole Pineda LPN  6/9/2022              "

## 2022-06-09 NOTE — NURSING NOTE
Chief Complaint   Patient presents with     Blood Draw     Labs drawn via VPT by RN in lab. VS taken.     Labs collected from venipuncture by RN. Vitals taken. Checked in for appointment(s).    Aimee PÉREZ RN PHN BSN  BMT/Oncology Lab

## 2022-06-09 NOTE — PROGRESS NOTES
[facility]    HEMATOLOGY & ONCOLOGY  FOLLOW UP    PATIENT NAME: Kerri Shook MRN # [id number]  DATE OF VISIT: 06/09/2022  YOB: 1940    REFERRING PROVIDER:  Dr. 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    1. CLL in 1986. Found to have an elevated white blood count on routine testing. This was consistent with CLL.Saw Dr. Puente who recommended observation. WBCs 15-20 during this time. She has required no treatment for CLL. Followed by PCP with yearly CBCs.  2. 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 The patient had a screening mammogram in 1990, which showed an abnormality in the left breast. She underwent a lumpectomy on 03/02/1990, which was consistent with a 1.5 cm primary, mucinous adenocarcinoma with 0 of 8 lymph nodes positive. She was staged as a stage I (T1 N0 M0). The tumor was ER positive. She did have radiation to the left breast with a boost having completed 6600 cGy in 33 fractions from 03/28/1990 until 05/15/1990. She went on to take tamoxifen for 6 years from 03/1990 until 03/1996.  3. 1/2019 labs showing WBC increasing to 27. This has continued with WBCs 47 on 11/12/2020 and 71.0 on 8//18/21  4. 8/31/21 Peripheral flow showing CD5+ lambda CLL cells. Hb 14.6, WBC 66.2, Plts 162, ANC 5.3  5. 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 She reports that she has not noticed any significant changes since last visit. Energy and appetite good. No lumps/bumps. Denies f/c/s or n/v/d.     CURRENT OUTPATIENT MEDICATIONS  Current Outpatient Medications   Medication Sig Dispense Refill     aspirin 81 MG tablet Take 81 mg by mouth daily       carvedilol (COREG) 3.125 MG tablet Take 1 tablet (3.125 mg) by  mouth 2 times daily 180 tablet 3     gabapentin (NEURONTIN) 100 MG capsule Route: Take 1 capsule (100 mg) by mouth nightly as needed - Oral 30 capsule 3     Multiple Vitamin (MULTIVITAMIN) per tablet Take 1 tablet by mouth daily.       simvastatin (ZOCOR) 20 MG tablet Take 1 tablet (20 mg) by mouth At Bedtime 90 tablet 3       REVIEW OF SYSTEMS  A complete ROS was performed and was negative except as mentioned in HPI    PHYSICAL EXAM  /76   Pulse 76   Temp 98  F (36.7  C) (Tympanic)   Resp 16   Wt 63.7 kg (140 lb 6.4 oz)   SpO2 98%   BMI 24.10 kg/m    Wt Readings from Last 3 Encounters:   05/18/22 63.2 kg (139 lb 4.8 oz)   03/03/22 64.4 kg (142 lb)   02/15/22 63.5 kg (140 lb)       Gen: alert, pleasant and conversational, NAD  HEENT: NC/AT, EOMI, anicteric sclera. OP clear. MMM.   Lymph: No palpable cervical, supraclavicular, or axillary LAD  CV: normal S1,S2 with RRR no m/r/g  Resp: lungs CTA bilaterally with adequate air movement to bases. No wheezes or crackles  Abd: soft NTND no organomegaly or masses. BS normoactive.   Ext: WWP no edema or cyanosis  Skin: no concerning lesions or rashes  Neuro: A&Ox4, no lateralizing sx. Grossly nonfocal.      LABORATORY AND IMAGING STUDIES     Latest Reference Range & Units 06/09/22 11:14   WBC 4.0 - 11.0 10e3/uL 92.1 (HH) (P)   Hemoglobin 11.7 - 15.7 g/dL 13.7 (P)   Hematocrit 35.0 - 47.0 % 43.2 (P)   Platelet Count 150 - 450 10e3/uL 153 (P)   RBC Count 3.80 - 5.20 10e6/uL 4.61 (P)   MCV 78 - 100 fL 94 (P)   MCH 26.5 - 33.0 pg 29.7 (P)   MCHC 31.5 - 36.5 g/dL 31.7 (P)   RDW 10.0 - 15.0 % 14.1 (P)   (HH): Data is critically high  (P): Preliminary            FISH 9/9/21  RESULTS:     ABNORMAL  - Loss of TP53 (83.5%)  - Monoallelic loss of [id number] (78%)  - Biallellic loss of [id number] (17.5%)  - Loss of CCND1 (11q13.3)  (81%)     ASSESSMENT AND PLAN  Kerri Shook is a 81 year old female with PMH significant for 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 WBC initially increasing logrithmically with >50% increase between  21 (66) and 10/21/21 (95). Today, WBC relatively stable at  for last 6 months    We discussed that the disease appears to have mutated but then plateaued again at this new WBC level. She continues to have Arboleda Stage 0 disease with only lymphocytosis but no LAD or CBC abnormalities. She is otherwise asymptomatic from this CLL. I stated that I wasn't sure how long it would remain stable but without any significant changes in disease or new symptoms there continues to be no indication to start treatment.     Final plan:  - Labs [id number]  - IgG quant next labs  - follow up with me in 1 year or earlier PRN    Total time spent on this encounter today including reviewing the EMR, documentation and direct patient care counselin minutes    Pop Centeno MD  Instructor  Division of Hematology, Oncology and Transplantation  [facility]

## 2022-06-09 NOTE — LETTER
6/9/2022         RE: Kerri Shook  [address]  [facility] [address]        Dear Colleague,    Thank you for referring your patient, Kerri Shook, to the [facility]. Please see a copy of my visit note below.    [facility]    HEMATOLOGY & ONCOLOGY  FOLLOW UP    PATIENT NAME: Kerri Shook MRN # [id number]  DATE OF VISIT: 06/09/2022  YOB: 1940    REFERRING PROVIDER:  Dr. 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    1. CLL in 1986. Found to have an elevated white blood count on routine testing. This was consistent with CLL.Saw Dr. Puente who recommended observation. WBCs 15-20 during this time. She has required no treatment for CLL. Followed by PCP with yearly CBCs.  2. 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 The patient had a screening mammogram in 1990, which showed an abnormality in the left breast. She underwent a lumpectomy on 03/02/1990, which was consistent with a 1.5 cm primary, mucinous adenocarcinoma with 0 of 8 lymph nodes positive. She was staged as a stage I (T1 N0 M0). The tumor was ER positive. She did have radiation to the left breast with a boost having completed 6600 cGy in 33 fractions from 03/28/1990 until 05/15/1990. She went on to take tamoxifen for 6 years from 03/1990 until 03/1996.  3. 1/2019 labs showing WBC increasing to 27. This has continued with WBCs 47 on 11/12/2020 and 71.0 on 8//18/21  4. 8/31/21 Peripheral flow showing CD5+ lambda CLL cells. Hb 14.6, WBC 66.2, Plts 162, ANC 5.3  5. 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 She reports that she has not noticed any significant changes since last visit. Energy and appetite  good. No lumps/bumps. Denies f/c/s or n/v/d.     CURRENT OUTPATIENT MEDICATIONS  Current Outpatient Medications   Medication Sig Dispense Refill     aspirin 81 MG tablet Take 81 mg by mouth daily       carvedilol (COREG) 3.125 MG tablet Take 1 tablet (3.125 mg) by mouth 2 times daily 180 tablet 3     gabapentin (NEURONTIN) 100 MG capsule Route: Take 1 capsule (100 mg) by mouth nightly as needed - Oral 30 capsule 3     Multiple Vitamin (MULTIVITAMIN) per tablet Take 1 tablet by mouth daily.       simvastatin (ZOCOR) 20 MG tablet Take 1 tablet (20 mg) by mouth At Bedtime 90 tablet 3       REVIEW OF SYSTEMS  A complete ROS was performed and was negative except as mentioned in HPI    PHYSICAL EXAM  /76   Pulse 76   Temp 98  F (36.7  C) (Tympanic)   Resp 16   Wt 63.7 kg (140 lb 6.4 oz)   SpO2 98%   BMI 24.10 kg/m    Wt Readings from Last 3 Encounters:   05/18/22 63.2 kg (139 lb 4.8 oz)   03/03/22 64.4 kg (142 lb)   02/15/22 63.5 kg (140 lb)       Gen: alert, pleasant and conversational, NAD  HEENT: NC/AT, EOMI, anicteric sclera. OP clear. MMM.   Lymph: No palpable cervical, supraclavicular, or axillary LAD  CV: normal S1,S2 with RRR no m/r/g  Resp: lungs CTA bilaterally with adequate air movement to bases. No wheezes or crackles  Abd: soft NTND no organomegaly or masses. BS normoactive.   Ext: WWP no edema or cyanosis  Skin: no concerning lesions or rashes  Neuro: A&Ox4, no lateralizing sx. Grossly nonfocal.      LABORATORY AND IMAGING STUDIES     Latest Reference Range & Units 06/09/22 11:14   WBC 4.0 - 11.0 10e3/uL 92.1 (HH) (P)   Hemoglobin 11.7 - 15.7 g/dL 13.7 (P)   Hematocrit 35.0 - 47.0 % 43.2 (P)   Platelet Count 150 - 450 10e3/uL 153 (P)   RBC Count 3.80 - 5.20 10e6/uL 4.61 (P)   MCV 78 - 100 fL 94 (P)   MCH 26.5 - 33.0 pg 29.7 (P)   MCHC 31.5 - 36.5 g/dL 31.7 (P)   RDW 10.0 - 15.0 % 14.1 (P)   (HH): Data is critically high  (P): Preliminary            FISH 9/9/21  RESULTS:     ABNORMAL  - Loss of  TP53 (83.5%)  - Monoallelic loss of [id number] (78%)  - Biallellic loss of C74M151 (17.5%)  - Loss of CCND1 (11q13.3)  (81%)     ASSESSMENT AND PLAN  Kerri Shook is a 81 year old female with PMH significant for 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 WBC initially increasing logrithmically with >50% increase between  21 (66) and 10/21/21 (95). Today, WBC relatively stable at  for last 6 months    We discussed that the disease appears to have mutated but then plateaued again at this new WBC level. She continues to have Arboleda Stage 0 disease with only lymphocytosis but no LAD or CBC abnormalities. She is otherwise asymptomatic from this CLL. I stated that I wasn't sure how long it would remain stable but without any significant changes in disease or new symptoms there continues to be no indication to start treatment.     Final plan:  - Labs [id number]  - IgG quant next labs  - follow up with me in 1 year or earlier PRN    Total time spent on this encounter today including reviewing the EMR, documentation and direct patient care counselin minutes      Pop Centeno MD  Instructor  Division of Hematology, Oncology and Transplantation  [facility]

## 2022-07-11 ENCOUNTER — MYC MEDICAL ADVICE (OUTPATIENT)
Dept: INTERNAL MEDICINE | Facility: CLINIC | Age: 82
End: 2022-07-11

## 2022-08-22 DIAGNOSIS — M79.605 PAIN OF LEFT LOWER EXTREMITY: ICD-10-CM

## 2022-08-22 RX ORDER — GABAPENTIN 100 MG/1
CAPSULE ORAL
Qty: 30 CAPSULE | Refills: 3 | Status: SHIPPED | OUTPATIENT
Start: 2022-08-22 | End: 2023-11-06

## 2022-08-22 NOTE — TELEPHONE ENCOUNTER
gabapentin (NEURONTIN) 100 MG capsule  Last Written Prescription Date: 3/24/21  Last Fill Quantity:30,   # refills: 3   Last Office Visit :5/18/22  Future Office visit:  9/7/22      Routing refill request to provider for review/approval because: gap in med rf. Not on protocol

## 2022-09-06 ENCOUNTER — LAB (OUTPATIENT)
Dept: LAB | Facility: CLINIC | Age: 82
End: 2022-09-06
Payer: COMMERCIAL

## 2022-09-06 DIAGNOSIS — C91.10 CLL (CHRONIC LYMPHOCYTIC LEUKEMIA) (H): ICD-10-CM

## 2022-09-06 LAB
BASOPHILS # BLD AUTO: 0.1 10E3/UL (ref 0–0.2)
BASOPHILS NFR BLD AUTO: 0 %
EOSINOPHIL # BLD AUTO: 0.1 10E3/UL (ref 0–0.7)
EOSINOPHIL NFR BLD AUTO: 0 %
ERYTHROCYTE [DISTWIDTH] IN BLOOD BY AUTOMATED COUNT: 14.1 % (ref 10–15)
HCT VFR BLD AUTO: 43 % (ref 35–47)
HGB BLD-MCNC: 13.8 G/DL (ref 11.7–15.7)
IMM GRANULOCYTES # BLD: 0.1 10E3/UL
IMM GRANULOCYTES NFR BLD: 0 %
LYMPHOCYTES # BLD AUTO: 97.4 10E3/UL (ref 0.8–5.3)
LYMPHOCYTES NFR BLD AUTO: 93 %
MCH RBC QN AUTO: 29.8 PG (ref 26.5–33)
MCHC RBC AUTO-ENTMCNC: 32.1 G/DL (ref 31.5–36.5)
MCV RBC AUTO: 93 FL (ref 78–100)
MONOCYTES # BLD AUTO: 2.3 10E3/UL (ref 0–1.3)
MONOCYTES NFR BLD AUTO: 2 %
NEUTROPHILS # BLD AUTO: 4.8 10E3/UL (ref 1.6–8.3)
NEUTROPHILS NFR BLD AUTO: 5 %
PLATELET # BLD AUTO: 181 10E3/UL (ref 150–450)
RBC # BLD AUTO: 4.63 10E6/UL (ref 3.8–5.2)
WBC # BLD AUTO: 104.7 10E3/UL (ref 4–11)

## 2022-09-06 PROCEDURE — 82784 ASSAY IGA/IGD/IGG/IGM EACH: CPT

## 2022-09-06 PROCEDURE — 36415 COLL VENOUS BLD VENIPUNCTURE: CPT

## 2022-09-06 PROCEDURE — 85025 COMPLETE CBC W/AUTO DIFF WBC: CPT

## 2022-09-06 NOTE — PROGRESS NOTES
HPI:    Last visit with us 5/18/2022 and additional information in that note. Overall doing well. She recently had a trip to [facility] with her granddaughter. Her musculoskeletal complaints (hip and back) have resolved and she was able to walk around [facility]. She has some stress with her son's EtOH abuse. No other HEENT, cardiopulmonary, abdominal, , GYN, neurological, systemic, psychiatric, lymphatic, endocrine, vascular complaints.        Past Medical History:   Diagnosis Date     Breast disorder 1990    Breast Cancer     Cardiomyopathy (H)      Chronic osteoarthritis 2012    resulted in hip replacement     CLL (chronic lymphoblastic leukemia) 1989     Closed fracture of second toe of left foot      Colon adenomas     6/26/14 colonoscopy, repeat in 3 years     History of blood transfusion      hyperlipidemia      Primary localized osteoarthrosis, pelvic region and thigh      Tinnitus      Past Surgical History:   Procedure Laterality Date     BIOPSY  within the last 5 years    polyps during colonoscopy     COLONOSCOPY  6/26/2014    Procedure: COMBINED COLONOSCOPY, SINGLE BIOPSY/POLYPECTOMY BY BIOPSY;  Surgeon: Pola Arceo MD;  Location:  GI     COLONOSCOPY N/A 1/5/2021    Procedure: COLONOSCOPY, WITH POLYPECTOMY AND CLIP;  Surgeon: Charlie Wang MD;  Location[place] [facility] [place]     D & C  1962    late PP hemorrhage --> retained placenta     ENDOSCOPIC SINUS SURGERY Right 7/16/2018    Procedure: ENDOSCOPIC SINUS SURGERY;  Endoscopic Sphenoidotomy with Removal of Tissue;  Surgeon: Kishore Webster MD;  Location:  OR     ENT SURGERY  1950    tonselectomy     EYE SURGERY  2015    cataract on left eye     left hip replacemen Left     hip replacement in 2013     LUMPECTOMY BREAST  1990    Left     ORTHOPEDIC SURGERY  age?    right shoulder      ORTHOPEDIC SURGERY  ~ 2013    left hip replacement     R hip arthroscopy  7/19/11     TONSILLECTOMY  1950     [facility] PELVIS/HIP JOINT SURGERY UNLISTED  April 2012    left hip  replacement     [facility] SHOULDER SURG PROC UNLISTED  ?     PE:    Vitals noted, gen, nad, cooperative, alert,. Neck supple nl rom, lungs with good air movement, RRR, S1, S2, no MRG, abdomen, no acute findings. Grossly normal neurological exam.     A/P:       1. CLL; she had ONC follow up with Dr. Centeno 6/9/2022. Next visit 6/1/2023  2. Immunizations; Moderna COVID vaccine x 4. She has completed the Zoster Shingrix vaccine series. Pneumococcal 23 10/21/2021. Prevnar 13 done 2/4/2015. Tdap 1/27/2014  3. Breast cancer; mammogram 4/7/2022  4. Dermatology; 11/17/2021  5. CT abdomen/pelvis 4/7/2022; no acute findings. Currently no complaints and if worse/persistent consider EGD. She had some abdominal complaints before leaving for [facility] but now is doing fine and has a good appetitie.   6. DEXA scan 11/19/2020  7. Increased cholesterol on Simvastatin; lipids 2/17/2022 TG's 114, HDL 71 and LDL 95  8. Colonscopy 1/5/2021 and repeat in 3 years  9. A1c 6.2% 2/17/2022  10. Seen Dr. Sigala, Podiatry 5/17/2022  11. She can follow up with me or orthopedics regarding R hip pain sxs. No current complaints.      40 minutes spent on the date of the encounter doing chart review, history and exam, documentation and further activities as noted above exclusive of procedures and other billable interpretations

## 2022-09-07 ENCOUNTER — OFFICE VISIT (OUTPATIENT)
Dept: INTERNAL MEDICINE | Facility: CLINIC | Age: 82
End: 2022-09-07
Payer: COMMERCIAL

## 2022-09-07 VITALS
BODY MASS INDEX: 23.9 KG/M2 | WEIGHT: 140 LBS | SYSTOLIC BLOOD PRESSURE: 116 MMHG | HEART RATE: 72 BPM | OXYGEN SATURATION: 93 % | HEIGHT: 64 IN | DIASTOLIC BLOOD PRESSURE: 71 MMHG

## 2022-09-07 DIAGNOSIS — E78.00 HIGH BLOOD CHOLESTEROL: Primary | ICD-10-CM

## 2022-09-07 LAB
IGA SERPL-MCNC: 86 MG/DL (ref 84–499)
IGG SERPL-MCNC: 352 MG/DL (ref 610–1616)
IGM SERPL-MCNC: 63 MG/DL (ref 35–242)

## 2022-09-07 PROCEDURE — 99215 OFFICE O/P EST HI 40 MIN: CPT | Performed by: INTERNAL MEDICINE

## 2022-09-07 NOTE — NURSING NOTE
"Kerri Shook is a 82 year old female patient that presents today in clinic for the following:    Chief Complaint   Patient presents with     Follow Up     The patient's allergies and medications were reviewed as noted. A set of vitals were recorded as noted without incident: /71 (BP Location: Right arm, Patient Position: Sitting, Cuff Size: Adult Regular)   Pulse 72   Ht 1.626 m (5' 4\")   Wt 63.5 kg (140 lb)   SpO2 93%   BMI 24.03 kg/m  . The patient does not have any other questions for the provider.    Sonia Carpenter, EMT at 9:26 AM on 9/7/2022    "

## 2022-10-07 ENCOUNTER — OFFICE VISIT (OUTPATIENT)
Dept: INTERNAL MEDICINE | Facility: CLINIC | Age: 82
End: 2022-10-07
Payer: COMMERCIAL

## 2022-10-07 VITALS
OXYGEN SATURATION: 98 % | HEART RATE: 77 BPM | WEIGHT: 139.9 LBS | SYSTOLIC BLOOD PRESSURE: 123 MMHG | HEIGHT: 64 IN | BODY MASS INDEX: 23.88 KG/M2 | DIASTOLIC BLOOD PRESSURE: 74 MMHG

## 2022-10-07 DIAGNOSIS — Z23 NEED FOR VACCINATION: Primary | ICD-10-CM

## 2022-10-07 PROCEDURE — 91312 COVID-19,PF,PFIZER BOOSTER BIVALENT: CPT | Performed by: INTERNAL MEDICINE

## 2022-10-07 PROCEDURE — 99214 OFFICE O/P EST MOD 30 MIN: CPT | Mod: 25 | Performed by: INTERNAL MEDICINE

## 2022-10-07 PROCEDURE — G0008 ADMIN INFLUENZA VIRUS VAC: HCPCS | Performed by: INTERNAL MEDICINE

## 2022-10-07 PROCEDURE — 90662 IIV NO PRSV INCREASED AG IM: CPT | Performed by: INTERNAL MEDICINE

## 2022-10-07 PROCEDURE — 0124A COVID-19,PF,PFIZER BOOSTER BIVALENT: CPT | Performed by: INTERNAL MEDICINE

## 2022-10-07 NOTE — PROGRESS NOTES
HPI:    Last visit with me 9/7/2022 and additional information in that note. Overall doing well. She states R hip and leg w/o worse sxs. And less than before. Otherwise, no additional HEENT, cardiopulmonary, abdominal, , GYN, neurological, systemic, psychiatric, lymphatic, endocrine complaints.     Past Medical History:   Diagnosis Date     Breast disorder 1990    Breast Cancer     Cardiomyopathy (H)      Chronic osteoarthritis 2012    resulted in hip replacement     CLL (chronic lymphoblastic leukemia) 1989     Closed fracture of second toe of left foot      Colon adenomas     6/26/14 colonoscopy, repeat in 3 years     History of blood transfusion      hyperlipidemia      Primary localized osteoarthrosis, pelvic region and thigh      Tinnitus      Past Surgical History:   Procedure Laterality Date     BIOPSY  within the last 5 years    polyps during colonoscopy     COLONOSCOPY  6/26/2014    Procedure: COMBINED COLONOSCOPY, SINGLE BIOPSY/POLYPECTOMY BY BIOPSY;  Surgeon: Pola Arceo MD;  Location:  GI     COLONOSCOPY N/A 1/5/2021    Procedure: COLONOSCOPY, WITH POLYPECTOMY AND CLIP;  Surgeon: Charlie Wang MD;  Location: [facility] [place]     D & C  1962    late PP hemorrhage --> retained placenta     ENDOSCOPIC SINUS SURGERY Right 7/16/2018    Procedure: ENDOSCOPIC SINUS SURGERY;  Endoscopic Sphenoidotomy with Removal of Tissue;  Surgeon: Kishore Webster MD;  Location:  OR     ENT SURGERY  1950    tonselectomy     EYE SURGERY  2015    cataract on left eye     left hip replacemen Left     hip replacement in 2013     LUMPECTOMY BREAST  1990    Left     ORTHOPEDIC SURGERY  age?    right shoulder      ORTHOPEDIC SURGERY  ~ 2013    left hip replacement     R hip arthroscopy  7/19/11     TONSILLECTOMY  1950     [facility] PELVIS/HIP JOINT SURGERY UNLISTED  April 2012    left hip replacement     [facility] SHOULDER SURG PROC UNLISTED  ?     PE:    Vitals noted, gen, nad, cooperative, alert, neck supple nl rom, lungs with good air  movement, RRR, S1, S2, no MRG, abdomen, no acute findings. Grossly normal neurological exam.     A/P:       1. CLL; she had ONC follow up with Dr. Centeno 6/9/2022. Next visit 6/1/2023  2. Immunizations; Moderna COVID vaccine x 4. She has completed the Zoster Shingrix vaccine series. Pneumococcal 23 10/21/2021. Prevnar 13 done 2/4/2015. Tdap 1/27/2014. Influenza vaccine today.  Bivalent COVID Pfizer today 10/7/2022.   3. Breast cancer; mammogram 4/7/2022  4. Dermatology; 11/17/2021  5. CT abdomen/pelvis 4/7/2022; no acute findings. Currently no complaints and if worse/persistent consider EGD.    6. DEXA scan 11/19/2020  7. Increased cholesterol on Simvastatin; lipids 2/17/2022 TG's 114, HDL 71 and LDL 95  8. Colonscopy 1/5/2021 and repeat in 3 years  9. A1c 6.2% 2/17/2022  10. Seen Dr. Sigala, Podiatry 5/17/2022  11. Hip complaints. Minimal sxs. Recently and will follow.        30 minutes spent on the date of the encounter doing chart review, history and exam, documentation and further activities as noted above exclusive of procedures and other billable interpretations

## 2022-10-07 NOTE — NURSING NOTE
Kerri Shook is a 82 year old female that presents in clinic today for the following:     Chief Complaint   Patient presents with     Follow Up     Pt here for 1 mo follow up        The patient's allergies and medications were reviewed. The patient's vitals were obtained without incident. The patient does not have any other questions for the provider.     YEVGENIY Camacho at 11:12 AM on 10/7/2022.  Primary Care Clinic: [phone]

## 2022-12-06 ENCOUNTER — LAB (OUTPATIENT)
Dept: LAB | Facility: CLINIC | Age: 82
End: 2022-12-06
Payer: COMMERCIAL

## 2022-12-06 DIAGNOSIS — C91.10 CLL (CHRONIC LYMPHOCYTIC LEUKEMIA) (H): ICD-10-CM

## 2022-12-06 LAB
BASOPHILS # BLD AUTO: 0.1 10E3/UL (ref 0–0.2)
BASOPHILS NFR BLD AUTO: 0 %
EOSINOPHIL # BLD AUTO: 0.1 10E3/UL (ref 0–0.7)
EOSINOPHIL NFR BLD AUTO: 0 %
ERYTHROCYTE [DISTWIDTH] IN BLOOD BY AUTOMATED COUNT: 13.9 % (ref 10–15)
HCT VFR BLD AUTO: 42 % (ref 35–47)
HGB BLD-MCNC: 13.3 G/DL (ref 11.7–15.7)
IMM GRANULOCYTES # BLD: 0.1 10E3/UL
IMM GRANULOCYTES NFR BLD: 0 %
LYMPHOCYTES # BLD AUTO: 105.1 10E3/UL (ref 0.8–5.3)
LYMPHOCYTES NFR BLD AUTO: 93 %
MCH RBC QN AUTO: 29.6 PG (ref 26.5–33)
MCHC RBC AUTO-ENTMCNC: 31.7 G/DL (ref 31.5–36.5)
MCV RBC AUTO: 93 FL (ref 78–100)
MONOCYTES # BLD AUTO: 3.1 10E3/UL (ref 0–1.3)
MONOCYTES NFR BLD AUTO: 3 %
NEUTROPHILS # BLD AUTO: 4.2 10E3/UL (ref 1.6–8.3)
NEUTROPHILS NFR BLD AUTO: 4 %
PLATELET # BLD AUTO: 172 10E3/UL (ref 150–450)
RBC # BLD AUTO: 4.5 10E6/UL (ref 3.8–5.2)
WBC # BLD AUTO: 112.5 10E3/UL (ref 4–11)

## 2022-12-06 PROCEDURE — 85025 COMPLETE CBC W/AUTO DIFF WBC: CPT

## 2022-12-06 PROCEDURE — 36415 COLL VENOUS BLD VENIPUNCTURE: CPT

## 2023-02-15 ENCOUNTER — LAB (OUTPATIENT)
Dept: LAB | Facility: CLINIC | Age: 83
End: 2023-02-15
Payer: COMMERCIAL

## 2023-02-15 ENCOUNTER — OFFICE VISIT (OUTPATIENT)
Dept: INTERNAL MEDICINE | Facility: CLINIC | Age: 83
End: 2023-02-15
Payer: COMMERCIAL

## 2023-02-15 VITALS
SYSTOLIC BLOOD PRESSURE: 114 MMHG | BODY MASS INDEX: 23.97 KG/M2 | HEIGHT: 64 IN | HEART RATE: 75 BPM | DIASTOLIC BLOOD PRESSURE: 71 MMHG | WEIGHT: 140.4 LBS | OXYGEN SATURATION: 96 %

## 2023-02-15 DIAGNOSIS — I10 BENIGN ESSENTIAL HYPERTENSION: Primary | ICD-10-CM

## 2023-02-15 DIAGNOSIS — I10 BENIGN ESSENTIAL HYPERTENSION: ICD-10-CM

## 2023-02-15 LAB
ALBUMIN SERPL BCG-MCNC: 4.4 G/DL (ref 3.5–5.2)
ALP SERPL-CCNC: 85 U/L (ref 35–104)
ALT SERPL W P-5'-P-CCNC: 22 U/L (ref 10–35)
ANION GAP SERPL CALCULATED.3IONS-SCNC: 9 MMOL/L (ref 7–15)
AST SERPL W P-5'-P-CCNC: 28 U/L (ref 10–35)
BASOPHILS # BLD MANUAL: 0 10E3/UL (ref 0–0.2)
BASOPHILS NFR BLD MANUAL: 0 %
BILIRUB SERPL-MCNC: 0.4 MG/DL
BUN SERPL-MCNC: 14.3 MG/DL (ref 8–23)
CALCIUM SERPL-MCNC: 9.6 MG/DL (ref 8.8–10.2)
CHLORIDE SERPL-SCNC: 103 MMOL/L (ref 98–107)
CREAT SERPL-MCNC: 0.91 MG/DL (ref 0.51–0.95)
DEPRECATED HCO3 PLAS-SCNC: 28 MMOL/L (ref 22–29)
EOSINOPHIL # BLD MANUAL: 0 10E3/UL (ref 0–0.7)
EOSINOPHIL NFR BLD MANUAL: 0 %
ERYTHROCYTE [DISTWIDTH] IN BLOOD BY AUTOMATED COUNT: 14.2 % (ref 10–15)
GFR SERPL CREATININE-BSD FRML MDRD: 63 ML/MIN/1.73M2
GLUCOSE SERPL-MCNC: 84 MG/DL (ref 70–99)
HCT VFR BLD AUTO: 44.4 % (ref 35–47)
HGB BLD-MCNC: 13.9 G/DL (ref 11.7–15.7)
LYMPHOCYTES # BLD MANUAL: 114.8 10E3/UL (ref 0.8–5.3)
LYMPHOCYTES NFR BLD MANUAL: 91 %
MCH RBC QN AUTO: 29.4 PG (ref 26.5–33)
MCHC RBC AUTO-ENTMCNC: 31.3 G/DL (ref 31.5–36.5)
MCV RBC AUTO: 94 FL (ref 78–100)
MONOCYTES # BLD MANUAL: 5 10E3/UL (ref 0–1.3)
MONOCYTES NFR BLD MANUAL: 4 %
NEUTROPHILS # BLD MANUAL: 6.3 10E3/UL (ref 1.6–8.3)
NEUTROPHILS NFR BLD MANUAL: 5 %
PLAT MORPH BLD: ABNORMAL
PLATELET # BLD AUTO: 181 10E3/UL (ref 150–450)
POTASSIUM SERPL-SCNC: 5.1 MMOL/L (ref 3.4–5.3)
PROT SERPL-MCNC: 6.5 G/DL (ref 6.4–8.3)
RBC # BLD AUTO: 4.73 10E6/UL (ref 3.8–5.2)
RBC MORPH BLD: ABNORMAL
SODIUM SERPL-SCNC: 140 MMOL/L (ref 136–145)
WBC # BLD AUTO: 126.1 10E3/UL (ref 4–11)

## 2023-02-15 PROCEDURE — 99214 OFFICE O/P EST MOD 30 MIN: CPT | Mod: 25 | Performed by: INTERNAL MEDICINE

## 2023-02-15 PROCEDURE — 93000 ELECTROCARDIOGRAM COMPLETE: CPT | Performed by: INTERNAL MEDICINE

## 2023-02-15 PROCEDURE — 80053 COMPREHEN METABOLIC PANEL: CPT | Performed by: PATHOLOGY

## 2023-02-15 PROCEDURE — 85007 BL SMEAR W/DIFF WBC COUNT: CPT | Performed by: PATHOLOGY

## 2023-02-15 PROCEDURE — 85027 COMPLETE CBC AUTOMATED: CPT | Performed by: PATHOLOGY

## 2023-02-15 PROCEDURE — 36415 COLL VENOUS BLD VENIPUNCTURE: CPT | Performed by: PATHOLOGY

## 2023-02-15 NOTE — NURSING NOTE
Kerri Shook is a 82 year old female that presents in clinic today for the following:     Chief Complaint   Patient presents with     Pre-Op Exam     Pt here for a pre op exam. See notes for details of surgery.        The patient's allergies and medications were reviewed. The patient's vitals were obtained without incident. The patient does not have any other questions for the provider.     Vernell Alas, EMT at 12:43 PM on 2/15/2023.  Primary Care Clinic: [phone]

## 2023-02-15 NOTE — PROGRESS NOTES
HPI:    Ms. Shook comes in for preoperative evaluation for R eye cataract surgery. See Optometry note from 1/6/2023. She states previous L eye cataract surgery. She does not smoke nor abuse EtOH. She denies any anesthesia issues. No bleeding concerns. She has excellent cardiopulmonary functional status. No other HEENT, cardiopulmonary, abdominal, , GYN, neurological, systemic, psychiatric, lymphatic, endocrine, vascular complaints. Family history not relevant.     Family history noted in HPI    ROS noted in HPI    Social history noted in HPI    Current Outpatient Medications   Medication     aspirin 81 MG tablet     carvedilol (COREG) 3.125 MG tablet     gabapentin (NEURONTIN) 100 MG capsule     Multiple Vitamin (MULTIVITAMIN) per tablet     simvastatin (ZOCOR) 20 MG tablet     No current facility-administered medications for this visit.     No know drug allergies.     Past Medical History:   Diagnosis Date     Breast disorder 1990    Breast Cancer     Cardiomyopathy (H)      Chronic osteoarthritis 2012    resulted in hip replacement     CLL (chronic lymphoblastic leukemia) 1989     Closed fracture of second toe of left foot      Colon adenomas     6/26/14 colonoscopy, repeat in 3 years     History of blood transfusion      hyperlipidemia      Primary localized osteoarthrosis, pelvic region and thigh      Tinnitus      Past Surgical History:   Procedure Laterality Date     BIOPSY  within the last 5 years    polyps during colonoscopy     COLONOSCOPY  6/26/2014    Procedure: COMBINED COLONOSCOPY, SINGLE BIOPSY/POLYPECTOMY BY BIOPSY;  Surgeon: Pola Arceo MD;  Location:  GI     COLONOSCOPY N/A 1/5/2021    Procedure: COLONOSCOPY, WITH POLYPECTOMY AND CLIP;  Surgeon: Charlie Wang MD;  Location: [facility]     D & C  1962    late PP hemorrhage --> retained placenta     ENDOSCOPIC SINUS SURGERY Right 7/16/2018    Procedure: ENDOSCOPIC SINUS SURGERY;  Endoscopic Sphenoidotomy with Removal of Tissue;  Surgeon: Eleanor  Kishore GALVAN MD;  Location: [facility] OR     ENT SURGERY  1950    tonselectomy     EYE SURGERY  2015    cataract on left eye     left hip replacemen Left     hip replacement in 2013     LUMPECTOMY BREAST  1990    Left     ORTHOPEDIC SURGERY  age?    right shoulder      ORTHOPEDIC SURGERY  ~ 2013    left hip replacement     R hip arthroscopy  7/19/11     TONSILLECTOMY  1950     [facility] PELVIS/HIP JOINT SURGERY UNLISTED  April 2012    left hip replacement     [facility] SHOULDER SURG PROC UNLISTED  ?     PE:    Vitals noted, gen, nad, cooperative, alert, neck supple, nl rom, no B carotid bruits, lungs with good air movement, no wheezing, RRR, S1, S2, no MRG, abdomen, no acute findings. Grossly normal neurological exam.     EKG: SR at 69; no acute findings; no significant change from 6/29/2018    A/P:    Ms. Shook is low risk for planned cataract surgery    1. She can remain on her low dose of Carvedilol  2. Preoperative COVID testing; she will confirm before surgery. She does not have any current  symptoms.  3. Laboratory testing is pending     Total time spent today with this patient including chart review, exam time with patient and documentation : 30 minutes.  This time was exclusive of the EKG interpretation

## 2023-02-15 NOTE — PROGRESS NOTES
Surgeon (please enter first and last name): Levi Morris MD    Fax number for Preop Evaluation: [phone]  Location of Surgery: [facility] [place]  Date of Surgery: 02/22/2023  Procedure: R eye cataract surgery KELMAN PHACOEMULSIFICATION CLEAR CORNEAL INTRAOCULAR LENS IMPLANT    History of reaction to anesthesia? No    Vernell Alas, YEVGENIY at 7:21 AM on 2/15/2023.

## 2023-02-16 LAB
ATRIAL RATE - MUSE: 69 BPM
DIASTOLIC BLOOD PRESSURE - MUSE: NORMAL MMHG
INTERPRETATION ECG - MUSE: NORMAL
P AXIS - MUSE: 71 DEGREES
PR INTERVAL - MUSE: 174 MS
QRS DURATION - MUSE: 76 MS
QT - MUSE: 376 MS
QTC - MUSE: 402 MS
R AXIS - MUSE: -18 DEGREES
SYSTOLIC BLOOD PRESSURE - MUSE: NORMAL MMHG
T AXIS - MUSE: 71 DEGREES
VENTRICULAR RATE- MUSE: 69 BPM

## 2023-02-17 ENCOUNTER — TELEPHONE (OUTPATIENT)
Dept: INTERNAL MEDICINE | Facility: CLINIC | Age: 83
End: 2023-02-17
Payer: COMMERCIAL

## 2023-02-17 NOTE — TELEPHONE ENCOUNTER
[facility] Call Center    Phone Message    May a detailed message be left on voicemail: yes     Reason for Call: Other: [facility] called and is requesting a addendum to the pt's recent pre-op on 2/15. She stated there is no med list, no review of systems, no allergies listed and no social or family history

## 2023-03-10 ENCOUNTER — OFFICE VISIT (OUTPATIENT)
Dept: URGENT CARE | Facility: URGENT CARE | Age: 83
End: 2023-03-10
Payer: COMMERCIAL

## 2023-03-10 VITALS
TEMPERATURE: 98.2 F | SYSTOLIC BLOOD PRESSURE: 125 MMHG | OXYGEN SATURATION: 95 % | DIASTOLIC BLOOD PRESSURE: 76 MMHG | RESPIRATION RATE: 16 BRPM | WEIGHT: 140 LBS | HEART RATE: 71 BPM | BODY MASS INDEX: 24.03 KG/M2

## 2023-03-10 DIAGNOSIS — K13.79 MUCOCELE OF MOUTH: Primary | ICD-10-CM

## 2023-03-10 PROCEDURE — 99213 OFFICE O/P EST LOW 20 MIN: CPT | Performed by: FAMILY MEDICINE

## 2023-03-10 RX ORDER — CLOBETASOL PROPIONATE 0.5 MG/G
OINTMENT TOPICAL 2 TIMES DAILY
Qty: 15 G | Refills: 0 | Status: SHIPPED | OUTPATIENT
Start: 2023-03-10 | End: 2023-03-17

## 2023-03-10 ASSESSMENT — ENCOUNTER SYMPTOMS: FEVER: 0

## 2023-03-10 NOTE — PROGRESS NOTES
Assessment & Plan     Mucocele of mouth  Visible mucocele underneath the right tongue, start topical clobetasol twice daily x1 week if not better see ENT.  No surroundings of The stomatitis.  Monitor for oropharyngeal candidiasis following corticosteroid use.  - clobetasol (TEMOVATE) 0.05 % external ointment  Dispense: 15 g; Refill: 0  - Adult ENT  Referral                   Return in about 1 week (around 3/17/2023) for If symptoms do not improve or gets worse..    Sterling Gomez MD  [facility] [place]    Subjective   Kerri is a 82 year old, presenting for the following health issues:  Mouth Problem (Pt has persistent sore under tongue, first was tender but has turned white/pale, hasn't improved, painful )      HPI     Patient is a pleasant 82-year-old female presents with a small painful sore underneath her right tongue, has been ongoing for a couple of weeks.  No difficulty eating drinking or swallowing.    Review of Systems   Constitutional: Negative for fever.            Objective    /76 (BP Location: Right arm, Patient Position: Sitting, Cuff Size: Adult Regular)   Pulse 71   Temp 98.2  F (36.8  C) (Temporal)   Resp 16   Wt 63.5 kg (140 lb)   SpO2 95%   BMI 24.03 kg/m    Body mass index is 24.03 kg/m .  Physical Exam  Vitals reviewed.   Constitutional:       Appearance: She is not ill-appearing.   HENT:      Mouth/Throat:      Comments: 3 mm clear/white papular lesion on bottom of tongue.   Cardiovascular:      Rate and Rhythm: Normal rate and regular rhythm.   Pulmonary:      Effort: Pulmonary effort is normal.      Breath sounds: Normal breath sounds.

## 2023-05-01 DIAGNOSIS — E78.5 HYPERLIPIDEMIA LDL GOAL <70: ICD-10-CM

## 2023-05-04 RX ORDER — SIMVASTATIN 20 MG
20 TABLET ORAL AT BEDTIME
Qty: 90 TABLET | Refills: 0 | Status: SHIPPED | OUTPATIENT
Start: 2023-05-04 | End: 2023-08-04

## 2023-05-04 NOTE — TELEPHONE ENCOUNTER
SIMVASTATIN 20MG TABLETS      Last Written Prescription Date:  4/26/22  Last Fill Quantity: 90,   # refills: 3  Last Office Visit : 2/15/23  Future Office visit:  7/17/23    Routing refill request to provider for review/approval because:  Overdue for office visit: n/a - pt was seen 2/15/23 by PCP.   Overdue for LDL     90d sarah beth refill sent, routed to clinic staff for follow up

## 2023-06-01 ENCOUNTER — ONCOLOGY VISIT (OUTPATIENT)
Dept: ONCOLOGY | Facility: CLINIC | Age: 83
End: 2023-06-01
Attending: STUDENT IN AN ORGANIZED HEALTH CARE EDUCATION/TRAINING PROGRAM
Payer: COMMERCIAL

## 2023-06-01 ENCOUNTER — APPOINTMENT (OUTPATIENT)
Dept: LAB | Facility: CLINIC | Age: 83
End: 2023-06-01
Attending: STUDENT IN AN ORGANIZED HEALTH CARE EDUCATION/TRAINING PROGRAM
Payer: COMMERCIAL

## 2023-06-01 VITALS
RESPIRATION RATE: 16 BRPM | HEART RATE: 71 BPM | BODY MASS INDEX: 24.1 KG/M2 | TEMPERATURE: 97.7 F | SYSTOLIC BLOOD PRESSURE: 133 MMHG | OXYGEN SATURATION: 96 % | WEIGHT: 140.4 LBS | DIASTOLIC BLOOD PRESSURE: 78 MMHG

## 2023-06-01 DIAGNOSIS — C91.10 CLL (CHRONIC LYMPHOCYTIC LEUKEMIA) (H): ICD-10-CM

## 2023-06-01 LAB
ALBUMIN SERPL BCG-MCNC: 4.1 G/DL (ref 3.5–5.2)
ALP SERPL-CCNC: 88 U/L (ref 35–104)
ALT SERPL W P-5'-P-CCNC: 20 U/L (ref 10–35)
ANION GAP SERPL CALCULATED.3IONS-SCNC: 8 MMOL/L (ref 7–15)
AST SERPL W P-5'-P-CCNC: 26 U/L (ref 10–35)
BASOPHILS # BLD MANUAL: 0 10E3/UL (ref 0–0.2)
BASOPHILS NFR BLD MANUAL: 0 %
BILIRUB SERPL-MCNC: 0.5 MG/DL
BUN SERPL-MCNC: 14.9 MG/DL (ref 8–23)
CALCIUM SERPL-MCNC: 9.2 MG/DL (ref 8.8–10.2)
CHLORIDE SERPL-SCNC: 106 MMOL/L (ref 98–107)
CREAT SERPL-MCNC: 0.78 MG/DL (ref 0.51–0.95)
DEPRECATED HCO3 PLAS-SCNC: 24 MMOL/L (ref 22–29)
EOSINOPHIL # BLD MANUAL: 2.7 10E3/UL (ref 0–0.7)
EOSINOPHIL NFR BLD MANUAL: 2 %
ERYTHROCYTE [DISTWIDTH] IN BLOOD BY AUTOMATED COUNT: 14 % (ref 10–15)
GFR SERPL CREATININE-BSD FRML MDRD: 75 ML/MIN/1.73M2
GLUCOSE SERPL-MCNC: 124 MG/DL (ref 70–99)
HCT VFR BLD AUTO: 41.8 % (ref 35–47)
HGB BLD-MCNC: 13.3 G/DL (ref 11.7–15.7)
LYMPHOCYTES # BLD MANUAL: 122.6 10E3/UL (ref 0.8–5.3)
LYMPHOCYTES NFR BLD MANUAL: 90 %
MCH RBC QN AUTO: 29.6 PG (ref 26.5–33)
MCHC RBC AUTO-ENTMCNC: 31.8 G/DL (ref 31.5–36.5)
MCV RBC AUTO: 93 FL (ref 78–100)
MONOCYTES # BLD MANUAL: 1.4 10E3/UL (ref 0–1.3)
MONOCYTES NFR BLD MANUAL: 1 %
NEUTROPHILS # BLD MANUAL: 9.5 10E3/UL (ref 1.6–8.3)
NEUTROPHILS NFR BLD MANUAL: 7 %
PLAT MORPH BLD: ABNORMAL
PLATELET # BLD AUTO: 163 10E3/UL (ref 150–450)
POTASSIUM SERPL-SCNC: 4.8 MMOL/L (ref 3.4–5.3)
PROT SERPL-MCNC: 6.1 G/DL (ref 6.4–8.3)
RBC # BLD AUTO: 4.49 10E6/UL (ref 3.8–5.2)
RBC MORPH BLD: ABNORMAL
SODIUM SERPL-SCNC: 138 MMOL/L (ref 136–145)
WBC # BLD AUTO: 136.2 10E3/UL (ref 4–11)

## 2023-06-01 PROCEDURE — 85007 BL SMEAR W/DIFF WBC COUNT: CPT | Performed by: STUDENT IN AN ORGANIZED HEALTH CARE EDUCATION/TRAINING PROGRAM

## 2023-06-01 PROCEDURE — G0463 HOSPITAL OUTPT CLINIC VISIT: HCPCS | Mod: 25 | Performed by: STUDENT IN AN ORGANIZED HEALTH CARE EDUCATION/TRAINING PROGRAM

## 2023-06-01 PROCEDURE — G0463 HOSPITAL OUTPT CLINIC VISIT: HCPCS | Performed by: STUDENT IN AN ORGANIZED HEALTH CARE EDUCATION/TRAINING PROGRAM

## 2023-06-01 PROCEDURE — 0121A HC ADMIN COVID VAC PFIZER BIVAL, SINGLE DOSE: CPT | Performed by: STUDENT IN AN ORGANIZED HEALTH CARE EDUCATION/TRAINING PROGRAM

## 2023-06-01 PROCEDURE — 99213 OFFICE O/P EST LOW 20 MIN: CPT | Performed by: STUDENT IN AN ORGANIZED HEALTH CARE EDUCATION/TRAINING PROGRAM

## 2023-06-01 PROCEDURE — 80053 COMPREHEN METABOLIC PANEL: CPT | Performed by: STUDENT IN AN ORGANIZED HEALTH CARE EDUCATION/TRAINING PROGRAM

## 2023-06-01 PROCEDURE — 250N000011 HC RX IP 250 OP 636: Performed by: STUDENT IN AN ORGANIZED HEALTH CARE EDUCATION/TRAINING PROGRAM

## 2023-06-01 PROCEDURE — 85027 COMPLETE CBC AUTOMATED: CPT | Performed by: STUDENT IN AN ORGANIZED HEALTH CARE EDUCATION/TRAINING PROGRAM

## 2023-06-01 PROCEDURE — 36415 COLL VENOUS BLD VENIPUNCTURE: CPT | Performed by: STUDENT IN AN ORGANIZED HEALTH CARE EDUCATION/TRAINING PROGRAM

## 2023-06-01 PROCEDURE — 91312 HC RX IP 250 OP 636: CPT | Performed by: STUDENT IN AN ORGANIZED HEALTH CARE EDUCATION/TRAINING PROGRAM

## 2023-06-01 RX ADMIN — BNT162B2 ORIGINAL AND OMICRON BA.4/BA.5 30 MCG: .1125; .1125 INJECTION, SUSPENSION INTRAMUSCULAR at 12:39

## 2023-06-01 ASSESSMENT — PAIN SCALES - GENERAL: PAINLEVEL: NO PAIN (0)

## 2023-06-01 NOTE — PROGRESS NOTES
[facility]    HEMATOLOGY & ONCOLOGY  FOLLOW UP    PATIENT NAME: Kerri Shook MRN # [id number]  DATE OF VISIT: 06/01/2023  YOB: 1940    REFERRING PROVIDER:  Dr. 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    1. CLL in 1986. Found to have an elevated white blood count on routine testing. This was consistent with CLL.Saw Dr. Puente who recommended observation. WBCs 15-20 during this time. She has required no treatment for CLL. Followed by PCP with yearly CBCs.  2. 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 The patient had a screening mammogram in 1990, which showed an abnormality in the left breast. She underwent a lumpectomy on 03/02/1990, which was consistent with a 1.5 cm primary, mucinous adenocarcinoma with 0 of 8 lymph nodes positive. She was staged as a stage I (T1 N0 M0). The tumor was ER positive. She did have radiation to the left breast with a boost having completed 6600 cGy in 33 fractions from 03/28/1990 until 05/15/1990. She went on to take tamoxifen for 6 years from 03/1990 until 03/1996.  3. 1/2019 labs showing WBC increasing to 27. This has continued with WBCs 47 on 11/12/2020 and 71.0 on 8//18/21  4. 8/31/21 Peripheral flow showing CD5+ lambda CLL cells. Hb 14.6, WBC 66.2, Plts 162, ANC 5.3  5. 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 She reports that she has not noticed any significant changes since last visit. Energy and appetite good. No lumps/bumps. Denies f/c/s or n/v/d.     CURRENT OUTPATIENT MEDICATIONS  Current Outpatient Medications   Medication Sig Dispense Refill     aspirin 81 MG tablet Take 81 mg by mouth daily       carvedilol (COREG) 3.125 MG tablet Take 1 tablet (3.125 mg) by  mouth 2 times daily 180 tablet 3     gabapentin (NEURONTIN) 100 MG capsule TAKE 1 CAPSULE(100 MG) BY MOUTH EVERY NIGHT AS NEEDED. please keep appt 9/7/22 30 capsule 3     Multiple Vitamin (MULTIVITAMIN) per tablet Take 1 tablet by mouth daily.       simvastatin (ZOCOR) 20 MG tablet Take 1 tablet (20 mg) by mouth At Bedtime 90 tablet 0       REVIEW OF SYSTEMS  A complete ROS was performed and was negative except as mentioned in HPI    PHYSICAL EXAM  /78   Pulse 71   Temp 97.7  F (36.5  C) (Oral)   Resp 16   Wt 63.7 kg (140 lb 6.4 oz)   SpO2 96%   BMI 24.10 kg/m    Wt Readings from Last 3 Encounters:   03/10/23 63.5 kg (140 lb)   02/15/23 63.7 kg (140 lb 6.4 oz)   10/07/22 63.5 kg (139 lb 14.4 oz)       Gen: alert, pleasant and conversational, NAD  HEENT: NC/AT, EOMI, anicteric sclera. MMM.   CV: warm and well perfused  Lymph: No palpable cervical, supraclavicular, or axillary LAD.  Resp: breathing comfortably on RA, no wheezes or cough  Abd: nondistended.   Skin: no concerning lesions or rashes on exposed skin  Neuro: A&Ox4, no lateralizing sx. Grossly nonfocal.  Psych: TP linear, mood/affect appropriate            LABORATORY AND IMAGING STUDIES       Latest Reference Range & Units 06/01/23 11:27   Sodium 136 - 145 mmol/L 138   Potassium 3.4 - 5.3 mmol/L 4.8   Chloride 98 - 107 mmol/L 106   Carbon Dioxide (CO2) 22 - 29 mmol/L 24   Urea Nitrogen 8.0 - 23.0 mg/dL 14.9   Creatinine 0.51 - 0.95 mg/dL 0.78   GFR Estimate >60 mL/min/1.73m2 75   Calcium 8.8 - 10.2 mg/dL 9.2   Anion Gap 7 - 15 mmol/L 8   Albumin 3.5 - 5.2 g/dL 4.1   Protein Total 6.4 - 8.3 g/dL 6.1 (L)   Alkaline Phosphatase 35 - 104 U/L 88   ALT 10 - 35 U/L 20   AST 10 - 35 U/L 26   Bilirubin Total <=1.2 mg/dL 0.5   Glucose 70 - 99 mg/dL 124 (H)   WBC 4.0 - 11.0 10e3/uL 136.2 (HH) (P)   Hemoglobin 11.7 - 15.7 g/dL 13.3 (P)   Hematocrit 35.0 - 47.0 % 41.8 (P)   Platelet Count 150 - 450 10e3/uL 163 (P)   RBC Count 3.80 - 5.20 10e6/uL 4.49 (P)    MCV 78 - 100 fL 93 (P)   MCH 26.5 - 33.0 pg 29.6 (P)   MCHC 31.5 - 36.5 g/dL 31.8 (P)   RDW 10.0 - 15.0 % 14.0 (P)   (HH): Data is critically high  (L): Data is abnormally low  (H): Data is abnormally high  (P): Preliminary        FISH 21  RESULTS:     ABNORMAL  - Loss of TP53 (83.5%)  - Monoallelic loss of [id number] (78%)  - Biallellic loss of [id number] (17.5%)  - Loss of CCND1 (11q13.3)  (81%)     ASSESSMENT AND PLAN  Kerri Shook is a 81 year old female with PMH significant for 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 WBC initially increasing logrithmically with >50% increase between  21 (66) and 10/21/21 (95). WBCs largely pleatued but have had a slow/steady increase over last 6 months.     We discussed that disease is increasing but as of yet isn't causing any problems that would prompt therapy. No constituational symptoms, no anemia/thrombocytopenia, no LAD. We will continue expectant observation, increasing to monthly CBCs and q3 month provider check ins.     Final plan:  - Monthly labs  - RIKY in 3 months  - COVID booster today  - Jacobo in 6 months    Total time spent on this encounter today including reviewing the EMR, documentation and direct patient care counselin minutes    Pop Centeno MD  Instructor  Division of Hematology, Oncology and Transplantation  [facility]

## 2023-06-01 NOTE — NURSING NOTE
"Oncology Rooming Note    June 1, 2023 11:39 AM   Kerri Shook is a 82 year old female who presents for:    Chief Complaint   Patient presents with     Blood Draw     Labs drawn via  by RN in lab.  VS taken      Oncology Clinic Visit     CLL     Initial Vitals: /78   Pulse 71   Temp 97.7  F (36.5  C) (Oral)   Resp 16   Wt 63.7 kg (140 lb 6.4 oz)   SpO2 96%   BMI 24.10 kg/m   Estimated body mass index is 24.1 kg/m  as calculated from the following:    Height as of 2/15/23: 1.626 m (5' 4\").    Weight as of this encounter: 63.7 kg (140 lb 6.4 oz). Body surface area is 1.7 meters squared.  No Pain (0) Comment: Data Unavailable   No LMP recorded. Patient is postmenopausal.  Allergies reviewed: Yes  Medications reviewed: Yes    Medications: Medication refills not needed today.  Pharmacy name entered into [employer]:    [id number] - [place], [place] - [address] AT [facility] [address]  [id number] - [place], [place] - [address] AT [facility] & [address] [place] [address]    Clinical concerns: NONE      Rachel Levine            "

## 2023-06-01 NOTE — LETTER
6/1/2023         RE: Kerri Shook  [address]  [facility] [address]        Dear Colleague,    Thank you for referring your patient, Kerri Shook, to the [facility]. Please see a copy of my visit note below.    [facility]    HEMATOLOGY & ONCOLOGY  FOLLOW UP    PATIENT NAME: Kerri Shook MRN # [id number]  DATE OF VISIT: 06/01/2023  YOB: 1940    REFERRING PROVIDER:  Dr. 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    1. CLL in 1986. Found to have an elevated white blood count on routine testing. This was consistent with CLL.Saw Dr. Puente who recommended observation. WBCs 15-20 during this time. She has required no treatment for CLL. Followed by PCP with yearly CBCs.  2. 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 The patient had a screening mammogram in 1990, which showed an abnormality in the left breast. She underwent a lumpectomy on 03/02/1990, which was consistent with a 1.5 cm primary, mucinous adenocarcinoma with 0 of 8 lymph nodes positive. She was staged as a stage I (T1 N0 M0). The tumor was ER positive. She did have radiation to the left breast with a boost having completed 6600 cGy in 33 fractions from 03/28/1990 until 05/15/1990. She went on to take tamoxifen for 6 years from 03/1990 until 03/1996.  3. 1/2019 labs showing WBC increasing to 27. This has continued with WBCs 47 on 11/12/2020 and 71.0 on 8//18/21  4. 8/31/21 Peripheral flow showing CD5+ lambda CLL cells. Hb 14.6, WBC 66.2, Plts 162, ANC 5.3  5. 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 She reports that she has not noticed any significant changes since last visit. Energy and appetite  good. No lumps/bumps. Denies f/c/s or n/v/d.     CURRENT OUTPATIENT MEDICATIONS  Current Outpatient Medications   Medication Sig Dispense Refill    aspirin 81 MG tablet Take 81 mg by mouth daily      carvedilol (COREG) 3.125 MG tablet Take 1 tablet (3.125 mg) by mouth 2 times daily 180 tablet 3    gabapentin (NEURONTIN) 100 MG capsule TAKE 1 CAPSULE(100 MG) BY MOUTH EVERY NIGHT AS NEEDED. please keep appt 9/7/22 30 capsule 3    Multiple Vitamin (MULTIVITAMIN) per tablet Take 1 tablet by mouth daily.      simvastatin (ZOCOR) 20 MG tablet Take 1 tablet (20 mg) by mouth At Bedtime 90 tablet 0       REVIEW OF SYSTEMS  A complete ROS was performed and was negative except as mentioned in HPI    PHYSICAL EXAM  /78   Pulse 71   Temp 97.7  F (36.5  C) (Oral)   Resp 16   Wt 63.7 kg (140 lb 6.4 oz)   SpO2 96%   BMI 24.10 kg/m    Wt Readings from Last 3 Encounters:   03/10/23 63.5 kg (140 lb)   02/15/23 63.7 kg (140 lb 6.4 oz)   10/07/22 63.5 kg (139 lb 14.4 oz)       Gen: alert, pleasant and conversational, NAD  HEENT: NC/AT, EOMI, anicteric sclera. MMM.   CV: warm and well perfused  Lymph: No palpable cervical, supraclavicular, or axillary LAD.  Resp: breathing comfortably on RA, no wheezes or cough  Abd: nondistended.   Skin: no concerning lesions or rashes on exposed skin  Neuro: A&Ox4, no lateralizing sx. Grossly nonfocal.  Psych: TP linear, mood/affect appropriate            LABORATORY AND IMAGING STUDIES       Latest Reference Range & Units 06/01/23 11:27   Sodium 136 - 145 mmol/L 138   Potassium 3.4 - 5.3 mmol/L 4.8   Chloride 98 - 107 mmol/L 106   Carbon Dioxide (CO2) 22 - 29 mmol/L 24   Urea Nitrogen 8.0 - 23.0 mg/dL 14.9   Creatinine 0.51 - 0.95 mg/dL 0.78   GFR Estimate >60 mL/min/1.73m2 75   Calcium 8.8 - 10.2 mg/dL 9.2   Anion Gap 7 - 15 mmol/L 8   Albumin 3.5 - 5.2 g/dL 4.1   Protein Total 6.4 - 8.3 g/dL 6.1 (L)   Alkaline Phosphatase 35 - 104 U/L 88   ALT 10 - 35 U/L 20   AST 10 - 35 U/L 26    Bilirubin Total <=1.2 mg/dL 0.5   Glucose 70 - 99 mg/dL 124 (H)   WBC 4.0 - 11.0 10e3/uL 136.2 (HH) (P)   Hemoglobin 11.7 - 15.7 g/dL 13.3 (P)   Hematocrit 35.0 - 47.0 % 41.8 (P)   Platelet Count 150 - 450 10e3/uL 163 (P)   RBC Count 3.80 - 5.20 10e6/uL 4.49 (P)   MCV 78 - 100 fL 93 (P)   MCH 26.5 - 33.0 pg 29.6 (P)   MCHC 31.5 - 36.5 g/dL 31.8 (P)   RDW 10.0 - 15.0 % 14.0 (P)   (HH): Data is critically high  (L): Data is abnormally low  (H): Data is abnormally high  (P): Preliminary        FISH 21  RESULTS:     ABNORMAL  - Loss of TP53 (83.5%)  - Monoallelic loss of [id number] (78%)  - Biallellic loss of [id number] (17.5%)  - Loss of CCND1 (11q13.3)  (81%)     ASSESSMENT AND PLAN  Kerri Shook is a 81 year old female with PMH significant for 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 WBC initially increasing logrithmically with >50% increase between  21 (66) and 10/21/21 (95). WBCs largely pleatued but have had a slow/steady increase over last 6 months.     We discussed that disease is increasing but as of yet isn't causing any problems that would prompt therapy. No constituational symptoms, no anemia/thrombocytopenia, no LAD. We will continue expectant observation, increasing to monthly CBCs and q3 month provider check ins.     Final plan:  - Monthly labs  - RIKY in 3 months  - COVID booster today  - Jacobo in 6 months    Total time spent on this encounter today including reviewing the EMR, documentation and direct patient care counselin minutes    Pop Centeno MD  Instructor  Division of Hematology, Oncology and Transplantation  [facility]

## 2023-06-01 NOTE — NURSING NOTE
Chief Complaint   Patient presents with     Blood Draw     Labs drawn via  by RN in lab.  VS taken        Labs collected from venipuncture by RN. Vitals taken. Checked in for appointment(s).    Skye Varela RN

## 2023-06-02 ENCOUNTER — HEALTH MAINTENANCE LETTER (OUTPATIENT)
Age: 83
End: 2023-06-02

## 2023-06-20 DIAGNOSIS — I51.81 STRESS-INDUCED CARDIOMYOPATHY: ICD-10-CM

## 2023-06-26 RX ORDER — CARVEDILOL 3.12 MG/1
3.12 TABLET ORAL 2 TIMES DAILY
Qty: 180 TABLET | Refills: 2 | Status: SHIPPED | OUTPATIENT
Start: 2023-06-26 | End: 2024-04-19

## 2023-07-05 ASSESSMENT — ENCOUNTER SYMPTOMS
STIFFNESS: 1
JOINT SWELLING: 0
NECK PAIN: 0
BACK PAIN: 1
MUSCLE CRAMPS: 0
ARTHRALGIAS: 0
MUSCLE WEAKNESS: 0
MYALGIAS: 0

## 2023-07-06 ENCOUNTER — LAB (OUTPATIENT)
Dept: LAB | Facility: CLINIC | Age: 83
End: 2023-07-06
Payer: COMMERCIAL

## 2023-07-06 DIAGNOSIS — C91.10 CLL (CHRONIC LYMPHOCYTIC LEUKEMIA) (H): ICD-10-CM

## 2023-07-06 LAB
ALBUMIN SERPL BCG-MCNC: 4.5 G/DL (ref 3.5–5.2)
ALP SERPL-CCNC: 89 U/L (ref 35–104)
ALT SERPL W P-5'-P-CCNC: 26 U/L (ref 0–50)
ANION GAP SERPL CALCULATED.3IONS-SCNC: 10 MMOL/L (ref 7–15)
AST SERPL W P-5'-P-CCNC: 39 U/L (ref 0–45)
BASOPHILS # BLD MANUAL: 0 10E3/UL (ref 0–0.2)
BASOPHILS NFR BLD MANUAL: 0 %
BILIRUB SERPL-MCNC: 0.5 MG/DL
BUN SERPL-MCNC: 13.6 MG/DL (ref 8–23)
CALCIUM SERPL-MCNC: 9.7 MG/DL (ref 8.8–10.2)
CHLORIDE SERPL-SCNC: 103 MMOL/L (ref 98–107)
CREAT SERPL-MCNC: 0.91 MG/DL (ref 0.51–0.95)
DEPRECATED HCO3 PLAS-SCNC: 27 MMOL/L (ref 22–29)
EOSINOPHIL # BLD MANUAL: 0 10E3/UL (ref 0–0.7)
EOSINOPHIL NFR BLD MANUAL: 0 %
ERYTHROCYTE [DISTWIDTH] IN BLOOD BY AUTOMATED COUNT: 14.1 % (ref 10–15)
GFR SERPL CREATININE-BSD FRML MDRD: 63 ML/MIN/1.73M2
GLUCOSE SERPL-MCNC: 131 MG/DL (ref 70–99)
HCT VFR BLD AUTO: 44.7 % (ref 35–47)
HGB BLD-MCNC: 13.4 G/DL (ref 11.7–15.7)
LYMPHOCYTES # BLD MANUAL: 123.6 10E3/UL (ref 0.8–5.3)
LYMPHOCYTES NFR BLD MANUAL: 96 %
MCH RBC QN AUTO: 28.9 PG (ref 26.5–33)
MCHC RBC AUTO-ENTMCNC: 30 G/DL (ref 31.5–36.5)
MCV RBC AUTO: 97 FL (ref 78–100)
MONOCYTES # BLD MANUAL: 0 10E3/UL (ref 0–1.3)
MONOCYTES NFR BLD MANUAL: 0 %
NEUTROPHILS # BLD MANUAL: 5.1 10E3/UL (ref 1.6–8.3)
NEUTROPHILS NFR BLD MANUAL: 4 %
PLAT MORPH BLD: ABNORMAL
PLATELET # BLD AUTO: 171 10E3/UL (ref 150–450)
POTASSIUM SERPL-SCNC: 5.1 MMOL/L (ref 3.4–5.3)
PROT SERPL-MCNC: 6.5 G/DL (ref 6.4–8.3)
RBC # BLD AUTO: 4.63 10E6/UL (ref 3.8–5.2)
RBC MORPH BLD: ABNORMAL
SODIUM SERPL-SCNC: 140 MMOL/L (ref 136–145)
WBC # BLD AUTO: 128.7 10E3/UL (ref 4–11)

## 2023-07-06 PROCEDURE — 85027 COMPLETE CBC AUTOMATED: CPT

## 2023-07-06 PROCEDURE — 80053 COMPREHEN METABOLIC PANEL: CPT

## 2023-07-06 PROCEDURE — 85007 BL SMEAR W/DIFF WBC COUNT: CPT

## 2023-07-06 PROCEDURE — 36415 COLL VENOUS BLD VENIPUNCTURE: CPT

## 2023-07-11 ENCOUNTER — OFFICE VISIT (OUTPATIENT)
Dept: INTERNAL MEDICINE | Facility: CLINIC | Age: 83
End: 2023-07-11
Payer: COMMERCIAL

## 2023-07-11 VITALS
HEIGHT: 64 IN | DIASTOLIC BLOOD PRESSURE: 69 MMHG | OXYGEN SATURATION: 95 % | SYSTOLIC BLOOD PRESSURE: 124 MMHG | HEART RATE: 72 BPM | BODY MASS INDEX: 24.26 KG/M2 | WEIGHT: 142.1 LBS

## 2023-07-11 DIAGNOSIS — Z01.818 PRE-OP EXAM: Primary | ICD-10-CM

## 2023-07-11 DIAGNOSIS — I51.81 STRESS-INDUCED CARDIOMYOPATHY: ICD-10-CM

## 2023-07-11 DIAGNOSIS — C91.10 CLL (CHRONIC LYMPHOCYTIC LEUKEMIA) (H): ICD-10-CM

## 2023-07-11 PROCEDURE — 99214 OFFICE O/P EST MOD 30 MIN: CPT | Mod: GC

## 2023-07-11 NOTE — NURSING NOTE
"Kerri Shook is a 82 year old female patient that presents today in clinic for the following:    Chief Complaint   Patient presents with     Pre-Op Exam     The patient's allergies and medications were reviewed as noted. A set of vitals were recorded as noted without incident: /69 (BP Location: Right arm, Patient Position: Sitting, Cuff Size: Adult Regular)   Pulse 72   Ht 1.626 m (5' 4\")   Wt 64.5 kg (142 lb 1.6 oz)   SpO2 95%   BMI 24.39 kg/m  . The patient does not have any other questions for the provider.    Yris Crocker, EMT at 9:37 AM on 7/11/2023.  Primary care clinic: [phone]  "

## 2023-07-11 NOTE — PROGRESS NOTES
Surgeon (please enter first and last name): Dr Greenwood  Fax number for Preop Evaluation: [phone]  Location of Surgery: [place]  [facility]  Date of Surgery: 07/14/2023  Procedure: bilateral Eyelid/brow lift  History of reaction to anesthesia? No    Yris Crocker, EMT at 9:35 AM on 7/11/2023.  Primary care clinic: [phone]        Answers for HPI/ROS submitted by the patient on 7/5/2023  General Symptoms: No  Skin Symptoms: No  HENT Symptoms: No  EYE SYMPTOMS: No  HEART SYMPTOMS: No  LUNG SYMPTOMS: No  INTESTINAL SYMPTOMS: No  URINARY SYMPTOMS: No  GYNECOLOGIC SYMPTOMS: No  BREAST SYMPTOMS: No  SKELETAL SYMPTOMS: Yes  BLOOD SYMPTOMS: No  NERVOUS SYSTEM SYMPTOMS: No  MENTAL HEALTH SYMPTOMS: No  Back pain: Yes  Muscle aches: No  Neck pain: No  Swollen joints: No  Joint pain: No  Bone pain: No  Muscle cramps: No  Muscle weakness: No  Joint stiffness: Yes  Bone fracture: No

## 2023-07-11 NOTE — PROGRESS NOTES
[facility] INTERNAL MEDICINE [address]  [address] [facility] [address]  Phone: [phone]  Fax: [phone]  Primary Provider: Juan Westbrook  Pre-op Performing Provider: JORGE MAI      PREOPERATIVE EVALUATION:  Today's date: 7/11/2023    Kerri Shook is a 82 year old female who presents for a preoperative evaluation.    Surgical Information:  Surgery/Procedure: eyelid/browlift   Surgery Location: [facility]  Surgeon: Liang Greenwood  Surgery Date: 7/14/2023  Time of Surgery: not specified  Where patient plans to recover: at home  Fax number for surgical facility: [phone]    Assessment & Plan     The proposed surgical procedure is considered LOW risk.    (Z01.818) Pre-op exam  (primary encounter diagnosis)  Comment: No concerning findings on review of systems or physical exam. CBC obtained 7/6/23 revealed a normal platelet and RBC account, which is reassuring against bleeding complications. METS score of 5.62 and Revised Cardia Risk Index of 0 points makes her very low risk for this procedure which will utilize conscious sedation. I expect her to tolerate the procedure and sedation without issues.   Plan: proceed with the surgery as planned without further diagnostic tests     - No identified additional risk factors other than previously addressed    Antiplatelet or Anticoagulation Medication Instructions:   - aspirin: patient already began holding aspirin, as instructed by her surgeon    Additional Medication Instructions:  Continue taking all other medications as prescribed.    RECOMMENDATION:  APPROVAL GIVEN to proceed with proposed procedure, without further diagnostic evaluation.  [id number]}    Subjective       HPI related to upcoming procedure: Kerri Shook is an 82 year old female with pmhx including breast cancer (1990) s/p radiation + tamoxifen, CLL, cardiomyopathy and HLD who presents for preoperative evaluation prior to  eyelid/browlift surgery. The surgery is for bilateral ptosis. She denies fatigue, any visual symptoms, headaches, SOB, chest pain, recent illness, or any other symptom. She has not received any treatment for her CLL. She was seen by Dr. Centeno on 6/1/23 and she is continuing expectant observation for this issue. She reports no other acute concerns today.      Answers for HPI/ROS submitted by the patient on 7/5/2023  General Symptoms: No  Skin Symptoms: No  HENT Symptoms: No  EYE SYMPTOMS: No  HEART SYMPTOMS: No  LUNG SYMPTOMS: No  INTESTINAL SYMPTOMS: No  URINARY SYMPTOMS: No  GYNECOLOGIC SYMPTOMS: No  BREAST SYMPTOMS: No  SKELETAL SYMPTOMS: Yes  BLOOD SYMPTOMS: No  NERVOUS SYSTEM SYMPTOMS: No  MENTAL HEALTH SYMPTOMS: No  Back pain: Yes  Muscle aches: No  Neck pain: No  Swollen joints: No  Joint pain: No  Bone pain: No  Muscle cramps: No  Muscle weakness: No  Joint stiffness: Yes  Bone fracture: No          7/5/2023    10:23 AM   Preop Questions   1. Have you ever had a heart attack or stroke? No   2. Have you ever had surgery on your heart or blood vessels, such as a stent placement, a coronary artery bypass, or surgery on an artery in your head, neck, heart, or legs? No   3. Do you have chest pain with activity? No   4. Do you have a history of  heart failure? No   5. Do you currently have a cold, bronchitis or symptoms of other infection? No   6. Do you have a cough, shortness of breath, or wheezing? No   7. Do you or anyone in your family have previous history of blood clots? No   8. Do you or does anyone in your family have a serious bleeding problem such as prolonged bleeding following surgeries or cuts? No   9. Have you ever had problems with anemia or been told to take iron pills? No   10. Have you had any abnormal blood loss such as black, tarry or bloody stools, or abnormal vaginal bleeding? No   11. Have you ever had a blood transfusion? YES -    11a. Have you ever had a transfusion reaction? No   12. Are  you willing to have a blood transfusion if it is medically needed before, during, or after your surgery? Yes   13. Have you or any of your relatives ever had problems with anesthesia? No   14. Do you have sleep apnea, excessive snoring or daytime drowsiness? No   15. Do you have any artifical heart valves or other implanted medical devices like a pacemaker, defibrillator, or continuous glucose monitor? No   16. Do you have artificial joints? YES -    17. Are you allergic to latex? No       Health Care Directive:  Patient does not have a Health Care Directive or Living Will: pt states she believes she has discussed this with her PCP    Review of systems:  CONSTITUTIONAL: NEGATIVE for fever, chills, change in weight  ENT/MOUTH: NEGATIVE for ear, mouth and throat problems  RESP: NEGATIVE for significant cough or SOB  CV: NEGATIVE for chest pain  PSYCHIATRIC: no concerns present    Patient Active Problem List    Diagnosis Date Noted     Acute right-sided low back pain with right-sided sciatica 08/18/2021     Priority: Medium     Seborrheic keratosis 06/17/2017     Priority: Medium     SK (seborrheic keratosis) 07/10/2016     Priority: Medium     Plantar fasciitis 01/16/2016     Priority: Medium     Colon adenomas      Priority: Medium     6/26/14 colonoscopy, repeat in 3 years       Encounter for routine gynecological examination 02/10/2014     Priority: Medium     2/2014:  No medical reason pap screens after age 65. ksl  12/14/2010: Pap NIL  Problem list name updated by automated process. Provider to review       Menopause--age 50 02/10/2014     Priority: Medium     Concurrent with L Breast Ca radiation p lumpectomy &Tamoxefin x 6 yrs.       CLL (chronic lymphocytic leukemia) (H) 10/30/2013     Priority: Medium     Hx of cardiomyopathy 10/30/2013     Priority: Medium     Rosacea 12/14/2012     Priority: Medium     KP (keratosis pilaris) 12/14/2012     Priority: Medium     Advance care planning 10/31/2012     Priority:  Medium     Patient states has Advance Directive and will bring in a copy to clinic. 10/31/2012        Foot injury 10/02/2012     Priority: Medium     CONCRETE BLOCK TO LT FOOT       Stress-induced cardiomyopathy 07/25/2012     Priority: Medium     S/P coronary angiogram 07/24/2012     Priority: Medium     Breast cancer -- Left 06/13/1990     Priority: Medium      Past Medical History:   Diagnosis Date     Breast disorder 1990    Breast Cancer     Cardiomyopathy (H)      Chronic osteoarthritis 2012    resulted in hip replacement     CLL (chronic lymphoblastic leukemia) 1989     Closed fracture of second toe of left foot      Colon adenomas     6/26/14 colonoscopy, repeat in 3 years     History of blood transfusion      hyperlipidemia      Primary localized osteoarthrosis, pelvic region and thigh      Tinnitus      Past Surgical History:   Procedure Laterality Date     BIOPSY  within the last 5 years    polyps during colonoscopy     COLONOSCOPY  6/26/2014    Procedure: COMBINED COLONOSCOPY, SINGLE BIOPSY/POLYPECTOMY BY BIOPSY;  Surgeon: Pola Arceo MD;  Location:  GI     COLONOSCOPY N/A 1/5/2021    Procedure: COLONOSCOPY, WITH POLYPECTOMY AND CLIP;  Surgeon: Charlie Wang MD;  [place] [facility]     D & C  1962    late PP hemorrhage --> retained placenta     ENDOSCOPIC SINUS SURGERY Right 7/16/2018    Procedure: ENDOSCOPIC SINUS SURGERY;  Endoscopic Sphenoidotomy with Removal of Tissue;  Surgeon: Kishore Webster MD;  Location:  OR     ENT SURGERY  1950    tonselectomy     EYE SURGERY  2015    cataract on left eye     left hip replacemen Left     hip replacement in 2013     LUMPECTOMY BREAST  1990    Left     ORTHOPEDIC SURGERY  age?    right shoulder      ORTHOPEDIC SURGERY  ~ 2013    left hip replacement     R hip arthroscopy  7/19/11     TONSILLECTOMY  1950     [facility] PELVIS/HIP JOINT SURGERY UNLISTED  April 2012    left hip replacement     [facility] SHOULDER SURG PROC UNLISTED  ?     Current Outpatient Medications  "  Medication Sig Dispense Refill     aspirin 81 MG tablet Take 81 mg by mouth daily       carvedilol (COREG) 3.125 MG tablet Take 1 tablet (3.125 mg) by mouth 2 times daily 180 tablet 2     gabapentin (NEURONTIN) 100 MG capsule TAKE 1 CAPSULE(100 MG) BY MOUTH EVERY NIGHT AS NEEDED. please keep appt 22 (Patient not taking: Reported on 2023) 30 capsule 3     Multiple Vitamin (MULTIVITAMIN) per tablet Take 1 tablet by mouth daily.       simvastatin (ZOCOR) 20 MG tablet Take 1 tablet (20 mg) by mouth At Bedtime 90 tablet 0       Allergies   Allergen Reactions     Nkda [No Known Drug Allergy]         Social History     Tobacco Use     Smoking status: Never     Smokeless tobacco: Never   Substance Use Topics     Alcohol use: Yes     Alcohol/week: 0.0 standard drinks of alcohol     Comment: 3 glasses of wine per week     Family History   Problem Relation Age of Onset     Diabetes Maternal Grandmother 85     Coronary Artery Disease Father 76         of MI     Heart Disease Father      Cancer Maternal Grandfather         stomach     Alcoholism Son         Active alcoholic     Dementia Mother      C.A.D. No family hx of      Cancer - colorectal No family hx of      Psychotic Disorder No family hx of      Skin Cancer No family hx of      Melanoma No family hx of      Hypertension No family hx of      Breast Cancer No family hx of      Prostate Cancer No family hx of      Cerebrovascular Disease No family hx of         ,, ,     History   Drug Use No         Objective     /69 (BP Location: Right arm, Patient Position: Sitting, Cuff Size: Adult Regular)   Pulse 72   Ht 1.626 m (5' 4\")   Wt 64.5 kg (142 lb 1.6 oz)   SpO2 95%   BMI 24.39 kg/m      Physical Exam  Constitutional:       General: She is not in acute distress.     Appearance: Normal appearance.   HENT:      Head: Normocephalic and atraumatic.   Eyes:      Extraocular Movements: Extraocular movements intact.      Pupils: Pupils are equal, round, " and reactive to light.      Comments: Moderate ptosis present bilaterally   Cardiovascular:      Rate and Rhythm: Normal rate and regular rhythm.      Heart sounds: No murmur heard.     No friction rub. No gallop.   Pulmonary:      Effort: Pulmonary effort is normal.      Breath sounds: Normal breath sounds.   Skin:     General: Skin is warm and dry.      Coloration: Skin is not jaundiced.      Findings: No bruising.   Neurological:      General: No focal deficit present.      Mental Status: She is alert and oriented to person, place, and time.   Psychiatric:         Mood and Affect: Mood normal.         Behavior: Behavior normal.         Thought Content: Thought content normal.         Judgment: Judgment normal.          Recent Labs   Lab Test 07/06/23  1325 06/01/23  1127 03/18/22  0953 02/17/22  1005   HGB 13.4 13.3   < > 14.8    163   < > 196    138   < >  --    POTASSIUM 5.1 4.8   < >  --    CR 0.91 0.78   < >  --    A1C  --   --   --  6.2*    < > = values in this interval not displayed.        Diagnostics:  No new labs or diagnostic tests performed for this visit.      Revised Cardiac Risk Index (RCRI):  The patient has the following serious cardiovascular risks for perioperative complications:   - No serious cardiac risks = 0 points     RCRI Interpretation: 0 points: Class I (very low risk - 0.4% complication rate)    [facility] Activity Status Index: 5.62 METS       Signed Electronically by: Emanuel Hollingsworth MD PGY-1 Internal Medicine Resident  Copy of this evaluation report is provided to requesting physician.

## 2023-07-15 NOTE — PROGRESS NOTES
History of Present Illness:  Ms. Shook is a 83 yo. Female with PMhx significant for CLL, breast cancer, hypertension and hyperlipidemia who presents today for follow up. She is concerned for voice hoarseness. Ongoing for about the last two or three months but seems to be getting worse. Comes and goes throughout the day, can't seem to identify any particular triggers or times when it is worse. Not painful but bothersome, notices increased hoarseness on the days she talks more as well. Does not believe it is related to allergies. No recent URIs. Will occasionally take antacid for reflux sxs but does not think there is any relation. Some associated mouth dryness, no eye dryness. Otherwise doing well, had recent eyelid/eyebrow lift last week and still recovering, no acute concerns.     A detailed Review of Systems was performed, verified and is negative except as documented in the HPI.  All health questionnaires were reviewed, verified and relevant information documented above.    Past Medical History:  Past Medical History:   Diagnosis Date     Breast disorder 1990    Breast Cancer     Cardiomyopathy (H)      Chronic osteoarthritis 2012    resulted in hip replacement     CLL (chronic lymphoblastic leukemia) 1989     Closed fracture of second toe of left foot      Colon adenomas     6/26/14 colonoscopy, repeat in 3 years     History of blood transfusion      hyperlipidemia      Primary localized osteoarthrosis, pelvic region and thigh      Tinnitus      Past Surgical History:  Past Surgical History:   Procedure Laterality Date     BIOPSY  within the last 5 years    polyps during colonoscopy     COLONOSCOPY  6/26/2014    Procedure: COMBINED COLONOSCOPY, SINGLE BIOPSY/POLYPECTOMY BY BIOPSY;  Surgeon: Pola Arceo MD;  Location:  GI     COLONOSCOPY N/A 1/5/2021    Procedure: COLONOSCOPY, WITH POLYPECTOMY AND CLIP;  Surgeon: Charlie Wang MD;  Location: [facility] OR     D & C  1962    late PP hemorrhage --> retained  "placenta     ENDOSCOPIC SINUS SURGERY Right 7/16/2018    Procedure: ENDOSCOPIC SINUS SURGERY;  Endoscopic Sphenoidotomy with Removal of Tissue;  Surgeon: Kishore Webster MD;  Location: [facility] OR     ENT SURGERY  1950    tonselectomy     EYE SURGERY  2015    cataract on left eye     left hip replacemen Left     hip replacement in 2013     LUMPECTOMY BREAST  1990    Left     ORTHOPEDIC SURGERY  age?    right shoulder      ORTHOPEDIC SURGERY  ~ 2013    left hip replacement     R hip arthroscopy  7/19/11     TONSILLECTOMY  1950     [facility] PELVIS/HIP JOINT SURGERY UNLISTED  April 2012    left hip replacement     [facility] SHOULDER SURG PROC UNLISTED  ?     Active Meds:  Current Outpatient Medications   Medication     aspirin 81 MG tablet     carvedilol (COREG) 3.125 MG tablet     gabapentin (NEURONTIN) 100 MG capsule     Multiple Vitamin (MULTIVITAMIN) per tablet     simvastatin (ZOCOR) 20 MG tablet     No current facility-administered medications for this visit.     Allergies:  Nkda [no known drug allergy]    Family History:  family history includes Alcoholism in her son; Cancer in her maternal grandfather; Coronary Artery Disease (age of onset: 76) in her father; Dementia in her mother; Diabetes (age of onset: 85) in her maternal grandmother; Heart Disease in her father.    Social History:  Social History     Tobacco Use     Smoking status: Never     Smokeless tobacco: Never   Substance Use Topics     Alcohol use: Yes     Alcohol/week: 0.0 standard drinks of alcohol     Comment: 3 glasses of wine per week     Drug use: No     Physical Exam:  Vitals: /76 (BP Location: Right arm, Patient Position: Sitting, Cuff Size: Adult Regular)   Pulse 80   Ht 1.626 m (5' 4.02\")   Wt 63.2 kg (139 lb 4.8 oz)   SpO2 95%   BMI 23.90 kg/m    Constitutional: Alert, oriented, pleasant, no acute distress  Head: Normocephalic, atraumatic  Eyes: edematous eyelids with recent eyebrow/eyelift   Neck: Supple, no lymphadenopathy  Cardiovascular: " Regular rate and rhythm, no murmurs, rubs or gallops, peripheral pulses full/symmetric  Respiratory: Good air movement bilaterally, lungs clear, no wheezes/rales/rhonchi  Musculoskeletal: No edema, normal muscle tone, normal gait  Neurologic: Alert and oriented, cranial nerves 2-12 intact, grossly non-focal  Skin: No rashes/lesions  Psychiatric: normal mentation, affect and mood    Diagnostics:  Labs reviewed in [facility]     Assessment and Plan:  1. CLL (dx 1986); follows with oncology, last visit with Dr. Centeno on 6/1/23. Has not required tx up to this point. Lymphocytes steadily increasing over last 6 months. Dr. Centeno recommended monthly CBC and visits every 3 months. Last CBC 7/6/23 with lymphocyte count 123. Next appt. 9/20/23.  2. Immunizations; COVID-19 monovalent vaccine x 4. Bivalent x 2 with most recent booster 6/1/23. Shingrix vaccine series 2019. Pneumococcal 23 on 10/21/2021. Prevnar 13 on 2/4/2015. Tdap on 1/27/2014.   3. Breast cancer (1990) s/p radiation and tamoxifen; mammogram 4/7/2022 and referral placed today.   4. Dermatology; 11/17/2021 and referral placed today.   5. CT abdomen/pelvis 4/7/2022 for abdominal pain; no acute findings. Currently no complaints and if worse/persistent consider EGD for H. Pylori?    6. DEXA scan: 11/19/2020 and recommended repeat in 5 years (2025).    7. Increased cholesterol on Simvastatin 20 mg; lipids 2/17/2022 TG's 114, HDL 71 and LDL 95. Repeat lipid panel today.   8. Colonscopy 1/5/2021 and repeat in 3 years (2024).  9. A1c 6.2% 2/17/2022 and repeat today.   10. Seen Dr. Sigala, Podiatry 5/17/2022  11. Hip complaints. Minimal sxs. recently and will follow.   12. Hypertension: stable, continue with carvedilol   13. Cataracts: R eye cataract surgery 2/22/23  14. Eyebrow/eyelid lift: procedure 7/14/23 and follow up with Dr. Greenwood at [facility] on 7/24/23.   15. Voice hoarseness: discussed ENT referral and CXR today given 2-3 months of worsening voice  hoarseness not likely attributed to allergies, GERD or recent URI. Especially with hx of CLL and breast cancer, initial evaluation with CXR would be appropriate and may consider non-contrast chest CT future. Ms. Shook is in agreement with the plan and will call the clinic with any concerns going forward.      MANDIE Garcia-S2    Staff note;    I personally reviewed Ms. Shook's past medical history. I interviewed her and conducted a physical examination. I agree with Ms. Hannah's above assessment and plan    ANABELLA Westbrook MD    40 minutes spent on the date of the encounter doing chart review, history and exam, documentation and further activities as noted above exclusive of procedures and other billable interpretations

## 2023-07-17 ENCOUNTER — ANCILLARY PROCEDURE (OUTPATIENT)
Dept: GENERAL RADIOLOGY | Facility: CLINIC | Age: 83
End: 2023-07-17
Attending: INTERNAL MEDICINE
Payer: COMMERCIAL

## 2023-07-17 ENCOUNTER — OFFICE VISIT (OUTPATIENT)
Dept: INTERNAL MEDICINE | Facility: CLINIC | Age: 83
End: 2023-07-17
Payer: COMMERCIAL

## 2023-07-17 ENCOUNTER — LAB (OUTPATIENT)
Dept: LAB | Facility: CLINIC | Age: 83
End: 2023-07-17
Payer: COMMERCIAL

## 2023-07-17 VITALS
OXYGEN SATURATION: 95 % | SYSTOLIC BLOOD PRESSURE: 124 MMHG | WEIGHT: 139.3 LBS | DIASTOLIC BLOOD PRESSURE: 76 MMHG | HEIGHT: 64 IN | HEART RATE: 80 BPM | BODY MASS INDEX: 23.78 KG/M2

## 2023-07-17 DIAGNOSIS — E78.00 HIGH BLOOD CHOLESTEROL: ICD-10-CM

## 2023-07-17 DIAGNOSIS — Z12.31 ENCOUNTER FOR SCREENING MAMMOGRAM FOR MALIGNANT NEOPLASM OF BREAST: ICD-10-CM

## 2023-07-17 DIAGNOSIS — Z13.1 SCREENING FOR DIABETES MELLITUS: ICD-10-CM

## 2023-07-17 DIAGNOSIS — C50.919 MALIGNANT NEOPLASM OF FEMALE BREAST, UNSPECIFIED ESTROGEN RECEPTOR STATUS, UNSPECIFIED LATERALITY, UNSPECIFIED SITE OF BREAST (H): ICD-10-CM

## 2023-07-17 DIAGNOSIS — R49.0 HOARSENESS OF VOICE: Primary | ICD-10-CM

## 2023-07-17 DIAGNOSIS — R73.09 INCREASED GLUCOSE LEVEL: ICD-10-CM

## 2023-07-17 DIAGNOSIS — R49.0 HOARSENESS OF VOICE: ICD-10-CM

## 2023-07-17 LAB
CHOLEST SERPL-MCNC: 213 MG/DL
HBA1C MFR BLD: 6.4 %
HDLC SERPL-MCNC: 70 MG/DL
LDLC SERPL CALC-MCNC: 114 MG/DL
NONHDLC SERPL-MCNC: 143 MG/DL
TRIGL SERPL-MCNC: 147 MG/DL

## 2023-07-17 PROCEDURE — 99215 OFFICE O/P EST HI 40 MIN: CPT | Performed by: INTERNAL MEDICINE

## 2023-07-17 PROCEDURE — 99000 SPECIMEN HANDLING OFFICE-LAB: CPT | Performed by: PATHOLOGY

## 2023-07-17 PROCEDURE — 83036 HEMOGLOBIN GLYCOSYLATED A1C: CPT | Performed by: INTERNAL MEDICINE

## 2023-07-17 PROCEDURE — 36415 COLL VENOUS BLD VENIPUNCTURE: CPT | Performed by: PATHOLOGY

## 2023-07-17 PROCEDURE — 71046 X-RAY EXAM CHEST 2 VIEWS: CPT | Mod: GC | Performed by: RADIOLOGY

## 2023-07-17 PROCEDURE — 80061 LIPID PANEL: CPT | Performed by: PATHOLOGY

## 2023-07-17 NOTE — NURSING NOTE
Kerri Shook is a 82 year old female patient that presents today in clinic for the following:    Chief Complaint   Patient presents with     RECHECK     Persistent throat irritation     The patient's allergies and medications were reviewed as noted. A set of vitals were recorded as noted without incident. The patient does not have any other questions for the provider.    Ariel Galeano, EMT at 1:18 PM on 7/17/2023

## 2023-07-21 ENCOUNTER — TELEPHONE (OUTPATIENT)
Dept: INTERNAL MEDICINE | Facility: CLINIC | Age: 83
End: 2023-07-21
Payer: COMMERCIAL

## 2023-07-21 DIAGNOSIS — R49.0 HOARSE VOICE QUALITY: Primary | ICD-10-CM

## 2023-07-21 NOTE — TELEPHONE ENCOUNTER
Dear Phu;    Placed more urgent ENT referral this encounter and please help coordinate    ANABELLA Westbrook

## 2023-07-24 NOTE — TELEPHONE ENCOUNTER
Pt will be contacted within 2 business day to schedule.        Phu Fox CMA ([facility]) at 8:27 AM on 7/24/2023

## 2023-08-02 ENCOUNTER — ANCILLARY PROCEDURE (OUTPATIENT)
Dept: MAMMOGRAPHY | Facility: CLINIC | Age: 83
End: 2023-08-02
Attending: INTERNAL MEDICINE
Payer: COMMERCIAL

## 2023-08-02 DIAGNOSIS — R49.0 HOARSENESS OF VOICE: ICD-10-CM

## 2023-08-02 DIAGNOSIS — Z13.1 SCREENING FOR DIABETES MELLITUS: ICD-10-CM

## 2023-08-02 DIAGNOSIS — Z12.31 ENCOUNTER FOR SCREENING MAMMOGRAM FOR MALIGNANT NEOPLASM OF BREAST: ICD-10-CM

## 2023-08-02 DIAGNOSIS — E78.00 HIGH BLOOD CHOLESTEROL: ICD-10-CM

## 2023-08-02 DIAGNOSIS — C50.919 MALIGNANT NEOPLASM OF FEMALE BREAST, UNSPECIFIED ESTROGEN RECEPTOR STATUS, UNSPECIFIED LATERALITY, UNSPECIFIED SITE OF BREAST (H): ICD-10-CM

## 2023-08-02 PROCEDURE — 77067 SCR MAMMO BI INCL CAD: CPT | Mod: GC

## 2023-08-02 PROCEDURE — 77063 BREAST TOMOSYNTHESIS BI: CPT | Mod: GC

## 2023-08-03 ENCOUNTER — LAB (OUTPATIENT)
Dept: LAB | Facility: CLINIC | Age: 83
End: 2023-08-03
Payer: COMMERCIAL

## 2023-08-03 ENCOUNTER — NURSE TRIAGE (OUTPATIENT)
Dept: ONCOLOGY | Facility: CLINIC | Age: 83
End: 2023-08-03

## 2023-08-03 DIAGNOSIS — C91.10 CLL (CHRONIC LYMPHOCYTIC LEUKEMIA) (H): ICD-10-CM

## 2023-08-03 LAB
ALBUMIN SERPL BCG-MCNC: 4.5 G/DL (ref 3.5–5.2)
ALP SERPL-CCNC: 57 U/L (ref 35–104)
ALT SERPL W P-5'-P-CCNC: ABNORMAL U/L
ANION GAP SERPL CALCULATED.3IONS-SCNC: 9 MMOL/L (ref 7–15)
AST SERPL W P-5'-P-CCNC: ABNORMAL U/L
BASOPHILS # BLD MANUAL: 0 10E3/UL (ref 0–0.2)
BASOPHILS NFR BLD MANUAL: 0 %
BILIRUB SERPL-MCNC: 0.5 MG/DL
BUN SERPL-MCNC: 13.5 MG/DL (ref 8–23)
CALCIUM SERPL-MCNC: 9.6 MG/DL (ref 8.8–10.2)
CHLORIDE SERPL-SCNC: 99 MMOL/L (ref 98–107)
CREAT SERPL-MCNC: 0.88 MG/DL (ref 0.51–0.95)
DEPRECATED HCO3 PLAS-SCNC: 27 MMOL/L (ref 22–29)
EOSINOPHIL # BLD MANUAL: 0 10E3/UL (ref 0–0.7)
EOSINOPHIL NFR BLD MANUAL: 0 %
ERYTHROCYTE [DISTWIDTH] IN BLOOD BY AUTOMATED COUNT: 14.2 % (ref 10–15)
GFR SERPL CREATININE-BSD FRML MDRD: 65 ML/MIN/1.73M2
GLUCOSE SERPL-MCNC: 91 MG/DL (ref 70–99)
HCT VFR BLD AUTO: 46.4 % (ref 35–47)
HGB BLD-MCNC: 13.9 G/DL (ref 11.7–15.7)
LYMPHOCYTES # BLD MANUAL: 146.4 10E3/UL (ref 0.8–5.3)
LYMPHOCYTES NFR BLD MANUAL: 95 %
MCH RBC QN AUTO: 29 PG (ref 26.5–33)
MCHC RBC AUTO-ENTMCNC: 30 G/DL (ref 31.5–36.5)
MCV RBC AUTO: 97 FL (ref 78–100)
MONOCYTES # BLD MANUAL: 0 10E3/UL (ref 0–1.3)
MONOCYTES NFR BLD MANUAL: 0 %
NEUTROPHILS # BLD MANUAL: 7.7 10E3/UL (ref 1.6–8.3)
NEUTROPHILS NFR BLD MANUAL: 5 %
PLAT MORPH BLD: ABNORMAL
PLATELET # BLD AUTO: 170 10E3/UL (ref 150–450)
POTASSIUM SERPL-SCNC: 4.8 MMOL/L (ref 3.4–5.3)
PROT SERPL-MCNC: 6 G/DL (ref 6.4–8.3)
RBC # BLD AUTO: 4.79 10E6/UL (ref 3.8–5.2)
RBC MORPH BLD: ABNORMAL
SODIUM SERPL-SCNC: 135 MMOL/L (ref 136–145)
WBC # BLD AUTO: 154.1 10E3/UL (ref 4–11)

## 2023-08-03 PROCEDURE — 85027 COMPLETE CBC AUTOMATED: CPT

## 2023-08-03 PROCEDURE — 82040 ASSAY OF SERUM ALBUMIN: CPT

## 2023-08-03 PROCEDURE — 84075 ASSAY ALKALINE PHOSPHATASE: CPT

## 2023-08-03 PROCEDURE — 80048 BASIC METABOLIC PNL TOTAL CA: CPT

## 2023-08-03 PROCEDURE — 82247 BILIRUBIN TOTAL: CPT

## 2023-08-03 PROCEDURE — 36415 COLL VENOUS BLD VENIPUNCTURE: CPT

## 2023-08-03 PROCEDURE — 85007 BL SMEAR W/DIFF WBC COUNT: CPT

## 2023-08-03 PROCEDURE — 84155 ASSAY OF PROTEIN SERUM: CPT

## 2023-08-03 NOTE — TELEPHONE ENCOUNTER
DATE/TIME OF CALL RECEIVED FROM LAB:  08/03/23 at 1:40 PM     LAB TEST & LAB VALUE:  Critical      Other values  Hgb 15  Plts 164    Previous Labs from 7/6/23 .7    PROVIDER NOTIFIED?: Yes    PROVIDER NAME: Dr. Centeno     TIME LAB VALUE REPORTED TO PROVIDER:   0973    MECHANISM OF PROVIDER NOTIFICATION: Page    PROVIDER RESPONSE:   [id number] Per Dr. Centeno, is aware and will work with care team on any action needed.

## 2023-08-04 DIAGNOSIS — E78.5 HYPERLIPIDEMIA LDL GOAL <70: ICD-10-CM

## 2023-08-04 RX ORDER — SIMVASTATIN 20 MG
20 TABLET ORAL AT BEDTIME
Qty: 90 TABLET | Refills: 3 | Status: SHIPPED | OUTPATIENT
Start: 2023-08-04 | End: 2024-08-19

## 2023-08-04 NOTE — TELEPHONE ENCOUNTER
Simvastatin 20 mg    Last Written Prescription Date:  05/04/23  Last Fill Quantity: 90,   # refills: 0  Last Office Visit : 02/19/18  Future Office visit:  None w/ FP    Routing refill request to provider for review/approval because:  Recent (12 mo) or future (30 days) visit within the authorizing provider's specialty

## 2023-08-08 ENCOUNTER — LAB (OUTPATIENT)
Dept: LAB | Facility: CLINIC | Age: 83
End: 2023-08-08
Payer: COMMERCIAL

## 2023-08-08 DIAGNOSIS — C91.10 CLL (CHRONIC LYMPHOCYTIC LEUKEMIA) (H): ICD-10-CM

## 2023-08-08 LAB
ALBUMIN SERPL BCG-MCNC: 4.3 G/DL (ref 3.5–5.2)
ALP SERPL-CCNC: 80 U/L (ref 35–104)
ALT SERPL W P-5'-P-CCNC: 32 U/L (ref 0–50)
ANION GAP SERPL CALCULATED.3IONS-SCNC: 8 MMOL/L (ref 7–15)
AST SERPL W P-5'-P-CCNC: 46 U/L (ref 0–45)
BASOPHILS # BLD MANUAL: 0 10E3/UL (ref 0–0.2)
BASOPHILS NFR BLD MANUAL: 0 %
BILIRUB SERPL-MCNC: 0.4 MG/DL
BUN SERPL-MCNC: 12.5 MG/DL (ref 8–23)
CALCIUM SERPL-MCNC: 9.4 MG/DL (ref 8.8–10.2)
CHLORIDE SERPL-SCNC: 103 MMOL/L (ref 98–107)
CREAT SERPL-MCNC: 0.88 MG/DL (ref 0.51–0.95)
DEPRECATED HCO3 PLAS-SCNC: 27 MMOL/L (ref 22–29)
EOSINOPHIL # BLD MANUAL: 0 10E3/UL (ref 0–0.7)
EOSINOPHIL NFR BLD MANUAL: 0 %
ERYTHROCYTE [DISTWIDTH] IN BLOOD BY AUTOMATED COUNT: 14.4 % (ref 10–15)
GFR SERPL CREATININE-BSD FRML MDRD: 65 ML/MIN/1.73M2
GLUCOSE SERPL-MCNC: 145 MG/DL (ref 70–99)
HCT VFR BLD AUTO: 43.9 % (ref 35–47)
HGB BLD-MCNC: 13.4 G/DL (ref 11.7–15.7)
LYMPHOCYTES # BLD MANUAL: 135.7 10E3/UL (ref 0.8–5.3)
LYMPHOCYTES NFR BLD MANUAL: 98 %
MCH RBC QN AUTO: 29.4 PG (ref 26.5–33)
MCHC RBC AUTO-ENTMCNC: 30.5 G/DL (ref 31.5–36.5)
MCV RBC AUTO: 96 FL (ref 78–100)
MONOCYTES # BLD MANUAL: 0 10E3/UL (ref 0–1.3)
MONOCYTES NFR BLD MANUAL: 0 %
NEUTROPHILS # BLD MANUAL: 2.8 10E3/UL (ref 1.6–8.3)
NEUTROPHILS NFR BLD MANUAL: 2 %
PLAT MORPH BLD: ABNORMAL
PLATELET # BLD AUTO: 156 10E3/UL (ref 150–450)
POTASSIUM SERPL-SCNC: 4.9 MMOL/L (ref 3.4–5.3)
PROT SERPL-MCNC: 6.2 G/DL (ref 6.4–8.3)
RBC # BLD AUTO: 4.56 10E6/UL (ref 3.8–5.2)
RBC MORPH BLD: ABNORMAL
SODIUM SERPL-SCNC: 138 MMOL/L (ref 136–145)
WBC # BLD AUTO: 138.5 10E3/UL (ref 4–11)

## 2023-08-08 PROCEDURE — 85027 COMPLETE CBC AUTOMATED: CPT

## 2023-08-08 PROCEDURE — 36415 COLL VENOUS BLD VENIPUNCTURE: CPT

## 2023-08-08 PROCEDURE — 80053 COMPREHEN METABOLIC PANEL: CPT

## 2023-08-08 PROCEDURE — 85007 BL SMEAR W/DIFF WBC COUNT: CPT

## 2023-08-15 ENCOUNTER — LAB (OUTPATIENT)
Dept: LAB | Facility: CLINIC | Age: 83
End: 2023-08-15
Payer: COMMERCIAL

## 2023-08-15 DIAGNOSIS — C91.10 CLL (CHRONIC LYMPHOCYTIC LEUKEMIA) (H): ICD-10-CM

## 2023-08-15 LAB
ALBUMIN SERPL BCG-MCNC: 4.3 G/DL (ref 3.5–5.2)
ALP SERPL-CCNC: 84 U/L (ref 35–104)
ALT SERPL W P-5'-P-CCNC: 27 U/L (ref 0–50)
ANION GAP SERPL CALCULATED.3IONS-SCNC: 9 MMOL/L (ref 7–15)
AST SERPL W P-5'-P-CCNC: 57 U/L (ref 0–45)
BASOPHILS # BLD MANUAL: 0 10E3/UL (ref 0–0.2)
BASOPHILS NFR BLD MANUAL: 0 %
BILIRUB SERPL-MCNC: 0.5 MG/DL
BUN SERPL-MCNC: 12.1 MG/DL (ref 8–23)
BURR CELLS BLD QL SMEAR: SLIGHT
CALCIUM SERPL-MCNC: 9.2 MG/DL (ref 8.8–10.2)
CHLORIDE SERPL-SCNC: 101 MMOL/L (ref 98–107)
CREAT SERPL-MCNC: 0.9 MG/DL (ref 0.51–0.95)
DEPRECATED HCO3 PLAS-SCNC: 27 MMOL/L (ref 22–29)
EOSINOPHIL # BLD MANUAL: 0 10E3/UL (ref 0–0.7)
EOSINOPHIL NFR BLD MANUAL: 0 %
ERYTHROCYTE [DISTWIDTH] IN BLOOD BY AUTOMATED COUNT: 14.3 % (ref 10–15)
GFR SERPL CREATININE-BSD FRML MDRD: 63 ML/MIN/1.73M2
GLUCOSE SERPL-MCNC: 173 MG/DL (ref 70–99)
HCT VFR BLD AUTO: 45 % (ref 35–47)
HGB BLD-MCNC: 13.3 G/DL (ref 11.7–15.7)
LYMPHOCYTES # BLD MANUAL: 134 10E3/UL (ref 0.8–5.3)
LYMPHOCYTES NFR BLD MANUAL: 97 %
MCH RBC QN AUTO: 28.5 PG (ref 26.5–33)
MCHC RBC AUTO-ENTMCNC: 29.6 G/DL (ref 31.5–36.5)
MCV RBC AUTO: 96 FL (ref 78–100)
MONOCYTES # BLD MANUAL: 2.8 10E3/UL (ref 0–1.3)
MONOCYTES NFR BLD MANUAL: 2 %
NEUTROPHILS # BLD MANUAL: 1.4 10E3/UL (ref 1.6–8.3)
NEUTROPHILS NFR BLD MANUAL: 1 %
PLAT MORPH BLD: ABNORMAL
PLATELET # BLD AUTO: 166 10E3/UL (ref 150–450)
POTASSIUM SERPL-SCNC: 4.7 MMOL/L (ref 3.4–5.3)
PROT SERPL-MCNC: 6 G/DL (ref 6.4–8.3)
RBC # BLD AUTO: 4.67 10E6/UL (ref 3.8–5.2)
RBC MORPH BLD: ABNORMAL
SODIUM SERPL-SCNC: 137 MMOL/L (ref 136–145)
WBC # BLD AUTO: 138.1 10E3/UL (ref 4–11)

## 2023-08-15 PROCEDURE — 85027 COMPLETE CBC AUTOMATED: CPT

## 2023-08-15 PROCEDURE — 85007 BL SMEAR W/DIFF WBC COUNT: CPT

## 2023-08-15 PROCEDURE — 36415 COLL VENOUS BLD VENIPUNCTURE: CPT

## 2023-08-15 PROCEDURE — 80053 COMPREHEN METABOLIC PANEL: CPT

## 2023-08-21 ENCOUNTER — MYC MEDICAL ADVICE (OUTPATIENT)
Dept: INTERNAL MEDICINE | Facility: CLINIC | Age: 83
End: 2023-08-21
Payer: COMMERCIAL
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
## 2023-08-21 DIAGNOSIS — Z23 NEED FOR TETANUS BOOSTER: Primary | ICD-10-CM

## 2023-08-22 RX ORDER — TETANUS AND DIPHTHERIA TOXOIDS ADSORBED 2; 2 [LF]/.5ML; [LF]/.5ML
0.5 INJECTION INTRAMUSCULAR ONCE
Qty: 0.5 ML | Refills: 0 | Status: SHIPPED | OUTPATIENT
Start: 2023-08-22 | End: 2023-08-22

## 2023-08-29 ENCOUNTER — PATIENT OUTREACH (OUTPATIENT)
Dept: ONCOLOGY | Facility: CLINIC | Age: 83
End: 2023-08-29

## 2023-08-29 ENCOUNTER — LAB (OUTPATIENT)
Dept: LAB | Facility: CLINIC | Age: 83
End: 2023-08-29
Payer: COMMERCIAL

## 2023-08-29 DIAGNOSIS — C91.10 CLL (CHRONIC LYMPHOCYTIC LEUKEMIA) (H): ICD-10-CM

## 2023-08-29 LAB
ALBUMIN SERPL BCG-MCNC: 4.2 G/DL (ref 3.5–5.2)
ALP SERPL-CCNC: 99 U/L (ref 35–104)
ALT SERPL W P-5'-P-CCNC: 50 U/L (ref 0–50)
ANION GAP SERPL CALCULATED.3IONS-SCNC: 9 MMOL/L (ref 7–15)
AST SERPL W P-5'-P-CCNC: 73 U/L (ref 0–45)
BASOPHILS # BLD MANUAL: 0 10E3/UL (ref 0–0.2)
BASOPHILS NFR BLD MANUAL: 0 %
BILIRUB SERPL-MCNC: 0.5 MG/DL
BUN SERPL-MCNC: 13.5 MG/DL (ref 8–23)
CALCIUM SERPL-MCNC: 9.1 MG/DL (ref 8.8–10.2)
CHLORIDE SERPL-SCNC: 101 MMOL/L (ref 98–107)
CREAT SERPL-MCNC: 0.88 MG/DL (ref 0.51–0.95)
DEPRECATED HCO3 PLAS-SCNC: 26 MMOL/L (ref 22–29)
EOSINOPHIL # BLD MANUAL: 2.3 10E3/UL (ref 0–0.7)
EOSINOPHIL NFR BLD MANUAL: 2 %
ERYTHROCYTE [DISTWIDTH] IN BLOOD BY AUTOMATED COUNT: 14.3 % (ref 10–15)
GFR SERPL CREATININE-BSD FRML MDRD: 65 ML/MIN/1.73M2
GLUCOSE SERPL-MCNC: 178 MG/DL (ref 70–99)
HCT VFR BLD AUTO: 42.6 % (ref 35–47)
HGB BLD-MCNC: 13.4 G/DL (ref 11.7–15.7)
LYMPHOCYTES # BLD MANUAL: 100.2 10E3/UL (ref 0.8–5.3)
LYMPHOCYTES NFR BLD MANUAL: 88 %
MCH RBC QN AUTO: 29.8 PG (ref 26.5–33)
MCHC RBC AUTO-ENTMCNC: 31.5 G/DL (ref 31.5–36.5)
MCV RBC AUTO: 95 FL (ref 78–100)
MONOCYTES # BLD MANUAL: 2.3 10E3/UL (ref 0–1.3)
MONOCYTES NFR BLD MANUAL: 2 %
NEUTROPHILS # BLD MANUAL: 9.1 10E3/UL (ref 1.6–8.3)
NEUTROPHILS NFR BLD MANUAL: 8 %
PLAT MORPH BLD: ABNORMAL
PLATELET # BLD AUTO: 166 10E3/UL (ref 150–450)
POTASSIUM SERPL-SCNC: 4.5 MMOL/L (ref 3.4–5.3)
PROT SERPL-MCNC: 6.3 G/DL (ref 6.4–8.3)
RBC # BLD AUTO: 4.5 10E6/UL (ref 3.8–5.2)
RBC MORPH BLD: ABNORMAL
SODIUM SERPL-SCNC: 136 MMOL/L (ref 136–145)
WBC # BLD AUTO: 113.9 10E3/UL (ref 4–11)

## 2023-08-29 PROCEDURE — 85007 BL SMEAR W/DIFF WBC COUNT: CPT

## 2023-08-29 PROCEDURE — 80053 COMPREHEN METABOLIC PANEL: CPT

## 2023-08-29 PROCEDURE — 36415 COLL VENOUS BLD VENIPUNCTURE: CPT

## 2023-08-29 PROCEDURE — 85027 COMPLETE CBC AUTOMATED: CPT

## 2023-08-29 NOTE — PROGRESS NOTES
[facility]: Cancer Care                                                                                          RNCC received call from Fabiana in the [place] lab with a critical WBC of 113.9. RNCC acknowledged and sent to Dr. Centeno.     10:49- Dr. Centeno acknowledged, No further action needed.     Signature:  Beulah Graham RN

## 2023-09-01 ENCOUNTER — ALLIED HEALTH/NURSE VISIT (OUTPATIENT)
Dept: INTERNAL MEDICINE | Facility: CLINIC | Age: 83
End: 2023-09-01
Payer: COMMERCIAL

## 2023-09-01 DIAGNOSIS — Z48.02 VISIT FOR SUTURE REMOVAL: Primary | ICD-10-CM

## 2023-09-01 PROCEDURE — 99207 PR NO CHARGE NURSE ONLY: CPT

## 2023-09-01 NOTE — PROGRESS NOTES
Kerri Shook presents in clinic today at the request of Dr. Westbrook (Ordering Provider) for suture removal. Upon review of the patient's chart, a total number of 4 suture were placed 14 days ago and the patient was recommended to have them removed in 14 days. Upon exam of the suture field, the wound is clean and dry; there's no obvious sign of infection. The patient received a TDAP on 2014 and then recently in [place] after the wound was sutured, per pt,  do not currently have record of this more recent injection.     Before the removal of today's suture, the patient was informed of the procedure and how long it would take. The field was cleaned with 10% povidone-iodine without incident. In total, all 4 suture were removed in clinic today without incident. The patient tolerated the suture removal well. There was no bleeding with the removal of today's suture. Basic wound care was discussed with the patient; the patient was supplied with wound care supplies and instructed to apply bacitracin to the wound for the next few days. The patient should be evaluated in an urgent care of emergency department setting if they develop the following signs and symptoms: (1) spreading redness, (2) increasing pain, (3) colored drainage, and (4) fever.     This service provided today was under the supervising provider of the day Dr. Ross, who was available if needed.    CANDI Mahan   1:05 PM 9/1/2023

## 2023-09-19 NOTE — PROGRESS NOTES
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    1. CLL in 1986. Found to have an elevated white blood count on routine testing. This was consistent with CLL.Saw Dr. Puente who recommended observation. WBCs 15-20 during this time. She has required no treatment for CLL. Followed by PCP with yearly CBCs.  2. 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 The patient had a screening mammogram in 1990, which showed an abnormality in the left breast. She underwent a lumpectomy on 03/02/1990, which was consistent with a 1.5 cm primary, mucinous adenocarcinoma with 0 of 8 lymph nodes positive. She was staged as a stage I (T1 N0 M0). The tumor was ER positive. She did have radiation to the left breast with a boost having completed 6600 cGy in 33 fractions from 03/28/1990 until 05/15/1990. She went on to take tamoxifen for 6 years from 03/1990 until 03/1996.  3. 1/2019 labs showing WBC increasing to 27. This has continued with WBCs 47 on 11/12/2020 and 71.0 on 8//18/21  4. 8/31/21 Peripheral flow showing CD5+ lambda CLL cells. Hb 14.6, WBC 66.2, Plts 162, ANC 5.3  5. 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   She is feeling well. Energy is stable. Appetite and weight are stable. No lumps or swelling. No GI distress-- occ constipation managed with eating daily salads. No fevers or recent infections.   ROS otherwise neg.       CURRENT OUTPATIENT MEDICATIONS  Current Outpatient Medications   Medication Sig Dispense Refill    aspirin 81 MG tablet Take 81 mg by mouth daily      carvedilol (COREG) 3.125 MG tablet Take 1 tablet (3.125 mg) by mouth 2 times daily 180 tablet 2    gabapentin (NEURONTIN) 100 MG  capsule TAKE 1 CAPSULE(100 MG) BY MOUTH EVERY NIGHT AS NEEDED. please keep appt 9/7/22 30 capsule 3    Multiple Vitamin (MULTIVITAMIN) per tablet Take 1 tablet by mouth daily.      simvastatin (ZOCOR) 20 MG tablet TAKE 1 TABLET(20 MG) BY MOUTH AT BEDTIME 90 tablet 3       REVIEW OF SYSTEMS  A complete ROS was performed and was negative except as mentioned in HPI    PHYSICAL EXAM  /71   Pulse 76   Temp 97.8  F (36.6  C) (Oral)   Resp 16   Wt 64.2 kg (141 lb 8 oz)   SpO2 97%   BMI 24.28 kg/m      General: No acute distress.  HEENT: EOMI, PERRL. Sclerae are anicteric. Oral mucosa is pink and moist with no lesions or thrush.   Lymph: Neck is supple with no lymphadenopathy in the cervical or supraclavicular areas.   Heart: Regular rate and rhythm.   Lungs: Clear to auscultation bilaterally.   Abdomen: Bowel sounds present, soft, nontender with no palpable hepatosplenomegaly or masses.   Extremities: No lower extremity edema noted bilaterally.   Neuro: Alert and oriented x3, CN grossly intact, steady gait  Skin: No rashes, petechiae, or bruising noted on exposed skin.    Wt Readings from Last 4 Encounters:   09/20/23 64.2 kg (141 lb 8 oz)   07/17/23 63.2 kg (139 lb 4.8 oz)   07/11/23 64.5 kg (142 lb 1.6 oz)   06/01/23 63.7 kg (140 lb 6.4 oz)             LABORATORY AND IMAGING STUDIES      I personally reviewed the following labs:    Most Recent 3 CBC's:  Recent Labs   Lab Test 09/20/23  1156 08/29/23  1028 08/15/23  1024   .7* 113.9* 138.1*   HGB 13.4 13.4 13.3   MCV 93 95 96    166 166     Most Recent 3 BMP's:  Recent Labs   Lab Test 08/29/23  1028 08/15/23  1024 08/08/23  1043    137 138   POTASSIUM 4.5 4.7 4.9   CHLORIDE 101 101 103   CO2 26 27 27   BUN 13.5 12.1 12.5   CR 0.88 0.90 0.88   ANIONGAP 9 9 8   AAKASH 9.1 9.2 9.4   * 173* 145*     Most Recent 2 LFT's:  Recent Labs   Lab Test 08/29/23  1028 08/15/23  1024   AST 73* 57*   ALT 50 27   ALKPHOS 99 84   BILITOTAL 0.5 0.5          ASSESSMENT AND PLAN  Kerri Shook is a 81 year old female with PMH significant for 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    # High risk CLL - Arboleda Stage 0, CLL-IPI = 5 = High risk FISH showing loss of 17p, IgHV mutated and NGS for TP53 mutation positive  WBC initially increasing logrithmically with >50% increase between 8/31/21 (66) and 10/21/21 (95). WBCs largely pleatued but have had a slow/steady increase over last 6 months.     We discussed that disease is increasing but as of yet isn't causing any problems that would prompt therapy. No constituational symptoms, no anemia/ thrombocytopenia, no LAD. We will continue expectant observation, increasing to monthly CBCs and q3 month provider visits     Final plan:  - Monthly labs  - Jacobo in 3 months    35 minutes spent on the date of the encounter doing chart review, review of test results, interpretation of tests, patient visit, and documentation     Gail Camacho [facility]-BC

## 2023-09-20 ENCOUNTER — APPOINTMENT (OUTPATIENT)
Dept: LAB | Facility: CLINIC | Age: 83
End: 2023-09-20
Attending: STUDENT IN AN ORGANIZED HEALTH CARE EDUCATION/TRAINING PROGRAM
Payer: COMMERCIAL

## 2023-09-20 ENCOUNTER — ONCOLOGY VISIT (OUTPATIENT)
Dept: ONCOLOGY | Facility: CLINIC | Age: 83
End: 2023-09-20
Attending: STUDENT IN AN ORGANIZED HEALTH CARE EDUCATION/TRAINING PROGRAM
Payer: COMMERCIAL

## 2023-09-20 VITALS
RESPIRATION RATE: 16 BRPM | OXYGEN SATURATION: 97 % | SYSTOLIC BLOOD PRESSURE: 125 MMHG | BODY MASS INDEX: 24.28 KG/M2 | DIASTOLIC BLOOD PRESSURE: 71 MMHG | WEIGHT: 141.5 LBS | TEMPERATURE: 97.8 F | HEART RATE: 76 BPM

## 2023-09-20 DIAGNOSIS — C91.10 CLL (CHRONIC LYMPHOCYTIC LEUKEMIA) (H): ICD-10-CM

## 2023-09-20 LAB
ACANTHOCYTES BLD QL SMEAR: SLIGHT
ALBUMIN SERPL BCG-MCNC: 4.3 G/DL (ref 3.5–5.2)
ALP SERPL-CCNC: 93 U/L (ref 35–104)
ALT SERPL W P-5'-P-CCNC: 26 U/L (ref 0–50)
ANION GAP SERPL CALCULATED.3IONS-SCNC: 9 MMOL/L (ref 7–15)
AST SERPL W P-5'-P-CCNC: 33 U/L (ref 0–45)
BASOPHILS # BLD MANUAL: 0 10E3/UL (ref 0–0.2)
BASOPHILS NFR BLD MANUAL: 0 %
BILIRUB SERPL-MCNC: 0.6 MG/DL
BUN SERPL-MCNC: 13.9 MG/DL (ref 8–23)
BURR CELLS BLD QL SMEAR: SLIGHT
CALCIUM SERPL-MCNC: 9.4 MG/DL (ref 8.8–10.2)
CHLORIDE SERPL-SCNC: 101 MMOL/L (ref 98–107)
CREAT SERPL-MCNC: 0.94 MG/DL (ref 0.51–0.95)
DEPRECATED HCO3 PLAS-SCNC: 28 MMOL/L (ref 22–29)
EGFRCR SERPLBLD CKD-EPI 2021: 60 ML/MIN/1.73M2
EOSINOPHIL # BLD MANUAL: 0 10E3/UL (ref 0–0.7)
EOSINOPHIL NFR BLD MANUAL: 0 %
ERYTHROCYTE [DISTWIDTH] IN BLOOD BY AUTOMATED COUNT: 14.5 % (ref 10–15)
GLUCOSE SERPL-MCNC: 133 MG/DL (ref 70–99)
HCT VFR BLD AUTO: 42.5 % (ref 35–47)
HGB BLD-MCNC: 13.4 G/DL (ref 11.7–15.7)
LYMPHOCYTES # BLD MANUAL: 123.9 10E3/UL (ref 0.8–5.3)
LYMPHOCYTES NFR BLD MANUAL: 92 %
MCH RBC QN AUTO: 29.4 PG (ref 26.5–33)
MCHC RBC AUTO-ENTMCNC: 31.5 G/DL (ref 31.5–36.5)
MCV RBC AUTO: 93 FL (ref 78–100)
MONOCYTES # BLD MANUAL: 0 10E3/UL (ref 0–1.3)
MONOCYTES NFR BLD MANUAL: 0 %
NEUTROPHILS # BLD MANUAL: 10.8 10E3/UL (ref 1.6–8.3)
NEUTROPHILS NFR BLD MANUAL: 8 %
PLAT MORPH BLD: ABNORMAL
PLATELET # BLD AUTO: 162 10E3/UL (ref 150–450)
POTASSIUM SERPL-SCNC: 4.5 MMOL/L (ref 3.4–5.3)
PROT SERPL-MCNC: 6.3 G/DL (ref 6.4–8.3)
RBC # BLD AUTO: 4.56 10E6/UL (ref 3.8–5.2)
RBC MORPH BLD: ABNORMAL
SODIUM SERPL-SCNC: 138 MMOL/L (ref 136–145)
WBC # BLD AUTO: 134.7 10E3/UL (ref 4–11)

## 2023-09-20 PROCEDURE — 85027 COMPLETE CBC AUTOMATED: CPT | Performed by: NURSE PRACTITIONER

## 2023-09-20 PROCEDURE — 36415 COLL VENOUS BLD VENIPUNCTURE: CPT | Performed by: NURSE PRACTITIONER

## 2023-09-20 PROCEDURE — 80053 COMPREHEN METABOLIC PANEL: CPT | Performed by: NURSE PRACTITIONER

## 2023-09-20 PROCEDURE — 85007 BL SMEAR W/DIFF WBC COUNT: CPT | Performed by: NURSE PRACTITIONER

## 2023-09-20 PROCEDURE — G0463 HOSPITAL OUTPT CLINIC VISIT: HCPCS | Performed by: NURSE PRACTITIONER

## 2023-09-20 PROCEDURE — 99214 OFFICE O/P EST MOD 30 MIN: CPT | Performed by: NURSE PRACTITIONER

## 2023-09-20 ASSESSMENT — PAIN SCALES - GENERAL: PAINLEVEL: NO PAIN (0)

## 2023-09-20 NOTE — LETTER
9/20/2023         RE: Kerri Shook  [address]  [facility] [address]        Dear Colleague,    Thank you for referring your patient, Kerri Shook, to the [facility]. Please see a copy of my visit note below.    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    1. CLL in 1986. Found to have an elevated white blood count on routine testing. This was consistent with CLL.Saw Dr. Puente who recommended observation. WBCs 15-20 during this time. She has required no treatment for CLL. Followed by PCP with yearly CBCs.  2. 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 The patient had a screening mammogram in 1990, which showed an abnormality in the left breast. She underwent a lumpectomy on 03/02/1990, which was consistent with a 1.5 cm primary, mucinous adenocarcinoma with 0 of 8 lymph nodes positive. She was staged as a stage I (T1 N0 M0). The tumor was ER positive. She did have radiation to the left breast with a boost having completed 6600 cGy in 33 fractions from 03/28/1990 until 05/15/1990. She went on to take tamoxifen for 6 years from 03/1990 until 03/1996.  3. 1/2019 labs showing WBC increasing to 27. This has continued with WBCs 47 on 11/12/2020 and 71.0 on 8//18/21  4. 8/31/21 Peripheral flow showing CD5+ lambda CLL cells. Hb 14.6, WBC 66.2, Plts 162, ANC 5.3  5. 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   She is feeling well. Energy is stable. Appetite and weight are stable. No lumps or swelling. No GI distress-- occ constipation managed with eating daily salads. No fevers or recent infections.   ROS otherwise neg.       CURRENT OUTPATIENT  MEDICATIONS  Current Outpatient Medications   Medication Sig Dispense Refill    aspirin 81 MG tablet Take 81 mg by mouth daily      carvedilol (COREG) 3.125 MG tablet Take 1 tablet (3.125 mg) by mouth 2 times daily 180 tablet 2    gabapentin (NEURONTIN) 100 MG capsule TAKE 1 CAPSULE(100 MG) BY MOUTH EVERY NIGHT AS NEEDED. please keep appt 9/7/22 30 capsule 3    Multiple Vitamin (MULTIVITAMIN) per tablet Take 1 tablet by mouth daily.      simvastatin (ZOCOR) 20 MG tablet TAKE 1 TABLET(20 MG) BY MOUTH AT BEDTIME 90 tablet 3       REVIEW OF SYSTEMS  A complete ROS was performed and was negative except as mentioned in HPI    PHYSICAL EXAM  /71   Pulse 76   Temp 97.8  F (36.6  C) (Oral)   Resp 16   Wt 64.2 kg (141 lb 8 oz)   SpO2 97%   BMI 24.28 kg/m      General: No acute distress.  HEENT: EOMI, PERRL. Sclerae are anicteric. Oral mucosa is pink and moist with no lesions or thrush.   Lymph: Neck is supple with no lymphadenopathy in the cervical or supraclavicular areas.   Heart: Regular rate and rhythm.   Lungs: Clear to auscultation bilaterally.   Abdomen: Bowel sounds present, soft, nontender with no palpable hepatosplenomegaly or masses.   Extremities: No lower extremity edema noted bilaterally.   Neuro: Alert and oriented x3, CN grossly intact, steady gait  Skin: No rashes, petechiae, or bruising noted on exposed skin.    Wt Readings from Last 4 Encounters:   09/20/23 64.2 kg (141 lb 8 oz)   07/17/23 63.2 kg (139 lb 4.8 oz)   07/11/23 64.5 kg (142 lb 1.6 oz)   06/01/23 63.7 kg (140 lb 6.4 oz)             LABORATORY AND IMAGING STUDIES      I personally reviewed the following labs:    Most Recent 3 CBC's:  Recent Labs   Lab Test 09/20/23  1156 08/29/23  1028 08/15/23  1024   .7* 113.9* 138.1*   HGB 13.4 13.4 13.3   MCV 93 95 96    166 166     Most Recent 3 BMP's:  Recent Labs   Lab Test 08/29/23  1028 08/15/23  1024 08/08/23  1043    137 138   POTASSIUM 4.5 4.7 4.9   CHLORIDE 101 101  103   CO2 26 27 27   BUN 13.5 12.1 12.5   CR 0.88 0.90 0.88   ANIONGAP 9 9 8   AAKASH 9.1 9.2 9.4   * 173* 145*     Most Recent 2 LFT's:  Recent Labs   Lab Test 08/29/23  1028 08/15/23  1024   AST 73* 57*   ALT 50 27   ALKPHOS 99 84   BILITOTAL 0.5 0.5         ASSESSMENT AND PLAN  Kerri Shook is a 81 year old female with PMH significant for 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    # High risk CLL - Arboleda Stage 0, CLL-IPI = 5 = High risk FISH showing loss of 17p, IgHV mutated and NGS for TP53 mutation positive  WBC initially increasing logrithmically with >50% increase between 8/31/21 (66) and 10/21/21 (95). WBCs largely pleatued but have had a slow/steady increase over last 6 months.     We discussed that disease is increasing but as of yet isn't causing any problems that would prompt therapy. No constituational symptoms, no anemia/ thrombocytopenia, no LAD. We will continue expectant observation, increasing to monthly CBCs and q3 month provider visits     Final plan:  - Monthly labs  - Jacobo in 3 months    35 minutes spent on the date of the encounter doing chart review, review of test results, interpretation of tests, patient visit, and documentation     Gail Camacho [facility]-BC

## 2023-09-20 NOTE — NURSING NOTE
"Oncology Rooming Note    September 20, 2023 12:04 PM   Kerri Shook is a 83 year old female who presents for:    Chief Complaint   Patient presents with    Oncology Clinic Visit     Chronic lymphocytic leukemia    Blood Draw     Vitals, blood drawn via VPT by LPN. Pt checked into appt.      Initial Vitals: /71   Pulse 76   Temp 97.8  F (36.6  C) (Oral)   Resp 16   Wt 64.2 kg (141 lb 8 oz)   SpO2 97%   BMI 24.28 kg/m   Estimated body mass index is 24.28 kg/m  as calculated from the following:    Height as of 7/17/23: 1.626 m (5' 4.02\").    Weight as of this encounter: 64.2 kg (141 lb 8 oz). Body surface area is 1.7 meters squared.  No Pain (0) Comment: Data Unavailable   No LMP recorded. Patient is postmenopausal.  Allergies reviewed: Yes  Medications reviewed: Yes    Medications: Medication refills not needed today.  Pharmacy name entered into [facility]:    [id number] - [place], [place] - [address] AT [facility] [address]  [id number] - [place], [place] - [address] AT [facility] & [address] [place] [address] [facility] STORE #[id number] - [place], [place] - [address] AT [facility] OF [address]    Clinical concerns: none       Manju Barillas              "

## 2023-09-20 NOTE — NURSING NOTE
Chief Complaint   Patient presents with    Oncology Clinic Visit     Chronic lymphocytic leukemia    Blood Draw     Vitals, blood drawn via VPT by LPN. Pt checked into maryt.      NOE Alvarez LPN

## 2023-09-24 NOTE — PROGRESS NOTES
HPI:    Last visit with us 7/17/2023 and additional details in that note. She still has some sadness regarding the death of her son. She has several months of exercise induced R thigh and back of R leg pain. When she stops the pain goes away. She denies R hip or R groin pain either at rest or exertion. She has chronic back pain but does not feel her R leg sxs. Are related to this. She does not have recent lumbar MRI. She denies any abdominal pain. No abdominal bloating. No obvious R leg mass. Otherwise, no additional HEENT, cardiopulmonary, abdominal, , GYN, neurological, systemic, psychiatric, lymphatic, endocrine, vascular complaints.       Past Medical History:   Diagnosis Date    Breast disorder 1990    Breast Cancer    Cardiomyopathy (H)     Chronic osteoarthritis 2012    resulted in hip replacement    CLL (chronic lymphoblastic leukemia) 1989    Closed fracture of second toe of left foot     Colon adenomas     6/26/14 colonoscopy, repeat in 3 years    History of blood transfusion     hyperlipidemia     Primary localized osteoarthrosis, pelvic region and thigh     Tinnitus      Past Surgical History:   Procedure Laterality Date    BIOPSY  within the last 5 years    polyps during colonoscopy    COLONOSCOPY  6/26/2014    Procedure: COMBINED COLONOSCOPY, SINGLE BIOPSY/POLYPECTOMY BY BIOPSY;  Surgeon: Pola Arceo MD;  Location:  GI    COLONOSCOPY N/A 1/5/2021    Procedure: COLONOSCOPY, WITH POLYPECTOMY AND CLIP;  Surgeon: Charlie Wang MD;  [place] [facility]    D & C  1962    late PP hemorrhage --> retained placenta    ENDOSCOPIC SINUS SURGERY Right 7/16/2018    Procedure: ENDOSCOPIC SINUS SURGERY;  Endoscopic Sphenoidotomy with Removal of Tissue;  Surgeon: Kishore Webster MD;  Location:  OR    ENT SURGERY  1950    tonselectomy    EYE SURGERY  2015    cataract on left eye    left hip replacemen Left     hip replacement in 2013    LUMPECTOMY BREAST  1990    Left    ORTHOPEDIC SURGERY  age?    right  shoulder     ORTHOPEDIC SURGERY  ~ 2013    left hip replacement    R hip arthroscopy  7/19/11    TONSILLECTOMY  1950    [facility] PELVIS/HIP JOINT SURGERY UNLISTED  April 2012    left hip replacement    [facility] SHOULDER SURG PROC UNLISTED  ?     PE:    Vitals noted, gen, nad, cooperative, alert, neck supple l rom, lungs with good air movement, RRR, S1, S2, no MRG, abdomen, no acute findings. No tenderness to palpation of lower back. No R thigh or R leg pain to palpation. No mass. Grossly normal neurological exam. She has excellent R tibialis pulse. No B LE swelling     Recent Results (from the past 168 hour(s))   Comprehensive metabolic panel    Collection Time: 09/20/23 11:56 AM   Result Value Ref Range    Sodium 138 136 - 145 mmol/L    Potassium 4.5 3.4 - 5.3 mmol/L    Chloride 101 98 - 107 mmol/L    Carbon Dioxide (CO2) 28 22 - 29 mmol/L    Anion Gap 9 7 - 15 mmol/L    Urea Nitrogen 13.9 8.0 - 23.0 mg/dL    Creatinine 0.94 0.51 - 0.95 mg/dL    Calcium 9.4 8.8 - 10.2 mg/dL    Glucose 133 (H) 70 - 99 mg/dL    Alkaline Phosphatase 93 35 - 104 U/L    AST 33 0 - 45 U/L    ALT 26 0 - 50 U/L    Protein Total 6.3 (L) 6.4 - 8.3 g/dL    Albumin 4.3 3.5 - 5.2 g/dL    Bilirubin Total 0.6 <=1.2 mg/dL    GFR Estimate 60 (L) >60 mL/min/1.73m2   CBC with platelets and differential    Collection Time: 09/20/23 11:56 AM   Result Value Ref Range    WBC Count 134.7 (HH) 4.0 - 11.0 10e3/uL    RBC Count 4.56 3.80 - 5.20 10e6/uL    Hemoglobin 13.4 11.7 - 15.7 g/dL    Hematocrit 42.5 35.0 - 47.0 %    MCV 93 78 - 100 fL    MCH 29.4 26.5 - 33.0 pg    MCHC 31.5 31.5 - 36.5 g/dL    RDW 14.5 10.0 - 15.0 %    Platelet Count 162 150 - 450 10e3/uL   Manual Differential    Collection Time: 09/20/23 11:56 AM   Result Value Ref Range    % Neutrophils 8 %    % Lymphocytes 92 %    % Monocytes 0 %    % Eosinophils 0 %    % Basophils 0 %    Absolute Neutrophils 10.8 (H) 1.6 - 8.3 10e3/uL    Absolute Lymphocytes 123.9 (H) 0.8 - 5.3 10e3/uL    Absolute Monocytes  0.0 0.0 - 1.3 10e3/uL    Absolute Eosinophils 0.0 0.0 - 0.7 10e3/uL    Absolute Basophils 0.0 0.0 - 0.2 10e3/uL    RBC Morphology Confirmed RBC Indices     Platelet Assessment  Automated Count Confirmed. Platelet morphology is normal.     Automated Count Confirmed. Platelet morphology is normal.    Acanthocytes Slight (A) None Seen    Zuleima Cells Slight (A) None Seen         Results for orders placed or performed in visit on 09/25/23   XR Pelvis and Hip Bilateral 2 Views     Status: None    Narrative    2 views pelvis/bilateral hip, 2 views right femur radiograph(s)  9/25/2023 2:31 PM    History: Pain of right thigh    Comparison: CT abdomen and pelvis 4/7/2022, radiographs 7/29/2019    Findings:    AP view(s) of the pelvis, frog-leg lateral views of each hip were  obtained.     No acute osseous abnormality.    Left total hip arthroplasty without evidence of hardware complication.  Mild heterotopic calcification lateral aspect of the hip.    Right hip end button osteophyte inferomedial to the fovea capitus with  mild to moderate joint space loss and prominent chondrocalcinosis.     Sacrum and innominate bones are partially obscured by overlying bowel  gas/fecal content.    Degenerative changes of visualized lower lumbar spine and sacroiliac  joints. Mild enthesopathic change of indeterminate bones.    Pelvic phleboliths.    AP and lateral views of right femur were obtained.    No acute osseous abnormality.    Right knee joint is grossly congruent.    Dystrophic soft tissue calcifications lateral aspect of proximal  thigh.      Impression    Impression:  1. No acute osseous abnormality.  2. Left total hip arthroplasty without evidence of hardware  complication.  3. Mild to moderate right hip degenerative changes, similar to prior    SUYAPA MINH         SYSTEM ID:  [id number]   Results for orders placed or performed in visit on 09/25/23   XR Femur Right 2 Views     Status: None    Narrative    2 views  pelvis/bilateral hip, 2 views right femur radiograph(s)  9/25/2023 2:31 PM    History: Pain of right thigh    Comparison: CT abdomen and pelvis 4/7/2022, radiographs 7/29/2019    Findings:    AP view(s) of the pelvis, frog-leg lateral views of each hip were  obtained.     No acute osseous abnormality.    Left total hip arthroplasty without evidence of hardware complication.  Mild heterotopic calcification lateral aspect of the hip.    Right hip end button osteophyte inferomedial to the fovea capitus with  mild to moderate joint space loss and prominent chondrocalcinosis.     Sacrum and innominate bones are partially obscured by overlying bowel  gas/fecal content.    Degenerative changes of visualized lower lumbar spine and sacroiliac  joints. Mild enthesopathic change of indeterminate bones.    Pelvic phleboliths.    AP and lateral views of right femur were obtained.    No acute osseous abnormality.    Right knee joint is grossly congruent.    Dystrophic soft tissue calcifications lateral aspect of proximal  thigh.      Impression    Impression:  1. No acute osseous abnormality.  2. Left total hip arthroplasty without evidence of hardware  complication.  3. Mild to moderate right hip degenerative changes, similar to prior    SUYAPA MINH         SYSTEM ID:  [id number]     A/P:      1. CLL (dx 1986); follows with oncology, last visit with Dr. Centeno on 6/1/2023. Has not required tx up to this point. Lymphocytes steadily increasing over last 6 months. She was last seen by Ms. Camacho 9/20/2023 and 12/20/2023 follow up with Dr. Centeno.   2. Immunizations; COVID-19 monovalent vaccine x 4. Bivalent x 2 with most recent booster 6/1/2023. Shingrix vaccine series 2019. Pneumococcal 23 on 10/21/2021. Prevnar 13 on 2/4/2015. Tdap on 1/27/2014. Td/Tdap done recently in [place]   3. Breast cancer (1990) s/p radiation and tamoxifen; mammogram done 8/2/2023.   4. Dermatology; appt. With Dr. Tavarez scheduled for 12/12/2023.   5. CT  abdomen/pelvis 4/7/2022 for abdominal pain; no acute findings. Currently no complaints and if worse/persistent consider EGD for H. Pylori?    6. DEXA scan: 11/19/2020 and recommended repeat in 5 years (2025).    7. Increased cholesterol on Simvastatin 20 mg; lipids 2/17/2022 TG's 114, HDL 71 and LDL 95. Lipids on 7/17/2023; , HDL 70 and TG's 147.   8. Colonscopy 1/5/2021 and repeat in 3 years (2024).  9. A1c 6.2% 2/17/2022; repeat 6.4% on 717/2023.   10. Seen Dr. Sigala, Podiatry 5/17/2022  11. Hip complaints. Minimal sxs. recently and will follow.   12. Hypertension: stable, continue with carvedilol   13. Cataracts: R eye cataract surgery 2/22/23  14. Eyebrow/eyelid lift: procedure 7/14/23 and follow up with Dr. Greenwood at [facility] on 7/24/23.   15. Voice hoarseness: discussed ENT referral and CXR was done 7/17/2023 given 2-3 months of worsening voice hoarseness not likely attributed to allergies, GERD or recent URI. Especially with hx of CLL and breast cancer. She has 12/11/2023 ENT appt. With Dr. Dias.   16. R leg complaints. X-rays of hips and R femur today. Discussed: spinal issues (spinal stenosis?) and could consider lumbar MRI. Doubt arterial vascular insufficiency. Discussed R hip issues and could consider hip MRI imagining and orthopedic evaluation. Given her h/o breast cancer and CLL May do direct MRI imaging R leg.      40 minutes spent on the date of the encounter doing chart review, history and exam, documentation and further activities as noted above exclusive of procedures and other billable interpretations

## 2023-09-25 ENCOUNTER — OFFICE VISIT (OUTPATIENT)
Dept: INTERNAL MEDICINE | Facility: CLINIC | Age: 83
End: 2023-09-25
Payer: COMMERCIAL

## 2023-09-25 ENCOUNTER — ANCILLARY PROCEDURE (OUTPATIENT)
Dept: GENERAL RADIOLOGY | Facility: CLINIC | Age: 83
End: 2023-09-25
Attending: INTERNAL MEDICINE
Payer: COMMERCIAL

## 2023-09-25 VITALS
BODY MASS INDEX: 24.34 KG/M2 | SYSTOLIC BLOOD PRESSURE: 122 MMHG | OXYGEN SATURATION: 97 % | DIASTOLIC BLOOD PRESSURE: 70 MMHG | HEART RATE: 66 BPM | WEIGHT: 142.6 LBS | HEIGHT: 64 IN

## 2023-09-25 DIAGNOSIS — M79.651 PAIN OF RIGHT THIGH: ICD-10-CM

## 2023-09-25 DIAGNOSIS — M79.651 PAIN OF RIGHT THIGH: Primary | ICD-10-CM

## 2023-09-25 PROCEDURE — 99215 OFFICE O/P EST HI 40 MIN: CPT | Performed by: INTERNAL MEDICINE

## 2023-09-25 PROCEDURE — 73552 X-RAY EXAM OF FEMUR 2/>: CPT | Mod: RT | Performed by: RADIOLOGY

## 2023-09-25 PROCEDURE — 73522 X-RAY EXAM HIPS BI 3-4 VIEWS: CPT | Performed by: RADIOLOGY

## 2023-09-25 NOTE — NURSING NOTE
Kerri Shook is a 83 year old female patient that presents today in clinic for the following:    Chief Complaint   Patient presents with    Follow Up     Pt would like to discuss pain in R leg     The patient's allergies and medications were reviewed as noted. A set of vitals were recorded as noted without incident. The patient does not have any other questions for the provider.    Mercedes Cordova, EMT at 1:12 PM on 9/25/2023

## 2023-10-05 ENCOUNTER — LAB (OUTPATIENT)
Dept: LAB | Facility: CLINIC | Age: 83
End: 2023-10-05
Payer: COMMERCIAL

## 2023-10-05 ENCOUNTER — NURSE TRIAGE (OUTPATIENT)
Dept: ONCOLOGY | Facility: CLINIC | Age: 83
End: 2023-10-05

## 2023-10-05 DIAGNOSIS — C91.10 CLL (CHRONIC LYMPHOCYTIC LEUKEMIA) (H): ICD-10-CM

## 2023-10-05 LAB
ALBUMIN SERPL BCG-MCNC: 4.1 G/DL (ref 3.5–5.2)
ALP SERPL-CCNC: 88 U/L (ref 35–104)
ALT SERPL W P-5'-P-CCNC: 22 U/L (ref 0–50)
ANION GAP SERPL CALCULATED.3IONS-SCNC: 9 MMOL/L (ref 7–15)
AST SERPL W P-5'-P-CCNC: 33 U/L (ref 0–45)
BASO+EOS+MONOS # BLD AUTO: ABNORMAL 10*3/UL
BASO+EOS+MONOS NFR BLD AUTO: ABNORMAL %
BASOPHILS # BLD AUTO: ABNORMAL 10*3/UL
BASOPHILS # BLD MANUAL: 0 10E3/UL (ref 0–0.2)
BASOPHILS NFR BLD AUTO: ABNORMAL %
BASOPHILS NFR BLD MANUAL: 0 %
BILIRUB SERPL-MCNC: 0.3 MG/DL
BUN SERPL-MCNC: 13.9 MG/DL (ref 8–23)
BURR CELLS BLD QL SMEAR: SLIGHT
CALCIUM SERPL-MCNC: 9.4 MG/DL (ref 8.8–10.2)
CHLORIDE SERPL-SCNC: 103 MMOL/L (ref 98–107)
CREAT SERPL-MCNC: 0.98 MG/DL (ref 0.51–0.95)
DEPRECATED HCO3 PLAS-SCNC: 27 MMOL/L (ref 22–29)
EGFRCR SERPLBLD CKD-EPI 2021: 57 ML/MIN/1.73M2
EOSINOPHIL # BLD AUTO: ABNORMAL 10*3/UL
EOSINOPHIL # BLD MANUAL: 1.2 10E3/UL (ref 0–0.7)
EOSINOPHIL NFR BLD AUTO: ABNORMAL %
EOSINOPHIL NFR BLD MANUAL: 1 %
ERYTHROCYTE [DISTWIDTH] IN BLOOD BY AUTOMATED COUNT: 14.5 % (ref 10–15)
GLUCOSE SERPL-MCNC: 110 MG/DL (ref 70–99)
HCT VFR BLD AUTO: 43.6 % (ref 35–47)
HGB BLD-MCNC: 13.4 G/DL (ref 11.7–15.7)
IMM GRANULOCYTES # BLD: ABNORMAL 10*3/UL
IMM GRANULOCYTES NFR BLD: ABNORMAL %
LYMPHOCYTES # BLD AUTO: ABNORMAL 10*3/UL
LYMPHOCYTES # BLD MANUAL: 111.9 10E3/UL (ref 0.8–5.3)
LYMPHOCYTES NFR BLD AUTO: ABNORMAL %
LYMPHOCYTES NFR BLD MANUAL: 93 %
MCH RBC QN AUTO: 29.3 PG (ref 26.5–33)
MCHC RBC AUTO-ENTMCNC: 30.7 G/DL (ref 31.5–36.5)
MCV RBC AUTO: 95 FL (ref 78–100)
MONOCYTES # BLD AUTO: ABNORMAL 10*3/UL
MONOCYTES # BLD MANUAL: 3.6 10E3/UL (ref 0–1.3)
MONOCYTES NFR BLD AUTO: ABNORMAL %
MONOCYTES NFR BLD MANUAL: 3 %
NEUTROPHILS # BLD AUTO: ABNORMAL 10*3/UL
NEUTROPHILS # BLD MANUAL: 3.6 10E3/UL (ref 1.6–8.3)
NEUTROPHILS NFR BLD AUTO: ABNORMAL %
NEUTROPHILS NFR BLD MANUAL: 3 %
NRBC # BLD AUTO: 0 10E3/UL
NRBC BLD AUTO-RTO: 0 /100
PLAT MORPH BLD: ABNORMAL
PLATELET # BLD AUTO: 169 10E3/UL (ref 150–450)
POTASSIUM SERPL-SCNC: 4.6 MMOL/L (ref 3.4–5.3)
PROT SERPL-MCNC: 6.3 G/DL (ref 6.4–8.3)
RBC # BLD AUTO: 4.57 10E6/UL (ref 3.8–5.2)
RBC MORPH BLD: ABNORMAL
SODIUM SERPL-SCNC: 139 MMOL/L (ref 135–145)
WBC # BLD AUTO: 120.3 10E3/UL (ref 4–11)

## 2023-10-05 PROCEDURE — 85007 BL SMEAR W/DIFF WBC COUNT: CPT

## 2023-10-05 PROCEDURE — 36415 COLL VENOUS BLD VENIPUNCTURE: CPT

## 2023-10-05 PROCEDURE — 80053 COMPREHEN METABOLIC PANEL: CPT

## 2023-10-05 PROCEDURE — 85027 COMPLETE CBC AUTOMATED: CPT

## 2023-10-05 NOTE — TELEPHONE ENCOUNTER
DATE/TIME OF CALL RECEIVED FROM LAB:  10/05/23 at 1:56 PM   LAB TEST:  WBC  LAB VALUE:  114.1  PROVIDER NOTIFIED?: Yes  PROVIDER NAME: Jacobo  DATE/TIME LAB VALUE REPORTED TO PROVIDER: 10/5 at 1:59  MECHANISM OF PROVIDER NOTIFICATION:  Secure Chat  PROVIDER RESPONSE: No intervention needed.

## 2023-10-12 NOTE — TELEPHONE ENCOUNTER
FUTURE VISIT INFORMATION      FUTURE VISIT INFORMATION:  Date: 12/11/23  Time: 2:30PM  [place] [facility]  REFERRAL INFORMATION:  Referring provider:  Juan Westbrook MD  Referring providers clinic:  [facility] INTERNAL   Reason for visit/diagnosis  Hoarseness of voice [R49.0]  ref by Juan Westbrook MD  recs in [facility]     RECORDS REQUESTED FROM:       Clinic name Comments Records Status Imaging Status   [facility] INTERNAL  7/17/23- OV W. Juan Westbrook MD EPIC     IMAGING  7/17/23- XR CHEST  [facility]  PACS    [facility]  11/8/23- OV Jewell Pittman NP  Epic

## 2023-10-17 ENCOUNTER — PATIENT OUTREACH (OUTPATIENT)
Dept: ONCOLOGY | Facility: CLINIC | Age: 83
End: 2023-10-17
Payer: COMMERCIAL

## 2023-10-17 NOTE — PROGRESS NOTES
[facility]: Cancer Care                                                                                          RNCC sent my chart message to patient to check in and re educate on contact information.   RNCC encouraged him to reach out with any questions or concerns.    Signature:  Beulah Graham RN

## 2023-10-24 ENCOUNTER — OFFICE VISIT (OUTPATIENT)
Dept: INTERNAL MEDICINE | Facility: CLINIC | Age: 83
End: 2023-10-24
Payer: COMMERCIAL

## 2023-10-24 VITALS
HEART RATE: 79 BPM | OXYGEN SATURATION: 96 % | WEIGHT: 143.6 LBS | DIASTOLIC BLOOD PRESSURE: 61 MMHG | SYSTOLIC BLOOD PRESSURE: 112 MMHG | BODY MASS INDEX: 24.63 KG/M2

## 2023-10-24 DIAGNOSIS — M79.661 PAIN OF RIGHT LOWER LEG: Primary | ICD-10-CM

## 2023-10-24 PROCEDURE — 99214 OFFICE O/P EST MOD 30 MIN: CPT | Performed by: INTERNAL MEDICINE

## 2023-10-24 NOTE — PROGRESS NOTES
"HPI:      Last visit with us 9/25/2023. She still has R hip and R thigh pain with walking. She feels her R thigh pain is deep \"at the bone\". She denies any significant back pain. She states she has to stop walking because of this pain. No other HEENT, cardiopulmonary, abdominal, , GYN, neurological, systemic, psychiatric, lymphatic, endocrine, vascular complaints.     Past Medical History:   Diagnosis Date    Breast disorder 1990    Breast Cancer    Cardiomyopathy (H)     Chronic osteoarthritis 2012    resulted in hip replacement    CLL (chronic lymphoblastic leukemia) 1989    Closed fracture of second toe of left foot     Colon adenomas     6/26/14 colonoscopy, repeat in 3 years    History of blood transfusion     hyperlipidemia     Primary localized osteoarthrosis, pelvic region and thigh     Tinnitus      Past Surgical History:   Procedure Laterality Date    BIOPSY  within the last 5 years    polyps during colonoscopy    COLONOSCOPY  6/26/2014    Procedure: COMBINED COLONOSCOPY, SINGLE BIOPSY/POLYPECTOMY BY BIOPSY;  Surgeon: Pola Arceo MD;  Location:  GI    COLONOSCOPY N/A 1/5/2021    Procedure: COLONOSCOPY, WITH POLYPECTOMY AND CLIP;  Surgeon: Charlie Wang MD;  [place] [facility] [place]    D & C  1962    late PP hemorrhage --> retained placenta    ENDOSCOPIC SINUS SURGERY Right 7/16/2018    Procedure: ENDOSCOPIC SINUS SURGERY;  Endoscopic Sphenoidotomy with Removal of Tissue;  Surgeon: Kishore Webster MD;  Location:  OR    ENT SURGERY  1950    tonselectomy    EYE SURGERY  2015    cataract on left eye    left hip replacemen Left     hip replacement in 2013    LUMPECTOMY BREAST  1990    Left    ORTHOPEDIC SURGERY  age?    right shoulder     ORTHOPEDIC SURGERY  ~ 2013    left hip replacement    R hip arthroscopy  7/19/11    TONSILLECTOMY  1950    [facility] PELVIS/HIP JOINT SURGERY UNLISTED  April 2012    left hip replacement    [facility] SHOULDER SURG PROC UNLISTED  ?     PE:    Vitals noted, gen, nad, cooperative, alert, " neck supple nl rom, lungs with good air movement, RRR, S1, S2, no MRG, abdomen, no acute findings. Grossly normal neurological exam. Minimal tenderness to palpation of R hip and R thigh.     XR Pelvis and Hip Bilateral 2 Views  Narrative: 2 views pelvis/bilateral hip, 2 views right femur radiograph(s)  9/25/2023 2:31 PM    History: Pain of right thigh    Comparison: CT abdomen and pelvis 4/7/2022, radiographs 7/29/2019    Findings:    AP view(s) of the pelvis, frog-leg lateral views of each hip were  obtained.     No acute osseous abnormality.    Left total hip arthroplasty without evidence of hardware complication.  Mild heterotopic calcification lateral aspect of the hip.    Right hip end button osteophyte inferomedial to the fovea capitus with  mild to moderate joint space loss and prominent chondrocalcinosis.     Sacrum and innominate bones are partially obscured by overlying bowel  gas/fecal content.    Degenerative changes of visualized lower lumbar spine and sacroiliac  joints. Mild enthesopathic change of indeterminate bones.    Pelvic phleboliths.    AP and lateral views of right femur were obtained.    No acute osseous abnormality.    Right knee joint is grossly congruent.    Dystrophic soft tissue calcifications lateral aspect of proximal  thigh.  Impression: Impression:  1. No acute osseous abnormality.  2. Left total hip arthroplasty without evidence of hardware  complication.  3. Mild to moderate right hip degenerative changes, similar to prior    SUYAPA MINH         SYSTEM ID:  [id number]  XR Femur Right 2 Views  Narrative: 2 views pelvis/bilateral hip, 2 views right femur radiograph(s)  9/25/2023 2:31 PM    History: Pain of right thigh    Comparison: CT abdomen and pelvis 4/7/2022, radiographs 7/29/2019    Findings:    AP view(s) of the pelvis, frog-leg lateral views of each hip were  obtained.     No acute osseous abnormality.    Left total hip arthroplasty without evidence of hardware  complication.  Mild heterotopic calcification lateral aspect of the hip.    Right hip end button osteophyte inferomedial to the fovea capitus with  mild to moderate joint space loss and prominent chondrocalcinosis.     Sacrum and innominate bones are partially obscured by overlying bowel  gas/fecal content.    Degenerative changes of visualized lower lumbar spine and sacroiliac  joints. Mild enthesopathic change of indeterminate bones.    Pelvic phleboliths.    AP and lateral views of right femur were obtained.    No acute osseous abnormality.    Right knee joint is grossly congruent.    Dystrophic soft tissue calcifications lateral aspect of proximal  thigh.  Impression: Impression:  1. No acute osseous abnormality.  2. Left total hip arthroplasty without evidence of hardware  complication.  3. Mild to moderate right hip degenerative changes, similar to prior    SUYAPA MINH         SYSTEM ID:  [id number]        A/P:     1. CLL (dx 1986); follows with oncology, last visit with Dr. Centeno on 6/1/2023. Has not required tx up to this point. Lymphocytes steadily increasing over last 6 months. She was last seen by Ms. Camacho 9/20/2023 and 12/20/2023 follow up with Dr. Centeno.   2. Immunizations; COVID-19 monovalent vaccine x 4 (Moderna),  Bivalent x 2 (Pfizer) with most recent Moderna 10/3/2023. . Shingrix vaccine series 2019. Pneumococcal 23 on 10/21/2021. Prevnar 13 on 2/4/2015. Tdap on 1/27/2014. Td/Tdap done recently in [place]   3. Breast cancer (1990) s/p radiation and tamoxifen; mammogram done 8/2/2023.   4. Dermatology; appt. With Dr. Tavarez scheduled for 12/12/2023.   5. CT abdomen/pelvis 4/7/2022 for abdominal pain; no acute findings. Currently no complaints and if worse/persistent consider EGD for H. Pylori?    6. DEXA scan: 11/19/2020 and recommended repeat in 5 years (2025).    7. Increased cholesterol on Simvastatin 20 mg; lipids 2/17/2022 TG's 114, HDL 71 and LDL 95. Lipids on 7/17/2023; , HDL 70  and TG's 147.   8. Colonscopy 1/5/2021 and repeat in 3 years (2024).  9. A1c 6.2% 2/17/2022; repeat 6.4% on 717/2023.   10. Seen Dr. Sigala, Podiatry 5/17/2022  11. R leg complaints. X-rays of hips and R femur were done 9/25/2023. Given her h/o breast cancer and CLL ordered MRI R hip and R leg. Discussed spinal stenosis can cause anterior thigh pain with walking. Will follow up to see if MRI lumbar spine imaging is needed.   12. Hypertension: stable, continue with carvedilol   13. Cataracts: R eye cataract surgery 2/22/23  14. Eyebrow/eyelid lift: procedure 7/14/23 and follow up with Dr. Greenwood at [facility] on 7/24/2023.   15. Voice hoarseness:  CXR was done 7/17/2023 given 2-3 months of worsening voice hoarseness not likely attributed to allergies, GERD or recent URI. Especially with hx of CLL and breast cancer. She has 12/11/2023 ENT appt. With Dr. Dias.      30 minutes spent on the date of the encounter doing chart review, history and exam, documentation and further activities as noted above exclusive of procedures and other billable interpretations

## 2023-11-05 DIAGNOSIS — M79.605 PAIN OF LEFT LOWER EXTREMITY: ICD-10-CM

## 2023-11-07 ENCOUNTER — LAB (OUTPATIENT)
Dept: LAB | Facility: CLINIC | Age: 83
End: 2023-11-07
Payer: COMMERCIAL

## 2023-11-07 DIAGNOSIS — C91.10 CLL (CHRONIC LYMPHOCYTIC LEUKEMIA) (H): ICD-10-CM

## 2023-11-07 LAB
ALBUMIN SERPL BCG-MCNC: 4.3 G/DL (ref 3.5–5.2)
ALP SERPL-CCNC: 92 U/L (ref 35–104)
ALT SERPL W P-5'-P-CCNC: 24 U/L (ref 0–50)
ANION GAP SERPL CALCULATED.3IONS-SCNC: 15 MMOL/L (ref 7–15)
AST SERPL W P-5'-P-CCNC: 57 U/L (ref 0–45)
BASOPHILS # BLD AUTO: ABNORMAL 10*3/UL
BASOPHILS # BLD MANUAL: 0 10E3/UL (ref 0–0.2)
BASOPHILS NFR BLD AUTO: ABNORMAL %
BASOPHILS NFR BLD MANUAL: 0 %
BILIRUB SERPL-MCNC: 0.4 MG/DL
BUN SERPL-MCNC: 14.5 MG/DL (ref 8–23)
BURR CELLS BLD QL SMEAR: SLIGHT
CALCIUM SERPL-MCNC: 9.7 MG/DL (ref 8.8–10.2)
CHLORIDE SERPL-SCNC: 103 MMOL/L (ref 98–107)
CREAT SERPL-MCNC: 0.95 MG/DL (ref 0.51–0.95)
DEPRECATED HCO3 PLAS-SCNC: 22 MMOL/L (ref 22–29)
EGFRCR SERPLBLD CKD-EPI 2021: 59 ML/MIN/1.73M2
ELLIPTOCYTES BLD QL SMEAR: SLIGHT
EOSINOPHIL # BLD AUTO: ABNORMAL 10*3/UL
EOSINOPHIL # BLD MANUAL: 0 10E3/UL (ref 0–0.7)
EOSINOPHIL NFR BLD AUTO: ABNORMAL %
EOSINOPHIL NFR BLD MANUAL: 0 %
ERYTHROCYTE [DISTWIDTH] IN BLOOD BY AUTOMATED COUNT: 14.6 % (ref 10–15)
GLUCOSE SERPL-MCNC: 95 MG/DL (ref 70–99)
HCT VFR BLD AUTO: 43.6 % (ref 35–47)
HGB BLD-MCNC: 13.8 G/DL (ref 11.7–15.7)
IMM GRANULOCYTES # BLD: ABNORMAL 10*3/UL
IMM GRANULOCYTES NFR BLD: ABNORMAL %
LYMPHOCYTES # BLD AUTO: ABNORMAL 10*3/UL
LYMPHOCYTES # BLD MANUAL: 139.7 10E3/UL (ref 0.8–5.3)
LYMPHOCYTES NFR BLD AUTO: ABNORMAL %
LYMPHOCYTES NFR BLD MANUAL: 91 %
MCH RBC QN AUTO: 30.1 PG (ref 26.5–33)
MCHC RBC AUTO-ENTMCNC: 31.7 G/DL (ref 31.5–36.5)
MCV RBC AUTO: 95 FL (ref 78–100)
MONOCYTES # BLD AUTO: ABNORMAL 10*3/UL
MONOCYTES # BLD MANUAL: 4.6 10E3/UL (ref 0–1.3)
MONOCYTES NFR BLD AUTO: ABNORMAL %
MONOCYTES NFR BLD MANUAL: 3 %
NEUTROPHILS # BLD AUTO: ABNORMAL 10*3/UL
NEUTROPHILS # BLD MANUAL: 9.2 10E3/UL (ref 1.6–8.3)
NEUTROPHILS NFR BLD AUTO: ABNORMAL %
NEUTROPHILS NFR BLD MANUAL: 6 %
NRBC # BLD AUTO: 0 10E3/UL
NRBC BLD AUTO-RTO: 0 /100
PLAT MORPH BLD: ABNORMAL
PLATELET # BLD AUTO: 168 10E3/UL (ref 150–450)
POTASSIUM SERPL-SCNC: 5.4 MMOL/L (ref 3.4–5.3)
PROT SERPL-MCNC: 6.6 G/DL (ref 6.4–8.3)
RBC # BLD AUTO: 4.59 10E6/UL (ref 3.8–5.2)
RBC MORPH BLD: ABNORMAL
SODIUM SERPL-SCNC: 140 MMOL/L (ref 135–145)
WBC # BLD AUTO: 153.5 10E3/UL (ref 4–11)

## 2023-11-07 PROCEDURE — 36415 COLL VENOUS BLD VENIPUNCTURE: CPT

## 2023-11-07 PROCEDURE — 85007 BL SMEAR W/DIFF WBC COUNT: CPT

## 2023-11-07 PROCEDURE — 80053 COMPREHEN METABOLIC PANEL: CPT

## 2023-11-07 PROCEDURE — 85027 COMPLETE CBC AUTOMATED: CPT

## 2023-11-07 RX ORDER — GABAPENTIN 100 MG/1
CAPSULE ORAL
Qty: 30 CAPSULE | Refills: 1 | Status: SHIPPED | OUTPATIENT
Start: 2023-11-07 | End: 2024-07-08

## 2023-11-07 NOTE — TELEPHONE ENCOUNTER
gabapentin (NEURONTIN) 100 MG capsule 30 capsule 3 8/22/2022     Last Office Visit : 10/24/2023   Future Office visit:  12/21/2023     Routing refill request to provider for review/approval because:  Drug not on the [facility], [facility] or [facility] refill protocol or controlled substance

## 2023-11-08 ENCOUNTER — OFFICE VISIT (OUTPATIENT)
Dept: URGENT CARE | Facility: URGENT CARE | Age: 83
End: 2023-11-08
Payer: COMMERCIAL

## 2023-11-08 VITALS
BODY MASS INDEX: 24.41 KG/M2 | RESPIRATION RATE: 18 BRPM | DIASTOLIC BLOOD PRESSURE: 58 MMHG | OXYGEN SATURATION: 96 % | HEIGHT: 64 IN | WEIGHT: 143 LBS | HEART RATE: 83 BPM | SYSTOLIC BLOOD PRESSURE: 100 MMHG | TEMPERATURE: 98.2 F

## 2023-11-08 DIAGNOSIS — R07.0 THROAT PAIN: Primary | ICD-10-CM

## 2023-11-08 DIAGNOSIS — J02.9 VIRAL PHARYNGITIS: ICD-10-CM

## 2023-11-08 LAB
DEPRECATED S PYO AG THROAT QL EIA: NEGATIVE
FLUAV AG SPEC QL IA: NEGATIVE
FLUBV AG SPEC QL IA: NEGATIVE
GROUP A STREP BY PCR: NOT DETECTED

## 2023-11-08 PROCEDURE — 87804 INFLUENZA ASSAY W/OPTIC: CPT | Performed by: NURSE PRACTITIONER

## 2023-11-08 PROCEDURE — 87651 STREP A DNA AMP PROBE: CPT | Performed by: NURSE PRACTITIONER

## 2023-11-08 PROCEDURE — 87635 SARS-COV-2 COVID-19 AMP PRB: CPT | Performed by: NURSE PRACTITIONER

## 2023-11-08 PROCEDURE — 99213 OFFICE O/P EST LOW 20 MIN: CPT | Performed by: NURSE PRACTITIONER

## 2023-11-08 NOTE — PROGRESS NOTES
"Chief Complaint   Patient presents with    Urgent Care    Pharyngitis     Sore throat since last night.      SUBJECTIVE:  Kerri Shook is a 83 year old female presenting with sore throat since yesterday.  She has been traveling and also has upcoming social plans.  No headache nausea rash postnasal drip mucus uvula deviation hot potato voice stridor trismus thrush appearance.  She has breast cancer CLL history, but is not currently on treatment.    Past Medical History:   Diagnosis Date    Breast disorder 1990    Breast Cancer    Cardiomyopathy (H)     Chronic osteoarthritis 2012    resulted in hip replacement    CLL (chronic lymphoblastic leukemia) 1989    Closed fracture of second toe of left foot     Colon adenomas     6/26/14 colonoscopy, repeat in 3 years    History of blood transfusion     hyperlipidemia     Primary localized osteoarthrosis, pelvic region and thigh     Tinnitus      aspirin 81 MG tablet, Take 81 mg by mouth daily  carvedilol (COREG) 3.125 MG tablet, Take 1 tablet (3.125 mg) by mouth 2 times daily  Multiple Vitamin (MULTIVITAMIN) per tablet, Take 1 tablet by mouth daily.  simvastatin (ZOCOR) 20 MG tablet, TAKE 1 TABLET(20 MG) BY MOUTH AT BEDTIME  gabapentin (NEURONTIN) 100 MG capsule, TAKE 1 CAPSULE(100 MG) BY MOUTH EVERY NIGHT AS NEEDED. please keep appt 9/7/22    No current facility-administered medications on file prior to visit.    Social History     Tobacco Use    Smoking status: Never    Smokeless tobacco: Never   Substance Use Topics    Alcohol use: Yes     Comment: 3 glasses of wine per week     Allergies   Allergen Reactions    Nkda [No Known Drug Allergy]        Review of Systems  All systems negative except for those listed above in HPI.    OBJECTIVE:   /58   Pulse 83   Temp 98.2  F (36.8  C) (Temporal)   Resp 18   Ht 1.626 m (5' 4\")   Wt 64.9 kg (143 lb)   SpO2 96%   BMI 24.55 kg/m      Physical Exam  Vitals reviewed.   Constitutional:       General: She is not in " acute distress.     Appearance: Normal appearance. She is well-developed. She is not ill-appearing, toxic-appearing or diaphoretic.   HENT:      Head: Normocephalic and atraumatic.      Mouth/Throat:      Pharynx: No oropharyngeal exudate or posterior oropharyngeal erythema.   Eyes:      Conjunctiva/sclera: Conjunctivae normal.      Pupils: Pupils are equal, round, and reactive to light.   Cardiovascular:      Rate and Rhythm: Normal rate.      Pulses: Normal pulses.   Pulmonary:      Effort: Pulmonary effort is normal. No respiratory distress.   Musculoskeletal:         General: Normal range of motion.      Cervical back: Normal range of motion and neck supple.   Lymphadenopathy:      Cervical: No cervical adenopathy.   Skin:     General: Skin is warm.      Capillary Refill: Capillary refill takes less than 2 seconds.      Findings: No rash.   Neurological:      General: No focal deficit present.      Mental Status: She is alert and oriented to person, place, and time.   Psychiatric:         Mood and Affect: Mood normal.         Behavior: Behavior normal.       Results for orders placed or performed in visit on 11/08/23   Influenza A/B antigen     Status: Normal    Specimen: Nose; Swab   Result Value Ref Range    Influenza A antigen Negative Negative    Influenza B antigen Negative Negative    Narrative    Test results must be correlated with clinical data. If necessary, results should be confirmed by a molecular assay or viral culture.   Streptococcus A Rapid Screen w/Reflex to PCR - Clinic Collect     Status: Normal    Specimen: Throat; Swab   Result Value Ref Range    Group A Strep antigen Negative Negative   Group A Streptococcus PCR Throat Swab     Status: Normal    Specimen: Throat; Swab   Result Value Ref Range    Group A strep by PCR Not Detected Not Detected    Narrative    The Xpert Xpress Strep A test, performed on the [employer], is a rapid, qualitative in vitro diagnostic test for  the detection of Streptococcus pyogenes (Group A ß-hemolytic Streptococcus, Strep A) in throat swab specimens from patients with signs and symptoms of pharyngitis. The Xpert Xpress Strep A test can be used as an aid in the diagnosis of Group A Streptococcal pharyngitis. The assay is not intended to monitor treatment for Group A Streptococcus infections. The Xpert Xpress Strep A test utilizes an automated real-time polymerase chain reaction (PCR) to detect Streptococcus pyogenes DNA.     ASSESSMENT:    ICD-10-CM    1. Throat pain  R07.0 Symptomatic COVID-19 Virus (Coronavirus) by PCR Nose     Influenza A/B antigen     Streptococcus A Rapid Screen w/Reflex to PCR - Clinic Collect     Group A Streptococcus PCR Throat Swab      2. Viral pharyngitis  J02.9         PLAN:     Strep COVID flu negative  Viral pharyngitis versus postnasal drip seasonal allergy irritation GERD  Exam unremarkable and vital stable  No thrush appearance visualized  Drink plenty of fluids and rest.  May use salt water gargles- about 8 oz warm water with about 1 teaspoon salt  Sucrets and Cepacol spray are over the counter medications that numb the throat.  Over the counter pain relievers such as tylenol or ibuprofen may be used as needed.  Honey has been shown to be helpful in cough management and is soothing to a sore throat. May add to lemon tea.  Please follow up with primary care provider if not improving, worsening or new symptoms.    Follow up with primary care provider with any problems, questions or concerns or if symptoms worsen or fail to improve. Patient agreed to plan and verbalized understanding.    VAHID Raygoza-BC  [facility]

## 2023-11-09 LAB — SARS-COV-2 RNA RESP QL NAA+PROBE: NEGATIVE

## 2023-11-09 NOTE — PATIENT INSTRUCTIONS
Strep COVID flu negative  Viral pharyngitis versus postnasal drip seasonal allergy irritation GERD  Exam unremarkable and vital stable  No thrush appearance visualized  Drink plenty of fluids and rest.  May use salt water gargles- about 8 oz warm water with about 1 teaspoon salt  Sucrets and Cepacol spray are over the counter medications that numb the throat.  Over the counter pain relievers such as tylenol or ibuprofen may be used as needed.  Honey has been shown to be helpful in cough management and is soothing to a sore throat. May add to lemon tea.  Please follow up with primary care provider if not improving, worsening or new symptoms.  
no

## 2023-11-28 ENCOUNTER — ANCILLARY ORDERS (OUTPATIENT)
Dept: INTERNAL MEDICINE | Facility: CLINIC | Age: 83
End: 2023-11-28

## 2023-11-28 ENCOUNTER — HOSPITAL ENCOUNTER (OUTPATIENT)
Dept: MRI IMAGING | Facility: CLINIC | Age: 83
Discharge: HOME | End: 2023-11-28
Attending: INTERNAL MEDICINE
Payer: COMMERCIAL

## 2023-11-28 DIAGNOSIS — M79.661 PAIN OF RIGHT LOWER LEG: Primary | ICD-10-CM

## 2023-11-28 DIAGNOSIS — M79.661 PAIN OF RIGHT LOWER LEG: ICD-10-CM

## 2023-11-28 PROCEDURE — 255N000002 HC RX 255 OP 636: Mod: JZ | Performed by: INTERNAL MEDICINE

## 2023-11-28 PROCEDURE — A9585 GADOBUTROL INJECTION: HCPCS | Mod: JZ | Performed by: INTERNAL MEDICINE

## 2023-11-28 PROCEDURE — 73723 MRI JOINT LWR EXTR W/O&W/DYE: CPT | Mod: RT

## 2023-11-28 PROCEDURE — 73723 MRI JOINT LWR EXTR W/O&W/DYE: CPT | Mod: 26 | Performed by: RADIOLOGY

## 2023-11-28 PROCEDURE — 73720 MRI LWR EXTREMITY W/O&W/DYE: CPT | Mod: RT

## 2023-11-28 PROCEDURE — 73720 MRI LWR EXTREMITY W/O&W/DYE: CPT | Mod: 26 | Performed by: RADIOLOGY

## 2023-11-28 RX ORDER — GADOBUTROL 604.72 MG/ML
7.5 INJECTION INTRAVENOUS ONCE
Status: COMPLETED | OUTPATIENT
Start: 2023-11-28 | End: 2023-11-28

## 2023-11-28 RX ADMIN — GADOBUTROL 6 ML: 604.72 INJECTION INTRAVENOUS at 10:11

## 2023-12-11 ENCOUNTER — PRE VISIT (OUTPATIENT)
Dept: OTOLARYNGOLOGY | Facility: CLINIC | Age: 83
End: 2023-12-11

## 2023-12-11 ENCOUNTER — VIRTUAL VISIT (OUTPATIENT)
Dept: OTOLARYNGOLOGY | Facility: CLINIC | Age: 83
End: 2023-12-11
Attending: INTERNAL MEDICINE
Payer: COMMERCIAL

## 2023-12-11 ENCOUNTER — OFFICE VISIT (OUTPATIENT)
Dept: OTOLARYNGOLOGY | Facility: CLINIC | Age: 83
End: 2023-12-11
Payer: COMMERCIAL

## 2023-12-11 VITALS
HEIGHT: 64 IN | WEIGHT: 143 LBS | SYSTOLIC BLOOD PRESSURE: 127 MMHG | DIASTOLIC BLOOD PRESSURE: 63 MMHG | HEART RATE: 73 BPM | BODY MASS INDEX: 24.41 KG/M2

## 2023-12-11 DIAGNOSIS — J38.7 IRRITABLE LARYNX SYNDROME: ICD-10-CM

## 2023-12-11 DIAGNOSIS — R49.8 PRESBYPHONIA: ICD-10-CM

## 2023-12-11 DIAGNOSIS — R49.0 MUSCLE TENSION DYSPHONIA: ICD-10-CM

## 2023-12-11 DIAGNOSIS — R49.0 DYSPHONIA: Primary | ICD-10-CM

## 2023-12-11 PROCEDURE — 92524 BEHAVRAL QUALIT ANALYS VOICE: CPT | Mod: GN | Performed by: SPEECH-LANGUAGE PATHOLOGIST

## 2023-12-11 PROCEDURE — 31579 LARYNGOSCOPY TELESCOPIC: CPT | Performed by: OTOLARYNGOLOGY

## 2023-12-11 PROCEDURE — 99204 OFFICE O/P NEW MOD 45 MIN: CPT | Mod: 25 | Performed by: OTOLARYNGOLOGY

## 2023-12-11 ASSESSMENT — PAIN SCALES - GENERAL: PAINLEVEL: NO PAIN (0)

## 2023-12-11 NOTE — LETTER
12/11/2023      RE: Kerri Shook  [address]  [place] [address]       Dear Dr. Westbrook:    I had the pleasure of meeting Kerri Shook in consultation at the [facility] of the [facility] at your request, for evaluation of throat concerns. The note from our visit follows. Speech recognition software may have been used in the documentation below; input is reviewed before signature to the best of my ability. I appreciate the opportunity to participate in the care of this pleasant patient.    Please feel free to contact me with any questions.    Sincerely yours,    Mari Dias M.D., M.P.H.  , Laryngology  Director, [facility]  Otolaryngology- Head & Neck Surgery  [phone]          =====    HISTORY OF PRESENT ILLNESS:   Kerri Shook is a pleasant 83 year old female with PMH including breast cancer, CLL, cardiomyopathy who presents with a several year history of throat concerns.     Voice  -Several year history of voice quality that is intermittently reduced  -Voice recovers to normal between episodes  -Occurs more frequently if speaking in groups  -Seems to be worsening gradually over time  -Would like to know why this is happening  -It happens frequently, normal voice lasts half a week or so at a time  -No apparent precipitant including illness/medication/life events    Swallowing  No concerns.     Cough/Throat-clearing  -perhaps going on for the past year or two  -intermittent dry coughs, no obvious trigger  -does not seem related to voice use or status    Breathing  No concerns.     Throat discomfort  No concerns.  No problem even with cough or voice are flared up.    Reflux-type symptoms  She experiences heartburn/indigestion monthly. She is not taking reflux medications.    Prior EPIC/ outside records were reviewed for this visit, including:  Juan Westbrook 10/24/23    MEDICATIONS:      Current Outpatient Medications   Medication Sig Dispense Refill    aspirin 81 MG tablet Take 81 mg by mouth daily      carvedilol (COREG) 3.125 MG tablet Take 1 tablet (3.125 mg) by mouth 2 times daily 180 tablet 2    gabapentin (NEURONTIN) 100 MG capsule TAKE 1 CAPSULE(100 MG) BY MOUTH EVERY NIGHT AS NEEDED. please keep appt 9/7/22 30 capsule 1    Multiple Vitamin (MULTIVITAMIN) per tablet Take 1 tablet by mouth daily.      simvastatin (ZOCOR) 20 MG tablet TAKE 1 TABLET(20 MG) BY MOUTH AT BEDTIME 90 tablet 3       ALLERGIES:    Allergies   Allergen Reactions    Nkda [No Known Drug Allergy]        PAST MEDICAL HISTORY:   Past Medical History:   Diagnosis Date    Breast disorder 1990    Breast Cancer    Cardiomyopathy (H)     Chronic osteoarthritis 2012    resulted in hip replacement    CLL (chronic lymphoblastic leukemia) 1989    Closed fracture of second toe of left foot     Colon adenomas     6/26/14 colonoscopy, repeat in 3 years    History of blood transfusion     hyperlipidemia     Primary localized osteoarthrosis, pelvic region and thigh     Tinnitus     Restless Leg    PAST SURGICAL HISTORY:   Past Surgical History:   Procedure Laterality Date    BIOPSY  within the last 5 years    polyps during colonoscopy    COLONOSCOPY  6/26/2014    Procedure: COMBINED COLONOSCOPY, SINGLE BIOPSY/POLYPECTOMY BY BIOPSY;  Surgeon: Pola Arceo MD;  Location:  GI    COLONOSCOPY N/A 1/5/2021    Procedure: COLONOSCOPY, WITH POLYPECTOMY AND CLIP;  Surgeon: Charlie Wang MD;  [place] [facility] [place]    D & C  1962    late PP hemorrhage --> retained placenta    ENDOSCOPIC SINUS SURGERY Right 7/16/2018    Procedure: ENDOSCOPIC SINUS SURGERY;  Endoscopic Sphenoidotomy with Removal of Tissue;  Surgeon: Kishore Webster MD;  Location:  OR    ENT SURGERY  1950    tonselectomy    EYE SURGERY  2015    cataract on left eye    left hip replacemen Left     hip replacement in 2013    LUMPECTOMY BREAST  1990    Left    ORTHOPEDIC SURGERY   age?    right shoulder     ORTHOPEDIC SURGERY  ~     left hip replacement    R hip arthroscopy  11    TONSILLECTOMY  1950    [facility] PELVIS/HIP JOINT SURGERY UNLISTED  2012    left hip replacement    [facility] SHOULDER SURG PROC UNLISTED  ?       HABITS/SOCIAL HISTORY:    Social History     Tobacco Use    Smoking status: Never    Smokeless tobacco: Never   Substance Use Topics    Alcohol use: Yes     Comment: 3 glasses of wine per week       FAMILY HISTORY:    Family History   Problem Relation Age of Onset    Diabetes Maternal Grandmother 85    Coronary Artery Disease Father 76         of MI    Heart Disease Father     Cancer Maternal Grandfather         stomach    Alcoholism Son         Active alcoholic    Dementia Mother     Osteoporosis Mother     C.A.D. No family hx of     Cancer - colorectal No family hx of     Psychotic Disorder No family hx of     Skin Cancer No family hx of     Melanoma No family hx of     Hypertension No family hx of     Breast Cancer No family hx of     Prostate Cancer No family hx of     Cerebrovascular Disease No family hx of         ,, ,       REVIEW OF SYSTEMS:  Comprehensive 11 point review of systems was reviewed. Positives are as noted below; pertinent findings are as noted in the HPI.     Patient Supplied Answers to Review of Systems      2023    10:48 AM    ENT ROS   Ears, Nose, Throat Ringing/noise in ears    Hoarseness   Musculoskeletal Sore or stiff joints       PHYSICAL EXAMINATION:  General: The patient was alert and conversant, and in no acute distress.    Eyes: PERRL, conjunctiva and lids normal, sclera nonicteric.  Nose: Anterior rhinoscopy: no gross abnormalities. no  bleeding; no  mucopurulence; septum grossly normal, mild mucoid drainage and/or crusting.  Oral cavity/oropharynx: No masses or lesions. Dentition in fair condition. Floor of mouth and oral tongue soft to palpation. Tongue mobility and palate elevation intact and symmetric.  Ears: Normal  auricles, external auditory canals bilaterally. Visualized portions of tympanic membranes normal bilaterally. Left with substantial cerumen.  Neck: No palpable cervical lymphadenopathy. There was mild to moderate tenderness to palpation of the thyrohyoid space, which was narrow. No obvious thyroid abnormality. Landmarks palpable.  Resp: Breathing comfortably, no stridor or stertor.  Neuro: Symmetric facial function. Other cranial nerves as documented above.  Psych: Normal affect, pleasant and cooperative.  Voice/speech: Mild dysphonia characterized by glottal ramos and suboptimal phonatory-respiratory coordination .  Extremities: No cyanosis, clubbing, or edema of the upper extremities.      PROCEDURE:   Flexible fiberoptic laryngoscopy and laryngovideostroboscopy  Indications: This procedure was warranted to evaluate the patient's laryngeal anatomy and function. Risks, benefits, and alternatives were discussed.  Description: After written informed consent was obtained, a time-out was performed to confirm patient identity, procedure, and procedure site. Topical 2% lidocaine and oxymetazoline were applied to the nasal cavities. I performed the endoscopy and no complications were apparent. Continuous and stroboscopic light were utilized to assess routine phonation and variable frequency phonation.  Performed by: Mari Dias MD MPH  SLP: Jasmeet Mckinney, PhD, CCC-SLP   Findings: Normal nasopharynx. Normal base of tongue, valleculae, and epiglottis. Vocal fold mobility: right: normal; left: normal. Medial edges of the true vocal folds: smooth and straight, intermittently mildly bowed. No focal mucosal lesions were observed on the true vocal folds. Glissade produced appropriate elongation. There was moderate to severe supraglottic recruitment with connected speech. Mucosa of false vocal folds, aryepiglottic folds, piriform sinuses, and posterior glottis unremarkable. Airway was patent.  No focal lesions on NBI.   Highly reactive to laryngeal exam.    The addition of stroboscopy allowed evaluation of the mucosal wave. Mildly limited by high reactivity despite oropharyngeal topical anesthesia.  Amplitude: right: normal; left: normal. Symmetry: intermittent symmetry. Closure pattern: slightly spindle-shaped. Closure plane: at glottic level. Phase distribution: slightly open-phase predominant.                                      IMAGING/RESULTS reviewed, with pertinent report excerpts below:  Exam: XR CHEST 2 VIEWS, 7/17/2023   Impression: Minor blunting of costophrenic angles, could be atelectasis versus pleural scarring versus trace pleural effusions.      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 Fortunately her laryngeal exam is reassuring overall, with no focal or concerning lesions.    I recommended speech therapy as initial primary management, with goals including reducing laryngeal irritability, improving laryngeal efficiency, and improving respiratory/phonatory coordination.       I asked the patient to return if symptoms persist despite the steps above.   I appreciate the opportunity to participate in the care of this patient.     I spent a total of 52 minutes on 12/11/2023 in chart review, review of tests, patient visit, documentation, care coordination, and/or discussion with other providers about the issues documented above, separate from any documented procedure(s).    Mari Dias MD

## 2023-12-11 NOTE — PATIENT INSTRUCTIONS
1.  You were seen in the ENT Clinic today by Dr. Dias. If you have any questions or concerns after your appointment, please call [phone]. Press option #1 for scheduling related needs. Press option #3 for Nurse advice.    2.  Dr. Dias has recommended the following:   - voice therapy    3.  Plan is to return to clinic as needed      Giana Lipscomb RN  [phone]  [facility] - Otolaryngology

## 2023-12-11 NOTE — PROGRESS NOTES
[facility] VOICE CLINIC  CLINICAL EVALUATION REPORT    Patient: Kerri Shook  Date of Service: 12/11/2023  Seen in conjunction with: Dr. Mari Dias  Referring physician: Dr. Westbrook    HISTORY  PATIENT INFORMATION  Kerri Shook is a 83 year old presenting today for initial evaluation of voice and resonance. Salient details of her symptom history are as follows:    The patient presents today with a history of intermittent dysphonia that began gradually and without an obvious inciting event 2 to 3 years ago.  Symptoms have been worsening in terms of frequency over time.  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  She reports that symptoms resolved to normal within a few hours after these episodes.  She has never had total loss of voice, and has not had other episodes of voice difficulties in the past.    She denies swallowing difficulties, symptoms of reflux, and throat discomfort.  She does report intermittent dry throat clearing as well as intermittent bouts of 2-3 coughs that are always dry and seem to come out of nowhere.  She has not observed obvious triggers for this.    OBJECTIVE FINDINGS  PATIENT REPORTED MEASURES  Patient Supplied Answers To [facility] Intake Voice Questionnaire       No data to display                 Patient Supplied Answers To VHI Questionnaire      12/4/2023    10:55 AM   Voice Handicap Index (VHI-10)   My voice makes it difficult for people to hear me 0   People have difficulty understanding me in a noisy room 1   My voice difficulties restrict my personal and social life.  0   I feel left out of conversations because of my voice 0   My voice problem causes me to lose income 0   I feel as though I have to strain to produce voice 0   The clarity of my voice is unpredictable 0   My voice problem upsets me 1   My voice makes me feel  "handicapped 0   People ask, \"What's wrong with your voice?\" 0   VHI-10 2        Patient Supplied Answers To CSI Questionnaire       No data to display                 Patient Supplied Answers To EAT Questionnaire       No data to display                  Self-Ratings as discussed with patient:       No data to display                 PERCEPTUAL EVALUATION (31760)  VOICE/ SPEECH/ NON-COMMUNICATIVE LARYNGEAL BEHAVIORS EVALUATION  Palpation of the laryngeal area shows:  firm musculature  tenderness of the thyrohyoid area  Breathing pattern:   inspirations are inadequate in volume and frequency  Cough/ Throat clear:  throat clear is primary and non productive   Kerri states today is a typical voice day, with clinician observing voice quality characterized by:  Roughness: Mild Intermittent  Breathiness: Mild Consistent  Strain: Mild Consistent  Habitual pitch is perceptually within normal limits and appropriate for age and gender  Loudness is mildly reduced for the setting     GRADE, ROUGHNESS, BREATHINESS, ASTHENIA, STRAIN  (GRBAS) scale:  G(2)R(1)B(1)A(0)S(1)    LARYNGEAL EXAMINATION  Procedure: Flexible endoscopy with chip-tip technology with stroboscopy, right nostril; topical anesthesia with 3% Lidocaine and 0.25% phenylephrine was applied.   Performed by: Dr. Mari Dias  Verbal consent was obtained and witnessed prior to this procedure.   A time-out was performed, verifying patient, procedure, and site.   This exam shows:  Velar Function: Not assessed  Secretions: Within normal limits  Laryngeal Mucosa: essentially healthy laryngeal mucosa  Vocal fold mucosa:    RTVF - within normal limits, no visible lesions; very subtle bowed appearance  LTVF - within normal limits, no visible lesions; very subtle bowed appearance  Vocal fold function:   Vocal folds are mobile and meet at midline  Movement is brisk and symmetric  Narrow Band Imaging (NBI) demonstrated: Findings consistent with halogen light  Airway is " widely patent  Elongation of the vocal folds for pitch increase is normal  Moderate four-way constriction of the supraglottic larynx during connected speech  Therapy probes could not be completed due to poor patient tolerance of exam  The addition of stroboscopy provided the following information:  Vibratory Behavior: Present bilaterally  Amplitude Right: Within normal limits  Amplitude Left: Moderately reduced, but this appears to be an artifact of discomfort during the exam  Mucosal Wave Right: Within normal limits  Mucosal Wave Left: Within normal limits  Glottic closure: Complete without obvious bowed appearance or central gap.  Phase predominance appears to be equal  Symmetry: Mostly symmetric  Periodicity: Mostly periodic    ASSESSMENT / PLAN  IMPRESSIONS: Kerri Shook is a 83 year old with Chronic Cough (R05.3), Chronic Throat Clearing (R68.89), and Dysphonia (R49.0) characterized by roughness, breathiness, and strain.  Laryngeal exam demonstrates grossly normal structure and function with moderate supraglottic compression during connected speech and extreme sensitivity to laryngeal exam.  Based on these findings, it appears that patient's voice symptoms are accounted for by laryngeal hyperfunction, and that coughing and throat clearing symptoms are accounted for by irritable larynx syndrome.  A course of speech therapy is recommended to optimize vocal technique, improve voice quality, promote reduced discomfort, effort and fatigue, and help reduce chronic cough and throat clear.  She demonstrates a Good prognosis for improvement given adherence to therapeutic recommendations.   Positive indicators: positive response to therapy probes diagnosis is known to respond to treatment  Negative indicators: None  DURATION / FREQUENCY: 4 bi-weekly therapy sessions    Goals:  Patient goal:    To understand the problem and fix it as much as possible     Short-term goal(s): Within the first 4 sessions, Kerri will:  --  utilize vocal hygiene strategies in order to minimize laryngeal irritation and facilitate improved therapeutic outcomes with 90% accy.  -- demonstrate provided cough and throat clear suppression/substitution strategies from memory independently with 90% accuracy.  -- accurately self-identify target vs. habitual voice quality in >80% of utterances, with demonstrated ability to volitionally alter voice quality with 90% accy when prompted.  -- coordinate appropriate air flow levels with forward resonance during phonation in order to minimize laryngeal compensation and effort with 90% accy.    Long-term goal(s): In 3 months, Kerri will:  -- report a 90% resolution of voice and coughing/throat clearing symptoms during a week of performing typical personal, social, and professional activities.    This treatment plan was developed with the patient who agreed with the recommendations.    TOTAL SERVICE TIME: 60 minutes  EVALUATION OF VOICE AND RESONANCE (57394)  NO CHARGE FACILITY FEE (85527)    Speech recognition software may have been used in this documentation; input is reviewed before signature to the best of my ability.     Jasmeet Mckinney, Ph.D., [facility]-SLP  Speech-Language Pathologist-[facility] Voice [facility]  [phone]  he/him/his

## 2023-12-11 NOTE — PROGRESS NOTES
Outpatient Speech Language Therapy Evaluation  PLAN OF TREATMENT FOR OUTPATIENT REHABILITATION  (COMPLETE FOR INITIAL CLAIMS ONLY)  Patient's Last Name, First Name, M.I.  YOB: 1940  Kerri Shook                        Provider's Name  Jasmeet Mckinney, SLP Medical Record No.  [id number]                               Onset Date:  12/11/23   Start of Care Date: 12/11/23     Type: Speech Language Therapy Medical Diagnosis: Dysphonia [R49.0]                        Therapy Diagnosis:  Dysphonia [R49.0]   Visits from SOC:  1   _________________________________________________________________________________  Plan of Treatment:   Speech therapy    DURATION/FREQUENCY OF TREATMENT  Six weekly, one-hour sessions, with two monthly one-hour follow-up sessions    PROGNOSIS  Good prognosis for voice improvement with speech therapy and regular practice of therapeutic activities.    BARRIERS TO LEARNING/TEACHING AND LEARNING NEEDS  None/Unremarkable    Goals:  Patient goal:   1. To improve and maintain a healthy voice quality  To reduce her cough to acceptable levels    Short-term goal(s): Within the first 4 sessions, Ms. Shook will:  2. Report resolution of symptoms, and use of optimal voice quality and comfort to meet personal, social, and professional needs, 75% of the time during a typical week of vocal activities    Long-term goal(s): In 6 months, Ms. Shook will:  3. Report resolution of symptoms, and use of optimal voice quality and comfort to meet personal, social, and professional needs, 90% of the time during a typical week of vocal activities  _________________________________________________________________________________    I CERTIFY THE NEED FOR THESE SERVICES FURNISHED UNDER        THIS PLAN OF TREATMENT AND WHILE UNDER MY CARE     (Physician attestation of this document indicates review and certification of the  therapy plan).     Certification date from: 12/11/23  Certification date to: 3/10/24    Referring Provider: Juan Westbrook

## 2023-12-11 NOTE — PROGRESS NOTES
Dear Dr. Westbrook:    I had the pleasure of meeting Kerri Shook in consultation at the [facility] of the [facility] at your request, for evaluation of throat concerns. The note from our visit follows. Speech recognition software may have been used in the documentation below; input is reviewed before signature to the best of my ability. I appreciate the opportunity to participate in the care of this pleasant patient.    Please feel free to contact me with any questions.    Sincerely yours,    Mari Dias M.D., M.P.H.  , Laryngology  Director, [facility]  Otolaryngology- Head & Neck Surgery  [phone]          =====    HISTORY OF PRESENT ILLNESS:   Kerri Shook is a pleasant 83 year old female with PMH including breast cancer, CLL, cardiomyopathy who presents with a several year history of throat concerns.     Voice  -Several year history of voice quality that is intermittently reduced  -Voice recovers to normal between episodes  -Occurs more frequently if speaking in groups  -Seems to be worsening gradually over time  -Would like to know why this is happening  -It happens frequently, normal voice lasts half a week or so at a time  -No apparent precipitant including illness/medication/life events    Swallowing  No concerns.     Cough/Throat-clearing  -perhaps going on for the past year or two  -intermittent dry coughs, no obvious trigger  -does not seem related to voice use or status    Breathing  No concerns.     Throat discomfort  No concerns.  No problem even with cough or voice are flared up.    Reflux-type symptoms  She experiences heartburn/indigestion monthly. She is not taking reflux medications.    Prior EPIC/ outside records were reviewed for this visit, including:  Juan Westbrook 10/24/23    MEDICATIONS:     Current Outpatient Medications   Medication Sig Dispense Refill    aspirin 81 MG tablet  Take 81 mg by mouth daily      carvedilol (COREG) 3.125 MG tablet Take 1 tablet (3.125 mg) by mouth 2 times daily 180 tablet 2    gabapentin (NEURONTIN) 100 MG capsule TAKE 1 CAPSULE(100 MG) BY MOUTH EVERY NIGHT AS NEEDED. please keep appt 9/7/22 30 capsule 1    Multiple Vitamin (MULTIVITAMIN) per tablet Take 1 tablet by mouth daily.      simvastatin (ZOCOR) 20 MG tablet TAKE 1 TABLET(20 MG) BY MOUTH AT BEDTIME 90 tablet 3       ALLERGIES:    Allergies   Allergen Reactions    Nkda [No Known Drug Allergy]        PAST MEDICAL HISTORY:   Past Medical History:   Diagnosis Date    Breast disorder 1990    Breast Cancer    Cardiomyopathy (H)     Chronic osteoarthritis 2012    resulted in hip replacement    CLL (chronic lymphoblastic leukemia) 1989    Closed fracture of second toe of left foot     Colon adenomas     6/26/14 colonoscopy, repeat in 3 years    History of blood transfusion     hyperlipidemia     Primary localized osteoarthrosis, pelvic region and thigh     Tinnitus     Restless Leg    PAST SURGICAL HISTORY:   Past Surgical History:   Procedure Laterality Date    BIOPSY  within the last 5 years    polyps during colonoscopy    COLONOSCOPY  6/26/2014    Procedure: COMBINED COLONOSCOPY, SINGLE BIOPSY/POLYPECTOMY BY BIOPSY;  Surgeon: Pola Arceo MD;  Location:  GI    COLONOSCOPY N/A 1/5/2021    Procedure: COLONOSCOPY, WITH POLYPECTOMY AND CLIP;  Surgeon: Charlie Wang MD;  Location: [facility] OR    D & C  1962    late PP hemorrhage --> retained placenta    ENDOSCOPIC SINUS SURGERY Right 7/16/2018    Procedure: ENDOSCOPIC SINUS SURGERY;  Endoscopic Sphenoidotomy with Removal of Tissue;  Surgeon: Kishore Webster MD;  Location:  OR    ENT SURGERY  1950    tonselectomy    EYE SURGERY  2015    cataract on left eye    left hip replacemen Left     hip replacement in 2013    LUMPECTOMY BREAST  1990    Left    ORTHOPEDIC SURGERY  age?    right shoulder     ORTHOPEDIC SURGERY  ~ 2013    left hip replacement    R hip  arthroscopy  11    TONSILLECTOMY  1950    [facility] PELVIS/HIP JOINT SURGERY UNLISTED  2012    left hip replacement    [facility] SHOULDER SURG PROC UNLISTED  ?       HABITS/SOCIAL HISTORY:    Social History     Tobacco Use    Smoking status: Never    Smokeless tobacco: Never   Substance Use Topics    Alcohol use: Yes     Comment: 3 glasses of wine per week       FAMILY HISTORY:    Family History   Problem Relation Age of Onset    Diabetes Maternal Grandmother 85    Coronary Artery Disease Father 76         of MI    Heart Disease Father     Cancer Maternal Grandfather         stomach    Alcoholism Son         Active alcoholic    Dementia Mother     Osteoporosis Mother     C.A.D. No family hx of     Cancer - colorectal No family hx of     Psychotic Disorder No family hx of     Skin Cancer No family hx of     Melanoma No family hx of     Hypertension No family hx of     Breast Cancer No family hx of     Prostate Cancer No family hx of     Cerebrovascular Disease No family hx of         ,, ,       REVIEW OF SYSTEMS:  Comprehensive 11 point review of systems was reviewed. Positives are as noted below; pertinent findings are as noted in the HPI.     Patient Supplied Answers to Review of Systems      2023    10:48 AM    ENT ROS   Ears, Nose, Throat Ringing/noise in ears    Hoarseness   Musculoskeletal Sore or stiff joints       PHYSICAL EXAMINATION:  General: The patient was alert and conversant, and in no acute distress.    Eyes: PERRL, conjunctiva and lids normal, sclera nonicteric.  Nose: Anterior rhinoscopy: no gross abnormalities. no  bleeding; no  mucopurulence; septum grossly normal, mild mucoid drainage and/or crusting.  Oral cavity/oropharynx: No masses or lesions. Dentition in fair condition. Floor of mouth and oral tongue soft to palpation. Tongue mobility and palate elevation intact and symmetric.  Ears: Normal auricles, external auditory canals bilaterally. Visualized portions of tympanic membranes  normal bilaterally. Left with substantial cerumen.  Neck: No palpable cervical lymphadenopathy. There was mild to moderate tenderness to palpation of the thyrohyoid space, which was narrow. No obvious thyroid abnormality. Landmarks palpable.  Resp: Breathing comfortably, no stridor or stertor.  Neuro: Symmetric facial function. Other cranial nerves as documented above.  Psych: Normal affect, pleasant and cooperative.  Voice/speech: Mild dysphonia characterized by glottal ramos and suboptimal phonatory-respiratory coordination .  Extremities: No cyanosis, clubbing, or edema of the upper extremities.      PROCEDURE:   Flexible fiberoptic laryngoscopy and laryngovideostroboscopy  Indications: This procedure was warranted to evaluate the patient's laryngeal anatomy and function. Risks, benefits, and alternatives were discussed.  Description: After written informed consent was obtained, a time-out was performed to confirm patient identity, procedure, and procedure site. Topical 2% lidocaine and oxymetazoline were applied to the nasal cavities. I performed the endoscopy and no complications were apparent. Continuous and stroboscopic light were utilized to assess routine phonation and variable frequency phonation.  Performed by: Mari Dias MD MPH  SLP: Jasmeet Mckinney, PhD, CCC-SLP   Findings: Normal nasopharynx. Normal base of tongue, valleculae, and epiglottis. Vocal fold mobility: right: normal; left: normal. Medial edges of the true vocal folds: smooth and straight, intermittently mildly bowed. No focal mucosal lesions were observed on the true vocal folds. Glissade produced appropriate elongation. There was moderate to severe supraglottic recruitment with connected speech. Mucosa of false vocal folds, aryepiglottic folds, piriform sinuses, and posterior glottis unremarkable. Airway was patent.  No focal lesions on NBI.  Highly reactive to laryngeal exam.    The addition of stroboscopy allowed evaluation of the  mucosal wave. Mildly limited by high reactivity despite oropharyngeal topical anesthesia.  Amplitude: right: normal; left: normal. Symmetry: intermittent symmetry. Closure pattern: slightly spindle-shaped. Closure plane: at glottic level. Phase distribution: slightly open-phase predominant.                                      IMAGING/RESULTS reviewed, with pertinent report excerpts below:  Exam: XR CHEST 2 VIEWS, 7/17/2023   Impression: Minor blunting of costophrenic angles, could be atelectasis versus pleural scarring versus trace pleural effusions.      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 Fortunately her laryngeal exam is reassuring overall, with no focal or concerning lesions.    I recommended speech therapy as initial primary management, with goals including reducing laryngeal irritability, improving laryngeal efficiency, and improving respiratory/phonatory coordination.       I asked the patient to return if symptoms persist despite the steps above.   I appreciate the opportunity to participate in the care of this patient.     I spent a total of 52 minutes on 12/11/2023 in chart review, review of tests, patient visit, documentation, care coordination, and/or discussion with other providers about the issues documented above, separate from any documented procedure(s).

## 2023-12-11 NOTE — PATIENT INSTRUCTIONS
Patient needs to be scheduled for:  Return Voice SLP Virtual or In Person     With:   Patient choice or first available SLP (Pastor Moses, Jasmeet Mckinney, Lien Castillo, Jenny Monique, Chata Harrison, Hayley Chaudhry, or Alondra Hernandez)    Number and Frequency of sessions:  4 appointments with approximately 14 days between each

## 2023-12-12 NOTE — PROGRESS NOTES
Medicare Certification  Physician Attestation for Therapy   I agree with the information in this note.    Mari Dias MD

## 2023-12-20 ENCOUNTER — ONCOLOGY VISIT (OUTPATIENT)
Dept: ONCOLOGY | Facility: CLINIC | Age: 83
End: 2023-12-20
Attending: STUDENT IN AN ORGANIZED HEALTH CARE EDUCATION/TRAINING PROGRAM
Payer: COMMERCIAL

## 2023-12-20 ENCOUNTER — LAB (OUTPATIENT)
Dept: LAB | Facility: CLINIC | Age: 83
End: 2023-12-20
Attending: STUDENT IN AN ORGANIZED HEALTH CARE EDUCATION/TRAINING PROGRAM
Payer: COMMERCIAL

## 2023-12-20 VITALS
TEMPERATURE: 98.2 F | HEART RATE: 82 BPM | DIASTOLIC BLOOD PRESSURE: 72 MMHG | BODY MASS INDEX: 24.44 KG/M2 | OXYGEN SATURATION: 95 % | SYSTOLIC BLOOD PRESSURE: 122 MMHG | RESPIRATION RATE: 16 BRPM | WEIGHT: 142.4 LBS

## 2023-12-20 DIAGNOSIS — C91.10 CLL (CHRONIC LYMPHOCYTIC LEUKEMIA) (H): ICD-10-CM

## 2023-12-20 DIAGNOSIS — C91.10 CLL (CHRONIC LYMPHOCYTIC LEUKEMIA) (H): Primary | ICD-10-CM

## 2023-12-20 LAB
ALBUMIN SERPL BCG-MCNC: 4.2 G/DL (ref 3.5–5.2)
ALP SERPL-CCNC: 92 U/L (ref 40–150)
ALT SERPL W P-5'-P-CCNC: 19 U/L (ref 0–50)
ANION GAP SERPL CALCULATED.3IONS-SCNC: 7 MMOL/L (ref 7–15)
AST SERPL W P-5'-P-CCNC: 29 U/L (ref 0–45)
BASOPHILS # BLD AUTO: ABNORMAL 10*3/UL
BASOPHILS # BLD MANUAL: 0 10E3/UL (ref 0–0.2)
BASOPHILS NFR BLD AUTO: ABNORMAL %
BASOPHILS NFR BLD MANUAL: 0 %
BILIRUB SERPL-MCNC: 0.5 MG/DL
BUN SERPL-MCNC: 14.1 MG/DL (ref 8–23)
CALCIUM SERPL-MCNC: 9.2 MG/DL (ref 8.8–10.2)
CHLORIDE SERPL-SCNC: 104 MMOL/L (ref 98–107)
CREAT SERPL-MCNC: 0.92 MG/DL (ref 0.51–0.95)
DEPRECATED HCO3 PLAS-SCNC: 28 MMOL/L (ref 22–29)
EGFRCR SERPLBLD CKD-EPI 2021: 61 ML/MIN/1.73M2
EOSINOPHIL # BLD AUTO: ABNORMAL 10*3/UL
EOSINOPHIL # BLD MANUAL: 0 10E3/UL (ref 0–0.7)
EOSINOPHIL NFR BLD AUTO: ABNORMAL %
EOSINOPHIL NFR BLD MANUAL: 0 %
ERYTHROCYTE [DISTWIDTH] IN BLOOD BY AUTOMATED COUNT: 14.7 % (ref 10–15)
GLUCOSE SERPL-MCNC: 119 MG/DL (ref 70–99)
HCT VFR BLD AUTO: 43.7 % (ref 35–47)
HGB BLD-MCNC: 13.8 G/DL (ref 11.7–15.7)
IMM GRANULOCYTES # BLD: ABNORMAL 10*3/UL
IMM GRANULOCYTES NFR BLD: ABNORMAL %
LYMPHOCYTES # BLD AUTO: ABNORMAL 10*3/UL
LYMPHOCYTES # BLD MANUAL: 142.9 10E3/UL (ref 0.8–5.3)
LYMPHOCYTES NFR BLD AUTO: ABNORMAL %
LYMPHOCYTES NFR BLD MANUAL: 94 %
MCH RBC QN AUTO: 29.6 PG (ref 26.5–33)
MCHC RBC AUTO-ENTMCNC: 31.6 G/DL (ref 31.5–36.5)
MCV RBC AUTO: 94 FL (ref 78–100)
MONOCYTES # BLD AUTO: ABNORMAL 10*3/UL
MONOCYTES # BLD MANUAL: 3 10E3/UL (ref 0–1.3)
MONOCYTES NFR BLD AUTO: ABNORMAL %
MONOCYTES NFR BLD MANUAL: 2 %
NEUTROPHILS # BLD AUTO: ABNORMAL 10*3/UL
NEUTROPHILS # BLD MANUAL: 6.1 10E3/UL (ref 1.6–8.3)
NEUTROPHILS NFR BLD AUTO: ABNORMAL %
NEUTROPHILS NFR BLD MANUAL: 4 %
NRBC # BLD AUTO: 0 10E3/UL
NRBC BLD AUTO-RTO: 0 /100
PLAT MORPH BLD: ABNORMAL
PLATELET # BLD AUTO: 155 10E3/UL (ref 150–450)
POTASSIUM SERPL-SCNC: 5.1 MMOL/L (ref 3.4–5.3)
PROT SERPL-MCNC: 6.3 G/DL (ref 6.4–8.3)
RBC # BLD AUTO: 4.67 10E6/UL (ref 3.8–5.2)
RBC MORPH BLD: ABNORMAL
SODIUM SERPL-SCNC: 139 MMOL/L (ref 135–145)
WBC # BLD AUTO: 152 10E3/UL (ref 4–11)

## 2023-12-20 PROCEDURE — 85027 COMPLETE CBC AUTOMATED: CPT | Performed by: PATHOLOGY

## 2023-12-20 PROCEDURE — G0463 HOSPITAL OUTPT CLINIC VISIT: HCPCS | Performed by: STUDENT IN AN ORGANIZED HEALTH CARE EDUCATION/TRAINING PROGRAM

## 2023-12-20 PROCEDURE — 80053 COMPREHEN METABOLIC PANEL: CPT | Performed by: PATHOLOGY

## 2023-12-20 PROCEDURE — 36415 COLL VENOUS BLD VENIPUNCTURE: CPT | Performed by: PATHOLOGY

## 2023-12-20 PROCEDURE — 99213 OFFICE O/P EST LOW 20 MIN: CPT | Performed by: STUDENT IN AN ORGANIZED HEALTH CARE EDUCATION/TRAINING PROGRAM

## 2023-12-20 PROCEDURE — 85007 BL SMEAR W/DIFF WBC COUNT: CPT | Performed by: PATHOLOGY

## 2023-12-20 ASSESSMENT — PAIN SCALES - GENERAL: PAINLEVEL: NO PAIN (0)

## 2023-12-20 NOTE — LETTER
12/20/2023         RE: Kerri Shook  [address]  [facility] [address]        Dear Colleague,    Thank you for referring your patient, Kerri Shook, to the [facility]. Please see a copy of my visit note below.    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    1. CLL in 1986. Found to have an elevated white blood count on routine testing. This was consistent with CLL.Saw Dr. Puente who recommended observation. WBCs 15-20 during this time. She has required no treatment for CLL. Followed by PCP with yearly CBCs.  2. 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 The patient had a screening mammogram in 1990, which showed an abnormality in the left breast. She underwent a lumpectomy on 03/02/1990, which was consistent with a 1.5 cm primary, mucinous adenocarcinoma with 0 of 8 lymph nodes positive. She was staged as a stage I (T1 N0 M0). The tumor was ER positive. She did have radiation to the left breast with a boost having completed 6600 cGy in 33 fractions from 03/28/1990 until 05/15/1990. She went on to take tamoxifen for 6 years from 03/1990 until 03/1996.  3. 1/2019 labs showing WBC increasing to 27. This has continued with WBCs 47 on 11/12/2020 and 71.0 on 8//18/21  4. 8/31/21 Peripheral flow showing CD5+ lambda CLL cells. Hb 14.6, WBC 66.2, Plts 162, ANC 5.3  5. 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 Reports not significant changes in health. Denies f/cs/ or n/v/d. No lumps/bumps. No rashes. Has some known OA in hips and received MRIs that show DJD. Otherwise no changes.       CURRENT OUTPATIENT MEDICATIONS  Current  Outpatient Medications   Medication Sig Dispense Refill    aspirin 81 MG tablet Take 81 mg by mouth daily      carvedilol (COREG) 3.125 MG tablet Take 1 tablet (3.125 mg) by mouth 2 times daily 180 tablet 2    gabapentin (NEURONTIN) 100 MG capsule TAKE 1 CAPSULE(100 MG) BY MOUTH EVERY NIGHT AS NEEDED. please keep appt 9/7/22 30 capsule 1    Multiple Vitamin (MULTIVITAMIN) per tablet Take 1 tablet by mouth daily.      simvastatin (ZOCOR) 20 MG tablet TAKE 1 TABLET(20 MG) BY MOUTH AT BEDTIME 90 tablet 3       REVIEW OF SYSTEMS  A complete ROS was performed and was negative except as mentioned in HPI    PHYSICAL EXAM  /72 (BP Location: Right arm, Patient Position: Sitting, Cuff Size: Adult Regular)   Pulse 82   Temp 98.2  F (36.8  C) (Oral)   Resp 16   Wt 64.6 kg (142 lb 6.4 oz)   SpO2 95%   BMI 24.44 kg/m      Gen: alert, pleasant and conversational, NAD  HEENT: NC/AT, EOMI w/ PERRL, anicteric sclera. OP clear. MMM.   Lymph: No palpable cervical, supraclavicular, axillary LAD  CV: normal S1,S2 with RRR no m/r/g  Resp: lungs CTA bilaterally with adequate air movement to bases. No wheezes or crackles  Abd: soft NTND no organomegaly or masses. BS normoactive.   Ext: WWP no edema or cyanosis  Skin: no concerning lesions or rashes  Neuro: A&Ox4, no lateralizing sx. Grossly nonfocal.        Wt Readings from Last 4 Encounters:   12/20/23 64.6 kg (142 lb 6.4 oz)   12/11/23 64.9 kg (143 lb)   11/08/23 64.9 kg (143 lb)   10/24/23 65.1 kg (143 lb 9.6 oz)             LABORATORY AND IMAGING STUDIES    I personally reviewed the following labs:     Latest Reference Range & Units 12/20/23 09:13   Sodium 135 - 145 mmol/L 139   Potassium 3.4 - 5.3 mmol/L 5.1   Chloride 98 - 107 mmol/L 104   Carbon Dioxide (CO2) 22 - 29 mmol/L 28   Urea Nitrogen 8.0 - 23.0 mg/dL 14.1   Creatinine 0.51 - 0.95 mg/dL 0.92   GFR Estimate >60 mL/min/1.73m2 61   Calcium 8.8 - 10.2 mg/dL 9.2   Anion Gap 7 - 15 mmol/L 7   Albumin 3.5 - 5.2 g/dL 4.2    Protein Total 6.4 - 8.3 g/dL 6.3 (L)   Alkaline Phosphatase 40 - 150 U/L 92   ALT 0 - 50 U/L 19   AST 0 - 45 U/L 29   Bilirubin Total <=1.2 mg/dL 0.5   Glucose 70 - 99 mg/dL 119 (H)   WBC 4.0 - 11.0 10e3/uL 152.0 (HH)   Hemoglobin 11.7 - 15.7 g/dL 13.8   Hematocrit 35.0 - 47.0 % 43.7   Platelet Count 150 - 450 10e3/uL 155   RBC Count 3.80 - 5.20 10e6/uL 4.67   MCV 78 - 100 fL 94   MCH 26.5 - 33.0 pg 29.6   MCHC 31.5 - 36.5 g/dL 31.6   RDW 10.0 - 15.0 % 14.7   % Neutrophils % 4   % Lymphocytes % 94   % Monocytes % 2   % Eosinophils % 0   % Basophils % 0   Absolute Basophils 0.0 - 0.2 10e3/uL 0.0   Absolute Neutrophil 1.6 - 8.3 10e3/uL 6.1   Absolute Lymphocytes 0.8 - 5.3 10e3/uL 142.9 (H)   Absolute Monocytes 0.0 - 1.3 10e3/uL 3.0 (H)   Absolute Eosinophils 0.0 - 0.7 10e3/uL 0.0   Absolute NRBCs 10e3/uL 0.0   NRBCs per 100 WBC <1 /100 0   RBC Morphology  Confirmed RBC Indices   Platelet Morphology Automated Count Confirmed. Platelet morphology is normal.  Automated Count Confirmed. Platelet morphology is normal.   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    # High risk CLL - Arboleda Stage 0, CLL-IPI = 5 = High risk FISH showing loss of 17p, IgHV mutated and NGS for TP53 mutation positive  WBC initially increasing logrithmically with >50% increase between 8/31/21 (66) and 10/21/21 (95). WBCs largely pleatued but have had a slow/steady increase over last 6 months. WBC continue to be stable to slightly increased but no changes in other counts. No infections or signs of autoimmune diseases. No LAD.     Recommended continued obstervation with monthly labs and q3 month provider visits.    Final plan:  - Monthly labs  - RIKY in 3 months  -  Jacobo in 6 months    20  minutes spent on the date of the encounter doing chart review, review of test results, interpretation of tests, patient visit, and documentation     Pop Centeno MD     Division of Hematology, Oncology and Transplantation  [facility]  P: [phone]

## 2023-12-20 NOTE — NURSING NOTE
"Oncology Rooming Note    December 20, 2023 9:56 AM   Kerri Shook is a 83 year old female who presents for:    Chief Complaint   Patient presents with    Oncology Clinic Visit     CLL (chronic lymphocytic leukemia)     Initial Vitals: /72 (BP Location: Right arm, Patient Position: Sitting, Cuff Size: Adult Regular)   Pulse 82   Temp 98.2  F (36.8  C) (Oral)   Resp 16   Wt 64.6 kg (142 lb 6.4 oz)   SpO2 95%   BMI 24.44 kg/m   Estimated body mass index is 24.44 kg/m  as calculated from the following:    Height as of 12/11/23: 1.626 m (5' 4\").    Weight as of this encounter: 64.6 kg (142 lb 6.4 oz). Body surface area is 1.71 meters squared.  No Pain (0) Comment: Data Unavailable   No LMP recorded. Patient is postmenopausal.  Allergies reviewed: Yes  Medications reviewed: Yes    Medications: Medication refills not needed today.  Pharmacy name entered into [facility]:    [id number] - [place], [place] - [address] AT [facility] [address]  [facility][id number] [place], [place] - [address] AT [facility] & [address] [place] [address] [facility] #[id number] - [place], [place] - [address] AT [facility] OF [address]    Frailty Screening:   Is the patient here for a new oncology consult visit in cancer care? 2. No      Clinical concerns: none       Krystle Cuevas"

## 2023-12-20 NOTE — PROGRESS NOTES
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    1. CLL in 1986. Found to have an elevated white blood count on routine testing. This was consistent with CLL.Saw Dr. Puente who recommended observation. WBCs 15-20 during this time. She has required no treatment for CLL. Followed by PCP with yearly CBCs.  2. 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 The patient had a screening mammogram in 1990, which showed an abnormality in the left breast. She underwent a lumpectomy on 03/02/1990, which was consistent with a 1.5 cm primary, mucinous adenocarcinoma with 0 of 8 lymph nodes positive. She was staged as a stage I (T1 N0 M0). The tumor was ER positive. She did have radiation to the left breast with a boost having completed 6600 cGy in 33 fractions from 03/28/1990 until 05/15/1990. She went on to take tamoxifen for 6 years from 03/1990 until 03/1996.  3. 1/2019 labs showing WBC increasing to 27. This has continued with WBCs 47 on 11/12/2020 and 71.0 on 8//18/21  4. 8/31/21 Peripheral flow showing CD5+ lambda CLL cells. Hb 14.6, WBC 66.2, Plts 162, ANC 5.3  5. 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 Reports not significant changes in health. Denies f/cs/ or n/v/d. No lumps/bumps. No rashes. Has some known OA in hips and received MRIs that show DJD. Otherwise no changes.       CURRENT OUTPATIENT MEDICATIONS  Current Outpatient Medications   Medication Sig Dispense Refill    aspirin 81 MG tablet Take 81 mg by mouth daily      carvedilol (COREG) 3.125 MG tablet Take 1 tablet (3.125 mg) by mouth 2 times daily 180 tablet 2    gabapentin (NEURONTIN) 100 MG capsule TAKE 1 CAPSULE(100 MG) BY  MOUTH EVERY NIGHT AS NEEDED. please keep appt 9/7/22 30 capsule 1    Multiple Vitamin (MULTIVITAMIN) per tablet Take 1 tablet by mouth daily.      simvastatin (ZOCOR) 20 MG tablet TAKE 1 TABLET(20 MG) BY MOUTH AT BEDTIME 90 tablet 3       REVIEW OF SYSTEMS  A complete ROS was performed and was negative except as mentioned in HPI    PHYSICAL EXAM  /72 (BP Location: Right arm, Patient Position: Sitting, Cuff Size: Adult Regular)   Pulse 82   Temp 98.2  F (36.8  C) (Oral)   Resp 16   Wt 64.6 kg (142 lb 6.4 oz)   SpO2 95%   BMI 24.44 kg/m      Gen: alert, pleasant and conversational, NAD  HEENT: NC/AT, EOMI w/ PERRL, anicteric sclera. OP clear. MMM.   Lymph: No palpable cervical, supraclavicular, axillary LAD  CV: normal S1,S2 with RRR no m/r/g  Resp: lungs CTA bilaterally with adequate air movement to bases. No wheezes or crackles  Abd: soft NTND no organomegaly or masses. BS normoactive.   Ext: WWP no edema or cyanosis  Skin: no concerning lesions or rashes  Neuro: A&Ox4, no lateralizing sx. Grossly nonfocal.        Wt Readings from Last 4 Encounters:   12/20/23 64.6 kg (142 lb 6.4 oz)   12/11/23 64.9 kg (143 lb)   11/08/23 64.9 kg (143 lb)   10/24/23 65.1 kg (143 lb 9.6 oz)             LABORATORY AND IMAGING STUDIES    I personally reviewed the following labs:     Latest Reference Range & Units 12/20/23 09:13   Sodium 135 - 145 mmol/L 139   Potassium 3.4 - 5.3 mmol/L 5.1   Chloride 98 - 107 mmol/L 104   Carbon Dioxide (CO2) 22 - 29 mmol/L 28   Urea Nitrogen 8.0 - 23.0 mg/dL 14.1   Creatinine 0.51 - 0.95 mg/dL 0.92   GFR Estimate >60 mL/min/1.73m2 61   Calcium 8.8 - 10.2 mg/dL 9.2   Anion Gap 7 - 15 mmol/L 7   Albumin 3.5 - 5.2 g/dL 4.2   Protein Total 6.4 - 8.3 g/dL 6.3 (L)   Alkaline Phosphatase 40 - 150 U/L 92   ALT 0 - 50 U/L 19   AST 0 - 45 U/L 29   Bilirubin Total <=1.2 mg/dL 0.5   Glucose 70 - 99 mg/dL 119 (H)   WBC 4.0 - 11.0 10e3/uL 152.0 (HH)   Hemoglobin 11.7 - 15.7 g/dL 13.8   Hematocrit 35.0 -  47.0 % 43.7   Platelet Count 150 - 450 10e3/uL 155   RBC Count 3.80 - 5.20 10e6/uL 4.67   MCV 78 - 100 fL 94   MCH 26.5 - 33.0 pg 29.6   MCHC 31.5 - 36.5 g/dL 31.6   RDW 10.0 - 15.0 % 14.7   % Neutrophils % 4   % Lymphocytes % 94   % Monocytes % 2   % Eosinophils % 0   % Basophils % 0   Absolute Basophils 0.0 - 0.2 10e3/uL 0.0   Absolute Neutrophil 1.6 - 8.3 10e3/uL 6.1   Absolute Lymphocytes 0.8 - 5.3 10e3/uL 142.9 (H)   Absolute Monocytes 0.0 - 1.3 10e3/uL 3.0 (H)   Absolute Eosinophils 0.0 - 0.7 10e3/uL 0.0   Absolute NRBCs 10e3/uL 0.0   NRBCs per 100 WBC <1 /100 0   RBC Morphology  Confirmed RBC Indices   Platelet Morphology Automated Count Confirmed. Platelet morphology is normal.  Automated Count Confirmed. Platelet morphology is normal.   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    # High risk CLL - Arboleda Stage 0, CLL-IPI = 5 = High risk FISH showing loss of 17p, IgHV mutated and NGS for TP53 mutation positive  WBC initially increasing logrithmically with >50% increase between 8/31/21 (66) and 10/21/21 (95). WBCs largely pleatued but have had a slow/steady increase over last 6 months. WBC continue to be stable to slightly increased but no changes in other counts. No infections or signs of autoimmune diseases. No LAD.     Recommended continued obstervation with monthly labs and q3 month provider visits.    Final plan:  - Monthly labs  - RIKY in 3 months  - Jacobo in 6 months    20  minutes spent on the date of the encounter doing chart review, review of test results, interpretation of tests, patient visit, and documentation     Pop Centeno MD     Division of Hematology, Oncology and  Transplantation  [facility]  P: [phone]

## 2023-12-21 ENCOUNTER — OFFICE VISIT (OUTPATIENT)
Dept: INTERNAL MEDICINE | Facility: CLINIC | Age: 83
End: 2023-12-21
Payer: COMMERCIAL

## 2023-12-21 VITALS
WEIGHT: 143.3 LBS | BODY MASS INDEX: 24.6 KG/M2 | OXYGEN SATURATION: 96 % | DIASTOLIC BLOOD PRESSURE: 78 MMHG | SYSTOLIC BLOOD PRESSURE: 123 MMHG | HEART RATE: 67 BPM

## 2023-12-21 DIAGNOSIS — M25.551 HIP PAIN, RIGHT: Primary | ICD-10-CM

## 2023-12-21 PROCEDURE — 99214 OFFICE O/P EST MOD 30 MIN: CPT | Performed by: INTERNAL MEDICINE

## 2023-12-21 NOTE — PROGRESS NOTES
Kerri is a 83 year old that presents in clinic today for the following:     Chief Complaint   Patient presents with    Follow Up           12/21/2023    10:27 AM   Additional Questions   Roomed by MR       Screenings as of today     PHQ-2 Total Score (Adult) - Positive if 3 or more points; Administer   PHQ-9 if positive 0        Sonia Carpenter EMT at 10:29 AM on 12/21/2023

## 2023-12-21 NOTE — PROGRESS NOTES
HPI:    Last visit with us 10/24/2023. Overall doing well. She states still some minor R hip pain but not bad and she does not feel she needs to see orthopedics. We reviewed her recent MRI imaging and no acute findings. She was seen in ENT by Dr. Dias and will consider going ahead with Sleep Therapy. Otherwise, no additional HEENT, cardiopulmonary, abdominal, , GYN, neurological, systemic, psychiatric, lymphatic, endocrine, vascular complaints.     Past Medical History:   Diagnosis Date    Breast disorder 1990    Breast Cancer    Cardiomyopathy (H)     Chronic osteoarthritis 2012    resulted in hip replacement    CLL (chronic lymphoblastic leukemia) 1989    Closed fracture of second toe of left foot     Colon adenomas     6/26/14 colonoscopy, repeat in 3 years    History of blood transfusion     hyperlipidemia     Primary localized osteoarthrosis, pelvic region and thigh     Tinnitus      Past Surgical History:   Procedure Laterality Date    BIOPSY  within the last 5 years    polyps during colonoscopy    COLONOSCOPY  6/26/2014    Procedure: COMBINED COLONOSCOPY, SINGLE BIOPSY/POLYPECTOMY BY BIOPSY;  Surgeon: Pola Arceo MD;  Location:  GI    COLONOSCOPY N/A 1/5/2021    Procedure: COLONOSCOPY, WITH POLYPECTOMY AND CLIP;  Surgeon: Charlie Wang MD;  Location: [facility] [place]    D & C  1962    late PP hemorrhage --> retained placenta    ENDOSCOPIC SINUS SURGERY Right 7/16/2018    Procedure: ENDOSCOPIC SINUS SURGERY;  Endoscopic Sphenoidotomy with Removal of Tissue;  Surgeon: Kishore Webster MD;  Location:  OR    ENT SURGERY  1950    tonselectomy    EYE SURGERY  2015    cataract on left eye    left hip replacemen Left     hip replacement in 2013    LUMPECTOMY BREAST  1990    Left    ORTHOPEDIC SURGERY  age?    right shoulder     ORTHOPEDIC SURGERY  ~ 2013    left hip replacement    R hip arthroscopy  7/19/11    TONSILLECTOMY  1950    [facility] PELVIS/HIP JOINT SURGERY UNLISTED  April 2012    left hip replacement    [facility]  SHOULDER SURG PROC UNLISTED  ?     PE:    Vitals noted, gen, nad, cooperative, alert, neck supple nl rom, lungs with good air movement, RRR, S1, S2, no MRG, abdomen, no acute findings. Grossly normal neurological exam.     Recent Results (from the past 720 hour(s))   Comprehensive metabolic panel    Collection Time: 12/20/23  9:13 AM   Result Value Ref Range    Sodium 139 135 - 145 mmol/L    Potassium 5.1 3.4 - 5.3 mmol/L    Carbon Dioxide (CO2) 28 22 - 29 mmol/L    Anion Gap 7 7 - 15 mmol/L    Urea Nitrogen 14.1 8.0 - 23.0 mg/dL    Creatinine 0.92 0.51 - 0.95 mg/dL    GFR Estimate 61 >60 mL/min/1.73m2    Calcium 9.2 8.8 - 10.2 mg/dL    Chloride 104 98 - 107 mmol/L    Glucose 119 (H) 70 - 99 mg/dL    Alkaline Phosphatase 92 40 - 150 U/L    AST 29 0 - 45 U/L    ALT 19 0 - 50 U/L    Protein Total 6.3 (L) 6.4 - 8.3 g/dL    Albumin 4.2 3.5 - 5.2 g/dL    Bilirubin Total 0.5 <=1.2 mg/dL   CBC with platelets and differential    Collection Time: 12/20/23  9:13 AM   Result Value Ref Range    WBC Count 152.0 (HH) 4.0 - 11.0 10e3/uL    RBC Count 4.67 3.80 - 5.20 10e6/uL    Hemoglobin 13.8 11.7 - 15.7 g/dL    Hematocrit 43.7 35.0 - 47.0 %    MCV 94 78 - 100 fL    MCH 29.6 26.5 - 33.0 pg    MCHC 31.6 31.5 - 36.5 g/dL    RDW 14.7 10.0 - 15.0 %    Platelet Count 155 150 - 450 10e3/uL    % Neutrophils      % Lymphocytes      % Monocytes      % Eosinophils      % Basophils      % Immature Granulocytes      NRBCs per 100 WBC 0 <1 /100    Absolute Neutrophils      Absolute Lymphocytes      Absolute Monocytes      Absolute Eosinophils      Absolute Basophils      Absolute Immature Granulocytes      Absolute NRBCs 0.0 10e3/uL   Manual Differential    Collection Time: 12/20/23  9:13 AM   Result Value Ref Range    % Neutrophils 4 %    % Lymphocytes 94 %    % Monocytes 2 %    % Eosinophils 0 %    % Basophils 0 %    Absolute Neutrophils 6.1 1.6 - 8.3 10e3/uL    Absolute Lymphocytes 142.9 (H) 0.8 - 5.3 10e3/uL    Absolute Monocytes 3.0 (H)  0.0 - 1.3 10e3/uL    Absolute Eosinophils 0.0 0.0 - 0.7 10e3/uL    Absolute Basophils 0.0 0.0 - 0.2 10e3/uL    RBC Morphology Confirmed RBC Indices     Platelet Assessment  Automated Count Confirmed. Platelet morphology is normal.     Automated Count Confirmed. Platelet morphology is normal.       MR Hip Right w/o & w Contrast  Narrative: MR right hip without and with contrast 11/28/2023 10:11 AM    Techniques: Multiplanar multisequence imaging of the right hip was  obtained without and with administration of intravenous contrast using  tumor/infection protocol.    History: R leg and hip pain h/o lymphoma and breast cancer; Pain of  right lower leg     Comparison: Right hip and right femur radiographs 9/25/2023, abdomen  and pelvis CT 4/7/2022    Findings:    Osseous structures  Osseous structures: Patchy red marrow deposition throughout the  visualized pelvis and right femur, particularly the right femoral neck  and intertrochanteric region. No fracture, stress reaction, avascular  necrosis, or focal osseous lesion is seen.    Articular cartilage and labrum  Assessment limited on this non-arthrographic study due to relative  lack of joint distension.    Articular cartilage: Acetabular osteophytosis. Full-thickness  cartilage loss with subchondral marrow edema involving the anterior  superior acetabulum and opposing femoral and.    Labrum: Degenerative tearing of the superior-anterior superior labrum.    Ligament teres and transverse ligament of acetabulum: Intact.    Joint or bursal effusion    Joint effusion: A physiologic amount of joint fluid.    Bursal effusion: Trace edema with corresponding enhancement over the  greater trochanter. No substantial iliopsoas or trochanteric bursal  effusion.    Muscles and tendons  Muscles and tendons: Insertional tendinosis with low-grade tearing of  the gluteus medius and minimus at the greater trochanter insertions.    Rectus femoris, sartorius, and visualized iliopsoas are  intact. The  visualized adductor muscles are unremarkable. Hamstrings and  quadriceps muscles are unremarkable.    Nerves:  The visualized course of the sciatic nerve is unremarkable.    Other Findings:  Visualization of the contralateral left thigh on the large  field-of-view coronal images demonstrates susceptibility artifact  related to the patient's left total hip arthroplasty. Red marrow  deposition within the mid left femoral shaft. No suspicious focal  osseous lesion or soft tissue lesion in the left thigh.    Visualized intrapelvic organs are unremarkable.  Impression: Impression:  1. No evidence for metastatic disease involving the right hip or right  thigh. Heterogenous bone marrow signal throughout the pelvis and  femurs.    2. Moderate right hip degenerative change with osteophytosis and  full-thickness cartilage loss with subchondral marrow edema involving  the anterior superior acetabulum and opposing femoral head.    3. Insertional tendinosis with low-grade tearing of the gluteus  minimus and low grade gluteus medius tearing as detailed above.    4. Mild right greater trochanteric bursitis.    I have personally reviewed the examination and initial interpretation  and I agree with the findings.    TANG CONTRERAS MD         SYSTEM ID:  [id number]  MR Femur Thigh Right wo & w Contrast  Narrative: MR right hip without and with contrast 11/28/2023 10:11 AM    Techniques: Multiplanar multisequence imaging of the right hip was  obtained without and with administration of intravenous contrast using  tumor/infection protocol.    History: R leg and hip pain h/o lymphoma and breast cancer; Pain of  right lower leg     Comparison: Right hip and right femur radiographs 9/25/2023, abdomen  and pelvis CT 4/7/2022    Findings:    Osseous structures  Osseous structures: Patchy red marrow deposition throughout the  visualized pelvis and right femur, particularly the right femoral neck  and intertrochanteric region. No  fracture, stress reaction, avascular  necrosis, or focal osseous lesion is seen.    Articular cartilage and labrum  Assessment limited on this non-arthrographic study due to relative  lack of joint distension.    Articular cartilage: Acetabular osteophytosis. Full-thickness  cartilage loss with subchondral marrow edema involving the anterior  superior acetabulum and opposing femoral and.    Labrum: Degenerative tearing of the superior-anterior superior labrum.    Ligament teres and transverse ligament of acetabulum: Intact.    Joint or bursal effusion    Joint effusion: A physiologic amount of joint fluid.    Bursal effusion: Trace edema with corresponding enhancement over the  greater trochanter. No substantial iliopsoas or trochanteric bursal  effusion.    Muscles and tendons  Muscles and tendons: Insertional tendinosis with low-grade tearing of  the gluteus medius and minimus at the greater trochanter insertions.    Rectus femoris, sartorius, and visualized iliopsoas are intact. The  visualized adductor muscles are unremarkable. Hamstrings and  quadriceps muscles are unremarkable.    Nerves:  The visualized course of the sciatic nerve is unremarkable.    Other Findings:  Visualization of the contralateral left thigh on the large  field-of-view coronal images demonstrates susceptibility artifact  related to the patient's left total hip arthroplasty. Red marrow  deposition within the mid left femoral shaft. No suspicious focal  osseous lesion or soft tissue lesion in the left thigh.    Visualized intrapelvic organs are unremarkable.  Impression: Impression:  1. No evidence for metastatic disease involving the right hip or right  thigh. Heterogenous bone marrow signal throughout the pelvis and  femurs.    2. Moderate right hip degenerative change with osteophytosis and  full-thickness cartilage loss with subchondral marrow edema involving  the anterior superior acetabulum and opposing femoral head.    3. Insertional  tendinosis with low-grade tearing of the gluteus  minimus and low grade gluteus medius tearing as detailed above.    4. Mild right greater trochanteric bursitis.    I have personally reviewed the examination and initial interpretation  and I agree with the findings.    TANG CONTRERAS MD         SYSTEM ID:  [id number]      A/P:     1. CLL (dx 1986); follows with oncology, last visit with Dr. Centeno on 12/20/2023 (next 6/5/2024). Has not required tx up to this point. Lymphocytes steadily increasing over last 6 months. Follow up Ms. Roe 3/20/2024.   2. Immunizations; COVID-19 monovalent vaccine x 4 (Moderna),  Bivalent x 2 (Pfizer) with most recent Moderna 10/3/2023. . Shingrix vaccine series 2019. Pneumococcal 23 on 10/21/2021. Prevnar 13 on 2/4/2015. Tdap on 1/27/2014. Td/Tdap done in [place]   3. Breast cancer (1990) s/p radiation and tamoxifen; mammogram done 8/2/2023.   4. Dermatology; scheduled for 3/22/2024.   5. CT abdomen/pelvis 4/7/2022 for abdominal pain; no acute findings. Currently no complaints and if worse/persistent consider EGD for H. Pylori?    6. DEXA scan: 11/19/2020 and recommended repeat in 5 years (2025).    7. Increased cholesterol on Simvastatin 20 mg; lipids 2/17/2022 TG's 114, HDL 71 and LDL 95. Lipids on 7/17/2023; , HDL 70 and TG's 147.   8. Colonscopy 1/5/2021 and repeat in 3 years (2024).  9. A1c 6.2% 2/17/2022; repeat 6.4% on 717/2023.   10. Seen Dr. Sigala, Podiatry 5/17/2022  11. R leg complaints. X-rays of hips and R femur were done 9/25/2023. Given her h/o breast cancer and CLL MRI R hip and R leg were done 11/28/2023 Discussed spinal stenosis can cause anterior thigh pain with walking. Will follow and if worse, she can consider PT and/or seeing orthopedics. She will let me know.   12. Hypertension: stable, continue with carvedilol   13. Cataracts: R eye cataract surgery 2/22/23  14. Eyebrow/eyelid lift: procedure 7/14/23 and follow up with Dr. Greenwood at [facility] on  7/24/2023.   15. Voice hoarseness:  CXR was done 7/17/2023 given 2-3 months of worsening voice hoarseness not likely attributed to allergies, GERD or recent URI. Especially with hx of CLL and breast cancer. She had 12/11/2023 ENT appt. With Dr. Dias.      30 minutes spent on the date of the encounter doing chart review, history and exam, documentation and further activities as noted above exclusive of procedures and other billable interpretations

## 2023-12-30 ENCOUNTER — ANCILLARY PROCEDURE (OUTPATIENT)
Dept: GENERAL RADIOLOGY | Facility: CLINIC | Age: 83
End: 2023-12-30
Attending: STUDENT IN AN ORGANIZED HEALTH CARE EDUCATION/TRAINING PROGRAM
Payer: COMMERCIAL

## 2023-12-30 ENCOUNTER — OFFICE VISIT (OUTPATIENT)
Dept: URGENT CARE | Facility: URGENT CARE | Age: 83
End: 2023-12-30
Payer: COMMERCIAL

## 2023-12-30 VITALS
OXYGEN SATURATION: 95 % | WEIGHT: 140 LBS | HEIGHT: 64 IN | RESPIRATION RATE: 16 BRPM | BODY MASS INDEX: 23.9 KG/M2 | DIASTOLIC BLOOD PRESSURE: 60 MMHG | HEART RATE: 77 BPM | TEMPERATURE: 98.1 F | SYSTOLIC BLOOD PRESSURE: 100 MMHG

## 2023-12-30 DIAGNOSIS — R05.1 ACUTE COUGH: Primary | ICD-10-CM

## 2023-12-30 DIAGNOSIS — R05.1 ACUTE COUGH: ICD-10-CM

## 2023-12-30 LAB
FLUAV AG SPEC QL IA: NEGATIVE
FLUBV AG SPEC QL IA: NEGATIVE

## 2023-12-30 PROCEDURE — 87804 INFLUENZA ASSAY W/OPTIC: CPT | Performed by: STUDENT IN AN ORGANIZED HEALTH CARE EDUCATION/TRAINING PROGRAM

## 2023-12-30 PROCEDURE — 71046 X-RAY EXAM CHEST 2 VIEWS: CPT | Mod: TC | Performed by: RADIOLOGY

## 2023-12-30 PROCEDURE — 99213 OFFICE O/P EST LOW 20 MIN: CPT | Performed by: STUDENT IN AN ORGANIZED HEALTH CARE EDUCATION/TRAINING PROGRAM

## 2023-12-30 NOTE — PATIENT INSTRUCTIONS
Your constellation of symptoms are most consistent with a viral illness.     Continue supportive cares including tylenol and ibuprofen as needed, drinking lots of fluids, and resting. You can try saline nasal spray as needed for congestion and honey to help with cough and sore throat.     Please return to the clinic or visit the emergency room if you have worsening symptoms, if you develop difficulty breathing or shortness of breath, if you develop fatigue/lethargy, or if you have concerns about dehydration.     Follow up with your primary care provider as needed or if your symptoms do not improve within the next 2-3 days.

## 2023-12-30 NOTE — PROGRESS NOTES
Urgent Care - 2023      HPI:      Kerri Shook is a 83 year old female here for evaluation of cold symptoms.     Symptoms since the . Mostly cough, congestion, and hoarse voice. Overall, cough has gotten worse, other symptoms have been constant. No shortness of breath. No difficulty breathing. No chest pain, vomiting, diarrhea. No muscle aches or fevers. Cough is productive of yellow phlegm.    Drinking fluids well. Two home Covid tests were negative. No sick contacts.      Review of Systems  Complete ROS negative unless noted in the HPI above.     Allergies  Allergies   Allergen Reactions    Nkda [No Known Drug Allergy]      Outpatient Medications  aspirin 81 MG tablet, Take 81 mg by mouth daily  carvedilol (COREG) 3.125 MG tablet, Take 1 tablet (3.125 mg) by mouth 2 times daily  Multiple Vitamin (MULTIVITAMIN) per tablet, Take 1 tablet by mouth daily.  simvastatin (ZOCOR) 20 MG tablet, TAKE 1 TABLET(20 MG) BY MOUTH AT BEDTIME  gabapentin (NEURONTIN) 100 MG capsule, TAKE 1 CAPSULE(100 MG) BY MOUTH EVERY NIGHT AS NEEDED. please keep appt 22    No current facility-administered medications on file prior to visit.    Past Medical History  Patient Active Problem List   Diagnosis    Breast cancer -- Left    S/P coronary angiogram    Stress-induced cardiomyopathy    Foot injury    Advance care planning    Rosacea    KP (keratosis pilaris)    CLL (chronic lymphocytic leukemia) (H)    Hx of cardiomyopathy    Encounter for routine gynecological examination    Menopause--age 50    Colon adenomas    SK (seborrheic keratosis)    Seborrheic keratosis    Plantar fasciitis    Acute right-sided low back pain with right-sided sciatica       Family History  Family History   Problem Relation Age of Onset    Diabetes Maternal Grandmother 85    Coronary Artery Disease Father 76         of MI    Heart Disease Father     Cancer Maternal Grandfather         stomach    Alcoholism Son         Active alcoholic     "Dementia Mother     Osteoporosis Mother     C.A.D. No family hx of     Cancer - colorectal No family hx of     Psychotic Disorder No family hx of     Skin Cancer No family hx of     Melanoma No family hx of     Hypertension No family hx of     Breast Cancer No family hx of     Prostate Cancer No family hx of     Cerebrovascular Disease No family hx of         ,, ,       Social History  Social History     Tobacco Use    Smoking status: Never    Smokeless tobacco: Never   Substance Use Topics    Alcohol use: Yes     Comment: 3 glasses of wine per week        Objective:      /60   Pulse 77   Temp 98.1  F (36.7  C) (Temporal)   Resp 16   Ht 1.626 m (5' 4\")   Wt 63.5 kg (140 lb)   SpO2 95%   BMI 24.03 kg/m      General: well-appearing, in no acute distress.  HEENT: NC/AT, MMM, BL TM grey with positive light reflex. + Congestion. + Oropharyngeal erythema.   CVS: RRR. No murmurs, rubs, or gallops.  Resp: Course crackles in left lower lobe. Otherwise, clear exam.  Abd: Normal active bowel sounds. Soft/non-distended, non-tender to palpation. No rebound or guarding.    Skin: no rash on exposed skin  Neuro: Alert and appropriately interactive.     Lab & Imaging Results    Results for orders placed or performed in visit on 12/30/23 (from the past 24 hour(s))   Influenza A/B antigen    Specimen: Nose; Swab   Result Value Ref Range    Influenza A antigen Negative Negative    Influenza B antigen Negative Negative    Narrative    Test results must be correlated with clinical data. If necessary, results should be confirmed by a molecular assay or viral culture.       I personally reviewed these results and discussed findings with the patient.      Assessment & Plan:   Kerri Shook is a 83 year old yo female for eval of cough, congestion, and hoarse voice.    Viral Illness  Patient presents with cough, congestion, and hoarse voice most consistent with viral URI. Vitals are within normal limits. Patient is well " appearing, appears hydrated, and is breathing comfortably and maintaining saturations on room air. Although some crackles heard in the LLL, CXR does not show evidence of pneumonia (final read pending).  No evidence of AOM. Flu tests negative. Recommend continued supportive cares, tylenol and ibuprofen as needed for pain or fever, drinking lots of fluids, honey for cough. Advised they should return to the clinic or present to the ER with worsening shortness of breathing, lethargy/fatigue, new or worsening fevers, as well as difficulty maintaining hydration. Patient stated understanding of these recommendations, and felt comfortable discharge home.    -     XR Chest 2 Views; Future - Final read pending. Looks normal, therefore no antibiotics prescribed.  -     Influenza A/B antigen    Patient to follow up with PCP if worsening, or not improving in the next 2-3 days.    Discussed the above with the patient, who stated understanding and agreed with the plan.     Yecenia Byers MD  Internal Medicine and Pediatrics   Urgent Care

## 2024-01-03 ENCOUNTER — MYC MEDICAL ADVICE (OUTPATIENT)
Dept: OTOLARYNGOLOGY | Facility: CLINIC | Age: 84
End: 2024-01-03
Payer: COMMERCIAL

## 2024-01-07 ENCOUNTER — MYC MEDICAL ADVICE (OUTPATIENT)
Dept: INTERNAL MEDICINE | Facility: CLINIC | Age: 84
End: 2024-01-07
Payer: COMMERCIAL

## 2024-01-08 ENCOUNTER — OFFICE VISIT (OUTPATIENT)
Dept: URGENT CARE | Facility: URGENT CARE | Age: 84
End: 2024-01-08
Payer: COMMERCIAL

## 2024-01-08 VITALS
HEIGHT: 64 IN | RESPIRATION RATE: 16 BRPM | DIASTOLIC BLOOD PRESSURE: 84 MMHG | OXYGEN SATURATION: 96 % | HEART RATE: 66 BPM | WEIGHT: 140 LBS | SYSTOLIC BLOOD PRESSURE: 120 MMHG | TEMPERATURE: 97.2 F | BODY MASS INDEX: 23.9 KG/M2

## 2024-01-08 DIAGNOSIS — R11.0 NAUSEA: ICD-10-CM

## 2024-01-08 DIAGNOSIS — J22 LOWER RESPIRATORY INFECTION: Primary | ICD-10-CM

## 2024-01-08 PROCEDURE — 99213 OFFICE O/P EST LOW 20 MIN: CPT | Performed by: PHYSICIAN ASSISTANT

## 2024-01-08 RX ORDER — DOXYCYCLINE HYCLATE 100 MG
100 TABLET ORAL 2 TIMES DAILY
Qty: 14 TABLET | Refills: 0 | Status: SHIPPED | OUTPATIENT
Start: 2024-01-08 | End: 2024-01-08

## 2024-01-08 RX ORDER — DOXYCYCLINE HYCLATE 100 MG
100 TABLET ORAL 2 TIMES DAILY
Qty: 14 TABLET | Refills: 0 | Status: SHIPPED | OUTPATIENT
Start: 2024-01-08 | End: 2024-03-19

## 2024-01-08 RX ORDER — ONDANSETRON 4 MG/1
4 TABLET, ORALLY DISINTEGRATING ORAL EVERY 8 HOURS PRN
Qty: 20 TABLET | Refills: 0 | Status: SHIPPED | OUTPATIENT
Start: 2024-01-08 | End: 2024-03-19

## 2024-01-08 ASSESSMENT — ENCOUNTER SYMPTOMS
DIARRHEA: 0
COUGH: 1
EYES NEGATIVE: 1
MYALGIAS: 0
APPETITE CHANGE: 1
SHORTNESS OF BREATH: 0
DIZZINESS: 1
VOMITING: 0
NAUSEA: 1
FATIGUE: 1
SORE THROAT: 0
FEVER: 0
RHINORRHEA: 1
HEADACHES: 1

## 2024-01-08 NOTE — PROGRESS NOTES
SUBJECTIVE:   Kerri Shook is a 83 year old female presenting with a chief complaint of   Chief Complaint   Patient presents with    Urgent Care    URI     Sick with cold symptoms, started with laryngitis on . Many neg home Covid tests. Tested for flu and CXR on  all okay. A few days ago started feeling a lot of fatigue and dizzy while walking.        She is an established patient of [facility].  Patient presents with symptoms since .  She was evaluated on  by PCP wherein she had a CXR and influenza - negative and WNL.  Today she has additional complaints of fatigue and dizziness while walking x 3 days.      Treatment:  nothing    Review of Systems   Constitutional:  Positive for appetite change and fatigue. Negative for fever.   HENT:  Positive for rhinorrhea. Negative for congestion, ear pain and sore throat.    Eyes: Negative.    Respiratory:  Positive for cough. Negative for shortness of breath.    Cardiovascular:  Negative for chest pain.   Gastrointestinal:  Positive for nausea. Negative for diarrhea and vomiting.   Musculoskeletal:  Negative for myalgias.   Skin:  Negative for rash.   Neurological:  Positive for dizziness and headaches.   All other systems reviewed and are negative.      Past Medical History:   Diagnosis Date    Breast disorder     Breast Cancer    Cardiomyopathy (H)     Chronic osteoarthritis     resulted in hip replacement    CLL (chronic lymphoblastic leukemia)     Closed fracture of second toe of left foot     Colon adenomas     14 colonoscopy, repeat in 3 years    History of blood transfusion     hyperlipidemia     Primary localized osteoarthrosis, pelvic region and thigh     Tinnitus      Family History   Problem Relation Age of Onset    Diabetes Maternal Grandmother 85    Coronary Artery Disease Father 76         of MI    Heart Disease Father     Cancer Maternal Grandfather         stomach    Alcoholism Son         Active alcoholic    Dementia  "Mother     Osteoporosis Mother     C.A.D. No family hx of     Cancer - colorectal No family hx of     Psychotic Disorder No family hx of     Skin Cancer No family hx of     Melanoma No family hx of     Hypertension No family hx of     Breast Cancer No family hx of     Prostate Cancer No family hx of     Cerebrovascular Disease No family hx of         ,, ,     Current Outpatient Medications   Medication Sig Dispense Refill    aspirin 81 MG tablet Take 81 mg by mouth daily      carvedilol (COREG) 3.125 MG tablet Take 1 tablet (3.125 mg) by mouth 2 times daily 180 tablet 2    simvastatin (ZOCOR) 20 MG tablet TAKE 1 TABLET(20 MG) BY MOUTH AT BEDTIME 90 tablet 3    gabapentin (NEURONTIN) 100 MG capsule TAKE 1 CAPSULE(100 MG) BY MOUTH EVERY NIGHT AS NEEDED. please keep appt 9/7/22 30 capsule 1    Multiple Vitamin (MULTIVITAMIN) per tablet Take 1 tablet by mouth daily.       Social History     Tobacco Use    Smoking status: Never    Smokeless tobacco: Never   Substance Use Topics    Alcohol use: Yes     Comment: 3 glasses of wine per week       OBJECTIVE  /84   Pulse 66   Temp 97.2  F (36.2  C) (Temporal)   Resp 16   Ht 1.626 m (5' 4\")   Wt 63.5 kg (140 lb)   SpO2 96%   BMI 24.03 kg/m      Physical Exam  Vitals and nursing note reviewed.   Constitutional:       Appearance: Normal appearance. She is normal weight.   HENT:      Head: Normocephalic and atraumatic.      Right Ear: Tympanic membrane, ear canal and external ear normal.      Left Ear: Tympanic membrane, ear canal and external ear normal.      Nose: Nose normal.      Mouth/Throat:      Mouth: Mucous membranes are moist.      Pharynx: Oropharynx is clear.   Eyes:      Extraocular Movements: Extraocular movements intact.      Conjunctiva/sclera: Conjunctivae normal.      Comments: No nystagmus   Cardiovascular:      Rate and Rhythm: Normal rate and regular rhythm.      Pulses: Normal pulses.      Heart sounds: Normal heart sounds.   Pulmonary:      " Effort: Pulmonary effort is normal.      Breath sounds: Normal breath sounds.   Musculoskeletal:      Cervical back: Normal range of motion.   Skin:     General: Skin is warm and dry.      Findings: No rash.   Neurological:      General: No focal deficit present.      Mental Status: She is alert.   Psychiatric:         Mood and Affect: Mood normal.         Behavior: Behavior normal.         Labs:  No results found for this or any previous visit (from the past 24 hour(s)).      ASSESSMENT:    No diagnosis found.     Medical Decision Making:    Differential Diagnosis:  URI Adult/Peds:  Sinusitis and vertigo, lower respiratory infection.    Serious Comorbid Conditions:  Adult:   reviewed    PLAN:    Rx for doxycycline and zofran.  Discussed reasons to seek immediate medical attention.  Additionally if no improvement or worsening in one week, may follow up with PCP and/or UC.    Discussed red flag symptoms.      Followup:    If not improving or if condition worsens, follow up with your Primary Care Provider, If not improving or if conditions worsens over the next 12-24 hours, go to the Emergency Department    There are no Patient Instructions on file for this visit.

## 2024-01-24 ENCOUNTER — NURSE TRIAGE (OUTPATIENT)
Facility: CLINIC | Age: 84
End: 2024-01-24

## 2024-01-24 ENCOUNTER — LAB (OUTPATIENT)
Dept: LAB | Facility: CLINIC | Age: 84
End: 2024-01-24
Payer: COMMERCIAL

## 2024-01-24 DIAGNOSIS — C91.10 CLL (CHRONIC LYMPHOCYTIC LEUKEMIA) (H): ICD-10-CM

## 2024-01-24 LAB
ALBUMIN SERPL BCG-MCNC: 4.3 G/DL (ref 3.5–5.2)
ALP SERPL-CCNC: 86 U/L (ref 40–150)
ALT SERPL W P-5'-P-CCNC: 25 U/L (ref 0–50)
ANION GAP SERPL CALCULATED.3IONS-SCNC: 10 MMOL/L (ref 7–15)
AST SERPL W P-5'-P-CCNC: 38 U/L (ref 0–45)
BILIRUB SERPL-MCNC: 0.4 MG/DL
BUN SERPL-MCNC: 13.8 MG/DL (ref 8–23)
CALCIUM SERPL-MCNC: 9.5 MG/DL (ref 8.8–10.2)
CHLORIDE SERPL-SCNC: 102 MMOL/L (ref 98–107)
CREAT SERPL-MCNC: 0.87 MG/DL (ref 0.51–0.95)
DEPRECATED HCO3 PLAS-SCNC: 26 MMOL/L (ref 22–29)
EGFRCR SERPLBLD CKD-EPI 2021: 66 ML/MIN/1.73M2
GLUCOSE SERPL-MCNC: 89 MG/DL (ref 70–99)
POTASSIUM SERPL-SCNC: 4.5 MMOL/L (ref 3.4–5.3)
PROT SERPL-MCNC: 6.2 G/DL (ref 6.4–8.3)
SODIUM SERPL-SCNC: 138 MMOL/L (ref 135–145)

## 2024-01-24 PROCEDURE — 36415 COLL VENOUS BLD VENIPUNCTURE: CPT

## 2024-01-24 PROCEDURE — 85007 BL SMEAR W/DIFF WBC COUNT: CPT

## 2024-01-24 PROCEDURE — 80053 COMPREHEN METABOLIC PANEL: CPT

## 2024-01-24 PROCEDURE — 85027 COMPLETE CBC AUTOMATED: CPT

## 2024-01-24 NOTE — TELEPHONE ENCOUNTER
DATE:  2024   TIME OF RECEIPT FROM LAB:  1317  Critical lab value:  WBC: 164; Other: A.5, Hgb: 13.4, Plts: 172  Last lab values:  : WBC: 152, A.9, Hgb: 13.8, Plts: 155  Provider Notified: Yes  Provider Name: oPp Centeno MD  Date/Time lab value reported to provider: 24 at 1339  Mechanism of provider notification: secure chat  Provider response: 1348: Acknowledged by Dr. Centeno. No further action required by Triage.   Routed to Care Team.

## 2024-01-25 LAB
BASOPHILS # BLD AUTO: ABNORMAL 10*3/UL
BASOPHILS # BLD MANUAL: 0 10E3/UL (ref 0–0.2)
BASOPHILS NFR BLD AUTO: ABNORMAL %
BASOPHILS NFR BLD MANUAL: 0 %
BURR CELLS BLD QL SMEAR: SLIGHT
EOSINOPHIL # BLD AUTO: ABNORMAL 10*3/UL
EOSINOPHIL # BLD MANUAL: 0 10E3/UL (ref 0–0.7)
EOSINOPHIL NFR BLD AUTO: ABNORMAL %
EOSINOPHIL NFR BLD MANUAL: 0 %
ERYTHROCYTE [DISTWIDTH] IN BLOOD BY AUTOMATED COUNT: 14.6 % (ref 10–15)
HCT VFR BLD AUTO: 42.9 % (ref 35–47)
HGB BLD-MCNC: 13.2 G/DL (ref 11.7–15.7)
IMM GRANULOCYTES # BLD: ABNORMAL 10*3/UL
IMM GRANULOCYTES NFR BLD: ABNORMAL %
LYMPHOCYTES # BLD AUTO: ABNORMAL 10*3/UL
LYMPHOCYTES # BLD MANUAL: 130.7 10E3/UL (ref 0.8–5.3)
LYMPHOCYTES NFR BLD AUTO: ABNORMAL %
LYMPHOCYTES NFR BLD MANUAL: 74 %
MCH RBC QN AUTO: 29.3 PG (ref 26.5–33)
MCHC RBC AUTO-ENTMCNC: 30.8 G/DL (ref 31.5–36.5)
MCV RBC AUTO: 95 FL (ref 78–100)
MONOCYTES # BLD AUTO: ABNORMAL 10*3/UL
MONOCYTES # BLD MANUAL: 0 10E3/UL (ref 0–1.3)
MONOCYTES NFR BLD AUTO: ABNORMAL %
MONOCYTES NFR BLD MANUAL: 0 %
NEUTROPHILS # BLD AUTO: ABNORMAL 10*3/UL
NEUTROPHILS # BLD MANUAL: 5.3 10E3/UL (ref 1.6–8.3)
NEUTROPHILS NFR BLD AUTO: ABNORMAL %
NEUTROPHILS NFR BLD MANUAL: 3 %
NRBC # BLD AUTO: 0 10E3/UL
NRBC BLD AUTO-RTO: 0 /100
OTHER CELLS # BLD MANUAL: 40.6 10E3/UL
OTHER CELLS NFR BLD MANUAL: 23 %
PATH REV: ABNORMAL
PLAT MORPH BLD: ABNORMAL
PLATELET # BLD AUTO: 170 10E3/UL (ref 150–450)
RBC # BLD AUTO: 4.51 10E6/UL (ref 3.8–5.2)
RBC MORPH BLD: ABNORMAL
WBC # BLD AUTO: 176.6 10E3/UL (ref 4–11)

## 2024-02-21 ENCOUNTER — NURSE TRIAGE (OUTPATIENT)
Facility: CLINIC | Age: 84
End: 2024-02-21

## 2024-02-21 ENCOUNTER — LAB (OUTPATIENT)
Dept: LAB | Facility: CLINIC | Age: 84
End: 2024-02-21
Payer: COMMERCIAL

## 2024-02-21 DIAGNOSIS — C91.10 CLL (CHRONIC LYMPHOCYTIC LEUKEMIA) (H): ICD-10-CM

## 2024-02-21 LAB
ALBUMIN SERPL BCG-MCNC: 4.4 G/DL (ref 3.5–5.2)
ALP SERPL-CCNC: 95 U/L (ref 40–150)
ALT SERPL W P-5'-P-CCNC: 26 U/L (ref 0–50)
ANION GAP SERPL CALCULATED.3IONS-SCNC: 10 MMOL/L (ref 7–15)
AST SERPL W P-5'-P-CCNC: 37 U/L (ref 0–45)
BASOPHILS # BLD AUTO: ABNORMAL 10*3/UL
BASOPHILS # BLD MANUAL: 0 10E3/UL (ref 0–0.2)
BASOPHILS NFR BLD AUTO: ABNORMAL %
BASOPHILS NFR BLD MANUAL: 0 %
BILIRUB SERPL-MCNC: 0.5 MG/DL
BUN SERPL-MCNC: 14.2 MG/DL (ref 8–23)
CALCIUM SERPL-MCNC: 9.5 MG/DL (ref 8.8–10.2)
CHLORIDE SERPL-SCNC: 104 MMOL/L (ref 98–107)
CREAT SERPL-MCNC: 0.96 MG/DL (ref 0.51–0.95)
DEPRECATED HCO3 PLAS-SCNC: 27 MMOL/L (ref 22–29)
EGFRCR SERPLBLD CKD-EPI 2021: 58 ML/MIN/1.73M2
EOSINOPHIL # BLD AUTO: ABNORMAL 10*3/UL
EOSINOPHIL # BLD MANUAL: 0 10E3/UL (ref 0–0.7)
EOSINOPHIL NFR BLD AUTO: ABNORMAL %
EOSINOPHIL NFR BLD MANUAL: 0 %
ERYTHROCYTE [DISTWIDTH] IN BLOOD BY AUTOMATED COUNT: 14.8 % (ref 10–15)
GLUCOSE SERPL-MCNC: 98 MG/DL (ref 70–99)
HCT VFR BLD AUTO: 42.9 % (ref 35–47)
HGB BLD-MCNC: 13.3 G/DL (ref 11.7–15.7)
IMM GRANULOCYTES # BLD: ABNORMAL 10*3/UL
IMM GRANULOCYTES NFR BLD: ABNORMAL %
LYMPHOCYTES # BLD AUTO: ABNORMAL 10*3/UL
LYMPHOCYTES # BLD MANUAL: 167.4 10E3/UL (ref 0.8–5.3)
LYMPHOCYTES NFR BLD AUTO: ABNORMAL %
LYMPHOCYTES NFR BLD MANUAL: 96 %
MCH RBC QN AUTO: 29.6 PG (ref 26.5–33)
MCHC RBC AUTO-ENTMCNC: 31 G/DL (ref 31.5–36.5)
MCV RBC AUTO: 96 FL (ref 78–100)
MONOCYTES # BLD AUTO: ABNORMAL 10*3/UL
MONOCYTES # BLD MANUAL: 0 10E3/UL (ref 0–1.3)
MONOCYTES NFR BLD AUTO: ABNORMAL %
MONOCYTES NFR BLD MANUAL: 0 %
NEUTROPHILS # BLD AUTO: ABNORMAL 10*3/UL
NEUTROPHILS # BLD MANUAL: 7 10E3/UL (ref 1.6–8.3)
NEUTROPHILS NFR BLD AUTO: ABNORMAL %
NEUTROPHILS NFR BLD MANUAL: 4 %
NRBC # BLD AUTO: 0 10E3/UL
NRBC BLD AUTO-RTO: 0 /100
PLAT MORPH BLD: ABNORMAL
PLATELET # BLD AUTO: 192 10E3/UL (ref 150–450)
POTASSIUM SERPL-SCNC: 4.8 MMOL/L (ref 3.4–5.3)
PROT SERPL-MCNC: 6.4 G/DL (ref 6.4–8.3)
RBC # BLD AUTO: 4.49 10E6/UL (ref 3.8–5.2)
RBC MORPH BLD: ABNORMAL
SODIUM SERPL-SCNC: 141 MMOL/L (ref 135–145)
WBC # BLD AUTO: 174.4 10E3/UL (ref 4–11)

## 2024-02-21 PROCEDURE — 36415 COLL VENOUS BLD VENIPUNCTURE: CPT

## 2024-02-21 PROCEDURE — 85007 BL SMEAR W/DIFF WBC COUNT: CPT

## 2024-02-21 PROCEDURE — 80053 COMPREHEN METABOLIC PANEL: CPT

## 2024-02-21 PROCEDURE — 85027 COMPLETE CBC AUTOMATED: CPT

## 2024-02-21 NOTE — TELEPHONE ENCOUNTER
DATE:  2/21/2024   TIME OF RECEIPT FROM LAB:  1257  Critical lab value:  .9; Other Hgb: 13.1; plts 186  Last lab values:  1/24/24: WBC: 176.6, Hgb: 13.2, Plts: 170  Provider Notified: Yes  Provider Name: Pop Centeno MD  Date/Time lab value reported to provider: 2/21/24 at 1300  Mechanism of provider notification: Secure Chat  Provider response: 1346: Acknowldged by Dr. Centeno. No further action required from Triage.    Routed to Care Team.

## 2024-03-19 ENCOUNTER — VIRTUAL VISIT (OUTPATIENT)
Dept: URGENT CARE | Facility: CLINIC | Age: 84
End: 2024-03-19
Payer: COMMERCIAL

## 2024-03-19 DIAGNOSIS — U07.1 INFECTION DUE TO 2019 NOVEL CORONAVIRUS: Primary | ICD-10-CM

## 2024-03-19 PROCEDURE — 99441 PR PHYSICIAN TELEPHONE EVALUATION 5-10 MIN: CPT | Mod: 93

## 2024-03-19 NOTE — PROGRESS NOTES
"Kerri is a 83 year old who is being evaluated via a billable telephone visit.          Assessment & Plan     Infection due to 2019 novel coronavirus    - nirmatrelvir and ritonavir (PAXLOVID) 150 mg/100 mg therapy pack; Take 2 tablets by mouth 2 times daily for 5 days (Take one tablet of Nirmatelvir and 1 tablet of Ritonavir twice daily for 5 days)      COVID-19 positive patient.  Encounter for consideration of medication intervention. Patient does qualify for a prescription. Full discussion with patient including medication options, risks and benefits. Potential drug interactions reviewed with patient.     Treatment Planned  Renal dose of Paxlovid sent to [facility] on [facility]    Temporary change to home medications: will hold simvastatin for 9 days    Estimated body mass index is 24.03 kg/m  as calculated from the following:    Height as of 1/8/24: 1.626 m (5' 4\").    Weight as of 1/8/24: 63.5 kg (140 lb).  GFR Estimate   Date Value Ref Range Status   02/21/2024 58 (L) >60 mL/min/1.73m2 Final   11/12/2020 53 (L) >60 mL/min/[1.73_m2] Final     Comment:     Non  GFR Calc  Starting 12/18/2018, serum creatinine based estimated GFR (eGFR) will be   calculated using the Chronic Kidney Disease Epidemiology Collaboration   (CKD-EPI) equation.       Lab Results   Component Value Date    [id number] Negative 11/08/2023       Return in about 1 week (around 3/26/2024), or if symptoms worsen or fail to improve.    Subjective   Kerri is a 83 year old, presenting for the following health issues:  No chief complaint on file.      HPI       COVID-19 Symptom Review  How many days ago did these symptoms start? yesterday    Are any of the following symptoms significant for you?  New or worsening difficulty breathing? No  Worsening cough? Yes, it's a dry cough.   Fever or chills? No  Headache: YES  Sore throat: No  Chest pain: No  Diarrhea: No  Body aches? No    What treatments has patient tried? Antihistamines  "   Does patient live in a nursing home, group home, or shelter? No  Does patient have a way to get food/medications during quarantined? Yes, I have a friend or family member who can help me.                Review of Systems  Constitutional, HEENT, cardiovascular, pulmonary, gi and gu systems are negative, except as otherwise noted.      Objective           Vitals:  No vitals were obtained today due to virtual visit.    Physical Exam   GENERAL: alert and no distress  RESP: No audible wheeze, cough            Video-Visit Details    Type of service:  Telephone visit lasted 5 minutes  Video End Time:  Originating Location (pt. Location): Home    Distant Location (provider location):  Off-site  Platform used for Video Visit: Telephone  Signed Electronically by: Virtual Urgent Care

## 2024-03-19 NOTE — PATIENT INSTRUCTIONS
For informational purposes only. Not to replace the advice of your health care provider. Copyright   2022 [facility]. All rights reserved. Clinically reviewed by Fabiana Espinosa, PharmD, BCACP. [employer] [id number] - REV 06/23.  COVID-19 Outpatient Treatments  Your care team can help you find the best treatments for COVID-19. Talk to a health care provider or refer to the FDA medicine fact sheets below.  Paxlovid (nirmatrelvir and ritonavir): https://www.paxlovid.Wire/resources  Molnupiravir (Lagevrio): https://www.fda.gov/media/910910/download  Important: We can only prescribe Paxlovid or Molnupiravir when it can be started within 5 days of first having symptoms.  Paxlovid (nimatrelvir and ritonavir)  How it works  Two medicines (nirmatrelvir and ritonavir) are taken together. They stop the virus from growing. Less amount of virus is easier for your body to fight.  Benefits  Lowers risk of a hospital stay or death from COVID-19.  How to take  Medicine comes in a daily container with both medicine tablets. Take by mouth twice daily (once in the morning, once at night) for 5 days.  The number of tablets to take varies by patient.  Don't chew or break capsules. Swallow whole.  When to take  Take it as soon as possible and within 5 days of your first symptoms.  Who can take it  Patients must be 12 years or older weigh at least 88 pounds. Paxlovid is the preferred treatment for pregnant patients.  Possible side effects  Can cause altered sense of taste, diarrhea (loose, watery stools), high blood pressure, muscle aches.  Medicine conflicts  Some medicines may conflict with Paxlovid and may cause serious side effects.  Tell your care team about all the medicines you take, including prescription and over-the-counter medicines, vitamins, and herbal supplements.  Your care team will review your medicines to make sure that you can safely take Paxlovid.  Cautions  Paxlovid is not advised for patients with severe  kidney or liver disease. If you have kidney or liver problems, the dose may need to be changed.  If you're pregnant or breastfeeding, talk to your care team about your options.  If you take hormonal birth control (such as the Pill), then you or your partner should also use a non-hormonal form of birth control (such as a condom). Keep doing this for 1 menstrual cycle after your last dose of Paxlovid.  Molnupiravir (lagevrio)  How it works  Stops the virus from growing. Less amount of virus is easier for your body to fight.  Benefits  Lowers risk of a hospital stay or death from COVID-19.  How to take  Take 4 capsules by mouth every 12 hours (4 in the morning and 4 at night) for 5 days. Don't chew or break capsules. Swallow whole.  When to take  Take as soon as possible and within 5 days of your first symptoms.  Who can take it  Patients must be 18 years or older.   Possible side effects  Diarrhea (loose, watery stools), nausea (feeling sick to your stomach), dizziness, headaches.  Medicine conflicts  Right now, there are no known conflicts with other drugs. But tell your care team about all medicines you take.  Cautions  This medicine is not advised for patients who are pregnant.  If you are someone who could become pregnant, use trusted birth control until 4 days after your last dose of molnupiravir.  If your partner could become pregnant, you should use trusted birth control until 3 months after your last dose of molnupiravir.      Coping with Life After COVID-19  Being in the hospital because of COVID-19 is scary. Going home can be scary, too. You may face changes to your life, the way you work or what you can eat. It s hard to adjust to change, and it s normal to feel afraid, frustrated or even angry. These feelings usually go away over time. If your feelings don t start to get better, it s called  adjustment disorder.      What signs should I look out for?  Adjustment disorder can happen to anyone in a time of  stress. It makes it hard to cope with daily life. You may feel lonely or fight with loved ones, even if you re glad to be home. Watch for these signs:  Fear or worry  Hard time focusing  Sadness or anger  Trouble talking to family or friends  Feeling like you don t fit in or isolating yourself  Problems with sleep   Drinking alcohol or taking drugs to cope    What can I do?  You can help yourself get better. Feeling you have control helps you move forward. You may wonder if you ll be able to do things you did before. Be patient. Do your best to make the most of every day. Try to build relationships, be as active as you can, eat right and keep a sense of humor. Avoid smoking and drinking too much alcohol. Call your family doctor or clinic if you re not sure what to do. They can guide you to care or other services.    When should I get help?  Think about getting counseling if your sadness or frustration gets worse. Together with a trained counselor, you can talk about your problems adjusting to life after your hospital stay. You can come up with new ways to handle changes that give you more control. Your family doctor or care team can help you find a counselor.     Get help if you re thinking about hurting yourself. If you need help right away, call 911 or go to the nearest emergency room. You can also try the Crisis Text Line.    Crisis Text Line: 199-099 (http://www.crisistextline.org)  The Crisis Text Line serves anyone, in any crisis. It gives free, 24/7 support. Here's how it works:  Text HOME to 659638 from anywhere in the [place], anytime, about any type of crisis.  A live, trained Crisis Counselor will text you back quickly.  The volunteer Crisis Counselor can help you move from a  hot moment  to a  cool moment.  They can also help you work out a safety plan.

## 2024-04-03 NOTE — PROGRESS NOTES
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    1. CLL in 1986. Found to have an elevated white blood count on routine testing. This was consistent with CLL.Saw Dr. Puente who recommended observation. WBCs 15-20 during this time. She has required no treatment for CLL. Followed by PCP with yearly CBCs.  2. 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 The patient had a screening mammogram in 1990, which showed an abnormality in the left breast. She underwent a lumpectomy on 03/02/1990, which was consistent with a 1.5 cm primary, mucinous adenocarcinoma with 0 of 8 lymph nodes positive. She was staged as a stage I (T1 N0 M0). The tumor was ER positive. She did have radiation to the left breast with a boost having completed 6600 cGy in 33 fractions from 03/28/1990 until 05/15/1990. She went on to take tamoxifen for 6 years from 03/1990 until 03/1996.  3. 1/2019 labs showing WBC increasing to 27. This has continued with WBCs 47 on 11/12/2020 and 71.0 on 8//18/21  4. 8/31/21 Peripheral flow showing CD5+ lambda CLL cells. Hb 14.6, WBC 66.2, Plts 162, ANC 5.3  5. 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   She is feeling well with good energy levels.  She is planning a trip to [facility] this summer.  Appetite is good and eating and drinking well.  She has covid but recovered without issue  Denies fevers/chills, night sweats, N/V/D/C, new pains, new lumps/bumps, SOB/cough, chest pain, rashes/sores.    CURRENT OUTPATIENT MEDICATIONS  Current Outpatient Medications   Medication Sig Dispense Refill    aspirin 81 MG tablet Take 81 mg by mouth daily      carvedilol (COREG) 3.125 MG  tablet Take 1 tablet (3.125 mg) by mouth 2 times daily 180 tablet 2    gabapentin (NEURONTIN) 100 MG capsule TAKE 1 CAPSULE(100 MG) BY MOUTH EVERY NIGHT AS NEEDED. please keep appt 9/7/22 30 capsule 1    Multiple Vitamin (MULTIVITAMIN) per tablet Take 1 tablet by mouth daily.      simvastatin (ZOCOR) 20 MG tablet TAKE 1 TABLET(20 MG) BY MOUTH AT BEDTIME 90 tablet 3       REVIEW OF SYSTEMS  A complete ROS was performed and was negative except as mentioned in HPI    PHYSICAL EXAM  /67 (BP Location: Right arm, Patient Position: Sitting, Cuff Size: Adult Regular)   Pulse 70   Temp 97.9  F (36.6  C) (Oral)   Resp 18   Wt 63.5 kg (140 lb 1.6 oz)   SpO2 100%   BMI 24.05 kg/m    General: No acute distress, pleasant   HEENT: Sclera anicteric. Oral mucosa pink and moist.  No mucositis or thrush  Lymph: No lymphadenopathy in neck, supraclavicular, axillary, or inguinal areas  Heart: Regular, rate, and rhythm  Lungs: Clear to ascultation bilaterally  Abdomen: Positive bowel sounds. Soft, non-distended, non-tender. No organomegaly or mass.   Extremities: no lower extremity edema  Neuro: Cranial nerves grossly intact  Rash: none on exposed skin       Wt Readings from Last 4 Encounters:   04/04/24 63.5 kg (140 lb 1.6 oz)   01/08/24 63.5 kg (140 lb)   12/30/23 63.5 kg (140 lb)   12/21/23 65 kg (143 lb 4.8 oz)       LABORATORY AND IMAGING STUDIES  Most Recent 3 CBC's:  Recent Labs   Lab Test 04/04/24  1134 02/21/24  1248 01/24/24  1253 02/15/23  1345 12/06/22  1006 09/06/22  1003 02/17/22  1005 01/13/22  0952   .3* 174.4* 176.6*   < > 112.5* 104.7*   < > 93.1*   HGB 13.3 13.3 13.2   < > 13.3 13.8   < > 14.4   MCV 93 96 95   < > 93 93   < > 93    192 170   < > 172 181   < > 189   ANEUTAUTO  --   --   --   --  4.2 4.8  --  5.8    < > = values in this interval not displayed.     Most Recent 3 BMP's:  Recent Labs   Lab Test 04/04/24  1134 02/21/24  1248 01/24/24  1253    141 138   POTASSIUM 5.0 4.8 4.5    CHLORIDE 104 104 102   CO2 25 27 26   BUN 15.1 14.2 13.8   CR 0.90 0.96* 0.87   ANIONGAP 10 10 10   AAKASH 9.2 9.5 9.5   GLC 95 98 89   PROTTOTAL 6.4 6.4 6.2*   ALBUMIN 4.1 4.4 4.3    Most Recent 3 LFT's:  Recent Labs   Lab Test 04/04/24  1134 02/21/24  1248 01/24/24  1253   AST 31 37 38   ALT 17 26 25   ALKPHOS 98 95 86   BILITOTAL 0.5 0.5 0.4    Most Recent 2 TSH and T4:No lab results found.  I reviewed the above labs today.        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    # High risk CLL - Arboleda Stage 0, CLL-IPI = 5 = High risk FISH showing loss of 17p, IgHV mutated and NGS for TP53 mutation positive  WBC initially increased but with no B symptoms or changes in other counts.  WBCs largely pleatued and have decreased this month. No infections, signs of autoimmune diseases, or B symptoms.  Will continue with observation and follow-up with provider Q3 months with labs prior.     Final plan:  - Monthly labs  - RIKY in 6 months  - Jacobo in 2 months    21 minutes spent on the date of the encounter doing chart review, review of test results, interpretation of tests, patient visit, and documentation     JAMSHID Delgado CNP

## 2024-04-04 ENCOUNTER — ONCOLOGY VISIT (OUTPATIENT)
Dept: ONCOLOGY | Facility: CLINIC | Age: 84
End: 2024-04-04
Attending: STUDENT IN AN ORGANIZED HEALTH CARE EDUCATION/TRAINING PROGRAM
Payer: COMMERCIAL

## 2024-04-04 ENCOUNTER — APPOINTMENT (OUTPATIENT)
Dept: LAB | Facility: CLINIC | Age: 84
End: 2024-04-04
Attending: STUDENT IN AN ORGANIZED HEALTH CARE EDUCATION/TRAINING PROGRAM
Payer: COMMERCIAL

## 2024-04-04 VITALS
SYSTOLIC BLOOD PRESSURE: 104 MMHG | OXYGEN SATURATION: 100 % | RESPIRATION RATE: 18 BRPM | TEMPERATURE: 97.9 F | HEART RATE: 70 BPM | WEIGHT: 140.1 LBS | BODY MASS INDEX: 24.05 KG/M2 | DIASTOLIC BLOOD PRESSURE: 67 MMHG

## 2024-04-04 DIAGNOSIS — C91.10 CLL (CHRONIC LYMPHOCYTIC LEUKEMIA) (H): Primary | ICD-10-CM

## 2024-04-04 LAB
ALBUMIN SERPL BCG-MCNC: 4.1 G/DL (ref 3.5–5.2)
ALP SERPL-CCNC: 98 U/L (ref 40–150)
ALT SERPL W P-5'-P-CCNC: 17 U/L (ref 0–50)
ANION GAP SERPL CALCULATED.3IONS-SCNC: 10 MMOL/L (ref 7–15)
AST SERPL W P-5'-P-CCNC: 31 U/L (ref 0–45)
BASOPHILS # BLD AUTO: ABNORMAL 10*3/UL
BASOPHILS # BLD MANUAL: 0 10E3/UL (ref 0–0.2)
BASOPHILS NFR BLD AUTO: ABNORMAL %
BASOPHILS NFR BLD MANUAL: 0 %
BILIRUB SERPL-MCNC: 0.5 MG/DL
BUN SERPL-MCNC: 15.1 MG/DL (ref 8–23)
CALCIUM SERPL-MCNC: 9.2 MG/DL (ref 8.8–10.2)
CHLORIDE SERPL-SCNC: 104 MMOL/L (ref 98–107)
CREAT SERPL-MCNC: 0.9 MG/DL (ref 0.51–0.95)
DEPRECATED HCO3 PLAS-SCNC: 25 MMOL/L (ref 22–29)
EGFRCR SERPLBLD CKD-EPI 2021: 63 ML/MIN/1.73M2
EOSINOPHIL # BLD AUTO: ABNORMAL 10*3/UL
EOSINOPHIL # BLD MANUAL: 0 10E3/UL (ref 0–0.7)
EOSINOPHIL NFR BLD AUTO: ABNORMAL %
EOSINOPHIL NFR BLD MANUAL: 0 %
ERYTHROCYTE [DISTWIDTH] IN BLOOD BY AUTOMATED COUNT: 14.3 % (ref 10–15)
GLUCOSE SERPL-MCNC: 95 MG/DL (ref 70–99)
HCT VFR BLD AUTO: 42.5 % (ref 35–47)
HGB BLD-MCNC: 13.3 G/DL (ref 11.7–15.7)
IMM GRANULOCYTES # BLD: ABNORMAL 10*3/UL
IMM GRANULOCYTES NFR BLD: ABNORMAL %
LYMPHOCYTES # BLD AUTO: ABNORMAL 10*3/UL
LYMPHOCYTES # BLD MANUAL: 162.5 10E3/UL (ref 0.8–5.3)
LYMPHOCYTES NFR BLD AUTO: ABNORMAL %
LYMPHOCYTES NFR BLD MANUAL: 96 %
MCH RBC QN AUTO: 29.2 PG (ref 26.5–33)
MCHC RBC AUTO-ENTMCNC: 31.3 G/DL (ref 31.5–36.5)
MCV RBC AUTO: 93 FL (ref 78–100)
MONOCYTES # BLD AUTO: ABNORMAL 10*3/UL
MONOCYTES # BLD MANUAL: 0 10E3/UL (ref 0–1.3)
MONOCYTES NFR BLD AUTO: ABNORMAL %
MONOCYTES NFR BLD MANUAL: 0 %
NEUTROPHILS # BLD AUTO: ABNORMAL 10*3/UL
NEUTROPHILS # BLD MANUAL: 6.8 10E3/UL (ref 1.6–8.3)
NEUTROPHILS NFR BLD AUTO: ABNORMAL %
NEUTROPHILS NFR BLD MANUAL: 4 %
NRBC # BLD AUTO: 0 10E3/UL
NRBC BLD AUTO-RTO: 0 /100
PLAT MORPH BLD: ABNORMAL
PLATELET # BLD AUTO: 182 10E3/UL (ref 150–450)
POTASSIUM SERPL-SCNC: 5 MMOL/L (ref 3.4–5.3)
PROT SERPL-MCNC: 6.4 G/DL (ref 6.4–8.3)
RBC # BLD AUTO: 4.56 10E6/UL (ref 3.8–5.2)
RBC MORPH BLD: ABNORMAL
SODIUM SERPL-SCNC: 139 MMOL/L (ref 135–145)
WBC # BLD AUTO: 169.3 10E3/UL (ref 4–11)

## 2024-04-04 PROCEDURE — 85027 COMPLETE CBC AUTOMATED: CPT

## 2024-04-04 PROCEDURE — 99213 OFFICE O/P EST LOW 20 MIN: CPT

## 2024-04-04 PROCEDURE — 82040 ASSAY OF SERUM ALBUMIN: CPT

## 2024-04-04 PROCEDURE — 36415 COLL VENOUS BLD VENIPUNCTURE: CPT

## 2024-04-04 PROCEDURE — G0463 HOSPITAL OUTPT CLINIC VISIT: HCPCS

## 2024-04-04 PROCEDURE — 85007 BL SMEAR W/DIFF WBC COUNT: CPT

## 2024-04-04 PROCEDURE — 84450 TRANSFERASE (AST) (SGOT): CPT

## 2024-04-04 ASSESSMENT — PAIN SCALES - GENERAL: PAINLEVEL: NO PAIN (0)

## 2024-04-04 NOTE — LETTER
4/4/2024         RE: Kerri Shook  [address]  [place] [address]        Dear Colleague,    Thank you for referring your patient, Kerri Shook, to the [facility]. Please see a copy of my visit note below.    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    1. CLL in 1986. Found to have an elevated white blood count on routine testing. This was consistent with CLL.Saw Dr. Puente who recommended observation. WBCs 15-20 during this time. She has required no treatment for CLL. Followed by PCP with yearly CBCs.  2. 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 The patient had a screening mammogram in 1990, which showed an abnormality in the left breast. She underwent a lumpectomy on 03/02/1990, which was consistent with a 1.5 cm primary, mucinous adenocarcinoma with 0 of 8 lymph nodes positive. She was staged as a stage I (T1 N0 M0). The tumor was ER positive. She did have radiation to the left breast with a boost having completed 6600 cGy in 33 fractions from 03/28/1990 until 05/15/1990. She went on to take tamoxifen for 6 years from 03/1990 until 03/1996.  3. 1/2019 labs showing WBC increasing to 27. This has continued with WBCs 47 on 11/12/2020 and 71.0 on 8//18/21  4. 8/31/21 Peripheral flow showing CD5+ lambda CLL cells. Hb 14.6, WBC 66.2, Plts 162, ANC 5.3  5. 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   She is feeling well with good energy levels.  She is planning a trip to [facility] this summer.  Appetite is good and eating and drinking well.  She has covid but recovered without issue  Denies fevers/chills, night  sweats, N/V/D/C, new pains, new lumps/bumps, SOB/cough, chest pain, rashes/sores.    CURRENT OUTPATIENT MEDICATIONS  Current Outpatient Medications   Medication Sig Dispense Refill    aspirin 81 MG tablet Take 81 mg by mouth daily      carvedilol (COREG) 3.125 MG tablet Take 1 tablet (3.125 mg) by mouth 2 times daily 180 tablet 2    gabapentin (NEURONTIN) 100 MG capsule TAKE 1 CAPSULE(100 MG) BY MOUTH EVERY NIGHT AS NEEDED. please keep appt 9/7/22 30 capsule 1    Multiple Vitamin (MULTIVITAMIN) per tablet Take 1 tablet by mouth daily.      simvastatin (ZOCOR) 20 MG tablet TAKE 1 TABLET(20 MG) BY MOUTH AT BEDTIME 90 tablet 3       REVIEW OF SYSTEMS  A complete ROS was performed and was negative except as mentioned in HPI    PHYSICAL EXAM  /67 (BP Location: Right arm, Patient Position: Sitting, Cuff Size: Adult Regular)   Pulse 70   Temp 97.9  F (36.6  C) (Oral)   Resp 18   Wt 63.5 kg (140 lb 1.6 oz)   SpO2 100%   BMI 24.05 kg/m    General: No acute distress, pleasant   HEENT: Sclera anicteric. Oral mucosa pink and moist.  No mucositis or thrush  Lymph: No lymphadenopathy in neck, supraclavicular, axillary, or inguinal areas  Heart: Regular, rate, and rhythm  Lungs: Clear to ascultation bilaterally  Abdomen: Positive bowel sounds. Soft, non-distended, non-tender. No organomegaly or mass.   Extremities: no lower extremity edema  Neuro: Cranial nerves grossly intact  Rash: none on exposed skin       Wt Readings from Last 4 Encounters:   04/04/24 63.5 kg (140 lb 1.6 oz)   01/08/24 63.5 kg (140 lb)   12/30/23 63.5 kg (140 lb)   12/21/23 65 kg (143 lb 4.8 oz)       LABORATORY AND IMAGING STUDIES  Most Recent 3 CBC's:  Recent Labs   Lab Test 04/04/24  1134 02/21/24  1248 01/24/24  1253 02/15/23  1345 12/06/22  1006 09/06/22  1003 02/17/22  1005 01/13/22  0952   .3* 174.4* 176.6*   < > 112.5* 104.7*   < > 93.1*   HGB 13.3 13.3 13.2   < > 13.3 13.8   < > 14.4   MCV 93 96 95   < > 93 93   < > 93     192 170   < > 172 181   < > 189   ANEUTAUTO  --   --   --   --  4.2 4.8  --  5.8    < > = values in this interval not displayed.     Most Recent 3 BMP's:  Recent Labs   Lab Test 04/04/24  1134 02/21/24  1248 01/24/24  1253    141 138   POTASSIUM 5.0 4.8 4.5   CHLORIDE 104 104 102   CO2 25 27 26   BUN 15.1 14.2 13.8   CR 0.90 0.96* 0.87   ANIONGAP 10 10 10   AAKASH 9.2 9.5 9.5   GLC 95 98 89   PROTTOTAL 6.4 6.4 6.2*   ALBUMIN 4.1 4.4 4.3    Most Recent 3 LFT's:  Recent Labs   Lab Test 04/04/24  1134 02/21/24  1248 01/24/24  1253   AST 31 37 38   ALT 17 26 25   ALKPHOS 98 95 86   BILITOTAL 0.5 0.5 0.4    Most Recent 2 TSH and T4:No lab results found.  I reviewed the above labs today.        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    # High risk CLL - Arboleda Stage 0, CLL-IPI = 5 = High risk FISH showing loss of 17p, IgHV mutated and NGS for TP53 mutation positive  WBC initially increased but with no B symptoms or changes in other counts.  WBCs largely pleatued and have decreased this month. No infections, signs of autoimmune diseases, or B symptoms.  Will continue with observation and follow-up with provider Q3 months with labs prior.     Final plan:  - Monthly labs  - RIKY in 6 months  - Jacobo in 2 months    21 minutes spent on the date of the encounter doing chart review, review of test results, interpretation of tests, patient visit, and documentation     JAMSHID Delgado CNP

## 2024-04-04 NOTE — NURSING NOTE
Chief Complaint   Patient presents with    Blood Draw     Labs drawn via  by RN. VS taken.     Labs collected from venipuncture by RN. Vitals taken. Checked in for appointment(s).     Teetee Welsh RN

## 2024-04-04 NOTE — NURSING NOTE
"Oncology Rooming Note    April 4, 2024 11:48 AM   Kerri Shook is a 83 year old female who presents for:    Chief Complaint   Patient presents with    Blood Draw     Labs drawn via  by RN. VS taken.    Oncology Clinic Visit     Chronic Lymphocytic Leukemia     Initial Vitals: /67 (BP Location: Right arm, Patient Position: Sitting, Cuff Size: Adult Regular)   Pulse 70   Temp 97.9  F (36.6  C) (Oral)   Resp 18   Wt 63.5 kg (140 lb 1.6 oz)   SpO2 100%   BMI 24.05 kg/m   Estimated body mass index is 24.05 kg/m  as calculated from the following:    Height as of 1/8/24: 1.626 m (5' 4\").    Weight as of this encounter: 63.5 kg (140 lb 1.6 oz). Body surface area is 1.69 meters squared.  No Pain (0) Comment: Data Unavailable   No LMP recorded. Patient is postmenopausal.  Allergies reviewed: Yes  Medications reviewed: Yes    Medications: Medication refills not needed today.  Pharmacy name entered into [facility]:    [id number] - [place], [place] - [address] AT [facility] [address]  [id number] - [place], [place] [address] AT [facility] & [id number] - [place], [place] - [address] AT [facility] OF [address]    Frailty Screening:   Is the patient here for a new oncology consult visit in cancer care? 2. No      Clinical concerns: None       Nicole Pineda LPN  4/4/2024              "

## 2024-04-19 ENCOUNTER — MYC REFILL (OUTPATIENT)
Dept: INTERNAL MEDICINE | Facility: CLINIC | Age: 84
End: 2024-04-19
Payer: COMMERCIAL

## 2024-04-19 DIAGNOSIS — I51.81 STRESS-INDUCED CARDIOMYOPATHY: ICD-10-CM

## 2024-04-19 RX ORDER — CARVEDILOL 3.12 MG/1
3.12 TABLET ORAL 2 TIMES DAILY
Qty: 180 TABLET | Refills: 2 | Status: SHIPPED | OUTPATIENT
Start: 2024-04-19

## 2024-05-08 ENCOUNTER — OFFICE VISIT (OUTPATIENT)
Dept: URGENT CARE | Facility: URGENT CARE | Age: 84
End: 2024-05-08
Payer: COMMERCIAL

## 2024-05-08 VITALS
WEIGHT: 144 LBS | HEART RATE: 66 BPM | SYSTOLIC BLOOD PRESSURE: 127 MMHG | DIASTOLIC BLOOD PRESSURE: 72 MMHG | TEMPERATURE: 97.6 F | RESPIRATION RATE: 15 BRPM | OXYGEN SATURATION: 95 % | BODY MASS INDEX: 24.59 KG/M2 | HEIGHT: 64 IN
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
## 2024-05-08 PROCEDURE — 99213 OFFICE O/P EST LOW 20 MIN: CPT | Performed by: NURSE PRACTITIONER

## 2024-05-08 NOTE — PROGRESS NOTES
Chief Complaint   Patient presents with    Urgent Care     Worried spot on hand could be tick bite      SUBJECTIVE:  Kerri Shook is a 83 year old female who presents to the clinic today with a red ruperto to her left thumb noted this morning.  She was visiting a friend's 3-month-old puppy 2 days prior.  The puppy bit her other hand and wrist causing some abrasions and scabs.  Her friend also said she has also had some 1 ticks.  No known tick was visualized by the patient.  She declines time in the woods or grassy areas flulike illness headache arthralgias erythema migrans.    Past Medical History:   Diagnosis Date    Breast disorder 1990    Breast Cancer    Cardiomyopathy (H)     Chronic osteoarthritis 2012    resulted in hip replacement    CLL (chronic lymphoblastic leukemia) 1989    Closed fracture of second toe of left foot     Colon adenomas     6/26/14 colonoscopy, repeat in 3 years    History of blood transfusion     hyperlipidemia     Primary localized osteoarthrosis, pelvic region and thigh     Tinnitus      Current Outpatient Medications   Medication Sig Dispense Refill    aspirin 81 MG tablet Take 81 mg by mouth daily      carvedilol (COREG) 3.125 MG tablet Take 1 tablet (3.125 mg) by mouth 2 times daily 180 tablet 2    gabapentin (NEURONTIN) 100 MG capsule TAKE 1 CAPSULE(100 MG) BY MOUTH EVERY NIGHT AS NEEDED. please keep appt 9/7/22 30 capsule 1    Multiple Vitamin (MULTIVITAMIN) per tablet Take 1 tablet by mouth daily.      simvastatin (ZOCOR) 20 MG tablet TAKE 1 TABLET(20 MG) BY MOUTH AT BEDTIME 90 tablet 3     No current facility-administered medications for this visit.     Social History     Tobacco Use    Smoking status: Never    Smokeless tobacco: Never   Substance Use Topics    Alcohol use: Yes     Comment: 3 glasses of wine per week     Allergies   Allergen Reactions    Nkda [No Known Drug Allergy]        Review of Systems  All systems negative except for those listed above in HPI.    EXAM:   BP  "127/72   Pulse 66   Temp 97.6  F (36.4  C) (Temporal)   Resp 15   Ht 1.626 m (5' 4\")   Wt 65.3 kg (144 lb)   SpO2 95%   BMI 24.72 kg/m      Physical Exam  Vitals reviewed.   Constitutional:       General: She is not in acute distress.     Appearance: Normal appearance. She is well-developed. She is not ill-appearing, toxic-appearing or diaphoretic.   HENT:      Head: Normocephalic and atraumatic.      Nose: Nose normal.      Mouth/Throat:      Pharynx: No oropharyngeal exudate.   Eyes:      Conjunctiva/sclera: Conjunctivae normal.      Pupils: Pupils are equal, round, and reactive to light.   Cardiovascular:      Rate and Rhythm: Normal rate.      Pulses: Normal pulses.   Pulmonary:      Effort: Pulmonary effort is normal. No respiratory distress.   Musculoskeletal:         General: Normal range of motion.      Cervical back: Normal range of motion and neck supple.   Lymphadenopathy:      Cervical: No cervical adenopathy.   Skin:     General: Skin is warm.      Capillary Refill: Capillary refill takes less than 2 seconds.      Findings: Erythema and lesion present. No rash.      Comments: Minor abrasions puppy bite scabs to bilateral hands and wrists.  The area of concern to the left thumb has a black scab in the center and a half a centimeter area of raised pink edema.  No erythema migrans or red streaking.   Neurological:      Mental Status: She is alert and oriented to person, place, and time.       ASSESSMENT:    ICD-10-CM    1. Dog bite, initial encounter  W54.0XXA         PLAN:    Puppy bites without concern for infection or erythema migrans  No signs of infection here today  Wash with lukewarm soapy water and bacitracin zinc  Reevaluation if erythema migrans flulike illness red streaking fevers sweats chills nausea    A tick must be attached for at least 24-48 hours to transfer pathogens and cause an infection. Infection is very unlikely in the first 48-72 hours.  Infection is more likely if the tick " "is engorged or has been attached for over 72 hours.  Prophylactic antibiotics are recommended only if:  The tick was attached for 36 hours or more AND less than 72 hours has passed since the tick was removed  Treatment with a single dose of Doxycycline, 200mg (4mg/kg in children 8 years of age or older) by mouth with food.  -Do not give to children under 8 or pregnant or lactating women  If there is a contraindication to doxycycline, an alternate antibiotic is not recommended.  Guidelines state, \"If sections of the mouthparts of the tick remain in the skin, they should be left alone as they will normally be expelled spontaneously.\"  Ticks should be removed with tweezers or protected fingers pulling straight out gently and firmly using steady pressure.   Apply ice packs, may take benadryl as needed itch/irritation.  Ibuprofen for discomfort.   Watch for signs of secondary infection- redness, swelling, pain, colored discharge or fever.  Advised to watch for erythema migrans rash (circular red ringed rash), fever, chills, sweats, body aches, stiff neck, headache, joint pain/ discomfort/ sweling or other flu/illness symptoms and be seen by primary care provider at that time.   Erythema migrans rash appears several days after tick bite and is separate from a local reaction to a tick bite.  Please follow up with primary care provider if not improving, worsening or new symptoms.  It will take 6-8 weeks before antibodies are detected with the lyme screen titer.      Follow up with primary care provider with any problems, questions or concerns or if symptoms worsen or fail to improve. Patient agreed to plan and verbalized understanding.    Jewell Simon, VAHID-[facility]  "

## 2024-05-08 NOTE — PATIENT INSTRUCTIONS
"  Puppy bites without concern for infection or erythema migrans  No signs of infection here today  Wash with lukewarm soapy water and bacitracin zinc  Reevaluation if erythema migrans flulike illness red streaking fevers sweats chills nausea    A tick must be attached for at least 24-48 hours to transfer pathogens and cause an infection. Infection is very unlikely in the first 48-72 hours.  Infection is more likely if the tick is engorged or has been attached for over 72 hours.  Prophylactic antibiotics are recommended only if:  The tick was attached for 36 hours or more AND less than 72 hours has passed since the tick was removed  Treatment with a single dose of Doxycycline, 200mg (4mg/kg in children 8 years of age or older) by mouth with food.  -Do not give to children under 8 or pregnant or lactating women  If there is a contraindication to doxycycline, an alternate antibiotic is not recommended.  Guidelines state, \"If sections of the mouthparts of the tick remain in the skin, they should be left alone as they will normally be expelled spontaneously.\"  Ticks should be removed with tweezers or protected fingers pulling straight out gently and firmly using steady pressure.   Apply ice packs, may take benadryl as needed itch/irritation.  Ibuprofen for discomfort.   Watch for signs of secondary infection- redness, swelling, pain, colored discharge or fever.  Advised to watch for erythema migrans rash (circular red ringed rash), fever, chills, sweats, body aches, stiff neck, headache, joint pain/ discomfort/ sweling or other flu/illness symptoms and be seen by primary care provider at that time.   Erythema migrans rash appears several days after tick bite and is separate from a local reaction to a tick bite.  Please follow up with primary care provider if not improving, worsening or new symptoms.  It will take 6-8 weeks before antibodies are detected with the lyme screen titer.      "

## 2024-05-15 ENCOUNTER — NURSE TRIAGE (OUTPATIENT)
Dept: ONCOLOGY | Facility: CLINIC | Age: 84
End: 2024-05-15

## 2024-05-15 ENCOUNTER — LAB (OUTPATIENT)
Dept: LAB | Facility: CLINIC | Age: 84
End: 2024-05-15
Payer: COMMERCIAL

## 2024-05-15 DIAGNOSIS — C91.10 CLL (CHRONIC LYMPHOCYTIC LEUKEMIA) (H): ICD-10-CM

## 2024-05-15 LAB
BASOPHILS # BLD AUTO: ABNORMAL 10*3/UL
BASOPHILS # BLD MANUAL: 0 10E3/UL (ref 0–0.2)
BASOPHILS NFR BLD AUTO: ABNORMAL %
BASOPHILS NFR BLD MANUAL: 0 %
EOSINOPHIL # BLD AUTO: ABNORMAL 10*3/UL
EOSINOPHIL # BLD MANUAL: 0 10E3/UL (ref 0–0.7)
EOSINOPHIL NFR BLD AUTO: ABNORMAL %
EOSINOPHIL NFR BLD MANUAL: 0 %
ERYTHROCYTE [DISTWIDTH] IN BLOOD BY AUTOMATED COUNT: 14.4 % (ref 10–15)
HCT VFR BLD AUTO: 41.4 % (ref 35–47)
HGB BLD-MCNC: 13 G/DL (ref 11.7–15.7)
IMM GRANULOCYTES # BLD: ABNORMAL 10*3/UL
IMM GRANULOCYTES NFR BLD: ABNORMAL %
LYMPHOCYTES # BLD AUTO: ABNORMAL 10*3/UL
LYMPHOCYTES # BLD MANUAL: 141.3 10E3/UL (ref 0.8–5.3)
LYMPHOCYTES NFR BLD AUTO: ABNORMAL %
LYMPHOCYTES NFR BLD MANUAL: 94 %
MCH RBC QN AUTO: 29.9 PG (ref 26.5–33)
MCHC RBC AUTO-ENTMCNC: 31.4 G/DL (ref 31.5–36.5)
MCV RBC AUTO: 95 FL (ref 78–100)
MONOCYTES # BLD AUTO: ABNORMAL 10*3/UL
MONOCYTES # BLD MANUAL: 7.5 10E3/UL (ref 0–1.3)
MONOCYTES NFR BLD AUTO: ABNORMAL %
MONOCYTES NFR BLD MANUAL: 5 %
NEUTROPHILS # BLD AUTO: ABNORMAL 10*3/UL
NEUTROPHILS # BLD MANUAL: 1.5 10E3/UL (ref 1.6–8.3)
NEUTROPHILS NFR BLD AUTO: ABNORMAL %
NEUTROPHILS NFR BLD MANUAL: 1 %
NRBC # BLD AUTO: 0 10E3/UL
NRBC BLD AUTO-RTO: 0 /100
PLAT MORPH BLD: ABNORMAL
PLATELET # BLD AUTO: 163 10E3/UL (ref 150–450)
RBC # BLD AUTO: 4.35 10E6/UL (ref 3.8–5.2)
RBC MORPH BLD: ABNORMAL
WBC # BLD AUTO: 150.3 10E3/UL (ref 4–11)

## 2024-05-15 PROCEDURE — 80053 COMPREHEN METABOLIC PANEL: CPT

## 2024-05-15 PROCEDURE — 36415 COLL VENOUS BLD VENIPUNCTURE: CPT

## 2024-05-15 PROCEDURE — 85027 COMPLETE CBC AUTOMATED: CPT

## 2024-05-15 PROCEDURE — 85007 BL SMEAR W/DIFF WBC COUNT: CPT

## 2024-05-15 NOTE — TELEPHONE ENCOUNTER
DATE/TIME OF CALL RECEIVED FROM LAB:  05/15/24 at 1:31 PM   LAB TEST:  .2  Last LAB VALUE:  04/04/24 .3  PROVIDER NOTIFIED?: Yes  PROVIDER NAME: Simran Roe  DATE/TIME LAB VALUE REPORTED TO PROVIDER: 5/15/2024  MECHANISM OF PROVIDER NOTIFICATION:  Routed encounter to provider and RNCC

## 2024-05-16 LAB
ALBUMIN SERPL BCG-MCNC: 4.3 G/DL (ref 3.5–5.2)
ALP SERPL-CCNC: 92 U/L (ref 40–150)
ALT SERPL W P-5'-P-CCNC: 21 U/L (ref 0–50)
ANION GAP SERPL CALCULATED.3IONS-SCNC: 10 MMOL/L (ref 7–15)
AST SERPL W P-5'-P-CCNC: 31 U/L (ref 0–45)
BILIRUB SERPL-MCNC: 0.4 MG/DL
BUN SERPL-MCNC: 16.8 MG/DL (ref 8–23)
CALCIUM SERPL-MCNC: 9.3 MG/DL (ref 8.8–10.2)
CHLORIDE SERPL-SCNC: 104 MMOL/L (ref 98–107)
CREAT SERPL-MCNC: 0.86 MG/DL (ref 0.51–0.95)
DEPRECATED HCO3 PLAS-SCNC: 24 MMOL/L (ref 22–29)
EGFRCR SERPLBLD CKD-EPI 2021: 67 ML/MIN/1.73M2
GLUCOSE SERPL-MCNC: 101 MG/DL (ref 70–99)
POTASSIUM SERPL-SCNC: 5.2 MMOL/L (ref 3.4–5.3)
PROT SERPL-MCNC: 6.1 G/DL (ref 6.4–8.3)
SODIUM SERPL-SCNC: 138 MMOL/L (ref 135–145)

## 2024-06-05 ENCOUNTER — APPOINTMENT (OUTPATIENT)
Dept: LAB | Facility: CLINIC | Age: 84
End: 2024-06-05
Payer: COMMERCIAL

## 2024-06-05 ENCOUNTER — ONCOLOGY VISIT (OUTPATIENT)
Dept: ONCOLOGY | Facility: CLINIC | Age: 84
End: 2024-06-05
Attending: STUDENT IN AN ORGANIZED HEALTH CARE EDUCATION/TRAINING PROGRAM
Payer: COMMERCIAL

## 2024-06-05 VITALS
WEIGHT: 145.1 LBS | OXYGEN SATURATION: 96 % | TEMPERATURE: 98 F | SYSTOLIC BLOOD PRESSURE: 113 MMHG | RESPIRATION RATE: 18 BRPM | HEART RATE: 78 BPM | BODY MASS INDEX: 24.91 KG/M2 | DIASTOLIC BLOOD PRESSURE: 67 MMHG

## 2024-06-05 DIAGNOSIS — C91.10 CLL (CHRONIC LYMPHOCYTIC LEUKEMIA) (H): ICD-10-CM

## 2024-06-05 LAB
ALBUMIN SERPL BCG-MCNC: 4.1 G/DL (ref 3.5–5.2)
ALP SERPL-CCNC: 96 U/L (ref 40–150)
ALT SERPL W P-5'-P-CCNC: 20 U/L (ref 0–50)
ANION GAP SERPL CALCULATED.3IONS-SCNC: 8 MMOL/L (ref 7–15)
AST SERPL W P-5'-P-CCNC: 30 U/L (ref 0–45)
BASOPHILS # BLD AUTO: ABNORMAL 10*3/UL
BASOPHILS # BLD MANUAL: 0 10E3/UL (ref 0–0.2)
BASOPHILS NFR BLD AUTO: ABNORMAL %
BASOPHILS NFR BLD MANUAL: 0 %
BILIRUB SERPL-MCNC: 0.4 MG/DL
BUN SERPL-MCNC: 10.7 MG/DL (ref 8–23)
CALCIUM SERPL-MCNC: 8.8 MG/DL (ref 8.8–10.2)
CHLORIDE SERPL-SCNC: 106 MMOL/L (ref 98–107)
CREAT SERPL-MCNC: 0.85 MG/DL (ref 0.51–0.95)
DEPRECATED HCO3 PLAS-SCNC: 26 MMOL/L (ref 22–29)
EGFRCR SERPLBLD CKD-EPI 2021: 68 ML/MIN/1.73M2
EOSINOPHIL # BLD AUTO: ABNORMAL 10*3/UL
EOSINOPHIL # BLD MANUAL: 0 10E3/UL (ref 0–0.7)
EOSINOPHIL NFR BLD AUTO: ABNORMAL %
EOSINOPHIL NFR BLD MANUAL: 0 %
ERYTHROCYTE [DISTWIDTH] IN BLOOD BY AUTOMATED COUNT: 14.4 % (ref 10–15)
GLUCOSE SERPL-MCNC: 98 MG/DL (ref 70–99)
HCT VFR BLD AUTO: 40.5 % (ref 35–47)
HGB BLD-MCNC: 12.5 G/DL (ref 11.7–15.7)
IMM GRANULOCYTES # BLD: ABNORMAL 10*3/UL
IMM GRANULOCYTES NFR BLD: ABNORMAL %
LYMPHOCYTES # BLD AUTO: ABNORMAL 10*3/UL
LYMPHOCYTES # BLD MANUAL: 150.2 10E3/UL (ref 0.8–5.3)
LYMPHOCYTES NFR BLD AUTO: ABNORMAL %
LYMPHOCYTES NFR BLD MANUAL: 96 %
MCH RBC QN AUTO: 29.4 PG (ref 26.5–33)
MCHC RBC AUTO-ENTMCNC: 30.9 G/DL (ref 31.5–36.5)
MCV RBC AUTO: 95 FL (ref 78–100)
MONOCYTES # BLD AUTO: ABNORMAL 10*3/UL
MONOCYTES # BLD MANUAL: 0 10E3/UL (ref 0–1.3)
MONOCYTES NFR BLD AUTO: ABNORMAL %
MONOCYTES NFR BLD MANUAL: 0 %
NEUTROPHILS # BLD AUTO: ABNORMAL 10*3/UL
NEUTROPHILS # BLD MANUAL: 6.3 10E3/UL (ref 1.6–8.3)
NEUTROPHILS NFR BLD AUTO: ABNORMAL %
NEUTROPHILS NFR BLD MANUAL: 4 %
NRBC # BLD AUTO: 0 10E3/UL
NRBC BLD AUTO-RTO: 0 /100
PLAT MORPH BLD: ABNORMAL
PLATELET # BLD AUTO: 149 10E3/UL (ref 150–450)
POTASSIUM SERPL-SCNC: 4.8 MMOL/L (ref 3.4–5.3)
PROT SERPL-MCNC: 5.9 G/DL (ref 6.4–8.3)
RBC # BLD AUTO: 4.25 10E6/UL (ref 3.8–5.2)
RBC MORPH BLD: ABNORMAL
SODIUM SERPL-SCNC: 140 MMOL/L (ref 135–145)
WBC # BLD AUTO: 156.5 10E3/UL (ref 4–11)

## 2024-06-05 PROCEDURE — 80053 COMPREHEN METABOLIC PANEL: CPT | Performed by: STUDENT IN AN ORGANIZED HEALTH CARE EDUCATION/TRAINING PROGRAM

## 2024-06-05 PROCEDURE — G2211 COMPLEX E/M VISIT ADD ON: HCPCS | Performed by: STUDENT IN AN ORGANIZED HEALTH CARE EDUCATION/TRAINING PROGRAM

## 2024-06-05 PROCEDURE — G0463 HOSPITAL OUTPT CLINIC VISIT: HCPCS | Performed by: STUDENT IN AN ORGANIZED HEALTH CARE EDUCATION/TRAINING PROGRAM

## 2024-06-05 PROCEDURE — 85007 BL SMEAR W/DIFF WBC COUNT: CPT | Performed by: STUDENT IN AN ORGANIZED HEALTH CARE EDUCATION/TRAINING PROGRAM

## 2024-06-05 PROCEDURE — 99214 OFFICE O/P EST MOD 30 MIN: CPT | Performed by: STUDENT IN AN ORGANIZED HEALTH CARE EDUCATION/TRAINING PROGRAM

## 2024-06-05 PROCEDURE — 36415 COLL VENOUS BLD VENIPUNCTURE: CPT | Performed by: STUDENT IN AN ORGANIZED HEALTH CARE EDUCATION/TRAINING PROGRAM

## 2024-06-05 PROCEDURE — 85027 COMPLETE CBC AUTOMATED: CPT | Performed by: STUDENT IN AN ORGANIZED HEALTH CARE EDUCATION/TRAINING PROGRAM

## 2024-06-05 ASSESSMENT — PAIN SCALES - GENERAL: PAINLEVEL: NO PAIN (0)

## 2024-06-05 NOTE — NURSING NOTE
Chief Complaint   Patient presents with    Blood Draw     Labs drawn via  by RN.      Labs drawn with  by RN. Vitals taken. Patient checked into next appointment.    Jayshree Gale RN

## 2024-06-05 NOTE — PROGRESS NOTES
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    1. CLL in 1986. Found to have an elevated white blood count on routine testing. This was consistent with CLL.Saw Dr. Puente who recommended observation. WBCs 15-20 during this time. She has required no treatment for CLL. Followed by PCP with yearly CBCs.  2. 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 The patient had a screening mammogram in 1990, which showed an abnormality in the left breast. She underwent a lumpectomy on 03/02/1990, which was consistent with a 1.5 cm primary, mucinous adenocarcinoma with 0 of 8 lymph nodes positive. She was staged as a stage I (T1 N0 M0). The tumor was ER positive. She did have radiation to the left breast with a boost having completed 6600 cGy in 33 fractions from 03/28/1990 until 05/15/1990. She went on to take tamoxifen for 6 years from 03/1990 until 03/1996.  3. 1/2019 labs showing WBC increasing to 27. This has continued with WBCs 47 on 11/12/2020 and 71.0 on 8//18/21  4. 8/31/21 Peripheral flow showing CD5+ lambda CLL cells. Hb 14.6, WBC 66.2, Plts 162, ANC 5.3  5. 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  Has not noticed any clinical changes.  No new lumps or bumps.  No new fatigue, or weight loss.  Good energy and appetite.  Denies any fevers, chills, sweats or nausea, vomiting, diarrhea.    CURRENT OUTPATIENT MEDICATIONS  Current Outpatient Medications   Medication Sig Dispense Refill    aspirin 81 MG tablet Take 81 mg by mouth daily      carvedilol (COREG) 3.125 MG tablet Take 1 tablet (3.125 mg) by mouth 2 times daily 180 tablet 2    gabapentin (NEURONTIN) 100 MG capsule TAKE  1 CAPSULE(100 MG) BY MOUTH EVERY NIGHT AS NEEDED. please keep appt 9/7/22 30 capsule 1    Multiple Vitamin (MULTIVITAMIN) per tablet Take 1 tablet by mouth daily.      simvastatin (ZOCOR) 20 MG tablet TAKE 1 TABLET(20 MG) BY MOUTH AT BEDTIME 90 tablet 3       REVIEW OF SYSTEMS  A complete ROS was performed and was negative except as mentioned in HPI    PHYSICAL EXAM  /67   Pulse 78   Temp 98  F (36.7  C) (Oral)   Resp 18   Wt 65.8 kg (145 lb 1.6 oz)   SpO2 96%   BMI 24.91 kg/m    Gen: alert, pleasant and conversational, NAD  HEENT: NC/AT, EOMI w/ PERRL, anicteric sclera. OP clear. MMM.   Lymph: No palpable cervical, supraclavicular, axillary LAD  CV: normal S1,S2 with RRR no m/r/g  Resp: lungs CTA bilaterally with adequate air movement to bases. No wheezes or crackles  Abd: soft NTND no organomegaly or masses. BS normoactive.   Ext: WWP no edema or cyanosis  Skin: no concerning lesions or rashes  Neuro: A&Ox4, no lateralizing sx. Grossly nonfocal.          Wt Readings from Last 4 Encounters:   06/05/24 65.8 kg (145 lb 1.6 oz)   05/08/24 65.3 kg (144 lb)   04/04/24 63.5 kg (140 lb 1.6 oz)   01/08/24 63.5 kg (140 lb)       LABORATORY AND IMAGING STUDIES  Most Recent 3 CBC's:  Recent Labs   Lab Test 05/15/24  1304 04/04/24  1134 02/21/24  1248 02/15/23  1345 12/06/22  1006 09/06/22  1003 02/17/22  1005 01/13/22  0952   .3* 169.3* 174.4*   < > 112.5* 104.7*   < > 93.1*   HGB 13.0 13.3 13.3   < > 13.3 13.8   < > 14.4   MCV 95 93 96   < > 93 93   < > 93    182 192   < > 172 181   < > 189   ANEUTAUTO  --   --   --   --  4.2 4.8  --  5.8    < > = values in this interval not displayed.     Most Recent 3 BMP's:  Recent Labs   Lab Test 05/15/24  1304 04/04/24  1134 02/21/24  1248    139 141   POTASSIUM 5.2 5.0 4.8   CHLORIDE 104 104 104   CO2 24 25 27   BUN 16.8 15.1 14.2   CR 0.86 0.90 0.96*   ANIONGAP 10 10 10   AAKASH 9.3 9.2 9.5   * 95 98   PROTTOTAL 6.1* 6.4 6.4   ALBUMIN 4.3 4.1 4.4     Most Recent 3 LFT's:  Recent Labs   Lab Test 05/15/24  1304 04/04/24  1134 02/21/24  1248   AST 31 31 37   ALT 21 17 26   ALKPHOS 92 98 95   BILITOTAL 0.4 0.5 0.5    Most Recent 2 TSH and T4:No lab results found.  I reviewed the above labs today.        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    # High risk CLL - Arboleda Stage 0, CLL-IPI = 5 = High risk FISH showing loss of 17p, IgHV mutated and NGS for TP53 mutation positive  WBC initially increased but with no B symptoms or changes in other counts.  WBCs largely pleatued and have decreased this month. No infections, signs of autoimmune diseases, or B symptoms.      ANC spuriously low on last labs but now recovered. Plan to continue with observation.    Final plan:  - Monthly labs  - RIKY in 6 months  - Jacobo in 12 months or sooner PRN    The longitudinal plan of care for the diagnosis(es)/condition(s) as documented were addressed during this visit. Due to the added complexity in care, I will continue to support Kerri in the subsequent management and with ongoing continuity of care.     20 minutes spent on the date of the encounter doing chart review, review of test results, interpretation of tests, patient visit, and documentation     Pop Centeno MD     Division of Hematology, Oncology and Transplantation  [facility]  P: [phone]

## 2024-06-05 NOTE — LETTER
6/5/2024      Kerri Shook  [address]  [place] [facility] [address]      Dear Colleague,    Thank you for referring your patient, Kerri Shook, to the [facility]. Please see a copy of my visit note below.    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    1. CLL in 1986. Found to have an elevated white blood count on routine testing. This was consistent with CLL.Saw Dr. Puente who recommended observation. WBCs 15-20 during this time. She has required no treatment for CLL. Followed by PCP with yearly CBCs.  2. 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 The patient had a screening mammogram in 1990, which showed an abnormality in the left breast. She underwent a lumpectomy on 03/02/1990, which was consistent with a 1.5 cm primary, mucinous adenocarcinoma with 0 of 8 lymph nodes positive. She was staged as a stage I (T1 N0 M0). The tumor was ER positive. She did have radiation to the left breast with a boost having completed 6600 cGy in 33 fractions from 03/28/1990 until 05/15/1990. She went on to take tamoxifen for 6 years from 03/1990 until 03/1996.  3. 1/2019 labs showing WBC increasing to 27. This has continued with WBCs 47 on 11/12/2020 and 71.0 on 8//18/21  4. 8/31/21 Peripheral flow showing CD5+ lambda CLL cells. Hb 14.6, WBC 66.2, Plts 162, ANC 5.3  5. 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  Has not noticed any clinical changes.  No new lumps or bumps.  No new fatigue, or weight loss.  Good energy and appetite.  Denies any fevers, chills, sweats or nausea, vomiting, diarrhea.    CURRENT OUTPATIENT MEDICATIONS  Current  Outpatient Medications   Medication Sig Dispense Refill    aspirin 81 MG tablet Take 81 mg by mouth daily      carvedilol (COREG) 3.125 MG tablet Take 1 tablet (3.125 mg) by mouth 2 times daily 180 tablet 2    gabapentin (NEURONTIN) 100 MG capsule TAKE 1 CAPSULE(100 MG) BY MOUTH EVERY NIGHT AS NEEDED. please keep appt 9/7/22 30 capsule 1    Multiple Vitamin (MULTIVITAMIN) per tablet Take 1 tablet by mouth daily.      simvastatin (ZOCOR) 20 MG tablet TAKE 1 TABLET(20 MG) BY MOUTH AT BEDTIME 90 tablet 3       REVIEW OF SYSTEMS  A complete ROS was performed and was negative except as mentioned in HPI    PHYSICAL EXAM  /67   Pulse 78   Temp 98  F (36.7  C) (Oral)   Resp 18   Wt 65.8 kg (145 lb 1.6 oz)   SpO2 96%   BMI 24.91 kg/m    Gen: alert, pleasant and conversational, NAD  HEENT: NC/AT, EOMI w/ PERRL, anicteric sclera. OP clear. MMM.   Lymph: No palpable cervical, supraclavicular, axillary LAD  CV: normal S1,S2 with RRR no m/r/g  Resp: lungs CTA bilaterally with adequate air movement to bases. No wheezes or crackles  Abd: soft NTND no organomegaly or masses. BS normoactive.   Ext: WWP no edema or cyanosis  Skin: no concerning lesions or rashes  Neuro: A&Ox4, no lateralizing sx. Grossly nonfocal.          Wt Readings from Last 4 Encounters:   06/05/24 65.8 kg (145 lb 1.6 oz)   05/08/24 65.3 kg (144 lb)   04/04/24 63.5 kg (140 lb 1.6 oz)   01/08/24 63.5 kg (140 lb)       LABORATORY AND IMAGING STUDIES  Most Recent 3 CBC's:  Recent Labs   Lab Test 05/15/24  1304 04/04/24  1134 02/21/24  1248 02/15/23  1345 12/06/22  1006 09/06/22  1003 02/17/22  1005 01/13/22  0952   .3* 169.3* 174.4*   < > 112.5* 104.7*   < > 93.1*   HGB 13.0 13.3 13.3   < > 13.3 13.8   < > 14.4   MCV 95 93 96   < > 93 93   < > 93    182 192   < > 172 181   < > 189   ANEUTAUTO  --   --   --   --  4.2 4.8  --  5.8    < > = values in this interval not displayed.     Most Recent 3 BMP's:  Recent Labs   Lab Test 05/15/24  8242  04/04/24  1134 02/21/24  1248    139 141   POTASSIUM 5.2 5.0 4.8   CHLORIDE 104 104 104   CO2 24 25 27   BUN 16.8 15.1 14.2   CR 0.86 0.90 0.96*   ANIONGAP 10 10 10   AAKASH 9.3 9.2 9.5   * 95 98   PROTTOTAL 6.1* 6.4 6.4   ALBUMIN 4.3 4.1 4.4    Most Recent 3 LFT's:  Recent Labs   Lab Test 05/15/24  1304 04/04/24  1134 02/21/24  1248   AST 31 31 37   ALT 21 17 26   ALKPHOS 92 98 95   BILITOTAL 0.4 0.5 0.5    Most Recent 2 TSH and T4:No lab results found.  I reviewed the above labs today.        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    # High risk CLL - Arboleda Stage 0, CLL-IPI = 5 = High risk FISH showing loss of 17p, IgHV mutated and NGS for TP53 mutation positive  WBC initially increased but with no B symptoms or changes in other counts.  WBCs largely pleatued and have decreased this month. No infections, signs of autoimmune diseases, or B symptoms.      ANC spuriously low on last labs but now recovered. Plan to continue with observation.    Final plan:  - Monthly labs  - RIKY in 6 months  - Jacobo in 12 months or sooner PRN    The longitudinal plan of care for the diagnosis(es)/condition(s) as documented were addressed during this visit. Due to the added complexity in care, I will continue to support Kerri in the subsequent management and with ongoing continuity of care.     20 minutes spent on the date of the encounter doing chart review, review of test results, interpretation of tests, patient visit, and documentation     Pop Centeno MD     Division of Hematology, Oncology and Transplantation  [facility]  P: [phone]

## 2024-06-05 NOTE — NURSING NOTE
"Oncology Rooming Note    June 5, 2024 12:15 PM   Kerri Shook is a 83 year old female who presents for:    Chief Complaint   Patient presents with    Blood Draw     Labs drawn via  by RN.     Oncology Clinic Visit     CLL (chronic lymphocytic leukemia)     Initial Vitals: /67   Pulse 78   Temp 98  F (36.7  C) (Oral)   Resp 18   Wt 65.8 kg (145 lb 1.6 oz)   SpO2 96%   BMI 24.91 kg/m   Estimated body mass index is 24.91 kg/m  as calculated from the following:    Height as of 5/8/24: 1.626 m (5' 4\").    Weight as of this encounter: 65.8 kg (145 lb 1.6 oz). Body surface area is 1.72 meters squared.  No Pain (0) Comment: Data Unavailable   No LMP recorded. Patient is postmenopausal.  Allergies reviewed: Yes  Medications reviewed: Yes    Medications: Medication refills not needed today.  Pharmacy name entered into Peer39: [id number] - [place], [place] - [address] AT [facility] [address]    Frailty Screening:   Is the patient here for a new oncology consult visit in cancer care? 2. No      Clinical concerns: none    Marion Short, EMT  6/5/2024              "

## 2024-06-29 ENCOUNTER — HEALTH MAINTENANCE LETTER (OUTPATIENT)
Age: 84
End: 2024-06-29

## 2024-06-30 NOTE — PROGRESS NOTES
HPI:    Overall stable. She has chronic R sided leg pain when she walks but this gets less with PT. We dicussed next step would be to get a Lumbar MRI but she wants to wait on this. She has had hip replacement surgery in the past and she does not feel this is her R hip. She had clinical COVID in the early spring and feels fine now. She is still due to see Dermatology. She has a small R upper nasal bump/skin lesion for more than one year. No other HEENT, cardiopulmonary, abdominal, , GYN, neurological, systemic, psychiatric, lymphatic, endocrine, vascular complaints.     Past Medical History:   Diagnosis Date    Breast disorder 1990    Breast Cancer    Cardiomyopathy (H)     Chronic osteoarthritis 2012    resulted in hip replacement    CLL (chronic lymphoblastic leukemia) 1989    Closed fracture of second toe of left foot     Colon adenomas     6/26/14 colonoscopy, repeat in 3 years    History of blood transfusion     hyperlipidemia     Primary localized osteoarthrosis, pelvic region and thigh     Tinnitus      Past Surgical History:   Procedure Laterality Date    BIOPSY  within the last 5 years    polyps during colonoscopy    COLONOSCOPY  6/26/2014    Procedure: COMBINED COLONOSCOPY, SINGLE BIOPSY/POLYPECTOMY BY BIOPSY;  Surgeon: Pola Arceo MD;  Location:  GI    COLONOSCOPY N/A 1/5/2021    Procedure: COLONOSCOPY, WITH POLYPECTOMY AND CLIP;  Surgeon: Charlie Wang MD;  [place] [facility]    D & C  1962    late PP hemorrhage --> retained placenta    ENDOSCOPIC SINUS SURGERY Right 7/16/2018    Procedure: ENDOSCOPIC SINUS SURGERY;  Endoscopic Sphenoidotomy with Removal of Tissue;  Surgeon: Kishore Webster MD;  Location:  OR    ENT SURGERY  1950    tonselectomy    EYE SURGERY  2015    cataract on left eye    left hip replacemen Left     hip replacement in 2013    LUMPECTOMY BREAST  1990    Left    ORTHOPEDIC SURGERY  age?    right shoulder     ORTHOPEDIC SURGERY  ~ 2013    left hip replacement    R hip  arthroscopy  7/19/11    TONSILLECTOMY  1950    [facility] PELVIS/HIP JOINT SURGERY UNLISTED  April 2012    left hip replacement    [facility] SHOULDER SURG PROC UNLISTED  ?     PE:    Vitals noted, gen, nad, cooperative, alert, neck supple she has a small hard and slightly red and raised area upper R nasal area, not tender, lungs with good air movement, RRR, S1, S2, no MRG, abdomen, no acute findings. Grossly normal neurological exam.     A/P:     1. CLL (dx 1986); follows with oncology, last visit with Dr. Centeno on 6/5/2024. Has not required tx up to this point. Lymphocytes steadily increasing over last 6 months. Follow up Ms. Roe 12/4/2024.   2. Immunizations; COVID-19 Moderna x 5 and Pfizer x 2. Shingrix vaccine series 2019. Pneumococcal 23 on 10/21/2021. Prevnar 13 on 2/4/2015. Tdap on 1/27/2014. Td/Tdap done in [place]  RSV 11/21/2023  3. Breast cancer (1990) s/p radiation and tamoxifen; mammogram done 8/2/2023.   4. Dermatology; scheduled for 3/22/2024 but cancelled. Placed dermatology referral today 7/1/2024  5. CT abdomen/pelvis 4/7/2022 for abdominal pain; no acute findings. Currently no complaints and if worse/persistent consider EGD for H. Pylori?    6. DEXA scan: 11/19/2020 and recommended repeat in 5 years (2025).    7. Increased cholesterol on Simvastatin 20 mg; lipids 2/17/2022 TG's 114, HDL 71 and LDL 95. Lipids on 7/17/2023; , HDL 70 and TG's 147.   8. Colonscopy 1/5/2021 and repeat in 3 years (2024). Ordered colonoscopy today 7/1/2024  9. A1c 6.2% 2/17/2022; repeat 6.4% on 717/2023.   10. Seen Dr. Sigala, Podiatry 5/17/2022  11. R leg complaints. X-rays of hips and R femur were done 9/25/2023. Given her h/o breast cancer and CLL MRI R hip and R leg were done 11/28/2023 Discussed spinal stenosis can cause anterior thigh pain with walking. Will follow and if worse, she can consider PT and/or seeing orthopedics. She will let me know. Will order lumbar MRI scan   12. Hypertension: stable, continue with  carvedilol   13. Cataracts: R eye cataract surgery 2/22/23. See Optometry appt. On 4/15/2024  14. Eyebrow/eyelid lift: procedure 7/14/23 and follow up with Dr. Greenwood at [facility] on 7/24/2023.   15. Voice hoarseness:  CXR was done 7/17/2023. She had 12/11/2023 ENT appt. With Dr. Dias.      30 minutes spent on the date of the encounter doing chart review, history and exam, documentation and further activities as noted above exclusive of procedures and other billable interpretations

## 2024-07-01 ENCOUNTER — OFFICE VISIT (OUTPATIENT)
Dept: INTERNAL MEDICINE | Facility: CLINIC | Age: 84
End: 2024-07-01
Payer: COMMERCIAL

## 2024-07-01 ENCOUNTER — LAB (OUTPATIENT)
Dept: LAB | Facility: CLINIC | Age: 84
End: 2024-07-01
Payer: COMMERCIAL

## 2024-07-01 VITALS
DIASTOLIC BLOOD PRESSURE: 73 MMHG | BODY MASS INDEX: 24.77 KG/M2 | OXYGEN SATURATION: 100 % | WEIGHT: 144.3 LBS | HEART RATE: 78 BPM | SYSTOLIC BLOOD PRESSURE: 118 MMHG

## 2024-07-01 DIAGNOSIS — Z12.11 SPECIAL SCREENING FOR MALIGNANT NEOPLASMS, COLON: Primary | ICD-10-CM

## 2024-07-01 DIAGNOSIS — Z12.83 SKIN CANCER SCREENING: ICD-10-CM

## 2024-07-01 DIAGNOSIS — C91.10 CLL (CHRONIC LYMPHOCYTIC LEUKEMIA) (H): ICD-10-CM

## 2024-07-01 LAB
ALBUMIN SERPL BCG-MCNC: 4.2 G/DL (ref 3.5–5.2)
ALP SERPL-CCNC: 106 U/L (ref 40–150)
ALT SERPL W P-5'-P-CCNC: 23 U/L (ref 0–50)
ANION GAP SERPL CALCULATED.3IONS-SCNC: 8 MMOL/L (ref 7–15)
AST SERPL W P-5'-P-CCNC: 29 U/L (ref 0–45)
BASOPHILS # BLD AUTO: ABNORMAL 10*3/UL
BASOPHILS # BLD MANUAL: 0 10E3/UL (ref 0–0.2)
BASOPHILS NFR BLD AUTO: ABNORMAL %
BASOPHILS NFR BLD MANUAL: 0 %
BILIRUB SERPL-MCNC: 0.5 MG/DL
BUN SERPL-MCNC: 17 MG/DL (ref 8–23)
CALCIUM SERPL-MCNC: 9.4 MG/DL (ref 8.8–10.2)
CHLORIDE SERPL-SCNC: 104 MMOL/L (ref 98–107)
CREAT SERPL-MCNC: 0.93 MG/DL (ref 0.51–0.95)
DEPRECATED HCO3 PLAS-SCNC: 27 MMOL/L (ref 22–29)
EGFRCR SERPLBLD CKD-EPI 2021: 61 ML/MIN/1.73M2
EOSINOPHIL # BLD AUTO: ABNORMAL 10*3/UL
EOSINOPHIL # BLD MANUAL: 0 10E3/UL (ref 0–0.7)
EOSINOPHIL NFR BLD AUTO: ABNORMAL %
EOSINOPHIL NFR BLD MANUAL: 0 %
ERYTHROCYTE [DISTWIDTH] IN BLOOD BY AUTOMATED COUNT: 14.4 % (ref 10–15)
GLUCOSE SERPL-MCNC: 110 MG/DL (ref 70–99)
HCT VFR BLD AUTO: 42.4 % (ref 35–47)
HGB BLD-MCNC: 13.2 G/DL (ref 11.7–15.7)
IMM GRANULOCYTES # BLD: ABNORMAL 10*3/UL
IMM GRANULOCYTES NFR BLD: ABNORMAL %
LYMPHOCYTES # BLD AUTO: ABNORMAL 10*3/UL
LYMPHOCYTES # BLD MANUAL: 161 10E3/UL (ref 0.8–5.3)
LYMPHOCYTES NFR BLD AUTO: ABNORMAL %
LYMPHOCYTES NFR BLD MANUAL: 94 %
MCH RBC QN AUTO: 29.7 PG (ref 26.5–33)
MCHC RBC AUTO-ENTMCNC: 31.1 G/DL (ref 31.5–36.5)
MCV RBC AUTO: 95 FL (ref 78–100)
MONOCYTES # BLD AUTO: ABNORMAL 10*3/UL
MONOCYTES # BLD MANUAL: 5.1 10E3/UL (ref 0–1.3)
MONOCYTES NFR BLD AUTO: ABNORMAL %
MONOCYTES NFR BLD MANUAL: 3 %
NEUTROPHILS # BLD AUTO: ABNORMAL 10*3/UL
NEUTROPHILS # BLD MANUAL: 5.1 10E3/UL (ref 1.6–8.3)
NEUTROPHILS NFR BLD AUTO: ABNORMAL %
NEUTROPHILS NFR BLD MANUAL: 3 %
NRBC # BLD AUTO: 0 10E3/UL
NRBC BLD AUTO-RTO: 0 /100
PLAT MORPH BLD: ABNORMAL
PLATELET # BLD AUTO: 152 10E3/UL (ref 150–450)
POTASSIUM SERPL-SCNC: 4.4 MMOL/L (ref 3.4–5.3)
PROT SERPL-MCNC: 6.1 G/DL (ref 6.4–8.3)
RBC # BLD AUTO: 4.45 10E6/UL (ref 3.8–5.2)
RBC MORPH BLD: ABNORMAL
SMUDGE CELLS BLD QL SMEAR: PRESENT
SODIUM SERPL-SCNC: 139 MMOL/L (ref 135–145)
WBC # BLD AUTO: 171.3 10E3/UL (ref 4–11)

## 2024-07-01 PROCEDURE — 85007 BL SMEAR W/DIFF WBC COUNT: CPT | Performed by: PATHOLOGY

## 2024-07-01 PROCEDURE — 99214 OFFICE O/P EST MOD 30 MIN: CPT | Performed by: INTERNAL MEDICINE

## 2024-07-01 PROCEDURE — 85027 COMPLETE CBC AUTOMATED: CPT | Performed by: PATHOLOGY

## 2024-07-01 PROCEDURE — 80053 COMPREHEN METABOLIC PANEL: CPT | Performed by: PATHOLOGY

## 2024-07-01 PROCEDURE — 36415 COLL VENOUS BLD VENIPUNCTURE: CPT | Performed by: PATHOLOGY

## 2024-07-01 NOTE — PROGRESS NOTES
Kerri is a 83 year old that presents in clinic today for the following:     Chief Complaint   Patient presents with    Follow Up     Back pain           7/1/2024     7:43 AM   Additional Questions   Roomed by YEVGENIY BLAS     Screenings as of today     PHQ-2 Total Score (Adult) - Positive if 3 or more points; Administer   PHQ-9 if positive 0    Fallen 2 or more times in the past year?  YEVGENIY Ruggiero at 7:48 AM on 7/1/2024

## 2024-07-05 DIAGNOSIS — M79.605 PAIN OF LEFT LOWER EXTREMITY: ICD-10-CM

## 2024-07-05 NOTE — TELEPHONE ENCOUNTER
Disp Refills Start End WEN   gabapentin (NEURONTIN) 100 MG capsule 30 capsule 1 11/7/2023 -- No   Sig: TAKE 1 CAPSULE(100 MG) BY MOUTH EVERY NIGHT AS NEEDED     ----------------------  Last Office Visit :   7/1/2024  [facility] Internal Medicine [facility] Office visit:  Selected Appointment    Selected Appointment   10/1/2024 8:30 AM (30 min)  [place]   Arrive by:  8:15 AM   [facility] RETURN   [facility] ([facility])   Juan Westbrook MD     ----------------------      Routing refill request to provider for review/approval because:   Drug not on the [facility] or [facility] refill protocol or controlled substance

## 2024-07-08 RX ORDER — GABAPENTIN 100 MG/1
CAPSULE ORAL
Qty: 30 CAPSULE | Refills: 1 | Status: SHIPPED | OUTPATIENT
Start: 2024-07-08

## 2024-07-15 ENCOUNTER — TELEPHONE (OUTPATIENT)
Dept: GASTROENTEROLOGY | Facility: CLINIC | Age: 84
End: 2024-07-15
Payer: COMMERCIAL

## 2024-07-15 NOTE — TELEPHONE ENCOUNTER
"Endoscopy Scheduling Screen    Have you had a positive Covid test in the last 14 days?  No    What is your communication preference for Instructions and/or Bowel Prep?   MyChart    What insurance is in the chart?  Other:  [employer]    Ordering/Referring Provider:   KIT DAILY        (If ordering provider performs procedure, schedule with ordering provider unless otherwise instructed. )    BMI: Estimated body mass index is 24.77 kg/m  as calculated from the following:    Height as of 5/8/24: 1.626 m (5' 4\").    Weight as of 7/1/24: 65.5 kg (144 lb 4.8 oz).     Sedation Ordered  moderate sedation.   If patient BMI > 50 do not schedule in [facility].    If patient BMI > 45 do not schedule at [facility].    Are you taking methadone or Suboxone?  No    Have you had difficulties, pain, or discomfort during past endoscopy procedures?  No    Are you taking any prescription medications for pain 3 or more times per week?   NO, No RN review required.    Do you have a history of malignant hyperthermia?  No    (Females) Are you currently pregnant?   No     Have you been diagnosed or told you have pulmonary hypertension?   No    Do you have an LVAD?  No    Have you been told you have moderate to severe sleep apnea?  No    Have you been told you have COPD, asthma, or any other lung disease?  No    Do you have any heart conditions?  No     Have you ever had or are you waiting for an organ transplant?  No. Continue scheduling, no site restrictions.    Have you had a stroke or transient ischemic attack (TIA aka \"mini stroke\" in the last 6 months?   No    Have you been diagnosed with or been told you have cirrhosis of the liver?   No    Are you currently on dialysis?   No    Do you need assistance transferring?   No    BMI: Estimated body mass index is 24.77 kg/m  as calculated from the following:    Height as of 5/8/24: 1.626 m (5' 4\").    Weight as of 7/1/24: 65.5 kg (144 lb 4.8 oz).     Is patients BMI > 40 and scheduling location " [facility]?  No    Do you take an injectable medication for weight loss or diabetes (excluding insulin)?  No    Do you take the medication Naltrexone?  No    Do you take blood thinners?  No       Prep   Are you currently on dialysis or do you have chronic kidney disease?  No    Do you have a diagnosis of diabetes?  No    Do you have a diagnosis of cystic fibrosis (CF)?  No    On a regular basis do you go 3 -5 days between bowel movements?  No    BMI > 40?  No    Preferred Pharmacy:    [id number] - [place], [place] - [address] AT [facility] [address]  [address]  [place] [address]  Phone: [phone] Fax: [phone]      Final Scheduling Details     Procedure scheduled  Colonoscopy    Surgeon:  MANA     Date of procedure:  11/18/2024     Pre-OP / PAC:   No - Not required for this site.    Location  [facility] - Patient preference.    Sedation   Moderate Sedation - Per order.      Patient Reminders:   You will receive a call from a Nurse to review instructions and health history.  This assessment must be completed prior to your procedure.  Failure to complete the Nurse assessment may result in the procedure being cancelled.      On the day of your procedure, please designate an adult(s) who can drive you home stay with you for the next 24 hours. The medicines used in the exam will make you sleepy. You will not be able to drive.      You cannot take public transportation, ride share services, or non-medical taxi service without a responsible caregiver.  Medical transport services are allowed with the requirement that a responsible caregiver will receive you at your destination.  We require that drivers and caregivers are confirmed prior to your procedure.

## 2024-07-28 ENCOUNTER — OFFICE VISIT (OUTPATIENT)
Dept: URGENT CARE | Facility: URGENT CARE | Age: 84
End: 2024-07-28
Payer: COMMERCIAL

## 2024-07-28 VITALS
SYSTOLIC BLOOD PRESSURE: 118 MMHG | HEART RATE: 73 BPM | RESPIRATION RATE: 16 BRPM | OXYGEN SATURATION: 96 % | TEMPERATURE: 97.2 F | DIASTOLIC BLOOD PRESSURE: 73 MMHG

## 2024-07-28 DIAGNOSIS — H57.89 IRRITATION OF LEFT EYE: Primary | ICD-10-CM

## 2024-07-28 PROCEDURE — 99213 OFFICE O/P EST LOW 20 MIN: CPT | Performed by: FAMILY MEDICINE

## 2024-07-28 NOTE — PROGRESS NOTES
Subjective: This morning patient put in her contacts but then she noticed that her left eye did not have any correction.  She looked around for the contact and could not find it and became worried that it is lost in the eye somewhere.  She kept trying to take it out but nothing would come out.  She has more contact lens she can put in.    Objective: Careful look at the left eye including flipping the eyelid shows that there is no contact lens anywhere in the eye.  The eye looks normal.    Assessment and plan: Irritation of the eye probably just from trying to take out a contact that was not there.  She can go ahead and put in a regular contact now.  It must of fallen out.

## 2024-08-07 ENCOUNTER — LAB (OUTPATIENT)
Dept: LAB | Facility: CLINIC | Age: 84
End: 2024-08-07
Payer: COMMERCIAL

## 2024-08-07 ENCOUNTER — OFFICE VISIT (OUTPATIENT)
Dept: DERMATOLOGY | Facility: CLINIC | Age: 84
End: 2024-08-07
Attending: INTERNAL MEDICINE
Payer: COMMERCIAL

## 2024-08-07 DIAGNOSIS — L81.4 SOLAR LENTIGO: ICD-10-CM

## 2024-08-07 DIAGNOSIS — D18.01 CHERRY ANGIOMA: ICD-10-CM

## 2024-08-07 DIAGNOSIS — Z12.83 SKIN CANCER SCREENING: Primary | ICD-10-CM

## 2024-08-07 DIAGNOSIS — D22.9 MULTIPLE BENIGN NEVI: ICD-10-CM

## 2024-08-07 DIAGNOSIS — D49.2 NEOPLASM OF UNSPECIFIED BEHAVIOR OF BONE, SOFT TISSUE, AND SKIN: ICD-10-CM

## 2024-08-07 DIAGNOSIS — C91.10 CLL (CHRONIC LYMPHOCYTIC LEUKEMIA) (H): ICD-10-CM

## 2024-08-07 DIAGNOSIS — L82.1 SEBORRHEIC KERATOSES: ICD-10-CM

## 2024-08-07 LAB
ALBUMIN SERPL BCG-MCNC: 4.5 G/DL (ref 3.5–5.2)
ALP SERPL-CCNC: 104 U/L (ref 40–150)
ALT SERPL W P-5'-P-CCNC: 19 U/L (ref 0–50)
ANION GAP SERPL CALCULATED.3IONS-SCNC: 10 MMOL/L (ref 7–15)
AST SERPL W P-5'-P-CCNC: 29 U/L (ref 0–45)
BASOPHILS # BLD AUTO: ABNORMAL 10*3/UL
BASOPHILS # BLD MANUAL: 0 10E3/UL (ref 0–0.2)
BASOPHILS NFR BLD AUTO: ABNORMAL %
BASOPHILS NFR BLD MANUAL: 0 %
BILIRUB SERPL-MCNC: 0.5 MG/DL
BUN SERPL-MCNC: 13.4 MG/DL (ref 8–23)
CALCIUM SERPL-MCNC: 9.5 MG/DL (ref 8.8–10.4)
CHLORIDE SERPL-SCNC: 102 MMOL/L (ref 98–107)
CREAT SERPL-MCNC: 0.94 MG/DL (ref 0.51–0.95)
EGFRCR SERPLBLD CKD-EPI 2021: 60 ML/MIN/1.73M2
EOSINOPHIL # BLD AUTO: ABNORMAL 10*3/UL
EOSINOPHIL # BLD MANUAL: 0 10E3/UL (ref 0–0.7)
EOSINOPHIL NFR BLD AUTO: ABNORMAL %
EOSINOPHIL NFR BLD MANUAL: 0 %
ERYTHROCYTE [DISTWIDTH] IN BLOOD BY AUTOMATED COUNT: 14.6 % (ref 10–15)
GLUCOSE SERPL-MCNC: 126 MG/DL (ref 70–99)
HCO3 SERPL-SCNC: 27 MMOL/L (ref 22–29)
HCT VFR BLD AUTO: 46.3 % (ref 35–47)
HGB BLD-MCNC: 14.3 G/DL (ref 11.7–15.7)
IMM GRANULOCYTES # BLD: ABNORMAL 10*3/UL
IMM GRANULOCYTES NFR BLD: ABNORMAL %
LYMPHOCYTES # BLD AUTO: ABNORMAL 10*3/UL
LYMPHOCYTES # BLD MANUAL: 200.3 10E3/UL (ref 0.8–5.3)
LYMPHOCYTES NFR BLD AUTO: ABNORMAL %
LYMPHOCYTES NFR BLD MANUAL: 94 %
MCH RBC QN AUTO: 29.7 PG (ref 26.5–33)
MCHC RBC AUTO-ENTMCNC: 30.9 G/DL (ref 31.5–36.5)
MCV RBC AUTO: 96 FL (ref 78–100)
MONOCYTES # BLD AUTO: ABNORMAL 10*3/UL
MONOCYTES # BLD MANUAL: 6.4 10E3/UL (ref 0–1.3)
MONOCYTES NFR BLD AUTO: ABNORMAL %
MONOCYTES NFR BLD MANUAL: 3 %
NEUTROPHILS # BLD AUTO: ABNORMAL 10*3/UL
NEUTROPHILS # BLD MANUAL: 6.4 10E3/UL (ref 1.6–8.3)
NEUTROPHILS NFR BLD AUTO: ABNORMAL %
NEUTROPHILS NFR BLD MANUAL: 3 %
NRBC # BLD AUTO: 0 10E3/UL
NRBC BLD AUTO-RTO: 0 /100
PLAT MORPH BLD: ABNORMAL
PLATELET # BLD AUTO: 171 10E3/UL (ref 150–450)
POTASSIUM SERPL-SCNC: 4.6 MMOL/L (ref 3.4–5.3)
PROT SERPL-MCNC: 6.8 G/DL (ref 6.4–8.3)
RBC # BLD AUTO: 4.82 10E6/UL (ref 3.8–5.2)
RBC MORPH BLD: ABNORMAL
SODIUM SERPL-SCNC: 139 MMOL/L (ref 135–145)
WBC # BLD AUTO: 213.1 10E3/UL (ref 4–11)

## 2024-08-07 PROCEDURE — 85027 COMPLETE CBC AUTOMATED: CPT | Performed by: PATHOLOGY

## 2024-08-07 PROCEDURE — 80053 COMPREHEN METABOLIC PANEL: CPT | Performed by: PATHOLOGY

## 2024-08-07 PROCEDURE — 85007 BL SMEAR W/DIFF WBC COUNT: CPT | Performed by: PATHOLOGY

## 2024-08-07 PROCEDURE — 11103 TANGNTL BX SKIN EA SEP/ADDL: CPT | Mod: XS | Performed by: DERMATOLOGY

## 2024-08-07 PROCEDURE — 11102 TANGNTL BX SKIN SINGLE LES: CPT | Performed by: DERMATOLOGY

## 2024-08-07 PROCEDURE — 99213 OFFICE O/P EST LOW 20 MIN: CPT | Mod: 25 | Performed by: DERMATOLOGY

## 2024-08-07 PROCEDURE — 88305 TISSUE EXAM BY PATHOLOGIST: CPT | Mod: TC | Performed by: DERMATOLOGY

## 2024-08-07 PROCEDURE — 36415 COLL VENOUS BLD VENIPUNCTURE: CPT | Performed by: PATHOLOGY

## 2024-08-07 PROCEDURE — 88305 TISSUE EXAM BY PATHOLOGIST: CPT | Mod: 26 | Performed by: DERMATOLOGY

## 2024-08-07 ASSESSMENT — PAIN SCALES - GENERAL: PAINLEVEL: NO PAIN (0)

## 2024-08-07 NOTE — NURSING NOTE
Lidocaine-epinephrine 1-1:929369 % injection   0.5mL once for one use, starting 8/7/2024 ending 8/7/2024,  2mL disp, R-0, injection  Injected by Anabel Medeiros RN

## 2024-08-07 NOTE — PROGRESS NOTES
[facility] Dermatology Note  Encounter Date: Aug 7, 2024  Office Visit     Dermatology Problem List:  FBSE 08/07/2024   0. NUB x2, L lateral nasal side wall, R lower lateral shin, s/p shave bx 08/07/2024  # Lesion on R upper nasal side wall - ddx cyst v bony structure?  - ultrasound ordered 8/7/24 to characterize  ____________________________________________    Assessment & Plan:    # Lesion on R upper nasal side wall - ddx cyst v bony structure?  - ultrasound ordered today to characterize    # Neoplasm of unspecified behavior of the skin (D49.2) on the L lateral nasal side wall. The differential diagnosis includes BCC .   # Neoplasm of unspecified behavior of the skin (D49.2) on the R lower lateral shin. The differential diagnosis includes BCC .   - shave biopsy performed today, see procedure note below    # Benign lesions - SKs, cherry angiomas, lentigenes.  - No treatment required    # Multiple benign nevi.   - Monitor for ABCDEs of melanoma   - Continue sun protection - recommend SPF 30 or higher with frequent application   - Return sooner if noticing changing or symptomatic lesions      Procedures Performed:   - Shave biopsy: After discussion of benefits and risks including but not limited to bleeding, infection, scar, incomplete removal, recurrence, and non-diagnostic biopsy, written consent and photographs were obtained. The area was cleaned with isopropyl alcohol. < 1mL of 1% lidocaine with epinephrine was injected to obtain adequate anesthesia. A shave biopsy was performed. Hemostasis was achieved with aluminium chloride. Vaseline and a sterile dressing were applied. The patient tolerated the procedure and no complications were noted. The patient was provided with verbal and written post care instructions.       Follow-up: 6-12 months pending pathology, in-person, or earlier for new or changing lesions    Staff and Scribe:   Scribe Disclosure:   By signing my name below, Simran LISA,  attest that this documentation has been prepared under the direction and in the presence of Vielka Ann MD.  - Electronically Signed: Simran Delacruzkevalison 08/07/24     Provider Disclosure:   The documentation recorded by the scribe accurately reflects the services I personally performed and the decisions made by me.    Vielka Ann MD    Department of Dermatology  [facility]: Phone: [phone], Fax: [phone]  8/13/2024       ____________________________________________    CC: Skin Check (Here today for a skin check. No concerns. )      HPI:  Ms. Kerri Shook is a(n) 84 year old female who presents today as a return patient for FBSE.     Patient has a  spot on her nose.    Patient is otherwise feeling well, without additional skin concerns.    Labs Reviewed:  N/A    Physical exam:  Vitals: There were no vitals taken for this visit.  GEN: This is a well developed, well-nourished female in no acute distress, in a pleasant mood.    SKIN: Total skin excluding the undergarment areas was performed. The exam included the head/face, neck, both arms, chest, back, abdomen, both legs, digits and/or nails.   - L lateral nasal side wall 1 mm pearly papule  - R upper nasal side wall firm subcutaneous papulonodule  - R lower lateral shin pink shiny macule   - There are dome shaped bright red papules on the head/neck, trunk, extremities.   - Multiple regular brown pigmented macules and papules are identified on the head/neck, trunk, extremities.   - Scattered brown macules on sun exposed areas.  - There are waxy stuck on tan to brown papules on the head/neck, trunk, extremities.  - No other lesions of concern on areas examined.       Medications:  Current Outpatient Medications   Medication Sig Dispense Refill    aspirin 81 MG tablet Take 81 mg by mouth daily      carvedilol (COREG) 3.125 MG tablet Take 1 tablet (3.125 mg) by  mouth 2 times daily 180 tablet 2    gabapentin (NEURONTIN) 100 MG capsule TAKE 1 CAPSULE(100 MG) BY MOUTH EVERY NIGHT AS NEEDED 30 capsule 1    Multiple Vitamin (MULTIVITAMIN) per tablet Take 1 tablet by mouth daily.      simvastatin (ZOCOR) 20 MG tablet TAKE 1 TABLET(20 MG) BY MOUTH AT BEDTIME 90 tablet 3     No current facility-administered medications for this visit.        Past Medical History:   Patient Active Problem List   Diagnosis    Breast cancer -- Left    S/P coronary angiogram    Stress-induced cardiomyopathy    Foot injury    Advance care planning    Rosacea    KP (keratosis pilaris)    CLL (chronic lymphocytic leukemia) (H)    Hx of cardiomyopathy    Encounter for routine gynecological examination    Menopause--age 50    Colon adenomas    SK (seborrheic keratosis)    Seborrheic keratosis    Plantar fasciitis    Acute right-sided low back pain with right-sided sciatica     Past Medical History:   Diagnosis Date    Breast disorder 1990    Breast Cancer    Cardiomyopathy (H)     Chronic osteoarthritis 2012    resulted in hip replacement    CLL (chronic lymphoblastic leukemia) 1989    Closed fracture of second toe of left foot     Colon adenomas     6/26/14 colonoscopy, repeat in 3 years    History of blood transfusion     hyperlipidemia     Primary localized osteoarthrosis, pelvic region and thigh     Tinnitus         CC Juan Westbrook MD  [address] [address] on close of this encounter.

## 2024-08-07 NOTE — LETTER
8/7/2024       RE: Kerri Shook  [address]  [facility] [place] [address]     Dear Colleague,    Thank you for referring your patient, Kerri Shook, to the [facility] DERMATOLOG[facility] [place] at [facility]. Please see a copy of my visit note below.    [facility] Dermatology Note  Encounter Date: Aug 7, 2024  Office Visit     Dermatology Problem List:  FBSE 08/07/2024   0. NUB x2, L lateral nasal side wall, R lower lateral shin, s/p shave bx 08/07/2024  # Lesion on R upper nasal side wall - ddx cyst v bony structure?  - ultrasound ordered 8/7/24 to characterize  ____________________________________________    Assessment & Plan:    # Lesion on R upper nasal side wall - ddx cyst v bony structure?  - ultrasound ordered today to characterize    # Neoplasm of unspecified behavior of the skin (D49.2) on the L lateral nasal side wall. The differential diagnosis includes BCC .   # Neoplasm of unspecified behavior of the skin (D49.2) on the R lower lateral shin. The differential diagnosis includes BCC .   - shave biopsy performed today, see procedure note below    # Benign lesions - SKs, cherry angiomas, lentigenes.  - No treatment required    # Multiple benign nevi.   - Monitor for ABCDEs of melanoma   - Continue sun protection - recommend SPF 30 or higher with frequent application   - Return sooner if noticing changing or symptomatic lesions      Procedures Performed:   - Shave biopsy: After discussion of benefits and risks including but not limited to bleeding, infection, scar, incomplete removal, recurrence, and non-diagnostic biopsy, written consent and photographs were obtained. The area was cleaned with isopropyl alcohol. < 1mL of 1% lidocaine with epinephrine was injected to obtain adequate anesthesia. A shave biopsy was performed. Hemostasis was achieved with aluminium chloride. Vaseline and a sterile dressing were applied.  The patient tolerated the procedure and no complications were noted. The patient was provided with verbal and written post care instructions.       Follow-up: 6-12 months pending pathology, in-person, or earlier for new or changing lesions    Staff and Scribe:   Scribe Disclosure:   By signing my name below, I, Simran Chow, attest that this documentation has been prepared under the direction and in the presence of Vielka Ann MD.  - Electronically Signed: Simran Chow 08/07/24     Provider Disclosure:   The documentation recorded by the scribe accurately reflects the services I personally performed and the decisions made by me.    Vielka Ann MD    Department of Dermatology  [facility]: Phone: [phone], Fax: [phone]  8/13/2024       ____________________________________________    CC: Skin Check (Here today for a skin check. No concerns. )      HPI:  Ms. Kerri Shook is a(n) 84 year old female who presents today as a return patient for FBSE.     Patient has a  spot on her nose.    Patient is otherwise feeling well, without additional skin concerns.    Labs Reviewed:  N/A    Physical exam:  Vitals: There were no vitals taken for this visit.  GEN: This is a well developed, well-nourished female in no acute distress, in a pleasant mood.    SKIN: Total skin excluding the undergarment areas was performed. The exam included the head/face, neck, both arms, chest, back, abdomen, both legs, digits and/or nails.   - L lateral nasal side wall 1 mm pearly papule  - R upper nasal side wall firm subcutaneous papulonodule  - R lower lateral shin pink shiny macule   - There are dome shaped bright red papules on the head/neck, trunk, extremities.   - Multiple regular brown pigmented macules and papules are identified on the head/neck, trunk, extremities.   - Scattered brown macules on sun exposed areas.  - There are  waxy stuck on tan to brown papules on the head/neck, trunk, extremities.  - No other lesions of concern on areas examined.       Medications:  Current Outpatient Medications   Medication Sig Dispense Refill     aspirin 81 MG tablet Take 81 mg by mouth daily       carvedilol (COREG) 3.125 MG tablet Take 1 tablet (3.125 mg) by mouth 2 times daily 180 tablet 2     gabapentin (NEURONTIN) 100 MG capsule TAKE 1 CAPSULE(100 MG) BY MOUTH EVERY NIGHT AS NEEDED 30 capsule 1     Multiple Vitamin (MULTIVITAMIN) per tablet Take 1 tablet by mouth daily.       simvastatin (ZOCOR) 20 MG tablet TAKE 1 TABLET(20 MG) BY MOUTH AT BEDTIME 90 tablet 3     No current facility-administered medications for this visit.        Past Medical History:   Patient Active Problem List   Diagnosis     Breast cancer -- Left     S/P coronary angiogram     Stress-induced cardiomyopathy     Foot injury     Advance care planning     Rosacea     KP (keratosis pilaris)     CLL (chronic lymphocytic leukemia) (H)     Hx of cardiomyopathy     Encounter for routine gynecological examination     Menopause--age 50     Colon adenomas     SK (seborrheic keratosis)     Seborrheic keratosis     Plantar fasciitis     Acute right-sided low back pain with right-sided sciatica     Past Medical History:   Diagnosis Date     Breast disorder 1990    Breast Cancer     Cardiomyopathy (H)      Chronic osteoarthritis 2012    resulted in hip replacement     CLL (chronic lymphoblastic leukemia) 1989     Closed fracture of second toe of left foot      Colon adenomas     6/26/14 colonoscopy, repeat in 3 years     History of blood transfusion      hyperlipidemia      Primary localized osteoarthrosis, pelvic region and thigh      Tinnitus         CC Juan Westbrook MD  [address] [address] on close of this encounter.      Again, thank you for allowing me to participate in the care of your patient.      Sincerely,    Vielka Ann MD

## 2024-08-07 NOTE — PATIENT INSTRUCTIONS
Checking for Skin Cancer  You can help find cancer early by checking your skin each month. There are 3 main kinds of skin cancer: melanoma, basal cell carcinoma, and squamous cell carcinoma. Doing monthly skin checks is the best way to find new marks, sores, or skin changes. Follow these instructions for checking your skin.   The ABCDEs of checking moles for melanoma   Check your moles or growths for signs of melanoma using ABCDE:   Asymmetry: The sides of the mole or growth don t match.  Border: The edges are ragged, notched, or blurred.  Color: The color within the mole or growth varies. It could be black, brown, tan, white, or shades of red, gray, or blue.  Diameter: The mole or growth is larger than   inch or 6 mm (size of a pencil eraser).  Evolving: The size, shape, texture, or color of the mole or growth is changing.     ABCDE's of moles on light skin.        ABCDE's of moles on dark skin may be harder to identify.     Checking for other types of skin cancer  Basal cell carcinoma or squamous cell carcinoma cause symptoms like:     A spot or mole that looks different from all other marks on your skin  Changes in how an area feels, such as itching, tenderness, or pain  Changes in the skin's surface, such as oozing, bleeding, or scaliness  A sore that doesn't heal  New swelling, redness, or spread of color beyond the border of a mole    Who s at risk?  Anyone of any skin color can get skin cancer. But you're at greater risk if you have:   Fair skin that freckles easily and burns instead of tanning  Light-colored or red hair  Light-colored eyes  Many moles or abnormal moles on your skin  A long history of unprotected exposure to sunlight or tanning beds  A history of many blistering sunburns as a child or teen  A family history of skin cancer  Been exposed to radiation or chemicals  A weakened immune system  Been exposed to arsenic  If you've had skin cancer in the past, you're at high risk of having it again.    How to check your skin  Do your monthly skin checkups in front of a full-length mirror. Use a room with good lighting so it's easier to see. Use a hand mirror to look at hard-to-see places like your buttocks and back. You can also have a trusted friend or family member help you with these checks. Check every part of your body, including your:   Head (ears, face, neck, and scalp)  Torso (front, back, sides, and under breasts)  Arms (tops, undersides, and armpits)  Hands (palms, backs, and fingers, including under the nails)  Lower back, buttocks, and genitals  Legs (front, back, and sides)  Feet (tops, soles, toes, including under the nails, and between toes)  Watch for new spots on your skin or a spot that's changing in color, shape, size.   If you have a lot of moles, take digital photos of them each month. Make sure to take photos both up close and from a distance. These can help you see if any moles change over time.   Know your skin  Most skin changes aren't cancer. But if you see any changes in your skin, call your healthcare provider right away. Only they can tell you if a change is a problem. If you have skin cancer, seeing your provider can be the first step to getting the treatment that could save your life.   Jayme last reviewed this educational content on 10/1/2021    9547-1522 [employer]. All rights reserved. This information is not intended as a substitute for professional medical care. Always follow your healthcare professional's instructions.     Wound Care After a Biopsy    What is a skin biopsy?  A skin biopsy allows the doctor to examine a very small piece of tissue under the microscope to determine the diagnosis and the best treatment for the skin condition. A local anesthetic (numbing medicine) is injected with a very small needle into the skin area to be tested. A small piece of skin is taken from the area. Sometimes a suture (stitch) is used.     What are the risks of a skin  biopsy?  I will experience scar, bleeding, swelling, pain, crusting and redness. I may experience incomplete removal or recurrence. Risks of this procedure are excessive bleeding, bruising, infection, nerve damage, numbness, thick (hypertrophic or keloidal) scar and non-diagnostic biopsy.    How should I care for my wound for the first 24 hours?  Keep the wound dry and covered for 24 hours  If it bleeds, hold direct pressure on the area for 15 minutes. If bleeding does not stop, call us or go to the emergency room  Avoid strenuous exercise the first 1-2 days or as your doctor instructs you    How should I care for the wound after 24 hours?  After 24 hours, remove the bandage  You may bathe or shower as normal  If you had a scalp biopsy, you can shampoo as usual and can use shower water to clean the biopsy site daily  Clean the wound once a day with gentle soap and water  Do not scrub, be gentle  Apply white petroleum/Vaseline after cleaning the wound with a cotton swab or a clean finger, and keep the site covered with a Bandaid /bandage. Bandages are not necessary with a scalp biopsy  If you are unable to cover the site with a Bandaid /bandage, re-apply ointment 2-3 times a day to keep the site moist. Moisture will help with healing  Avoid strenuous activity for first 1-2 days  Avoid lakes, rivers, pools, and oceans until the stitches are removed or the site is healed    How do I clean my wound?  Wash hands thoroughly with soap or use hand  before all wound care  Clean the wound with gentle soap and water  Apply white petroleum/Vaseline  to wound after it is clean  Replace the Bandaid /bandage to keep the wound covered for the first few days or as instructed by your doctor  If you had a scalp biopsy, warm shower water to the area on a daily basis should suffice    What should I use to clean my wound?   Cotton-tipped applicators (Qtips )  White petroleum jelly (Vaseline ). Use a clean new container and use  Q-tips to apply.  Bandaids  as needed  Gentle soap     How should I care for my wound long term?  Do not get your wound dirty  Keep up with wound care for one week or until the area is healed.  A small scab will form and fall off by itself when the area is completely healed. The area will be red and will become pink in color as it heals. Sun protection is very important for how your scar will turn out. Sunscreen with an SPF 30 or greater is recommended once the area is healed.  You should have some soreness but it should be mild and slowly go away over several days. Talk to your doctor about using tylenol for pain,    When should I call my doctor?  If you have increased:   Pain or swelling  Pus or drainage (clear or slightly yellow drainage is ok)  Temperature over 100F  Spreading redness or warmth around wound    When will I hear about my results?  The biopsy results can take 2 weeks to come back.  Your results will automatically release to [employer] before your provider has even reviewed them.  The clinic will call you with the results, send you a [employer] message, or have you schedule a follow-up clinic or phone time to discuss the results.  Contact our clinics if you do not hear from us in 2 weeks.    Who should I call with questions?  [facility]: [phone]  [facility]: [phone]  For urgent needs outside of business hours call the [facility] at [phone] and ask for the dermatology resident on call

## 2024-08-07 NOTE — NURSING NOTE
Dermatology Rooming Note    Kerri Shook's goals for this visit include:   Chief Complaint   Patient presents with    Skin Check     Here today for a skin check. No concerns.      Anabel Medeiros RN

## 2024-08-08 ENCOUNTER — PATIENT OUTREACH (OUTPATIENT)
Dept: ONCOLOGY | Facility: CLINIC | Age: 84
End: 2024-08-08
Payer: COMMERCIAL

## 2024-08-08 LAB
PATH REPORT.COMMENTS IMP SPEC: ABNORMAL
PATH REPORT.COMMENTS IMP SPEC: ABNORMAL
PATH REPORT.COMMENTS IMP SPEC: YES
PATH REPORT.FINAL DX SPEC: ABNORMAL
PATH REPORT.GROSS SPEC: ABNORMAL
PATH REPORT.MICROSCOPIC SPEC OTHER STN: ABNORMAL
PATH REPORT.RELEVANT HX SPEC: ABNORMAL

## 2024-08-12 ENCOUNTER — TELEPHONE (OUTPATIENT)
Dept: DERMATOLOGY | Facility: CLINIC | Age: 84
End: 2024-08-12
Payer: COMMERCIAL

## 2024-08-12 DIAGNOSIS — C44.91 BASAL CELL CARCINOMA: Primary | ICD-10-CM

## 2024-08-13 ENCOUNTER — TELEPHONE (OUTPATIENT)
Dept: DERMATOLOGY | Facility: CLINIC | Age: 84
End: 2024-08-13
Payer: COMMERCIAL

## 2024-08-13 NOTE — PROGRESS NOTES
[facility]: Cancer Care                                                                                            RNCC received message from Dr. Centeno stating that patient's WBC count was increasing again. He would like to see patient at next available to discuss treatment.     RNCC sent message to scheduling requesting that this be completed. RNCC sending my chart to patient to make her aware that scheduling will be calling.     RNCC encouraged her to call with any questions or concerns.     Signature:  Beulah Graham RN

## 2024-08-13 NOTE — TELEPHONE ENCOUNTER
Called patient to schedule surgery with Dr. Lucio    Date of Surgery: 10/15    Surgery type: Mohs and excision     Consult scheduled: Yes    Has patient had mohs with us before? No    Additional comments: le Chamberlain on 8/13/2024 at 10:51 AM

## 2024-08-14 ENCOUNTER — ONCOLOGY VISIT (OUTPATIENT)
Dept: ONCOLOGY | Facility: CLINIC | Age: 84
End: 2024-08-14
Attending: STUDENT IN AN ORGANIZED HEALTH CARE EDUCATION/TRAINING PROGRAM
Payer: COMMERCIAL

## 2024-08-14 VITALS
DIASTOLIC BLOOD PRESSURE: 77 MMHG | WEIGHT: 142.3 LBS | BODY MASS INDEX: 24.43 KG/M2 | SYSTOLIC BLOOD PRESSURE: 116 MMHG | RESPIRATION RATE: 12 BRPM | HEART RATE: 77 BPM | TEMPERATURE: 97.7 F | OXYGEN SATURATION: 96 %

## 2024-08-14 DIAGNOSIS — C91.10 CLL (CHRONIC LYMPHOCYTIC LEUKEMIA) (H): Primary | ICD-10-CM

## 2024-08-14 PROCEDURE — G2211 COMPLEX E/M VISIT ADD ON: HCPCS | Performed by: STUDENT IN AN ORGANIZED HEALTH CARE EDUCATION/TRAINING PROGRAM

## 2024-08-14 PROCEDURE — G0463 HOSPITAL OUTPT CLINIC VISIT: HCPCS | Performed by: STUDENT IN AN ORGANIZED HEALTH CARE EDUCATION/TRAINING PROGRAM

## 2024-08-14 PROCEDURE — 99215 OFFICE O/P EST HI 40 MIN: CPT | Performed by: STUDENT IN AN ORGANIZED HEALTH CARE EDUCATION/TRAINING PROGRAM

## 2024-08-14 ASSESSMENT — PAIN SCALES - GENERAL: PAINLEVEL: NO PAIN (0)

## 2024-08-14 NOTE — PROGRESS NOTES
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    1. CLL in 1986. Found to have an elevated white blood count on routine testing. This was consistent with CLL.Saw Dr. Puente who recommended observation. WBCs 15-20 during this time. She has required no treatment for CLL. Followed by PCP with yearly CBCs.  2. 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 The patient had a screening mammogram in 1990, which showed an abnormality in the left breast. She underwent a lumpectomy on 03/02/1990, which was consistent with a 1.5 cm primary, mucinous adenocarcinoma with 0 of 8 lymph nodes positive. She was staged as a stage I (T1 N0 M0). The tumor was ER positive. She did have radiation to the left breast with a boost having completed 6600 cGy in 33 fractions from 03/28/1990 until 05/15/1990. She went on to take tamoxifen for 6 years from 03/1990 until 03/1996.  3. 1/2019 labs showing WBC increasing to 27. This has continued with WBCs 47 on 11/12/2020 and 71.0 on 8//18/21  4. 8/31/21 Peripheral flow showing CD5+ lambda CLL cells. Hb 14.6, WBC 66.2, Plts 162, ANC 5.3  5. 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 No new symptoms. Denies f/c/s or n/v/d. No new lumps/bumps.     CURRENT OUTPATIENT MEDICATIONS  Current Outpatient Medications   Medication Sig Dispense Refill    aspirin 81 MG tablet Take 81 mg by mouth daily      carvedilol (COREG) 3.125 MG tablet Take 1 tablet (3.125 mg) by mouth 2 times daily 180 tablet 2    gabapentin (NEURONTIN) 100 MG capsule TAKE 1 CAPSULE(100 MG) BY MOUTH EVERY NIGHT AS NEEDED 30 capsule 1    Multiple Vitamin (MULTIVITAMIN) per  tablet Take 1 tablet by mouth daily.      simvastatin (ZOCOR) 20 MG tablet TAKE 1 TABLET(20 MG) BY MOUTH AT BEDTIME 90 tablet 3       REVIEW OF SYSTEMS  A complete ROS was performed and was negative except as mentioned in HPI    PHYSICAL EXAM  /77 (BP Location: Right arm, Patient Position: Sitting, Cuff Size: Adult Regular)   Pulse 77   Temp 97.7  F (36.5  C) (Oral)   Resp 12   Wt 64.5 kg (142 lb 4.8 oz)   SpO2 96%   BMI 24.43 kg/m    Gen: alert, pleasant and conversational, NAD  HEENT: NC/AT, EOMI w/ PERRL, anicteric sclera. OP clear. MMM.   Lymph: No palpable cervical, supraclavicular, axillary LAD  CV: normal S1,S2 with RRR no m/r/g  Resp: lungs CTA bilaterally with adequate air movement to bases. No wheezes or crackles  Abd: soft NTND no organomegaly or masses. BS normoactive.   Ext: WWP no edema or cyanosis  Skin: no concerning lesions or rashes  Neuro: A&Ox4, no lateralizing sx. Grossly nonfocal.          Wt Readings from Last 4 Encounters:   07/01/24 65.5 kg (144 lb 4.8 oz)   06/05/24 65.8 kg (145 lb 1.6 oz)   05/08/24 65.3 kg (144 lb)   04/04/24 63.5 kg (140 lb 1.6 oz)       LABORATORY AND IMAGING STUDIES  Most Recent 3 CBC's:  Recent Labs   Lab Test 08/07/24  0948 07/01/24  0849 06/05/24  1153 02/15/23  1345 12/06/22  1006 09/06/22  1003 02/17/22  1005 01/13/22  0952   .1* 171.3* 156.5*   < > 112.5* 104.7*   < > 93.1*   HGB 14.3 13.2 12.5   < > 13.3 13.8   < > 14.4   MCV 96 95 95   < > 93 93   < > 93    152 149*   < > 172 181   < > 189   ANEUTAUTO  --   --   --   --  4.2 4.8  --  5.8    < > = values in this interval not displayed.     Most Recent 3 BMP's:  Recent Labs   Lab Test 08/07/24  0948 07/01/24  0849 06/05/24  1153    139 140   POTASSIUM 4.6 4.4 4.8   CHLORIDE 102 104 106   CO2 27 27 26   BUN 13.4 17.0 10.7   CR 0.94 0.93 0.85   ANIONGAP 10 8 8   AAKASH 9.5 9.4 8.8   * 110* 98   PROTTOTAL 6.8 6.1* 5.9*   ALBUMIN 4.5 4.2 4.1    Most Recent 3 LFT's:  Recent Labs    Lab Test 08/07/24  0948 07/01/24  0849 06/05/24  1153   AST 29 29 30   ALT 19 23 20   ALKPHOS 104 106 96   BILITOTAL 0.5 0.5 0.4    Most Recent 2 TSH and T4:No lab results found.  I reviewed the above labs today.        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    # High risk CLL - Arboleda Stage 0, CLL-IPI = 5 = High risk. FISH showing loss of 17p, IgHV mutated and NGS for TP53 mutation positive    Reviewed WBC trend which is increasing. Doubling time is still >6 months but ALC now >200 and took a large increase from 150 in 2 months. Discussed that while she doesn't absolutely need therapy now, I believe that the time was coming and we should discuss options. We reviewed continuous BTKi with Acalabrutinib / Zanubrutinib vs time limited therapy with Obin/Venetoclax. Also discussed a clinical trial utilizing Zanubritinib + Sonrotoclax that we have available. Will discuss this with the trial team and screen for eligibility. She states that she is interested in this.    Provided information on risks/benefits of BTKi vs O/Venetoclax for her to consider. Will monitor counts carefully and move forward when she is ready    Final plan:  - Continue monthly labs  - low threshold to start therapy  - screen for clinical trial    The longitudinal plan of care for the diagnosis(es)/condition(s) as documented were addressed during this visit. Due to the added complexity in care, I will continue to support Kerri in the subsequent management and with ongoing continuity of care.     51 minutes spent on the date of the encounter doing chart review, review of test results, interpretation of tests, patient visit, and documentation     Pop Centeno MD     Division of Hematology, Oncology and Transplantation  [facility]  [place]  P: [phone]

## 2024-08-14 NOTE — LETTER
8/14/2024      Kerri Shook  [address]  [place] [address]      Dear Colleague,    Thank you for referring your patient, Kerri Shook, to the [facility]. Please see a copy of my visit note below.    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    1. CLL in 1986. Found to have an elevated white blood count on routine testing. This was consistent with CLL.Saw Dr. Puente who recommended observation. WBCs 15-20 during this time. She has required no treatment for CLL. Followed by PCP with yearly CBCs.  2. 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 The patient had a screening mammogram in 1990, which showed an abnormality in the left breast. She underwent a lumpectomy on 03/02/1990, which was consistent with a 1.5 cm primary, mucinous adenocarcinoma with 0 of 8 lymph nodes positive. She was staged as a stage I (T1 N0 M0). The tumor was ER positive. She did have radiation to the left breast with a boost having completed 6600 cGy in 33 fractions from 03/28/1990 until 05/15/1990. She went on to take tamoxifen for 6 years from 03/1990 until 03/1996.  3. 1/2019 labs showing WBC increasing to 27. This has continued with WBCs 47 on 11/12/2020 and 71.0 on 8//18/21  4. 8/31/21 Peripheral flow showing CD5+ lambda CLL cells. Hb 14.6, WBC 66.2, Plts 162, ANC 5.3  5. 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 No new symptoms. Denies f/c/s or n/v/d. No new lumps/bumps.     CURRENT OUTPATIENT MEDICATIONS  Current Outpatient Medications   Medication Sig Dispense Refill     aspirin 81 MG tablet Take 81 mg by mouth  daily       carvedilol (COREG) 3.125 MG tablet Take 1 tablet (3.125 mg) by mouth 2 times daily 180 tablet 2     gabapentin (NEURONTIN) 100 MG capsule TAKE 1 CAPSULE(100 MG) BY MOUTH EVERY NIGHT AS NEEDED 30 capsule 1     Multiple Vitamin (MULTIVITAMIN) per tablet Take 1 tablet by mouth daily.       simvastatin (ZOCOR) 20 MG tablet TAKE 1 TABLET(20 MG) BY MOUTH AT BEDTIME 90 tablet 3       REVIEW OF SYSTEMS  A complete ROS was performed and was negative except as mentioned in HPI    PHYSICAL EXAM  /77 (BP Location: Right arm, Patient Position: Sitting, Cuff Size: Adult Regular)   Pulse 77   Temp 97.7  F (36.5  C) (Oral)   Resp 12   Wt 64.5 kg (142 lb 4.8 oz)   SpO2 96%   BMI 24.43 kg/m    Gen: alert, pleasant and conversational, NAD  HEENT: NC/AT, EOMI w/ PERRL, anicteric sclera. OP clear. MMM.   Lymph: No palpable cervical, supraclavicular, axillary LAD  CV: normal S1,S2 with RRR no m/r/g  Resp: lungs CTA bilaterally with adequate air movement to bases. No wheezes or crackles  Abd: soft NTND no organomegaly or masses. BS normoactive.   Ext: WWP no edema or cyanosis  Skin: no concerning lesions or rashes  Neuro: A&Ox4, no lateralizing sx. Grossly nonfocal.          Wt Readings from Last 4 Encounters:   07/01/24 65.5 kg (144 lb 4.8 oz)   06/05/24 65.8 kg (145 lb 1.6 oz)   05/08/24 65.3 kg (144 lb)   04/04/24 63.5 kg (140 lb 1.6 oz)       LABORATORY AND IMAGING STUDIES  Most Recent 3 CBC's:  Recent Labs   Lab Test 08/07/24  0948 07/01/24  0849 06/05/24  1153 02/15/23  1345 12/06/22  1006 09/06/22  1003 02/17/22  1005 01/13/22  0952   .1* 171.3* 156.5*   < > 112.5* 104.7*   < > 93.1*   HGB 14.3 13.2 12.5   < > 13.3 13.8   < > 14.4   MCV 96 95 95   < > 93 93   < > 93    152 149*   < > 172 181   < > 189   ANEUTAUTO  --   --   --   --  4.2 4.8  --  5.8    < > = values in this interval not displayed.     Most Recent 3 BMP's:  Recent Labs   Lab Test 08/07/24  0948 07/01/24  0849 06/05/24  1153   NA  139 139 140   POTASSIUM 4.6 4.4 4.8   CHLORIDE 102 104 106   CO2 27 27 26   BUN 13.4 17.0 10.7   CR 0.94 0.93 0.85   ANIONGAP 10 8 8   AAKASH 9.5 9.4 8.8   * 110* 98   PROTTOTAL 6.8 6.1* 5.9*   ALBUMIN 4.5 4.2 4.1    Most Recent 3 LFT's:  Recent Labs   Lab Test 08/07/24  0948 07/01/24  0849 06/05/24  1153   AST 29 29 30   ALT 19 23 20   ALKPHOS 104 106 96   BILITOTAL 0.5 0.5 0.4    Most Recent 2 TSH and T4:No lab results found.  I reviewed the above labs today.        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    # High risk CLL - Arboleda Stage 0, CLL-IPI = 5 = High risk. FISH showing loss of 17p, IgHV mutated and NGS for TP53 mutation positive    Reviewed WBC trend which is increasing. Doubling time is still >6 months but ALC now >200 and took a large increase from 150 in 2 months. Discussed that while she doesn't absolutely need therapy now, I believe that the time was coming and we should discuss options. We reviewed continuous BTKi with Acalabrutinib / Zanubrutinib vs time limited therapy with Obin/Venetoclax. Also discussed a clinical trial utilizing Zanubritinib + Sonrotoclax that we have available. Will discuss this with the trial team and screen for eligibility. She states that she is interested in this.    Provided information on risks/benefits of BTKi vs O/Venetoclax for her to consider. Will monitor counts carefully and move forward when she is ready    Final plan:  - Continue monthly labs  - low threshold to start therapy  - screen for clinical trial    The longitudinal plan of care for the diagnosis(es)/condition(s) as documented were addressed during this visit. Due to the added complexity in care, I will continue to support Kerri in the subsequent management and with ongoing continuity of care.     51 minutes  spent on the date of the encounter doing chart review, review of test results, interpretation of tests, patient visit, and documentation     Pop Centeno MD     Division of Hematology, Oncology and Transplantation  [facility]  P: [phone]                Again, thank you for allowing me to participate in the care of your patient.        Sincerely,        Pop Centeno MD

## 2024-08-16 ENCOUNTER — TELEPHONE (OUTPATIENT)
Dept: DERMATOLOGY | Facility: CLINIC | Age: 84
End: 2024-08-16
Payer: COMMERCIAL

## 2024-08-16 NOTE — TELEPHONE ENCOUNTER
Patient confirmed scheduled appointment:     Date: 3/5/25  Time: 10 AM  Visit type: Return dermatology  Provider: Dr. Vielka Ann  Location: [facility] [place]    Additional Notes:

## 2024-08-19 ENCOUNTER — MYC REFILL (OUTPATIENT)
Dept: INTERNAL MEDICINE | Facility: CLINIC | Age: 84
End: 2024-08-19
Payer: COMMERCIAL

## 2024-08-19 DIAGNOSIS — E78.5 HYPERLIPIDEMIA LDL GOAL <70: ICD-10-CM

## 2024-08-20 RX ORDER — SIMVASTATIN 20 MG
20 TABLET ORAL AT BEDTIME
Qty: 90 TABLET | Refills: 0 | Status: SHIPPED | OUTPATIENT
Start: 2024-08-20

## 2024-08-20 NOTE — TELEPHONE ENCOUNTER
Patient checking the status on this. She's leaving town tomorrow morning and is now out of this medication. She needs a refill ASAP. Please call and advise.

## 2024-08-26 ENCOUNTER — ONCOLOGY VISIT (OUTPATIENT)
Dept: ONCOLOGY | Facility: CLINIC | Age: 84
End: 2024-08-26
Attending: STUDENT IN AN ORGANIZED HEALTH CARE EDUCATION/TRAINING PROGRAM
Payer: COMMERCIAL

## 2024-08-26 VITALS
DIASTOLIC BLOOD PRESSURE: 67 MMHG | WEIGHT: 142.8 LBS | TEMPERATURE: 97.8 F | BODY MASS INDEX: 24.51 KG/M2 | OXYGEN SATURATION: 97 % | SYSTOLIC BLOOD PRESSURE: 108 MMHG | HEART RATE: 79 BPM | RESPIRATION RATE: 12 BRPM

## 2024-08-26 DIAGNOSIS — C91.10 CLL (CHRONIC LYMPHOCYTIC LEUKEMIA) (H): Primary | ICD-10-CM

## 2024-08-26 PROCEDURE — 99215 OFFICE O/P EST HI 40 MIN: CPT | Performed by: STUDENT IN AN ORGANIZED HEALTH CARE EDUCATION/TRAINING PROGRAM

## 2024-08-26 PROCEDURE — G0463 HOSPITAL OUTPT CLINIC VISIT: HCPCS | Performed by: STUDENT IN AN ORGANIZED HEALTH CARE EDUCATION/TRAINING PROGRAM

## 2024-08-26 PROCEDURE — 99417 PROLNG OP E/M EACH 15 MIN: CPT | Performed by: STUDENT IN AN ORGANIZED HEALTH CARE EDUCATION/TRAINING PROGRAM

## 2024-08-26 ASSESSMENT — PAIN SCALES - GENERAL: PAINLEVEL: NO PAIN (0)

## 2024-08-26 NOTE — PROGRESS NOTES
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  In August 2021 WBC count was 66,000 and now in August 2024 white blood cell count is 200,000.  Patient remains asymptomatic.  No fevers chills night sweats no new lymphadenopathy.  Patient remains independent for activities of daily living.  Takes a walk to exercise.  Patient was diagnosed of breast cancer in 1990.  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  0 out of 8 lymph nodes were positive.  She was diagnosed with stage I disease.  Tumor was ER positive she did have radiation to the left breast with boost having completed 6-6000 100 Curie Valdivia in 33 fractions.  He she had then went on to take tamoxifen for 6 years completed treatment in 1996.  Since then breast cancer has been in remission.  Echocardiogram most recent was in 2016 that showed EF of 55 to 60%.  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  Mono allelic loss of 13 q. and 78%, bile allelic loss of 13 q. and 17 for 5% and 81% has loss of 11 q.  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     CLL (chronic lymphocytic leukemia) (H)   10/30/2013 Initial Diagnosis    CLL (chronic lymphocytic leukemia) (H)     11/1/2021 - 11/1/2021 Chemotherapy    Oral  ONC Chronic Lymphocytic Leukemia (CLL) - Acalabrutinib  Plan Provider: Pop Centeno MD  Treatment goal: Palliative  Line of treatment: First Line       She is seen today in the office, Patient comes today for follow up. No fevers, chills, night sweats or weight loss, no lymphadenopathy. No pain.        Objective    /67 (BP Location: Right arm, Patient Position: Sitting, Cuff Size: Adult Regular)   Pulse 79   Temp 97.8  F (36.6  C) (Oral)   Resp 12   Wt 64.8 kg (142 lb 12.8 oz)   SpO2 97%   BMI 24.51 kg/m    Body mass index is 24.51 kg/m .  Physical Exam   GENERAL:  Alert oriented well nourished  HEAD: normocephalic atraumatic  SKIN:  no rash, hives, other lesions.  LYMPHATIC: no abnormal lymph nodes palable in cervical, axillary, supraclavicular or inguinal area.  RESP:  No rales or rhonchi, breath sounds bilaterally equal and vesicular  CV:  No tachycardia, S1 S2 normal No murmur.  GI:  Abdomen soft nontender no hepato or splenomegaly  MUSCULOSKELETAL:  No visible joint redness or swelling.  NEURO:  No gross weakness gait normal  PSYCH: pleasant affect    Labs: I personally reviewed complete blood count, differential, renal function test, liver function tests.  No cytopenias, LFTs within normal limits, renal function test within normal limits.  She has rising lymphocytosis.  Most recently 200,000 absolute lymphocyte count on August 7, 2024.  This has been climbing over the last 4 years. Doubling time seems to be 1 to 2 years.    Assessment and plan:    1) chronic lymphocytic leukemia:  -I discussed natural history prognosis management of chronic lymphocytic leukemia.  I discussed that it is a slow-growing disease and is monitored expectantly.  CLL will grow over time without any treatment.  Since CLL is a mature B-cell malignancy, high white blood cell count usually does not cause any symptoms to the patient and does not put patient at high risk of hyperviscosity or tumor lysis just by virtue of  having high white blood cell count.  Spontaneous tumor lysis can occur in CLL but it is not common.  Therefore absolute lymphocyte count is not a part of indications for treatment initiation in CLL guidelines.  Specially for clinical trials, rapidly rising lymphocytosis defined as lymphocyte doubling time less than 6 months  Is required to initiate therapy.  She does not have bulky lymphadenopathy, massive splenomegaly, progressive cytopenias or B symptoms that warrant treatment initiation.  She does not want to start treatment now, if possible she would like to continue expectant management.  I agree with that.  I told her that she is coming close to starting treatment.  I went over the treatment options including zanubrutinib, acalabrutinib with or without obinutuzumab as well as venetoclax obinutuzumab.  I discussed that all 3 options are approved and lead to better survival compared to chemoimmunotherapy based regimen but the do not have randomized trial comparing between these agents.  Venetoclax obinutuzumab was a fixed duration treatment.  Patient was inclining towards fixed duration treatment.  I discussed that retrospective data suggest that BTK inhibitors may have better response rates and PFS rates in patients with 17P deletion or TP53 mutation but this is not coming from a randomized or competitive retrospective data.  Institutional clinical trial of zanubrutinib plus sonrotoclax versus venetoclax obinutuzumab was discussed with the patient.  She would not qualify for the trial at this point but if she continues expectant management and desires to initiate treatment when she has one of the treatment initiation criteria's, then she should be able to participate in clinical trial.  Patient verbalized understanding of the information discussed and all her questions were answered.    Total time spent on date of service in review of medical records, review of labs, history taking, physical exam, discussion of  assessment and plan, counseling and patient education is 60 minutes.    Herb Tanner MD  Attending Physician  Pager [phone]              Signed Electronically by: Herb Tanner MD

## 2024-08-26 NOTE — LETTER
8/26/2024      Kerri Shook  [address]  [facility] [address]      Dear Colleague,    Thank you for referring your patient, Kerri Shook, to the [facility]. Please see a copy of my visit note below.      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  In August 2021 WBC count was 66,000 and now in August 2024 white blood cell count is 200,000.  Patient remains asymptomatic.  No fevers chills night sweats no new lymphadenopathy.  Patient remains independent for activities of daily living.  Takes a walk to exercise.  Patient was diagnosed of breast cancer in 1990.  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  0 out of 8 lymph nodes were positive.  She was diagnosed with stage I disease.  Tumor was ER positive she did have radiation to the left breast with boost having completed 6-6000 100 Curie Valdivia in 33 fractions.  He she had then went on to take tamoxifen for 6 years completed treatment in 1996.  Since then breast cancer has been in remission.  Echocardiogram most recent was in 2016 that showed EF of 55 to 60%.  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  Mono allelic loss of 13 q. and 78%, bile allelic  loss of 13 q. and 17 for 5% and 81% has loss of 11 q.  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     CLL (chronic lymphocytic leukemia) (H)   10/30/2013 Initial Diagnosis    CLL (chronic lymphocytic leukemia) (H)     11/1/2021 - 11/1/2021 Chemotherapy    Oral ONC Chronic Lymphocytic Leukemia (CLL) - Acalabrutinib  Plan Provider: Pop Centeno MD  Treatment goal: Palliative  Line of treatment: First Line       She is seen today in the office, Patient comes today for follow up. No fevers, chills, night sweats or weight loss, no lymphadenopathy. No pain.        Objective   /67 (BP Location: Right arm, Patient Position: Sitting, Cuff Size: Adult Regular)   Pulse 79   Temp 97.8  F (36.6  C) (Oral)   Resp 12   Wt 64.8 kg (142 lb 12.8 oz)   SpO2 97%   BMI 24.51 kg/m    Body mass index is 24.51 kg/m .  Physical Exam   GENERAL:  Alert oriented well nourished  HEAD: normocephalic atraumatic  SKIN:  no rash, hives, other lesions.  LYMPHATIC: no abnormal lymph nodes palable in cervical, axillary, supraclavicular or inguinal area.  RESP:  No rales or rhonchi, breath sounds bilaterally equal and vesicular  CV:  No tachycardia, S1 S2 normal No murmur.  GI:  Abdomen soft nontender no hepato or splenomegaly  MUSCULOSKELETAL:  No visible joint redness or swelling.  NEURO:  No gross weakness gait normal  PSYCH: pleasant affect    Labs: I personally reviewed complete blood count, differential, renal function test, liver function tests.  No cytopenias, LFTs within normal limits, renal function test within normal limits.  She has rising lymphocytosis.  Most recently 200,000 absolute lymphocyte count on August 7, 2024.  This has been climbing over the last 4 years. Doubling time seems to be 1 to 2 years.    Assessment and plan:    1) chronic lymphocytic leukemia:  -I discussed natural history prognosis management of chronic lymphocytic leukemia.  I discussed that it is a slow-growing disease and is monitored expectantly.  CLL will  grow over time without any treatment.  Since CLL is a mature B-cell malignancy, high white blood cell count usually does not cause any symptoms to the patient and does not put patient at high risk of hyperviscosity or tumor lysis just by virtue of having high white blood cell count.  Spontaneous tumor lysis can occur in CLL but it is not common.  Therefore absolute lymphocyte count is not a part of indications for treatment initiation in CLL guidelines.  Specially for clinical trials, rapidly rising lymphocytosis defined as lymphocyte doubling time less than 6 months  Is required to initiate therapy.  She does not have bulky lymphadenopathy, massive splenomegaly, progressive cytopenias or B symptoms that warrant treatment initiation.  She does not want to start treatment now, if possible she would like to continue expectant management.  I agree with that.  I told her that she is coming close to starting treatment.  I went over the treatment options including zanubrutinib, acalabrutinib with or without obinutuzumab as well as venetoclax obinutuzumab.  I discussed that all 3 options are approved and lead to better survival compared to chemoimmunotherapy based regimen but the do not have randomized trial comparing between these agents.  Venetoclax obinutuzumab was a fixed duration treatment.  Patient was inclining towards fixed duration treatment.  I discussed that retrospective data suggest that BTK inhibitors may have better response rates and PFS rates in patients with 17P deletion or TP53 mutation but this is not coming from a randomized or competitive retrospective data.  Institutional clinical trial of zanubrutinib plus sonrotoclax versus venetoclax obinutuzumab was discussed with the patient.  She would not qualify for the trial at this point but if she continues expectant management and desires to initiate treatment when she has one of the treatment initiation criteria's, then she should be able to participate  in clinical trial.  Patient verbalized understanding of the information discussed and all her questions were answered.    Total time spent on date of service in review of medical records, review of labs, history taking, physical exam, discussion of assessment and plan, counseling and patient education is 60 minutes.    Herb Tanner MD  Attending Physician  Pager [phone]              Signed Electronically by: Herb Tanner MD        Again, thank you for allowing me to participate in the care of your patient.        Sincerely,        Herb Tanner MD

## 2024-08-26 NOTE — NURSING NOTE
"Oncology Rooming Note    August 26, 2024 11:25 AM   Kerri Shook is a 84 year old female who presents for:    Chief Complaint   Patient presents with    Oncology Clinic Visit     CLL (chronic lymphocytic leukemia)       Initial Vitals: /67 (BP Location: Right arm, Patient Position: Sitting, Cuff Size: Adult Regular)   Pulse 79   Temp 97.8  F (36.6  C) (Oral)   Resp 12   Wt 64.8 kg (142 lb 12.8 oz)   SpO2 97%   BMI 24.51 kg/m   Estimated body mass index is 24.51 kg/m  as calculated from the following:    Height as of 5/8/24: 1.626 m (5' 4\").    Weight as of this encounter: 64.8 kg (142 lb 12.8 oz). Body surface area is 1.71 meters squared.  No Pain (0) Comment: Data Unavailable   No LMP recorded. Patient is postmenopausal.  Allergies reviewed: Yes  Medications reviewed: Yes    Medications: Medication refills not needed today.  Pharmacy name entered into [employer]: [id number] - [place], [place] - [address] AT [facility] [address]    Frailty Screening:   Is the patient here for a new oncology consult visit in cancer care? 2. No      Clinical concerns: Patient states no new concerns to discuss with provider.  Dr. Tanner was NOT notified.      Christina Cronin EMT            "

## 2024-09-04 ENCOUNTER — NURSE TRIAGE (OUTPATIENT)
Dept: ONCOLOGY | Facility: CLINIC | Age: 84
End: 2024-09-04

## 2024-09-04 ENCOUNTER — TELEPHONE (OUTPATIENT)
Dept: ONCOLOGY | Facility: CLINIC | Age: 84
End: 2024-09-04

## 2024-09-04 ENCOUNTER — HOSPITAL ENCOUNTER (EMERGENCY)
Facility: CLINIC | Age: 84
Discharge: HOME | End: 2024-09-05
Attending: EMERGENCY MEDICINE | Admitting: EMERGENCY MEDICINE
Payer: COMMERCIAL

## 2024-09-04 ENCOUNTER — LAB (OUTPATIENT)
Dept: LAB | Facility: CLINIC | Age: 84
End: 2024-09-04
Payer: COMMERCIAL

## 2024-09-04 DIAGNOSIS — C91.10 CLL (CHRONIC LYMPHOCYTIC LEUKEMIA) (H): ICD-10-CM

## 2024-09-04 DIAGNOSIS — R89.9 ABNORMAL LABORATORY TEST: ICD-10-CM

## 2024-09-04 DIAGNOSIS — Z01.89 LABORATORY TEST: ICD-10-CM

## 2024-09-04 LAB
ALBUMIN SERPL BCG-MCNC: 4.3 G/DL (ref 3.5–5.2)
ALBUMIN SERPL BCG-MCNC: 4.3 G/DL (ref 3.5–5.2)
ALP SERPL-CCNC: 109 U/L (ref 40–150)
ALP SERPL-CCNC: 98 U/L (ref 40–150)
ALT SERPL W P-5'-P-CCNC: 19 U/L (ref 0–50)
ALT SERPL W P-5'-P-CCNC: 22 U/L (ref 0–50)
ANION GAP SERPL CALCULATED.3IONS-SCNC: 12 MMOL/L (ref 7–15)
ANION GAP SERPL CALCULATED.3IONS-SCNC: 7 MMOL/L (ref 7–15)
AST SERPL W P-5'-P-CCNC: 31 U/L (ref 0–45)
AST SERPL W P-5'-P-CCNC: 49 U/L (ref 0–45)
BASE EXCESS BLDV CALC-SCNC: 0 MMOL/L (ref -3–3)
BASOPHILS # BLD AUTO: ABNORMAL 10*3/UL
BASOPHILS # BLD MANUAL: 0 10E3/UL (ref 0–0.2)
BASOPHILS NFR BLD AUTO: ABNORMAL %
BASOPHILS NFR BLD MANUAL: 0 %
BILIRUB SERPL-MCNC: 0.3 MG/DL
BILIRUB SERPL-MCNC: 0.5 MG/DL
BUN SERPL-MCNC: 15 MG/DL (ref 8–23)
BUN SERPL-MCNC: 15.1 MG/DL (ref 8–23)
CA-I BLD-MCNC: 4.7 MG/DL (ref 4.4–5.2)
CALCIUM SERPL-MCNC: 9.1 MG/DL (ref 8.8–10.4)
CALCIUM SERPL-MCNC: 9.3 MG/DL (ref 8.8–10.4)
CHLORIDE SERPL-SCNC: 103 MMOL/L (ref 98–107)
CHLORIDE SERPL-SCNC: 106 MMOL/L (ref 98–107)
CPB POCT: NO
CREAT SERPL-MCNC: 0.85 MG/DL (ref 0.51–0.95)
CREAT SERPL-MCNC: 0.96 MG/DL (ref 0.51–0.95)
EGFRCR SERPLBLD CKD-EPI 2021: 58 ML/MIN/1.73M2
EGFRCR SERPLBLD CKD-EPI 2021: 67 ML/MIN/1.73M2
EOSINOPHIL # BLD AUTO: ABNORMAL 10*3/UL
EOSINOPHIL # BLD MANUAL: 1.8 10E3/UL (ref 0–0.7)
EOSINOPHIL NFR BLD AUTO: ABNORMAL %
EOSINOPHIL NFR BLD MANUAL: 1 %
ERYTHROCYTE [DISTWIDTH] IN BLOOD BY AUTOMATED COUNT: 14.2 % (ref 10–15)
GLUCOSE BLD-MCNC: 120 MG/DL (ref 70–99)
GLUCOSE SERPL-MCNC: 115 MG/DL (ref 70–99)
GLUCOSE SERPL-MCNC: 119 MG/DL (ref 70–99)
HCO3 BLDV-SCNC: 25 MMOL/L (ref 21–28)
HCO3 SERPL-SCNC: 22 MMOL/L (ref 22–29)
HCO3 SERPL-SCNC: 25 MMOL/L (ref 22–29)
HCT VFR BLD AUTO: 43.1 % (ref 35–47)
HCT VFR BLD CALC: 40 % (ref 35–47)
HGB BLD-MCNC: 13.6 G/DL (ref 11.7–15.7)
HGB BLD-MCNC: 13.6 G/DL (ref 11.7–15.7)
HOLD SPECIMEN: NORMAL
IMM GRANULOCYTES # BLD: ABNORMAL 10*3/UL
IMM GRANULOCYTES NFR BLD: ABNORMAL %
LYMPHOCYTES # BLD AUTO: ABNORMAL 10*3/UL
LYMPHOCYTES # BLD MANUAL: 168.1 10E3/UL (ref 0.8–5.3)
LYMPHOCYTES NFR BLD AUTO: ABNORMAL %
LYMPHOCYTES NFR BLD MANUAL: 92 %
MCH RBC QN AUTO: 30.6 PG (ref 26.5–33)
MCHC RBC AUTO-ENTMCNC: 31.6 G/DL (ref 31.5–36.5)
MCV RBC AUTO: 97 FL (ref 78–100)
MONOCYTES # BLD AUTO: ABNORMAL 10*3/UL
MONOCYTES # BLD MANUAL: 5.5 10E3/UL (ref 0–1.3)
MONOCYTES NFR BLD AUTO: ABNORMAL %
MONOCYTES NFR BLD MANUAL: 3 %
NEUTROPHILS # BLD AUTO: ABNORMAL 10*3/UL
NEUTROPHILS # BLD MANUAL: 7.3 10E3/UL (ref 1.6–8.3)
NEUTROPHILS NFR BLD AUTO: ABNORMAL %
NEUTROPHILS NFR BLD MANUAL: 4 %
NRBC # BLD AUTO: 0 10E3/UL
NRBC BLD AUTO-RTO: 0 /100
PCO2 BLDV: 41 MM HG (ref 40–50)
PH BLDV: 7.4 [PH] (ref 7.32–7.43)
PHOSPHATE SERPL-MCNC: 3.7 MG/DL (ref 2.5–4.5)
PLAT MORPH BLD: ABNORMAL
PLATELET # BLD AUTO: 156 10E3/UL (ref 150–450)
PO2 BLDV: 43 MM HG (ref 25–47)
POTASSIUM BLD-SCNC: 4.1 MMOL/L (ref 3.4–5.3)
POTASSIUM SERPL-SCNC: 4.7 MMOL/L (ref 3.4–5.3)
POTASSIUM SERPL-SCNC: 8.1 MMOL/L (ref 3.4–5.3)
PROT SERPL-MCNC: 6.5 G/DL (ref 6.4–8.3)
PROT SERPL-MCNC: 6.6 G/DL (ref 6.4–8.3)
RBC # BLD AUTO: 4.44 10E6/UL (ref 3.8–5.2)
RBC MORPH BLD: ABNORMAL
SAO2 % BLDV: 78 % (ref 70–75)
SODIUM BLD-SCNC: 139 MMOL/L (ref 135–145)
SODIUM SERPL-SCNC: 135 MMOL/L (ref 135–145)
SODIUM SERPL-SCNC: 140 MMOL/L (ref 135–145)
URATE SERPL-MCNC: 4.8 MG/DL (ref 2.4–5.7)
WBC # BLD AUTO: 182.7 10E3/UL (ref 4–11)

## 2024-09-04 PROCEDURE — 36415 COLL VENOUS BLD VENIPUNCTURE: CPT | Performed by: EMERGENCY MEDICINE

## 2024-09-04 PROCEDURE — 99283 EMERGENCY DEPT VISIT LOW MDM: CPT | Performed by: EMERGENCY MEDICINE

## 2024-09-04 PROCEDURE — 93005 ELECTROCARDIOGRAM TRACING: CPT | Performed by: EMERGENCY MEDICINE

## 2024-09-04 PROCEDURE — 84075 ASSAY ALKALINE PHOSPHATASE: CPT

## 2024-09-04 PROCEDURE — 84460 ALANINE AMINO (ALT) (SGPT): CPT

## 2024-09-04 PROCEDURE — 84100 ASSAY OF PHOSPHORUS: CPT | Performed by: EMERGENCY MEDICINE

## 2024-09-04 PROCEDURE — 36415 COLL VENOUS BLD VENIPUNCTURE: CPT

## 2024-09-04 PROCEDURE — 84450 TRANSFERASE (AST) (SGOT): CPT

## 2024-09-04 PROCEDURE — 99284 EMERGENCY DEPT VISIT MOD MDM: CPT | Performed by: EMERGENCY MEDICINE

## 2024-09-04 PROCEDURE — 84550 ASSAY OF BLOOD/URIC ACID: CPT | Performed by: EMERGENCY MEDICINE

## 2024-09-04 PROCEDURE — 82247 BILIRUBIN TOTAL: CPT

## 2024-09-04 PROCEDURE — 84155 ASSAY OF PROTEIN SERUM: CPT

## 2024-09-04 PROCEDURE — 80069 RENAL FUNCTION PANEL: CPT

## 2024-09-04 PROCEDURE — 85007 BL SMEAR W/DIFF WBC COUNT: CPT

## 2024-09-04 PROCEDURE — 84075 ASSAY ALKALINE PHOSPHATASE: CPT | Performed by: EMERGENCY MEDICINE

## 2024-09-04 PROCEDURE — 85027 COMPLETE CBC AUTOMATED: CPT

## 2024-09-04 PROCEDURE — 82803 BLOOD GASES ANY COMBINATION: CPT

## 2024-09-04 PROCEDURE — 93010 ELECTROCARDIOGRAM REPORT: CPT | Performed by: EMERGENCY MEDICINE

## 2024-09-04 ASSESSMENT — COLUMBIA-SUICIDE SEVERITY RATING SCALE - C-SSRS
1. IN THE PAST MONTH, HAVE YOU WISHED YOU WERE DEAD OR WISHED YOU COULD GO TO SLEEP AND NOT WAKE UP?: NO
2. HAVE YOU ACTUALLY HAD ANY THOUGHTS OF KILLING YOURSELF IN THE PAST MONTH?: NO
6. HAVE YOU EVER DONE ANYTHING, STARTED TO DO ANYTHING, OR PREPARED TO DO ANYTHING TO END YOUR LIFE?: NO

## 2024-09-04 ASSESSMENT — ACTIVITIES OF DAILY LIVING (ADL): ADLS_ACUITY_SCORE: 33

## 2024-09-04 NOTE — TELEPHONE ENCOUNTER
DATE/TIME OF CALL RECEIVED FROM LAB:  09/04/24 at 1:19 PM   Critical LAB TEST:  .5  Last LAB VALUE:  08/07/24 .1  PROVIDER NOTIFIED?: Yes  PROVIDER NAME:   DATE/TIME LAB VALUE REPORTED TO PROVIDER: 9/4/2024  MECHANISM OF PROVIDER NOTIFICATION:  Routed to  and RNCC [place]

## 2024-09-05 VITALS
WEIGHT: 142 LBS | OXYGEN SATURATION: 96 % | BODY MASS INDEX: 24.24 KG/M2 | TEMPERATURE: 98.3 F | SYSTOLIC BLOOD PRESSURE: 121 MMHG | HEART RATE: 69 BPM | RESPIRATION RATE: 16 BRPM | HEIGHT: 64 IN | DIASTOLIC BLOOD PRESSURE: 78 MMHG

## 2024-09-05 LAB
ATRIAL RATE - MUSE: 85 BPM
DIASTOLIC BLOOD PRESSURE - MUSE: NORMAL MMHG
INTERPRETATION ECG - MUSE: NORMAL
P AXIS - MUSE: 70 DEGREES
PR INTERVAL - MUSE: 174 MS
QRS DURATION - MUSE: 68 MS
QT - MUSE: 358 MS
QTC - MUSE: 426 MS
R AXIS - MUSE: -27 DEGREES
SYSTOLIC BLOOD PRESSURE - MUSE: NORMAL MMHG
T AXIS - MUSE: 43 DEGREES
VENTRICULAR RATE- MUSE: 85 BPM

## 2024-09-05 NOTE — ED PROVIDER NOTES
[place] EMERGENCY DEPARTMENT ([facility])    9/04/24       ED PROVIDER NOTE    History     Chief Complaint   Patient presents with    Abnormal Labs     HPI  Kerri Shook is a 84 year old female with a past medical history of left sided breast cancer, chronic lymphocytic leukemia, cardiomyopathy, and seborrheic keratosis, who presents to the ED for evaluation of abnormal labs. Patient presents to the ED after clinic visit where potassium was elevated to 8.1 and she was recommended to present to the ED for repeat blood draw and potential management of hyperkalemia.   Patient denies any symptoms.    Past Medical History  Past Medical History:   Diagnosis Date    Breast disorder 1990    Breast Cancer    Cardiomyopathy (H)     Chronic osteoarthritis 2012    resulted in hip replacement    CLL (chronic lymphoblastic leukemia) 1989    Closed fracture of second toe of left foot     Colon adenomas     6/26/14 colonoscopy, repeat in 3 years    History of blood transfusion     hyperlipidemia     Primary localized osteoarthrosis, pelvic region and thigh     Tinnitus      Past Surgical History:   Procedure Laterality Date    BIOPSY  within the last 5 years    polyps during colonoscopy    COLONOSCOPY  6/26/2014    Procedure: COMBINED COLONOSCOPY, SINGLE BIOPSY/POLYPECTOMY BY BIOPSY;  Surgeon: Pola Arceo MD;  Location:  GI    COLONOSCOPY N/A 1/5/2021    Procedure: COLONOSCOPY, WITH POLYPECTOMY AND CLIP;  Surgeon: Charlie Wang MD;  Location: [facility] [place]    D & C  1962    late PP hemorrhage --> retained placenta    ENDOSCOPIC SINUS SURGERY Right 7/16/2018    Procedure: ENDOSCOPIC SINUS SURGERY;  Endoscopic Sphenoidotomy with Removal of Tissue;  Surgeon: Kishore Webster MD;  Location:  OR    ENT SURGERY  1950    tonselectomy    EYE SURGERY  2015    cataract on left eye    left hip replacemen Left     hip replacement in 2013    LUMPECTOMY BREAST  1990    Left    ORTHOPEDIC SURGERY  age?    right shoulder      "ORTHOPEDIC SURGERY  ~     left hip replacement    R hip arthroscopy  11    TONSILLECTOMY  1950    [facility] PELVIS/HIP JOINT SURGERY UNLISTED  2012    left hip replacement    [facility] SHOULDER SURG PROC UNLISTED  ?     aspirin 81 MG tablet  carvedilol (COREG) 3.125 MG tablet  gabapentin (NEURONTIN) 100 MG capsule  Multiple Vitamin (MULTIVITAMIN) per tablet  simvastatin (ZOCOR) 20 MG tablet      Allergies   Allergen Reactions    Nkda [No Known Drug Allergy]      Family History  Family History   Problem Relation Age of Onset    Diabetes Maternal Grandmother 85    Coronary Artery Disease Father 76         of MI    Heart Disease Father     Cancer Maternal Grandfather         stomach    Alcoholism Son         Active alcoholic    Dementia Mother     Osteoporosis Mother     C.A.D. No family hx of     Cancer - colorectal No family hx of     Psychotic Disorder No family hx of     Skin Cancer No family hx of     Melanoma No family hx of     Hypertension No family hx of     Breast Cancer No family hx of     Prostate Cancer No family hx of     Cerebrovascular Disease No family hx of         ,, ,     Social History   Social History     Tobacco Use    Smoking status: Never    Smokeless tobacco: Never   Substance Use Topics    Alcohol use: Yes     Comment: 3 glasses of wine per week    Drug use: Never      A complete review of systems was performed with pertinent positives and negatives noted in the HPI, and all other systems negative.    Physical Exam   BP: 128/79  Pulse: 81  Temp: 98.3  F (36.8  C)  Resp: 16  Height: 162.6 cm (5' 4\")  Weight: 64.4 kg (142 lb)  SpO2: 95 %  Physical Exam  Vitals and nursing note reviewed.   Constitutional:       General: She is not in acute distress.     Appearance: She is not diaphoretic.   HENT:      Head: Normocephalic and atraumatic.   Eyes:      Extraocular Movements: Extraocular movements intact.      Conjunctiva/sclera: Conjunctivae normal.   Cardiovascular:      Rate and Rhythm: " Normal rate.      Heart sounds: Normal heart sounds.   Pulmonary:      Effort: Pulmonary effort is normal. No respiratory distress.      Breath sounds: Normal breath sounds.   Abdominal:      Palpations: Abdomen is soft.      Tenderness: There is no abdominal tenderness.   Musculoskeletal:         General: No tenderness. Normal range of motion.      Cervical back: Normal range of motion and neck supple.   Skin:     General: Skin is warm.      Findings: No rash.           ED Course, Procedures, & Data      Procedures            EKG Interpretation:      Interpreted by GORDON BASS MD, MD  Time reviewed: 2228  Symptoms at time of EKG: None   Rhythm: normal sinus   Rate: 85  Axis: Normal  Ectopy: none  Conduction: normal  ST Segments/ T Waves: No ST-T wave changes  Q Waves: none  Comparison to prior: No old EKG available    Clinical Impression: no acute changes                 Results for orders placed or performed during the hospital encounter of 09/04/24   [facility] Draw     Status: None    Narrative    The following orders were created for panel order [facility] Draw.  Procedure                               Abnormality         Status                     ---------                               -----------         ------                     Extra Blue Top Tube[[id number]]                              Final result               Extra Red Top Tube[[id number]]                               Final result               Extra Green Top (Lithium...[[id number]]                      Final result               Extra Purple Top Tube[[id number]]                            Final result                 Please view results for these tests on the individual orders.   Extra Blue Top Tube     Status: None   Result Value Ref Range    Hold Specimen JIC    Extra Red Top Tube     Status: None   Result Value Ref Range    Hold Specimen JIC    Extra Green Top (Lithium Heparin) Tube     Status: None   Result Value Ref Range    Hold Specimen JIC    Extra  Purple Top Tube     Status: None   Result Value Ref Range    Hold Specimen JIC    iStat Gases Electrolytes ICA Glucose Venous, POCT     Status: Abnormal   Result Value Ref Range    CPB Applied No     Hematocrit POCT 40 35 - 47 %    Calcium, Ionized Whole Blood POCT 4.7 4.4 - 5.2 mg/dL    Glucose Whole Blood POCT 120 (H) 70 - 99 mg/dL    Bicarbonate Venous POCT 25 21 - 28 mmol/L    Hemoglobin POCT 13.6 11.7 - 15.7 g/dL    Potassium POCT 4.1 3.4 - 5.3 mmol/L    Sodium POCT 139 135 - 145 mmol/L    pCO2 Venous POCT 41 40 - 50 mm Hg    pO2 Venous POCT 43 25 - 47 mm Hg    pH Venous POCT 7.40 7.32 - 7.43    O2 Sat, Venous POCT 78 (H) 70 - 75 %    Base Excess/Deficit (+/-) POCT 0.0 -3.0 - 3.0 mmol/L   Comprehensive metabolic panel     Status: Abnormal   Result Value Ref Range    Sodium 140 135 - 145 mmol/L    Potassium 4.7 3.4 - 5.3 mmol/L    Carbon Dioxide (CO2) 22 22 - 29 mmol/L    Anion Gap 12 7 - 15 mmol/L    Urea Nitrogen 15.0 8.0 - 23.0 mg/dL    Creatinine 0.85 0.51 - 0.95 mg/dL    GFR Estimate 67 >60 mL/min/1.73m2    Calcium 9.1 8.8 - 10.4 mg/dL    Chloride 106 98 - 107 mmol/L    Glucose 119 (H) 70 - 99 mg/dL    Alkaline Phosphatase 109 40 - 150 U/L    AST 31 0 - 45 U/L    ALT 19 0 - 50 U/L    Protein Total 6.5 6.4 - 8.3 g/dL    Albumin 4.3 3.5 - 5.2 g/dL    Bilirubin Total 0.3 <=1.2 mg/dL   Phosphorus     Status: Normal   Result Value Ref Range    Phosphorus 3.7 2.5 - 4.5 mg/dL   Uric acid     Status: Normal   Result Value Ref Range    Uric Acid 4.8 2.4 - 5.7 mg/dL   EKG 12 lead     Status: None (Preliminary result)   Result Value Ref Range    Systolic Blood Pressure  mmHg    Diastolic Blood Pressure  mmHg    Ventricular Rate 85 BPM    Atrial Rate 85 BPM    OR Interval 174 ms    QRS Duration 68 ms     ms    QTc 426 ms    P Axis 70 degrees    R AXIS -27 degrees    T Axis 43 degrees    Interpretation ECG Sinus rhythm  Low voltage QRS  Borderline ECG        Medications - No data to display  Labs Ordered and  Resulted from Time of ED Arrival to Time of ED Departure   ISTAT GASES ELECTROLYTES ICA GLUCOSE VENOUS POCT - Abnormal       Result Value    CPB Applied No      Hematocrit POCT 40      Calcium, Ionized Whole Blood POCT 4.7      Glucose Whole Blood POCT 120 (*)     Bicarbonate Venous POCT 25      Hemoglobin POCT 13.6      Potassium POCT 4.1      Sodium POCT 139      pCO2 Venous POCT 41      pO2 Venous POCT 43      pH Venous POCT 7.40      O2 Sat, Venous POCT 78 (*)     Base Excess/Deficit (+/-) POCT 0.0     COMPREHENSIVE METABOLIC PANEL - Abnormal    Sodium 140      Potassium 4.7      Carbon Dioxide (CO2) 22      Anion Gap 12      Urea Nitrogen 15.0      Creatinine 0.85      GFR Estimate 67      Calcium 9.1      Chloride 106      Glucose 119 (*)     Alkaline Phosphatase 109      AST 31      ALT 19      Protein Total 6.5      Albumin 4.3      Bilirubin Total 0.3     PHOSPHORUS - Normal    Phosphorus 3.7     URIC ACID - Normal    Uric Acid 4.8       No orders to display          Critical care was not performed.     Medical Decision Making  The patient's presentation was of moderate complexity (an acute health problem).    The patient's evaluation involved:  review of external note(s) from 1 sources (see separate area of note for details)  review of 1 test result(s) ordered prior to this encounter (see separate area of note for details)  ordering and/or review of 3+ test(s) in this encounter (see separate area of note for details)  independent interpretation of testing performed by another health professional (EKG interpreted by me with above results)    The patient's management necessitated only low risk treatment.    Assessment & Plan    84 year old male with history of CLL to the emergency department for evaluation after outpatient labs today resulted at 8.1.  Her creatinine was normal.  She was referred for recheck and also to evaluate for tumor lysis syndrome although oncology felt that that was unlikely.   Patient's labs are normal potassium of 4.7.  Creatinine was normal at 0.85.  Uric acid and phosphorus levels normal.  LDH was not able to be run.  Patient appears clinically well.  She is anxious for discharge.  Primary oncology follow-up recommended per routine    I have reviewed the nursing notes. I have reviewed the findings, diagnosis, plan and need for follow up with the patient.    Discharge Medication List as of 9/5/2024 12:12 AM          Final diagnoses:   Laboratory test   Abnormal laboratory test     Chart documentation was completed with Dragon voice-recognition software. Even though reviewed, this chart may still contain some grammatical, spelling, and word errors.     [facility] EMERGENCY DEPARTMENT  9/4/2024     Jarrod Arzate MD  09/05/24 0058

## 2024-09-05 NOTE — TELEPHONE ENCOUNTER
Brief Hematology Fellow note    Patient with known history of CLL, last seen in clinic with Dr. Tanner  for follow up on 8/26/24, had blood drawn today and results came back with potassium elevated to 8.1.     She is completely asymptomatic reassuring for hyperviscosity and is currently on expectant management without any active treatment.     I suspect this might be related to hemolysis specially with normal clinic function and her white white count  consistently elevated  >100 since 2022, continues to be stable today 182.     Regardless, I discussed with her my recommendation of going to the E.D for repeat blood draw, and if real, management of hyperkalemia as if in fact real, it could be very dangerous to have K levels that high, specially for her heart.     Recommendations:   - Go to the ED   - Repeat CMP, include phos, uric acid and LDH for TLS  - Manage hyperkalemia accordingly ( EKG, shifting, etc). If hyperkalemia is real, she might even need to be admitted for further monitoring.   - Consider heme malignancy consult on AM.     Nesha Delarosa MD

## 2024-09-05 NOTE — ED TRIAGE NOTES
"Chief Complaint: Patient presents to the ED after being informed by primary care office their Potasium is high from lab test earlier today.    Onset of Symptoms: Today    Home Remedies: NA    Triage Vital Signs: /79   Pulse 81   Temp 98.3  F (36.8  C) (Oral)   Resp 16   Ht 1.626 m (5' 4\")   Wt 64.4 kg (142 lb)   SpO2 95%   BMI 24.37 kg/m      Gustavo Bardales RN  September 4, 2024       Triage Assessment (Adult)       Row Name 09/04/24 9585          Triage Assessment    Airway WDL WDL        Respiratory WDL    Respiratory WDL WDL        Skin Circulation/Temperature WDL    Skin Circulation/Temperature WDL WDL        Cardiac WDL    Cardiac WDL WDL        Peripheral/Neurovascular WDL    Peripheral Neurovascular WDL WDL        Cognitive/Neuro/Behavioral WDL    Cognitive/Neuro/Behavioral WDL WDL                     "

## 2024-09-17 ENCOUNTER — OFFICE VISIT (OUTPATIENT)
Dept: URGENT CARE | Facility: URGENT CARE | Age: 84
End: 2024-09-17
Payer: COMMERCIAL

## 2024-09-17 VITALS
BODY MASS INDEX: 23.32 KG/M2 | SYSTOLIC BLOOD PRESSURE: 112 MMHG | HEIGHT: 65 IN | HEART RATE: 74 BPM | TEMPERATURE: 97.2 F | DIASTOLIC BLOOD PRESSURE: 65 MMHG | WEIGHT: 140 LBS | OXYGEN SATURATION: 98 %

## 2024-09-17 DIAGNOSIS — Z76.0 MEDICATION REFILL: ICD-10-CM

## 2024-09-17 DIAGNOSIS — K13.79 SORE IN MOUTH: Primary | ICD-10-CM

## 2024-09-17 PROCEDURE — 99213 OFFICE O/P EST LOW 20 MIN: CPT | Performed by: FAMILY MEDICINE

## 2024-09-17 NOTE — PROGRESS NOTES
Chief Complaint   Patient presents with    Urgent Care    Mouth Problem     Pt in clinic to have eval for burn on the roof of her zeeshan.     Kerri was seen today for urgent care and mouth problem.    Diagnoses and all orders for this visit:    Sore in mouth    Medication refill  -     amoxicillin-clavulanate (AUGMENTIN) 875-125 MG tablet; Take 1 tablet by mouth 2 times daily for 7 days.    ASSESSMENT:      Sore in mouth  Medication refill  Differential diagnosis canker sore/mucosal injury related to hot food /oral thrush/  PLAN:   See orders in epic.   Symptomatic treat with salt water rinses OTC analgesic as needed.  Also recommended applying topical viscous lidocaine to the area to help with the pain.  Follow-up with primary clinic if not improving.  At this point I am not seeing any sign of infection  But because she is going on a trip patient was given a prescription for an antibiotic only to take it if there is worsening redness and worsening pain.        SUBJECTIVE:  Kerri Shook is a 84 year old female with a chief complaint of sore in the roof of the mouth following drinking some hot coffee this happened 3 days back she has been cleaning her mouth with warm salt water but has been noticing continuing pain.  Denies any other systemic symptoms.  Patient is leaving for a trip in couple of days hence is here to rule out any infection.    Past Medical History:   Diagnosis Date    Breast disorder 1990    Breast Cancer    Cardiomyopathy (H)     Chronic osteoarthritis 2012    resulted in hip replacement    CLL (chronic lymphoblastic leukemia) 1989    Closed fracture of second toe of left foot     Colon adenomas     6/26/14 colonoscopy, repeat in 3 years    History of blood transfusion     hyperlipidemia     Primary localized osteoarthrosis, pelvic region and thigh     Tinnitus      Current Outpatient Medications   Medication Sig Dispense Refill    amoxicillin-clavulanate (AUGMENTIN) 875-125 MG tablet Take 1  "tablet by mouth 2 times daily for 7 days. 14 tablet 0    aspirin 81 MG tablet Take 81 mg by mouth daily.      carvedilol (COREG) 3.125 MG tablet Take 1 tablet (3.125 mg) by mouth 2 times daily 180 tablet 2    gabapentin (NEURONTIN) 100 MG capsule TAKE 1 CAPSULE(100 MG) BY MOUTH EVERY NIGHT AS NEEDED 30 capsule 1    Multiple Vitamin (MULTIVITAMIN) per tablet Take 1 tablet by mouth daily.      simvastatin (ZOCOR) 20 MG tablet Take 1 tablet (20 mg) by mouth at bedtime 90 tablet 0     Social History     Tobacco Use    Smoking status: Never    Smokeless tobacco: Never   Substance Use Topics    Alcohol use: Yes     Comment: 3 glasses of wine per week       ROS:  Review of systems negative except as stated above.    OBJECTIVE:   /65   Pulse 74   Temp 97.2  F (36.2  C) (Temporal)   Ht 1.651 m (5' 5\")   Wt 63.5 kg (140 lb)   SpO2 98%   BMI 23.30 kg/m    GENERAL APPEARANCE: healthy, alert and no distress  EYES: EOMI,  PERRL, conjunctiva clear  HENT: ear canals and TM's normal.  Nose normal.  Pharynx no  erythema noted.  There is a 1cm area of tenderness and erythema noted in the left aspect of the roof of the mouth   NECK: supple, non-tender to palpation, no adenopathy noted  PSYCH: mentation appears normal    Tish aBrrios MD     "

## 2024-10-02 NOTE — TELEPHONE ENCOUNTER
FUTURE VISIT INFORMATION      FUTURE VISIT INFORMATION:  Date: 10/8/2024  Time: 3pm   Location: Derm Surg   REFERRAL INFORMATION:  Referring provider:  Vielka Ann MD   Referring providers clinic[place]  [facility]  Reason for visit/diagnosis  Basal cell carcinoma     RECORDS REQUESTED FROM:       Clinic name Comments Records Status Imaging Status   8/7/2024 Dermatopathology (internal biopsy)  A. Right lower lateral shin:  - Basal cell carcinoma, superficial type - (see description)  B. Left lateral nasal side wall:  - Basal cell carcinoma, nodular type - (see description)     Derm OV with Dr. Ann  Skin Cancer Screening

## 2024-10-08 ENCOUNTER — PRE VISIT (OUTPATIENT)
Dept: DERMATOLOGY | Facility: CLINIC | Age: 84
End: 2024-10-08

## 2024-10-09 ENCOUNTER — NURSE TRIAGE (OUTPATIENT)
Dept: ONCOLOGY | Facility: CLINIC | Age: 84
End: 2024-10-09

## 2024-10-09 ENCOUNTER — LAB (OUTPATIENT)
Dept: LAB | Facility: CLINIC | Age: 84
End: 2024-10-09
Payer: COMMERCIAL

## 2024-10-09 DIAGNOSIS — E78.5 HYPERLIPIDEMIA LDL GOAL <70: ICD-10-CM

## 2024-10-09 DIAGNOSIS — C91.10 CLL (CHRONIC LYMPHOCYTIC LEUKEMIA) (H): ICD-10-CM

## 2024-10-09 LAB
ERYTHROCYTE [DISTWIDTH] IN BLOOD BY AUTOMATED COUNT: 14.2 % (ref 10–15)
HCT VFR BLD AUTO: 42.8 % (ref 35–47)
HGB BLD-MCNC: 13.6 G/DL (ref 11.7–15.7)
MCH RBC QN AUTO: 30.8 PG (ref 26.5–33)
MCHC RBC AUTO-ENTMCNC: 31.8 G/DL (ref 31.5–36.5)
MCV RBC AUTO: 97 FL (ref 78–100)
PLATELET # BLD AUTO: 169 10E3/UL (ref 150–450)
RBC # BLD AUTO: 4.42 10E6/UL (ref 3.8–5.2)
WBC # BLD AUTO: 192.9 10E3/UL (ref 4–11)

## 2024-10-09 PROCEDURE — 85007 BL SMEAR W/DIFF WBC COUNT: CPT

## 2024-10-09 PROCEDURE — 80053 COMPREHEN METABOLIC PANEL: CPT

## 2024-10-09 PROCEDURE — 85027 COMPLETE CBC AUTOMATED: CPT

## 2024-10-09 PROCEDURE — 80061 LIPID PANEL: CPT

## 2024-10-09 PROCEDURE — 36415 COLL VENOUS BLD VENIPUNCTURE: CPT

## 2024-10-09 NOTE — TELEPHONE ENCOUNTER
DATE/TIME OF CALL RECEIVED FROM LAB:  10/09/24 at 1:28 PM   CRITICAL LAB TEST:  .9  OTHER LAB VALUES:  Hgb 13.3 Platelets 184 ANC 5.0   PROVIDER NOTIFIED?: No (Please explain why the provider was not notified): WBC is decreasing in patient with CLL  PROVIDER NAME: DR Centeno   DATE/TIME LAB VALUE REPORTED TO PROVIDER: N/A  MECHANISM OF PROVIDER NOTIFICATION:  Nurse triage Note   PROVIDER RESPONSE: N/A

## 2024-10-10 LAB
ALBUMIN SERPL BCG-MCNC: 4.4 G/DL (ref 3.5–5.2)
ALP SERPL-CCNC: 105 U/L (ref 40–150)
ALT SERPL W P-5'-P-CCNC: 24 U/L (ref 0–50)
ANION GAP SERPL CALCULATED.3IONS-SCNC: 8 MMOL/L (ref 7–15)
AST SERPL W P-5'-P-CCNC: 35 U/L (ref 0–45)
BASOPHILS # BLD MANUAL: 0 10E3/UL (ref 0–0.2)
BASOPHILS NFR BLD MANUAL: 0 %
BILIRUB SERPL-MCNC: 0.6 MG/DL
BUN SERPL-MCNC: 15.3 MG/DL (ref 8–23)
CALCIUM SERPL-MCNC: 9.4 MG/DL (ref 8.8–10.4)
CHLORIDE SERPL-SCNC: 104 MMOL/L (ref 98–107)
CHOLEST SERPL-MCNC: 205 MG/DL
CREAT SERPL-MCNC: 0.9 MG/DL (ref 0.51–0.95)
EGFRCR SERPLBLD CKD-EPI 2021: 63 ML/MIN/1.73M2
EOSINOPHIL # BLD MANUAL: 0 10E3/UL (ref 0–0.7)
EOSINOPHIL NFR BLD MANUAL: 0 %
FASTING STATUS PATIENT QL REPORTED: YES
FASTING STATUS PATIENT QL REPORTED: YES
GLUCOSE SERPL-MCNC: 114 MG/DL (ref 70–99)
HCO3 SERPL-SCNC: 26 MMOL/L (ref 22–29)
HDLC SERPL-MCNC: 57 MG/DL
LDLC SERPL CALC-MCNC: 125 MG/DL
LYMPHOCYTES # BLD MANUAL: 41.3 10E3/UL (ref 0.8–5.3)
LYMPHOCYTES NFR BLD MANUAL: 21 %
MONOCYTES # BLD MANUAL: 0 10E3/UL (ref 0–1.3)
MONOCYTES NFR BLD MANUAL: 0 %
NEUTROPHILS # BLD MANUAL: 3.1 10E3/UL (ref 1.6–8.3)
NEUTROPHILS NFR BLD MANUAL: 2 %
NONHDLC SERPL-MCNC: 148 MG/DL
OTHER CELLS # BLD MANUAL: 148.5 10E3/UL
OTHER CELLS NFR BLD MANUAL: 77 %
PATH REV: ABNORMAL
PLAT MORPH BLD: ABNORMAL
POTASSIUM SERPL-SCNC: 5.1 MMOL/L (ref 3.4–5.3)
PROT SERPL-MCNC: 6.5 G/DL (ref 6.4–8.3)
RBC MORPH BLD: ABNORMAL
ROULEAUX BLD QL SMEAR: PRESENT
SODIUM SERPL-SCNC: 138 MMOL/L (ref 135–145)
TRIGL SERPL-MCNC: 116 MG/DL

## 2024-10-15 ENCOUNTER — MYC MEDICAL ADVICE (OUTPATIENT)
Dept: DERMATOLOGY | Facility: CLINIC | Age: 84
End: 2024-10-15

## 2024-10-15 ENCOUNTER — OFFICE VISIT (OUTPATIENT)
Dept: DERMATOLOGY | Facility: CLINIC | Age: 84
End: 2024-10-15
Payer: COMMERCIAL

## 2024-10-15 VITALS — HEART RATE: 98 BPM | SYSTOLIC BLOOD PRESSURE: 131 MMHG | DIASTOLIC BLOOD PRESSURE: 73 MMHG

## 2024-10-15 DIAGNOSIS — C44.712 BASAL CELL CARCINOMA (BCC) OF RIGHT LOWER LEG: ICD-10-CM

## 2024-10-15 DIAGNOSIS — C44.311 BASAL CELL CARCINOMA (BCC) OF LEFT NASAL SIDEWALL: Primary | ICD-10-CM

## 2024-10-15 PROCEDURE — 17313 MOHS 1 STAGE T/A/L: CPT | Performed by: DERMATOLOGY

## 2024-10-15 PROCEDURE — 14060 TIS TRNFR E/N/E/L 10 SQ CM/<: CPT | Performed by: DERMATOLOGY

## 2024-10-15 PROCEDURE — 17314 MOHS ADDL STAGE T/A/L: CPT | Performed by: DERMATOLOGY

## 2024-10-15 PROCEDURE — 17311 MOHS 1 STAGE H/N/HF/G: CPT | Performed by: DERMATOLOGY

## 2024-10-15 PROCEDURE — 12032 INTMD RPR S/A/T/EXT 2.6-7.5: CPT | Mod: 59 | Performed by: DERMATOLOGY

## 2024-10-15 ASSESSMENT — PAIN SCALES - GENERAL: PAINLEVEL: NO PAIN (0)

## 2024-10-15 NOTE — PROGRESS NOTES
[facility] [place] Procedure Note    Dermatology Problem List:  FBSE 08/07/2024     # Hx of NMSC  - BCC, R lower lateral shin, s/p Mohs 10/15/24  - BCC, L lateral nasal sidewall, s/p Mohs 10/15/24    # Lesion on R upper nasal side wall - ddx cyst v bony structure?  - ultrasound ordered 8/7/24 to characterize  ___________________________________________      Date of Service:  Oct 15, 2024  Surgery: Mohs micrographic surgery    Case 1  Repair Type: intermediate  Repair Size: 4.4 cm  Suture Material: vicryl rapide 5-0  Tumor Type: BCC - Basal cell carcinoma  Location: right lower lateral shin  Derm-Path Accession #: [id number]  PreOp Size: 1.0x0.8 cm  PostOp Size: 2.0x1.5 cm  Mohs Accession #:   Level of Defect: fascia      Procedure:  We discussed the principles of treatment and most likely complications including scarring, bleeding, infection, swelling, pain, crusting, nerve damage, large wound,  incomplete excision, wound dehiscence,  nerve damage, recurrence, and a second procedure may be recommended to obtain the best cosmetic or functional result.    Informed consent was obtained and the patient underwent the procedure as follows:  The patient was placed supine on the operating table.  The cancer was identified, outlined with a marker, and verified by the patient.  The entire surgical field was prepped with ChoraPrep.  The surgical site was anesthetized using Lidocaine 1% with epi 1:100,000.      The area of clinically apparent tumor was debulked. The layer of tissue was then surgically excised using a #15 blade and was then transferred onto a specimen sheet maintaining the orientation of the specimen. Hemostasis was obtained using Other (comments) (hyfrecation). The wound site was then covered with a dressing while the tissue samples were processed for examination.    The excised tissue was transported to the Mohs histology laboratory maintaining the tissue  orientation.  The tissue specimen was relaxed so that the entire surgical margin was in a a single horizontal plane for sectioning and inked for precise mapping.  A precise reference map was drawn to reflect the sectioning of the specimen, colored inking of the margins, and orientation on the patient.  The tissue was processed using horizontal sectioning of the base and continuous peripheral margins.  The histopathologic sections were reviewed in conjunction with the reference map.    Total blocks: 1    Total slides:  2    Residual tumor was identified and indicated in red on the reference map, identifying the location where further tissue excision was necessary. Therefore, an additional stage of Mohs Micrographic surgery was deemed necessary.     Stage II   The patient was returned to the operating room, and the area prepped in the usual manner. The residual tumor was excised using the reference map as a guide. The specimen was transfered to a labeled specimen sheet maintaining the orientation of the specimen. Hemostasis was obtained and the wound site was covered with a dressing while the tissue was processed for examination.     The excised tissue was transported to the Mohs histology laboratory maintaining orientation. The specimen margins were inked for precise mapping and a reference map was prepared for the is additional stage to maintain precise orientation as described above. The tissue was processed using horizontal sectioning of the base and continuous peripheral margins. The histopathologic sections were reviewed in conjunction with the reference map.     Total blocks: 1    Total slides:  2    Residual tumor was identified and indicated in red on the reference map, identifying the location where further tissue excision was necessary. Therefore, an additional stage of Mohs Micrographic surgery was deemed necessary.     Stage III   The patient was returned to the operating room, and the area prepped in the  usual manner. The residual tumor was excised using the reference map as a guide. The specimen was transfered to a labeled specimen sheet maintaining the orientation of the specimen. Hemostasis was obtained and the wound site was covered with a dressing while the tissue was processed for examination.     The excised tissue was transported to the Mohs histology laboratory maintaining orientation. The specimen margins were inked for precise mapping and a reference map was prepared for the is additional stage to maintain precise orientation as described above. The tissue was processed using horizontal sectioning of the base and continuous peripheral margins. The histopathologic sections were reviewed in conjunction with the reference map.     Total blocks: 1    Total slides:  2    There were no cancer cells visualized on examination, therefore Mohs surgery was complete.    REPAIR: An intermediate linear layered closure was selected as the procedure which would maximally preserve both function and cosmesis.    After the excision of the tumor, the area was undermined. Hemostasis was obtained with Hyfrecation electrofulgaration.  Closure was oriented so that the wound was in the patient's natural skin tension lines. The deeper layers of subcutaneous and superficial (nonmuscle) fascia (deep layer suturing) were then closed with 4-0  monocryl buried vertical mattress sutures. The epidermis was then carefully approximated along the length of the wound using (superficial layer suturing)  5-0 Vicryl Rapide  simple running sutures.     Estimated blood loss was less than 10 ml for all surgical sites. A sterile pressure dressing was applied and wound care instructions, with a written handout, were given. The patient was discharged from the Dermatologic Surgery Center alert and ambulatory.    Follow-up in [facility] Dermatologic Surgery Clinic [place] Procedure Note      Date of Service:  Oct 15,  2024  Surgery: Mohs micrographic surgery    Case 2  Repair Type: local flap (advancement flap)  Repair Size: 3.0x1.5 cm  Suture Material: Fast Absorbing Gut 5-0  Tumor Type: BCC - Basal cell carcinoma  Location: left lateral nasal sidewall  Derm-Path Accession #: [id number]  PreOp Size: 0.5x0.5 cm  PostOp Size: 1.0x0.9 cm  Mohs Accession #:   Level of Defect: fascia      Procedure:  We discussed the principles of treatment and most likely complications including scarring, bleeding, infection, swelling, pain, crusting, nerve damage, large wound,  incomplete excision, wound dehiscence,  nerve damage, recurrence, and a second procedure may be recommended to obtain the best cosmetic or functional result.    Informed consent was obtained and the patient underwent the procedure as follows:  The patient was placed supine on the operating table.  The cancer was identified, outlined with a marker, and verified by the patient.  The entire surgical field was prepped with ChoraPrep.  The surgical site was anesthetized using Lidocaine 1% with epi 1:100,000.      The area of clinically apparent tumor was debulked. The layer of tissue was then surgically excised using a #15 blade and was then transferred onto a specimen sheet maintaining the orientation of the specimen. Hemostasis was obtained using Other (comments) (hyfrecation). The wound site was then covered with a dressing while the tissue samples were processed for examination.    The excised tissue was transported to the Mohs histology laboratory maintaining the tissue orientation.  The tissue specimen was relaxed so that the entire surgical margin was in a a single horizontal plane for sectioning and inked for precise mapping.  A precise reference map was drawn to reflect the sectioning of the specimen, colored inking of the margins, and orientation on the patient.  The tissue was processed using horizontal sectioning of the base and continuous peripheral margins.   The histopathologic sections were reviewed in conjunction with the reference map.    Total blocks: 1    Total slides:  2    There were no cancer cells visualized on examination, therefore Mohs surgery was complete.    Burow s Advancement Flap:  Because of the size, full thickness nature of the defect, and tightness of the surrounding skin, a Burow's Cresentic advancement flap was planned. Patient was prepped with Betasept, local anesthesia administered, and draped in a sterile fashion. A Burow's triangle was excised superiorly and a horizontal incision extended laterally to displace the 2nd Burow's triangle into the relaxed skin tension lines of the medial cheek. The flap was elevated by undermining the skin in the subcutaneous plane and after hemostasis was obtained the flap was advanced and closed in a layered fashion (4-0  monocryl, 5-0 Vicryl Rapide) and a dressing was applied. Total advancement flap size was 3.0cm x 1.5cm.     Estimated blood loss, minimal; complications, none; bandaging and wound care, routine. Patient was discharged in good condition and will return for bandage change/suture removal in 1 week and for assessment of surgical repair in 3 months    Scribe Disclosure:   I, Martha Martini, am serving as a scribe; to document services personally performed by Dr. Nii Lucio - -based on data collection and the provider's statements to me.     Provider Disclosure:   The documentation recorded by the scribe accurately reflects the services I personally performed and the decisions made by me. I personally performed the procedures today.    Nii Lucio DO    Department of Dermatology  [facility]: Phone: [phone], Fax:[phone]  [facility]: Phone: [phone], Fax: [phone]    Care and Laboratory Testing Performed at:  [facility]  [facility]   Clinics and Surgery Center - Dermatologic Surgery Clinic  [address] [place] [address]   Mail Code [id number]   [place] [address]

## 2024-10-15 NOTE — LETTER
10/15/2024       RE: Kerri Shook  [address]  [facility] [place] [facility] [address]     Dear Colleague,    Thank you for referring your patient, Kerri Shook, to the [facility] DERMATOLOG[facility] SURGERY CL[facility] [place] at [facility]. Please see a copy of my visit note below.    [facility] Dermatologic Surgery [facility] [place] Procedure Note    Dermatology Problem List:  FBSE 08/07/2024     # Hx of NMSC  - BCC, R lower lateral shin, s/p Mohs 10/15/24  - BCC, L lateral nasal sidewall, s/p Mohs 10/15/24    # Lesion on R upper nasal side wall - ddx cyst v bony structure?  - ultrasound ordered 8/7/24 to characterize  ___________________________________________      Date of Service:  Oct 15, 2024  Surgery: Mohs micrographic surgery    Case 1  Repair Type: intermediate  Repair Size: 4.4 cm  Suture Material: vicryl rapide 5-0  Tumor Type: BCC - Basal cell carcinoma  Location: right lower lateral shin  Derm-Path Accession #: [id number]  PreOp Size: 1.0x0.8 cm  PostOp Size: 2.0x1.5 cm  Mohs Accession #:   Level of Defect: fascia      Procedure:  We discussed the principles of treatment and most likely complications including scarring, bleeding, infection, swelling, pain, crusting, nerve damage, large wound,  incomplete excision, wound dehiscence,  nerve damage, recurrence, and a second procedure may be recommended to obtain the best cosmetic or functional result.    Informed consent was obtained and the patient underwent the procedure as follows:  The patient was placed supine on the operating table.  The cancer was identified, outlined with a marker, and verified by the patient.  The entire surgical field was prepped with ChoraPrep.  The surgical site was anesthetized using Lidocaine 1% with epi 1:100,000.      The area of clinically apparent tumor was debulked. The layer of tissue was then surgically excised using a #15 blade and was then transferred  onto a specimen sheet maintaining the orientation of the specimen. Hemostasis was obtained using Other (comments) (hyfrecation). The wound site was then covered with a dressing while the tissue samples were processed for examination.    The excised tissue was transported to the [facility] histology laboratory maintaining the tissue orientation.  The tissue specimen was relaxed so that the entire surgical margin was in a a single horizontal plane for sectioning and inked for precise mapping.  A precise reference map was drawn to reflect the sectioning of the specimen, colored inking of the margins, and orientation on the patient.  The tissue was processed using horizontal sectioning of the base and continuous peripheral margins.  The histopathologic sections were reviewed in conjunction with the reference map.    Total blocks: 1    Total slides:  2    Residual tumor was identified and indicated in red on the reference map, identifying the location where further tissue excision was necessary. Therefore, an additional stage of Mohs Micrographic surgery was deemed necessary.     Stage II   The patient was returned to the operating room, and the area prepped in the usual manner. The residual tumor was excised using the reference map as a guide. The specimen was transfered to a labeled specimen sheet maintaining the orientation of the specimen. Hemostasis was obtained and the wound site was covered with a dressing while the tissue was processed for examination.     The excised tissue was transported to the [facility] histology laboratory maintaining orientation. The specimen margins were inked for precise mapping and a reference map was prepared for the is additional stage to maintain precise orientation as described above. The tissue was processed using horizontal sectioning of the base and continuous peripheral margins. The histopathologic sections were reviewed in conjunction with the reference map.     Total blocks: 1    Total  slides:  2    Residual tumor was identified and indicated in red on the reference map, identifying the location where further tissue excision was necessary. Therefore, an additional stage of Mohs Micrographic surgery was deemed necessary.     Stage III   The patient was returned to the operating room, and the area prepped in the usual manner. The residual tumor was excised using the reference map as a guide. The specimen was transfered to a labeled specimen sheet maintaining the orientation of the specimen. Hemostasis was obtained and the wound site was covered with a dressing while the tissue was processed for examination.     The excised tissue was transported to the Mohs histology laboratory maintaining orientation. The specimen margins were inked for precise mapping and a reference map was prepared for the is additional stage to maintain precise orientation as described above. The tissue was processed using horizontal sectioning of the base and continuous peripheral margins. The histopathologic sections were reviewed in conjunction with the reference map.     Total blocks: 1    Total slides:  2    There were no cancer cells visualized on examination, therefore Mohs surgery was complete.    REPAIR: An intermediate linear layered closure was selected as the procedure which would maximally preserve both function and cosmesis.    After the excision of the tumor, the area was undermined. Hemostasis was obtained with Hyfrecation electrofulgaration.  Closure was oriented so that the wound was in the patient's natural skin tension lines. The deeper layers of subcutaneous and superficial (nonmuscle) fascia (deep layer suturing) were then closed with 4-0  monocryl buried vertical mattress sutures. The epidermis was then carefully approximated along the length of the wound using (superficial layer suturing)  5-0 Vicryl Rapide  simple running sutures.     Estimated blood loss was less than 10 ml for all surgical sites. A  sterile pressure dressing was applied and wound care instructions, with a written handout, were given. The patient was discharged from the Dermatologic Surgery Center alert and ambulatory.    Follow-up in [facility] Dermatologic Surgery Clinic [place] Procedure Note      Date of Service:  Oct 15, 2024  Surgery: Mohs micrographic surgery    Case 2  Repair Type: local flap (advancement flap)  Repair Size: 3.0x1.5 cm  Suture Material: Fast Absorbing Gut 5-0  Tumor Type: BCC - Basal cell carcinoma  Location: left lateral nasal sidewall  Derm-Path Accession #: [id number]  PreOp Size: 0.5x0.5 cm  PostOp Size: 1.0x0.9 cm  Mohs Accession #:   Level of Defect: fascia      Procedure:  We discussed the principles of treatment and most likely complications including scarring, bleeding, infection, swelling, pain, crusting, nerve damage, large wound,  incomplete excision, wound dehiscence,  nerve damage, recurrence, and a second procedure may be recommended to obtain the best cosmetic or functional result.    Informed consent was obtained and the patient underwent the procedure as follows:  The patient was placed supine on the operating table.  The cancer was identified, outlined with a marker, and verified by the patient.  The entire surgical field was prepped with ChoraPrep.  The surgical site was anesthetized using Lidocaine 1% with epi 1:100,000.      The area of clinically apparent tumor was debulked. The layer of tissue was then surgically excised using a #15 blade and was then transferred onto a specimen sheet maintaining the orientation of the specimen. Hemostasis was obtained using Other (comments) (hyfrecation). The wound site was then covered with a dressing while the tissue samples were processed for examination.    The excised tissue was transported to the Mohs histology laboratory maintaining the tissue orientation.  The tissue specimen was relaxed so that the entire surgical margin  was in a a single horizontal plane for sectioning and inked for precise mapping.  A precise reference map was drawn to reflect the sectioning of the specimen, colored inking of the margins, and orientation on the patient.  The tissue was processed using horizontal sectioning of the base and continuous peripheral margins.  The histopathologic sections were reviewed in conjunction with the reference map.    Total blocks: 1    Total slides:  2    There were no cancer cells visualized on examination, therefore Mohs surgery was complete.    Burow s Advancement Flap:  Because of the size, full thickness nature of the defect, and tightness of the surrounding skin, a Burow's Cresentic advancement flap was planned. Patient was prepped with Betasept, local anesthesia administered, and draped in a sterile fashion. A Burow's triangle was excised superiorly and a horizontal incision extended laterally to displace the 2nd Burow's triangle into the relaxed skin tension lines of the medial cheek. The flap was elevated by undermining the skin in the subcutaneous plane and after hemostasis was obtained the flap was advanced and closed in a layered fashion (4-0  monocryl, 5-0 Vicryl Rapide) and a dressing was applied. Total advancement flap size was 3.0cm x 1.5cm.     Estimated blood loss, minimal; complications, none; bandaging and wound care, routine. Patient was discharged in good condition and will return for bandage change/suture removal in 1 week and for assessment of surgical repair in 3 months    Scribe Disclosure:   MARIA L, Martha Martini, am serving as a scribe; to document services personally performed by Dr. Nii Lucio - -based on data collection and the provider's statements to me.     Provider Disclosure:   The documentation recorded by the scribe accurately reflects the services I personally performed and the decisions made by me. I personally performed the procedures today.    Nii Lucio, DO  Assistant  Professor  Department of Dermatology  [facility]: Phone: [phone], Fax:[phone]  [facility]: Phone: [phone], Fax: [phone]    Care and Laboratory Testing Performed at:  [facility]   Clinics and Surgery Center - Dermatologic Surgery Clinic  [address] [place] [address]   Mail Code [id number], [place] [address]      Again, thank you for allowing me to participate in the care of your patient.      Sincerely,    Nii Lucio MD

## 2024-10-15 NOTE — PATIENT INSTRUCTIONS
Wound care    I will experience scar, bleeding, swelling, pain, crusting and redness. I may experience incomplete removal or recurrence. Risks are bleeding, bruising, swelling, infection, nerve damage, & a large wound. A second procedure may be recommended to obtain the best cosmetic or functional result.       A three month office visit with your Surgeon is recommended for scar evaluation. Please reach out sooner if you have concerns about you surgical site/wound.    Caring for your skin after surgery    After your surgery, a pressure bandage will be placed over the area that has stitches. This is important to prevent bleeding. Please follow these instructions over the next 1 to 2 weeks. Following this regimen will help to prevent complications as your wound heals.     For the first 48 hours after your surgery:    Leave the pressure dressing on and keep it dry. If it should come loose, you may re-tape it, but do not take it off.  Relax and take it easy. Do not do any vigorous exercise or heavy lifting. This could cause the wound to bleed.  Post-Operative pain is usually mild. If you are able to take tylenol, You may take plain or extra-strength Tylenol (acetaminophen) As directed on the bottle (do not take more than 4,000mg in one day). If you are able to take ibuprofen, you can alternate the tylenol and ibuprofen.   Avoid alcohol as this may increase your tendency to bleed.   You may put an ice pack around the bandaged area for 20 minutes at a time as needed. This may help reduce swelling, bruising, and pain. Make sure the ice pack is waterproof so that the pressure bandage doesn t get wet.  If the wound is on the face try to sleep with your head elevated. Either in a recliner or propped up in bed, this will decrease swelling around the eyes.   You may see a small amount of drainage or blood on your pressure bandage. This is normal. However:  If drainage or bleeding continues or saturates the bandage, you will  need to apply firm pressure over the bandage with a piece of gauze for 15 minutes.  If bleeding continues after applying pressure for 15 minutes, apply an ice pack to the bandaged area for 15 minutes.  If bleeding still continues, call our office or go to the nearest emergency room.    Remove pressure dressing 48 hours after surgery:    Carefully remove the pressure bandage. If it seems sticky or too difficult to get off, you may need to soak it off in the shower.  After the pressure dressing is removed, you may shower and get the wound wet. However, Do Not let the forceful stream of the shower hit the wound directly.  Follow these wound care and dressing change instructions:    You have skin glue over the stitches. This will dry and flake off with time (about 2 weeks). If the stitches are still hanging around after 2 weeks, let us know, we can clip them out for you if they do not fall out with washing.   You may allow water to run over the site. Do not soak.  Do Not rub or scrub the site.  Pat dry after the shower or bath.  Avoid topical medications, lotions, creams, ointment,or oils.  Do not use tanning lamps or expose the site to sun.   Check wound appearance daily, some swelling and redness is normal after a procedure but should go away as your would is healing. If the swelling and redness or pain increases or if any other signs of infection occur listed below please send in a photo via my chart and or call us to let us know.  The clear glue film should start peeling and flaking off approximately around 2 weeks. By this time your wound should be sufficiently healed. If it still looks to be healing when the glue comes off you can clean the wound with soapy warm water daily in the shower. No need to bandage further, but you can put a bandage on the area daily for the two weeks if you would like.   If skin glue and sutures are still intact at 2 weeks after your procedure, you can start applying Vaseline daily to  "help soften up the skin glue. It will come off easier this way.        If you are able to take acetaminophen (\"Tylenol,\" etc.) and ibuprofen (\"Advil\" or \"Motrin,\" etc.), then you may STAGGER these medications by taking 400 mg of ibuprofen (usually two tabs) every 8 hours and 1,000 mg of acetaminophen (e.g., two tabs of extra-strength Tylenol) every 8 hours.    This means, for example, that you could take the followin,000 mg of Tylenol, followed 4 hours later by 400 mg Ibuprofen, followed 4 hours later with 1,000 mg of Tylenol, followed 4 hours later by 400 mg Ibuprofen, followed 4 hours later with 1,000 mg of Tylenol, and so forth.     Essentially, you can take either 1,000 mg of Tylenol or 400 mg of ibuprofen in alternating fashion EVERY FOUR HOURS.    Do NOT exceed more than 4,000 mg of Tylenol or 3,200 mg of ibuprofen per 24 hours. If you are not able to take Tylenol or ibuprofen as above due to other health issues (or a physician has told you directly that you are not allowed to take one of them, say due to pre-existing severe liver or kidney issues), then disregard the above directions.    Scientific evidence supports that this combination/schedule of pain medications is just as effective, if not more effective, than taking a narcotic pain medicine.       Follow up will be a 3 month scar evaluation either in person or via a telephone visit with you sending in a photo via [employer]. Unless you have been told to follow up sooner or if you have concerns and would like to be see sooner. Please call or send us in a [employer] message if possible and attach a photo.        What to expect:    The first couple of days your wound may be tender and may bleed slightly when doing wound care.  There may be swelling and bruising around the wound, especially if it is near the eyes. For your comfort, you may apply ice or cold compresses to the bruises after your have removed the pressure bandage.  The area around your wound may " be numb for several weeks or even months.  You may experience periodic sharp pain or mild itching around the wound as it heals.   The suture line will look dark for a while but will lighten over time.       When to call us:    You have bleeding that will not stop after applying pressure and ice.  You have pain that is not controlled with Tylenol (acetaminophen.)  You have signs or symptoms of an infection such as:  Fever over 100 degrees Fahrenheit  Redness, warmth or foul-smelling drainage from the wound  If you have any questions, or are not sure how to take care of the wound.    Phone numbers:    During business hours (M-F 8:00-4:30 p.m.)  [facility]-  [phone] Option 1 appt. Desk and ask for the Dermatology Surgery Team  [phone] [facility] .     ---------------------------------------------------------  Evenings/Weekends/Holidays  Hospital - [phone]   TTY for hearing tykqrlyc-[phone]  *Ask  to page dermatologist on-call  Emergency Tecw-[phone]  TTY for hearing impaired- [phone]

## 2024-10-15 NOTE — NURSING NOTE
Dermatology Rooming Note    Kerri Shook's goals for this visit include:   Chief Complaint   Patient presents with    Derm Problem     MOHS on the right shin and left nasal wall     Alicia PÉREZ CMA

## 2024-10-16 ENCOUNTER — TELEPHONE (OUTPATIENT)
Dept: DERMATOLOGY | Facility: CLINIC | Age: 84
End: 2024-10-16
Payer: COMMERCIAL

## 2024-10-16 NOTE — TELEPHONE ENCOUNTER
Follow up call completed following Mohs procedure with Dr. Lucio.       Are you having pain? mild  Are you taking pain medication? tylenol  Are you applying ice?  no  Have you had any noticeable bleeding through the bandage? no   Do you have any other concerns? no       Please call [phone] if you have any questions or concerns.    Rhonda YU RN  Dermatology Surgery  [phone]

## 2024-10-29 ENCOUNTER — OFFICE VISIT (OUTPATIENT)
Dept: DERMATOLOGY | Facility: CLINIC | Age: 84
End: 2024-10-29
Payer: COMMERCIAL

## 2024-10-29 DIAGNOSIS — Z51.89 VISIT FOR WOUND CHECK: ICD-10-CM

## 2024-10-29 DIAGNOSIS — Z85.828 HISTORY OF BASAL CELL CARCINOMA OF SKIN: Primary | ICD-10-CM

## 2024-10-29 PROCEDURE — 99207 PR NO CHARGE LOS: CPT | Performed by: DERMATOLOGY

## 2024-10-29 NOTE — PROGRESS NOTES
Dermatologic Surgery Post-Op Wound Check     CC: Derm Problem (2 week wound check-R shin, L nose)      Dermatology Problem List:  FBSE 08/07/2024      1. Hx of NMSC  - BCC, R lower lateral shin, s/p Mohs 10/15/24  - BCC, L lateral nasal sidewall, s/p Mohs 10/15/24     # Lesion on R upper nasal side wall - ddx cyst v bony structure?  - ultrasound ordered 8/7/24 to characterize    Subjective: Kerri Shook is a 84 year old female who presents today for wound check after Mohs surgery on 10/15/24.  - She reports the left lateral nasal sidewall is healing appropriately  and she's discontinued wound care at this site.  - Right lateral lower shin still has the derma bond and sutures in tact. She'd like to get rid of them if possible, however, if they will dissolve on their own she is happy with this as well.   - no other concerns today    Objective: An exam of the right lower lateral shin and left lateral nasal sidewall was performed today   - right lower lateral shin , there is a well healed surgical scar with Vicryl Rapide and derma bond in tact. No adjacent erythema or edema.   - left lateral nasal sidewall, there is a geometric scar consistent with advancement flap. The scar is pink but no adjacent erythema or edema.            Assessment and Plan:     1. Post-op wound evaluation status post Mohs and intermediate repair of BCC on right lower lateral shin.   2. Post-op wound evaluation status post Mohs and advancement flap repair of BCC on left lateral nasal sidewall.  - The patient's surgery site(s) is/are healing very well. No evidence of infection on examination today.  - We discussed scar massage starting 1 month after surgery for both sites. She's interested in a 3 month scar evaluation for the left lateral nasal sidewall.   - Sutures were removed. The patient was told to continue with wound cares for 1 more week on the right lower lateral shin.    Patient was discussed with and evaluated by attending physician,  Nii Lucio DO.    Scribe Disclosure:   I, Martha Martini, am serving as a scribe; to document services personally performed by Dr. Nii Lucio - -based on data collection and the provider's statements to me.       Provider Disclosure:   The documentation recorded by the scribe accurately reflects the services I personally performed and the decisions made by me.    Nii Lucio DO    Department of Dermatology  [facility]: Phone: [phone], Fax:[phone]  [facility]: Phone: [phone], Fax: [phone]

## 2024-10-29 NOTE — NURSING NOTE
Chief Complaint   Patient presents with    Derm Problem     2 week wound check-R MARTÍNEZ olmos RN  Dermatology Surgery

## 2024-10-29 NOTE — LETTER
10/29/2024       RE: Kerri Shook  [address]  [place] [address]     Dear Colleague,    Thank you for referring your patient, Kerri Shook, to the [facility] [place] [facility]. Please see a copy of my visit note below.    Dermatologic Surgery Post-Op Wound Check     CC: Derm Problem (2 week wound check-R shin, L nose)      Dermatology Problem List:  FBSE 08/07/2024      1. Hx of NMSC  - BCC, R lower lateral shin, s/p Mohs 10/15/24  - BCC, L lateral nasal sidewall, s/p Mohs 10/15/24     # Lesion on R upper nasal side wall - ddx cyst v bony structure?  - ultrasound ordered 8/7/24 to characterize    Subjective: Kerri Shook is a 84 year old female who presents today for wound check after Mohs surgery on 10/15/24.  - She reports the left lateral nasal sidewall is healing appropriately  and she's discontinued wound care at this site.  - Right lateral lower shin still has the derma bond and sutures in tact. She'd like to get rid of them if possible, however, if they will dissolve on their own she is happy with this as well.   - no other concerns today    Objective: An exam of the right lower lateral shin and left lateral nasal sidewall was performed today   - right lower lateral shin , there is a well healed surgical scar with Vicryl Rapide and derma bond in tact. No adjacent erythema or edema.   - left lateral nasal sidewall, there is a geometric scar consistent with advancement flap. The scar is pink but no adjacent erythema or edema.            Assessment and Plan:     1. Post-op wound evaluation status post Mohs and intermediate repair of BCC on right lower lateral shin.   2. Post-op wound evaluation status post Mohs and advancement flap repair of BCC on left lateral nasal sidewall.  - The patient's surgery site(s) is/are healing very well. No evidence of infection on examination today.  - We discussed  scar massage starting 1 month after surgery for both sites. She's interested in a 3 month scar evaluation for the left lateral nasal sidewall.   - Sutures were removed. The patient was told to continue with wound cares for 1 more week on the right lower lateral shin.    Patient was discussed with and evaluated by attending physician, Nii Lucio DO.    Scribe Disclosure:   I, Martha Martini, am serving as a scribe; to document services personally performed by Dr. Nii Lucio - -based on data collection and the provider's statements to me.       Provider Disclosure:   The documentation recorded by the scribe accurately reflects the services I personally performed and the decisions made by me.    Nii Lucio DO    Department of Dermatology  [facility]: Phone: [phone], Fax:[phone]  [facility]: Phone: [phone], Fax: [phone]      Again, thank you for allowing me to participate in the care of your patient.      Sincerely,    Nii Lucio MD

## 2024-11-01 ENCOUNTER — ANCILLARY PROCEDURE (OUTPATIENT)
Dept: GENERAL RADIOLOGY | Facility: CLINIC | Age: 84
End: 2024-11-01
Attending: STUDENT IN AN ORGANIZED HEALTH CARE EDUCATION/TRAINING PROGRAM
Payer: COMMERCIAL

## 2024-11-01 ENCOUNTER — OFFICE VISIT (OUTPATIENT)
Dept: URGENT CARE | Facility: URGENT CARE | Age: 84
End: 2024-11-01
Payer: COMMERCIAL

## 2024-11-01 VITALS
RESPIRATION RATE: 12 BRPM | TEMPERATURE: 97.6 F | DIASTOLIC BLOOD PRESSURE: 70 MMHG | HEART RATE: 74 BPM | SYSTOLIC BLOOD PRESSURE: 121 MMHG | OXYGEN SATURATION: 96 %

## 2024-11-01 DIAGNOSIS — R05.1 ACUTE COUGH: Primary | ICD-10-CM

## 2024-11-01 DIAGNOSIS — J34.89 RHINORRHEA: ICD-10-CM

## 2024-11-01 DIAGNOSIS — R05.1 ACUTE COUGH: ICD-10-CM

## 2024-11-01 PROCEDURE — 99213 OFFICE O/P EST LOW 20 MIN: CPT | Performed by: STUDENT IN AN ORGANIZED HEALTH CARE EDUCATION/TRAINING PROGRAM

## 2024-11-01 PROCEDURE — 71046 X-RAY EXAM CHEST 2 VIEWS: CPT | Mod: TC | Performed by: RADIOLOGY

## 2024-11-01 RX ORDER — BENZONATATE 200 MG/1
200 CAPSULE ORAL 3 TIMES DAILY PRN
Qty: 15 CAPSULE | Refills: 0 | Status: SHIPPED | OUTPATIENT
Start: 2024-11-01 | End: 2024-11-06

## 2024-11-01 RX ORDER — FLUTICASONE PROPIONATE 50 MCG
1 SPRAY, SUSPENSION (ML) NASAL DAILY
Qty: 9.9 ML | Refills: 0 | Status: SHIPPED | OUTPATIENT
Start: 2024-11-01

## 2024-11-01 NOTE — PATIENT INSTRUCTIONS
For nasal congestion, I recommend Afrin (NO more than 3 days), nasal saline 3x/day, Flonase at bedtime 1 spray in each nares. Tessalon is a prescription cough suppressant that can help. Dry air can make a cough worse so I recommend using a humidifier at night if you have one.     Upper respiratory tract infection (URI; eg, common cold) can precede acute bronchitis. Initially, symptoms associated with both conditions (eg, headache, nasal congestion, sore throat) may be present    Bronchitis is inflammation of the bronchial tubes, which carry air to the lungs. The tubes swell and produce mucus, or phlegm. The mucus and inflamed bronchial tubes make you cough. You may have trouble breathing.   Antibiotics most often do not help and they may cause harm.    Bronchitis usually develops rapidly and lasts about 2 to 3 weeks in people who are otherwise healthy.      How can you care for yourself at home?  Get some extra rest.    Take an over-the-counter pain medicine, such as acetaminophen (Tylenol), ibuprofen (Advil, Motrin), or naproxen (Aleve) to reduce fever and relieve body aches. Read and follow all instructions on the label.    Take an over-the-counter cough medicine to help quiet a dry, hacking cough so that you can sleep. Avoid cough medicines that have more than one active ingredient. Read and follow all instructions on the label.      Dextromethorphan  (OTC Robitussin) Immediate release (liquids, lozenges, and capsules): 10 to 20 mg orally every 4 hours or 20 to 30 mg every 6 to 8 hours as needed  Extended release (suspension): 60 mg orally twice daily as needed (maximum daily dose: 120 mg) Cough suppressant   Guaifenesin  (OTC Mucinex) Immediate release (liquids and tablets): 200 to 400 mg orally every 4 hours as needed  Extended release (tablets): 600 mg to 1.2 g orally every 12 hours as needed  (maximum daily dose: 2.4 g) Helps loosen mucus/bronchial secretions.        Benzonatate 100 to 200 mg orally 3 times  per day as needed  (maximum daily dose: 600 mg) Cough suppressant    Swallow capsule whole; do not chew or break   Non-pharmacologic interventions   Throat lozenges (oral) As needed (per labelling instructions) Lozenges (typically nonmedicated or with menthol) may relieve sore throat and reduce cough frequency and severity   Honey (oral) As needed Often taken in hot water or tea  May reduce cough frequency and severity

## 2024-11-01 NOTE — PROGRESS NOTES
Assessment & Plan     Acute cough  - XR Chest 2 Views  - benzonatate (TESSALON) 200 MG capsule  Dispense: 15 capsule; Refill: 0  - mucinex  - rest, honey, liquids    Rhinorrhea  - fluticasone (FLONASE) 50 MCG/ACT nasal spray  Dispense: 9.9 mL; Refill: 0  - nasal saline QID daily prn    10d dry cough, rhinorrhea, intermittent headache with a negative home COVID test. On exam, she is fatigued appearing with an intermittent cough and persistent throat clearing. Afebrile and vitally stable, cxr negative for acute bacterial pneumonia. Most likely viral bronchitis. Discussed symptom management and expected course of recovery. Return precautions given and Kerri was understanding of the plan at the time of discharge.     Return for if symptoms do not improve in 2 weeks.    Alondra Castro, DO  she/her  [facility]    Subjective     Kerri Shook is a 84 year old female who presents to clinic today for the following health issues:    HPI    URI Adult    Onset of symptoms was 10days ago  Dry cough, runny nose, intermittent headache improved with Tylenol, fatigue  Negative home COVID test  Staying the same  No sick contacts, no recent travel, fever, chills, new night sweats (has off and on at baseline)  Tried OTC decongestant   No h/o COPD, tobacco use    Past Medical History:   Diagnosis Date    Breast disorder 1990    Breast Cancer    Cardiomyopathy (H)     Chronic osteoarthritis 2012    resulted in hip replacement    CLL (chronic lymphoblastic leukemia) 1989    Closed fracture of second toe of left foot     Colon adenomas     6/26/14 colonoscopy, repeat in 3 years    History of blood transfusion     hyperlipidemia     Primary localized osteoarthrosis, pelvic region and thigh     Tinnitus      Allergies   Allergen Reactions    Nkda [No Known Drug Allergy]      Current Outpatient Medications   Medication Sig Dispense Refill    aspirin 81 MG tablet Take 81 mg by mouth daily.      benzonatate (TESSALON) 200  MG capsule Take 1 capsule (200 mg) by mouth 3 times daily as needed for cough. 15 capsule 0    carvedilol (COREG) 3.125 MG tablet Take 1 tablet (3.125 mg) by mouth 2 times daily 180 tablet 2    fluticasone (FLONASE) 50 MCG/ACT nasal spray Spray 1 spray into both nostrils daily. 9.9 mL 0    gabapentin (NEURONTIN) 100 MG capsule TAKE 1 CAPSULE(100 MG) BY MOUTH EVERY NIGHT AS NEEDED 30 capsule 1    Multiple Vitamin (MULTIVITAMIN) per tablet Take 1 tablet by mouth daily.      simvastatin (ZOCOR) 20 MG tablet Take 1 tablet (20 mg) by mouth at bedtime 90 tablet 0     No current facility-administered medications for this visit.      Review of Systems  Constitutional, HEENT, cardiovascular, pulmonary, gi and gu systems are negative, except as otherwise noted.      Objective    /70 (BP Location: Right arm)   Pulse 74   Temp 97.6  F (36.4  C) (Oral)   Resp 12   SpO2 96%     Physical Exam  Constitutional:       Appearance: She is ill-appearing (fatigued). She is not toxic-appearing.   HENT:      Right Ear: No drainage. A middle ear effusion is present.      Left Ear: No drainage. There is impacted cerumen.      Nose: Congestion and rhinorrhea present. Rhinorrhea is clear.      Mouth/Throat:      Lips: Pink.      Pharynx: Oropharynx is clear. Postnasal drip present.      Tonsils: No tonsillar exudate.      Comments: Continuous throat clearing  Cardiovascular:      Rate and Rhythm: Normal rate.   Pulmonary:      Effort: Pulmonary effort is normal.      Breath sounds: Examination of the left-lower field reveals rhonchi. Rhonchi present. No wheezing or rales.   Neurological:      Mental Status: She is alert.          XR Chest 2 Views    Result Date: 11/1/2024  EXAM: XR CHEST 2 VIEWS LOCATION: [facility] DATE: 11/1/2024 INDICATION: 10d cough, rales in LLL COMPARISON: 12/30/2023     IMPRESSION: No change. Heart size and pulmonary vessels are normal. Very minimal interstitial prominence at lung  bases, lungs otherwise clear.         The use of Dragon[employer] dictation services may have been used to construct the content in this note; any grammatical or spelling errors are non-intentional. Please contact the author of this note directly if you are in need of any clarification.

## 2024-11-06 ENCOUNTER — LAB (OUTPATIENT)
Dept: LAB | Facility: CLINIC | Age: 84
End: 2024-11-06
Payer: COMMERCIAL

## 2024-11-06 ENCOUNTER — NURSE TRIAGE (OUTPATIENT)
Facility: CLINIC | Age: 84
End: 2024-11-06

## 2024-11-06 DIAGNOSIS — C91.10 CLL (CHRONIC LYMPHOCYTIC LEUKEMIA) (H): ICD-10-CM

## 2024-11-06 LAB
ERYTHROCYTE [DISTWIDTH] IN BLOOD BY AUTOMATED COUNT: 14 % (ref 10–15)
HCT VFR BLD AUTO: 42.1 % (ref 35–47)
HGB BLD-MCNC: 12.7 G/DL (ref 11.7–15.7)
MCH RBC QN AUTO: 29 PG (ref 26.5–33)
MCHC RBC AUTO-ENTMCNC: 30.2 G/DL (ref 31.5–36.5)
MCV RBC AUTO: 96 FL (ref 78–100)
PLATELET # BLD AUTO: 190 10E3/UL (ref 150–450)
RBC # BLD AUTO: 4.38 10E6/UL (ref 3.8–5.2)
WBC # BLD AUTO: 211.5 10E3/UL (ref 4–11)

## 2024-11-06 PROCEDURE — 85007 BL SMEAR W/DIFF WBC COUNT: CPT

## 2024-11-06 PROCEDURE — 80053 COMPREHEN METABOLIC PANEL: CPT

## 2024-11-06 PROCEDURE — 85027 COMPLETE CBC AUTOMATED: CPT

## 2024-11-06 PROCEDURE — 36415 COLL VENOUS BLD VENIPUNCTURE: CPT

## 2024-11-06 NOTE — TELEPHONE ENCOUNTER
DATE:  2024   TIME OF RECEIPT FROM LAB:  1323  Critical lab value:  WBC: 199.3; Other Hgb: 12.8, Plts: 196; ANC: 5.94 A.26  Last lab values:  10/9/24: WBC: 192.9, Hgb: 13.6, Plts: 169, ANC: 3.1, A.3  Provider Notified: Yes  Provider Name: Pop Centeno MD  Date/Time lab value reported to provider: 24 at 1347  Mechanism of provider notification: Secure Chat  Provider response: [id number]. Acknowledged. No further action required from Triage.   Routed to Care Team.

## 2024-11-07 LAB
ALBUMIN SERPL BCG-MCNC: 4.2 G/DL (ref 3.5–5.2)
ALP SERPL-CCNC: 130 U/L (ref 40–150)
ALT SERPL W P-5'-P-CCNC: 25 U/L (ref 0–50)
ANION GAP SERPL CALCULATED.3IONS-SCNC: 11 MMOL/L (ref 7–15)
AST SERPL W P-5'-P-CCNC: 32 U/L (ref 0–45)
BASOPHILS # BLD MANUAL: 0 10E3/UL (ref 0–0.2)
BASOPHILS NFR BLD MANUAL: 0 %
BILIRUB SERPL-MCNC: 0.4 MG/DL
BUN SERPL-MCNC: 14.6 MG/DL (ref 8–23)
CALCIUM SERPL-MCNC: 9.1 MG/DL (ref 8.8–10.4)
CHLORIDE SERPL-SCNC: 103 MMOL/L (ref 98–107)
CREAT SERPL-MCNC: 0.89 MG/DL (ref 0.51–0.95)
EGFRCR SERPLBLD CKD-EPI 2021: 64 ML/MIN/1.73M2
EOSINOPHIL # BLD MANUAL: 0 10E3/UL (ref 0–0.7)
EOSINOPHIL NFR BLD MANUAL: 0 %
GLUCOSE SERPL-MCNC: 81 MG/DL (ref 70–99)
HCO3 SERPL-SCNC: 25 MMOL/L (ref 22–29)
LYMPHOCYTES # BLD MANUAL: 158.6 10E3/UL (ref 0.8–5.3)
LYMPHOCYTES NFR BLD MANUAL: 75 %
MONOCYTES # BLD MANUAL: 4.2 10E3/UL (ref 0–1.3)
MONOCYTES NFR BLD MANUAL: 2 %
NEUTROPHILS # BLD MANUAL: 6.3 10E3/UL (ref 1.6–8.3)
NEUTROPHILS NFR BLD MANUAL: 3 %
OTHER CELLS # BLD MANUAL: 42.3 10E3/UL
OTHER CELLS NFR BLD MANUAL: 20 %
PATH REV: ABNORMAL
PLAT MORPH BLD: ABNORMAL
POTASSIUM SERPL-SCNC: 4.5 MMOL/L (ref 3.4–5.3)
PROT SERPL-MCNC: 6.3 G/DL (ref 6.4–8.3)
RBC MORPH BLD: ABNORMAL
SODIUM SERPL-SCNC: 139 MMOL/L (ref 135–145)

## 2024-11-13 NOTE — PROCEDURES
HPI:    Overall stable. She had a recent URI/bronchitis and had a fairly unremarkable CXR on 11/1/2024. Her breathing is fine today. She has chronic unchanged mild R thigh pain. Otherwise, no additional HEENT, cardiopulmonary, abdominal, , GYN, neurological, systemic, psychiatric, lymphatic, endocrine, vascular complaints.     Past Medical History:   Diagnosis Date    Breast disorder 1990    Breast Cancer    Cardiomyopathy (H)     Chronic osteoarthritis 2012    resulted in hip replacement    CLL (chronic lymphoblastic leukemia) 1989    Closed fracture of second toe of left foot     Colon adenomas     6/26/14 colonoscopy, repeat in 3 years    History of blood transfusion     hyperlipidemia     Primary localized osteoarthrosis, pelvic region and thigh     Tinnitus      Past Surgical History:   Procedure Laterality Date    BIOPSY  within the last 5 years    polyps during colonoscopy    COLONOSCOPY  6/26/2014    Procedure: COMBINED COLONOSCOPY, SINGLE BIOPSY/POLYPECTOMY BY BIOPSY;  Surgeon: Pola Arceo MD;  Location:  GI    COLONOSCOPY N/A 1/5/2021    Procedure: COLONOSCOPY, WITH POLYPECTOMY AND CLIP;  Surgeon: Charlie Wang MD;  Location: [facility] OR    D & C  1962    late PP hemorrhage --> retained placenta    ENDOSCOPIC SINUS SURGERY Right 7/16/2018    Procedure: ENDOSCOPIC SINUS SURGERY;  Endoscopic Sphenoidotomy with Removal of Tissue;  Surgeon: Kishore Webster MD;  Location:  OR    ENT SURGERY  1950    tonselectomy    EYE SURGERY  2015    cataract on left eye    left hip replacemen Left     hip replacement in 2013    LUMPECTOMY BREAST  1990    Left    ORTHOPEDIC SURGERY  age?    right shoulder     ORTHOPEDIC SURGERY  ~ 2013    left hip replacement    R hip arthroscopy  7/19/11    TONSILLECTOMY  1950    [facility] PELVIS/HIP JOINT SURGERY UNLISTED  April 2012    left hip replacement    [facility] SHOULDER SURG PROC UNLISTED  ?     PE:    Vitals noted, gen, nad, cooperative, alert, neck supple nl rom, lungs with good air  movement and clear, RRR, S1, S2, no MRG, abdomen, no acute findings Grossly normal neurological exam. No lower spinal tenderness to palpation. No tenderness to R thigh palpation, no mass.     Recent Results (from the past 720 hours)   Comprehensive metabolic panel    Collection Time: 11/06/24  1:07 PM   Result Value Ref Range    Sodium 139 135 - 145 mmol/L    Potassium 4.5 3.4 - 5.3 mmol/L    Carbon Dioxide (CO2) 25 22 - 29 mmol/L    Anion Gap 11 7 - 15 mmol/L    Urea Nitrogen 14.6 8.0 - 23.0 mg/dL    Creatinine 0.89 0.51 - 0.95 mg/dL    GFR Estimate 64 >60 mL/min/1.73m2    Calcium 9.1 8.8 - 10.4 mg/dL    Chloride 103 98 - 107 mmol/L    Glucose 81 70 - 99 mg/dL    Alkaline Phosphatase 130 40 - 150 U/L    AST 32 0 - 45 U/L    ALT 25 0 - 50 U/L    Protein Total 6.3 (L) 6.4 - 8.3 g/dL    Albumin 4.2 3.5 - 5.2 g/dL    Bilirubin Total 0.4 <=1.2 mg/dL   CBC with platelets and differential    Collection Time: 11/06/24  1:07 PM   Result Value Ref Range    WBC Count 211.5 (HH) 4.0 - 11.0 10e3/uL    RBC Count 4.38 3.80 - 5.20 10e6/uL    Hemoglobin 12.7 11.7 - 15.7 g/dL    Hematocrit 42.1 35.0 - 47.0 %    MCV 96 78 - 100 fL    MCH 29.0 26.5 - 33.0 pg    MCHC 30.2 (L) 31.5 - 36.5 g/dL    RDW 14.0 10.0 - 15.0 %    Platelet Count 190 150 - 450 10e3/uL   Manual Differential    Collection Time: 11/06/24  1:07 PM   Result Value Ref Range    % Neutrophils 3 %    % Lymphocytes 75 %    % Monocytes 2 %    % Eosinophils 0 %    % Basophils 0 %    % Other Cells 20 %    Absolute Neutrophils 6.3 1.6 - 8.3 10e3/uL    Absolute Lymphocytes 158.6 (H) 0.8 - 5.3 10e3/uL    Absolute Monocytes 4.2 (H) 0.0 - 1.3 10e3/uL    Absolute Eosinophils 0.0 0.0 - 0.7 10e3/uL    Absolute Basophils 0.0 0.0 - 0.2 10e3/uL    Absolute Other Cells 42.3 (H) <=0.0 10e3/uL    RBC Morphology Confirmed RBC Indices     Platelet Assessment  Automated Count Confirmed. Platelet morphology is normal.     Automated Count Confirmed. Platelet morphology is normal.    Pathologist  Review Comments (Blood) CLL          A/P:     1. CLL (dx 1986); follows with oncology, last visit with Dr. Tanner 8/26/2024.  Follow up Ms. Roe 12/4/2024.   2. Immunizations; COVID-19 Moderna x 5 and Pfizer x 2. Shingrix vaccine series 2019. Pneumococcal 23 on 10/21/2021. Prevnar 13 on 2/4/2015. Tdap on 1/27/2014. Td/Tdap done in [place]  RSV 11/21/2023  3. Breast cancer (1990) s/p radiation and tamoxifen; mammogram done 8/2/2023. Ordered mammogram on 11/13/2024  4. Dermatology; she follows with Dr. Lucio for h/o BCC (last visit 10/29/2024) and next 1/14/2025 and then with Dr. Ann 3/5/2025  5. CT abdomen/pelvis 4/7/2022 for abdominal pain; no acute findings.   6. DEXA scan: 11/19/2020 and recommended repeat in 5 years (2025).    7. Increased cholesterol on Simvastatin 20 mg; lipids; 10/9/2024; , HDL 57, and TG's 116.   8. Colonscopy 1/5/2021 and repeat in 3 years (2024). Ordered colonoscopy  7/1/2024 and scheduled for 11/18/2024  9. A1c 6.2% 2/17/2022; repeat 6.4% on 7/17/2023.   10. Seen Dr. Sigala, Podiatry 5/17/2022  11. R leg complaints. X-rays of hips and R femur were done 9/25/2023. Given her h/o breast cancer and CLL MRI R hip and R leg were done 11/28/2023 Discussed spinal stenosis can cause anterior thigh pain with walking. Will follow and if worse, she can consider PT and/or seeing orthopedics. She will let me know. Will order lumbar MRI scan. No worse sxs.   12. Hypertension: stable, continue with carvedilol   13. Cataracts: R eye cataract surgery 2/22/23. See Optometry appt. On 4/15/2024  14. Eyebrow/eyelid lift: procedure 7/14/23 and follow up with Dr. Greenwood at [facility] on 7/24/2023.   15. Voice hoarseness:  CXR was done 11/1/2024.  She had 12/11/2023 ENT appt. With Dr. Dias.      40 minutes spent on the date of the encounter doing chart review, history and exam, documentation and further activities as noted above exclusive of procedures and other billable interpretations

## 2024-11-14 ENCOUNTER — OFFICE VISIT (OUTPATIENT)
Dept: INTERNAL MEDICINE | Facility: CLINIC | Age: 84
End: 2024-11-14
Payer: COMMERCIAL

## 2024-11-14 VITALS
SYSTOLIC BLOOD PRESSURE: 130 MMHG | BODY MASS INDEX: 23.98 KG/M2 | DIASTOLIC BLOOD PRESSURE: 78 MMHG | WEIGHT: 144.1 LBS | OXYGEN SATURATION: 96 % | HEART RATE: 67 BPM

## 2024-11-14 DIAGNOSIS — Z12.31 ENCOUNTER FOR SCREENING MAMMOGRAM FOR BREAST CANCER: Primary | ICD-10-CM

## 2024-11-14 DIAGNOSIS — Z23 ENCOUNTER FOR IMMUNIZATION: ICD-10-CM

## 2024-11-14 NOTE — PROGRESS NOTES
Kerri Shook is a 84 year old female that presents in clinic today for the following:     Chief Complaint   Patient presents with    Follow Up       The patient's allergies and medications were reviewed. The patient's vitals were obtained without incident.     [place], [place]   Primary Care Clinic

## 2024-11-14 NOTE — NURSING NOTE
Chief Complaint   Patient presents with     Numbness     Pt reports numbness in left hand during the last week but subsided yesterday       YEVGENIY Garcia at 12:13 PM sign on 4/10/2020    
98.2

## 2024-11-16 ENCOUNTER — HEALTH MAINTENANCE LETTER (OUTPATIENT)
Age: 84
End: 2024-11-16

## 2024-11-18 ENCOUNTER — HOSPITAL ENCOUNTER (OUTPATIENT)
Facility: CLINIC | Age: 84
Discharge: HOME | End: 2024-11-18
Attending: SURGERY | Admitting: SURGERY
Payer: COMMERCIAL

## 2024-11-18 VITALS
DIASTOLIC BLOOD PRESSURE: 55 MMHG | RESPIRATION RATE: 16 BRPM | OXYGEN SATURATION: 92 % | HEART RATE: 69 BPM | SYSTOLIC BLOOD PRESSURE: 107 MMHG

## 2024-11-18 LAB — COLONOSCOPY: NORMAL

## 2024-11-18 PROCEDURE — 45380 COLONOSCOPY AND BIOPSY: CPT | Performed by: SURGERY

## 2024-11-18 PROCEDURE — 250N000011 HC RX IP 250 OP 636: Performed by: SURGERY

## 2024-11-18 PROCEDURE — 88305 TISSUE EXAM BY PATHOLOGIST: CPT | Mod: TC | Performed by: SURGERY

## 2024-11-18 PROCEDURE — 88305 TISSUE EXAM BY PATHOLOGIST: CPT | Mod: 26 | Performed by: STUDENT IN AN ORGANIZED HEALTH CARE EDUCATION/TRAINING PROGRAM

## 2024-11-18 PROCEDURE — G0500 MOD SEDAT ENDO SERVICE >5YRS: HCPCS | Performed by: SURGERY

## 2024-11-18 PROCEDURE — 99153 MOD SED SAME PHYS/QHP EA: CPT | Performed by: SURGERY

## 2024-11-18 RX ORDER — NALOXONE HYDROCHLORIDE 0.4 MG/ML
0.2 INJECTION, SOLUTION INTRAMUSCULAR; INTRAVENOUS; SUBCUTANEOUS
Status: DISCONTINUED | OUTPATIENT
Start: 2024-11-18 | End: 2024-11-18 | Stop reason: HOSPADM

## 2024-11-18 RX ORDER — ONDANSETRON 2 MG/ML
4 INJECTION INTRAMUSCULAR; INTRAVENOUS
Status: DISCONTINUED | OUTPATIENT
Start: 2024-11-18 | End: 2024-11-18 | Stop reason: HOSPADM

## 2024-11-18 RX ORDER — ONDANSETRON 4 MG/1
4 TABLET, ORALLY DISINTEGRATING ORAL EVERY 6 HOURS PRN
Status: DISCONTINUED | OUTPATIENT
Start: 2024-11-18 | End: 2024-11-18 | Stop reason: HOSPADM

## 2024-11-18 RX ORDER — PROCHLORPERAZINE MALEATE 5 MG/1
5 TABLET ORAL EVERY 6 HOURS PRN
Status: DISCONTINUED | OUTPATIENT
Start: 2024-11-18 | End: 2024-11-18 | Stop reason: HOSPADM

## 2024-11-18 RX ORDER — ONDANSETRON 2 MG/ML
4 INJECTION INTRAMUSCULAR; INTRAVENOUS EVERY 6 HOURS PRN
Status: DISCONTINUED | OUTPATIENT
Start: 2024-11-18 | End: 2024-11-18 | Stop reason: HOSPADM

## 2024-11-18 RX ORDER — LIDOCAINE 40 MG/G
CREAM TOPICAL
Status: DISCONTINUED | OUTPATIENT
Start: 2024-11-18 | End: 2024-11-18 | Stop reason: HOSPADM

## 2024-11-18 RX ORDER — NALOXONE HYDROCHLORIDE 0.4 MG/ML
0.4 INJECTION, SOLUTION INTRAMUSCULAR; INTRAVENOUS; SUBCUTANEOUS
Status: DISCONTINUED | OUTPATIENT
Start: 2024-11-18 | End: 2024-11-18 | Stop reason: HOSPADM

## 2024-11-18 RX ORDER — FLUMAZENIL 0.1 MG/ML
0.2 INJECTION, SOLUTION INTRAVENOUS
Status: DISCONTINUED | OUTPATIENT
Start: 2024-11-18 | End: 2024-11-18 | Stop reason: HOSPADM

## 2024-11-18 ASSESSMENT — ACTIVITIES OF DAILY LIVING (ADL)
ADLS_ACUITY_SCORE: 0

## 2024-11-18 NOTE — H&P
Kerri Shook  [id number]  female  84 year old      Reason for procedure/surgery: screening colonoscopy    Patient Active Problem List   Diagnosis    Breast cancer -- Left    S/P coronary angiogram    Stress-induced cardiomyopathy    Foot injury    Advance care planning    Rosacea    KP (keratosis pilaris)    CLL (chronic lymphocytic leukemia) (H)    Hx of cardiomyopathy    Encounter for routine gynecological examination    Menopause--age 50    Colon adenomas    SK (seborrheic keratosis)    Seborrheic keratosis    Plantar fasciitis    Acute right-sided low back pain with right-sided sciatica       Past Surgical History:    Past Surgical History:   Procedure Laterality Date    BIOPSY  within the last 5 years    polyps during colonoscopy    COLONOSCOPY  6/26/2014    Procedure: COMBINED COLONOSCOPY, SINGLE BIOPSY/POLYPECTOMY BY BIOPSY;  Surgeon: Pola Arceo MD;  Location:  GI    COLONOSCOPY N/A 1/5/2021    Procedure: COLONOSCOPY, WITH POLYPECTOMY AND CLIP;  Surgeon: Charlie Wang MD;  Location: [facility] [place]    D & C  1962    late PP hemorrhage --> retained placenta    ENDOSCOPIC SINUS SURGERY Right 7/16/2018    Procedure: ENDOSCOPIC SINUS SURGERY;  Endoscopic Sphenoidotomy with Removal of Tissue;  Surgeon: Kishore Webster MD;  Location:  OR    ENT SURGERY  1950    tonselectomy    EYE SURGERY  2015    cataract on left eye    left hip replacemen Left     hip replacement in 2013    LUMPECTOMY BREAST  1990    Left    ORTHOPEDIC SURGERY  age?    right shoulder     ORTHOPEDIC SURGERY  ~ 2013    left hip replacement    R hip arthroscopy  7/19/11    TONSILLECTOMY  1950    [facility] PELVIS/HIP JOINT SURGERY UNLISTED  April 2012    left hip replacement    [facility] SHOULDER SURG PROC UNLISTED  ?       Past Medical History:   Past Medical History:   Diagnosis Date    Breast disorder 1990    Breast Cancer    Cardiomyopathy (H)     Chronic osteoarthritis 2012    resulted in hip replacement    CLL (chronic lymphoblastic leukemia) 1989     Closed fracture of second toe of left foot     Colon adenomas     14 colonoscopy, repeat in 3 years    History of blood transfusion     hyperlipidemia     Primary localized osteoarthrosis, pelvic region and thigh     Tinnitus        Social History:   Social History     Tobacco Use    Smoking status: Never    Smokeless tobacco: Never   Substance Use Topics    Alcohol use: Yes     Comment: 3 glasses of wine per week       Family History:   Family History   Problem Relation Age of Onset    Diabetes Maternal Grandmother 85    Coronary Artery Disease Father 76         of MI    Heart Disease Father     Cancer Maternal Grandfather         stomach    Alcoholism Son         Active alcoholic    Dementia Mother     Osteoporosis Mother     C.A.D. No family hx of     Cancer - colorectal No family hx of     Psychotic Disorder No family hx of     Skin Cancer No family hx of     Melanoma No family hx of     Hypertension No family hx of     Breast Cancer No family hx of     Prostate Cancer No family hx of     Cerebrovascular Disease No family hx of         ,, ,       Allergies:   Allergies   Allergen Reactions    Nkda [No Known Drug Allergy]        Active Medications:   No current outpatient medications on file.       Systemic Review:   CONSTITUTIONAL: NEGATIVE for fever, chills, change in weight  ENT/MOUTH: NEGATIVE for ear, mouth and throat problems  RESP: NEGATIVE for significant cough or SOB  CV: NEGATIVE for chest pain, palpitations or peripheral edema    Physical Examination:   Vital Signs: BP (!) 105/92   SpO2 95%   GENERAL: healthy, alert and no distress  NECK: no adenopathy, no asymmetry, masses, or scars  RESP: lungs clear to auscultation - no rales, rhonchi or wheezes  CV: regular rate and rhythm, normal S1 S2, no S3 or S4, no murmur, click or rub, no peripheral edema and peripheral pulses strong  ABDOMEN: soft, nontender, no hepatosplenomegaly, no masses and bowel sounds normal  MS: no gross musculoskeletal  defects noted, no edema      Plan: Appropriate to proceed as scheduled.      Devin Motley MD, MD  11/18/2024    PCP:  Juan Westbrook

## 2024-11-19 LAB
PATH REPORT.COMMENTS IMP SPEC: NORMAL
PATH REPORT.COMMENTS IMP SPEC: NORMAL
PATH REPORT.FINAL DX SPEC: NORMAL
PATH REPORT.GROSS SPEC: NORMAL
PATH REPORT.MICROSCOPIC SPEC OTHER STN: NORMAL
PATH REPORT.RELEVANT HX SPEC: NORMAL
PHOTO IMAGE: NORMAL

## 2024-11-20 RX ORDER — FENTANYL CITRATE 50 UG/ML
INJECTION, SOLUTION INTRAMUSCULAR; INTRAVENOUS PRN
OUTPATIENT
Start: 2024-11-18

## 2024-11-25 ENCOUNTER — ANCILLARY PROCEDURE (OUTPATIENT)
Dept: MAMMOGRAPHY | Facility: CLINIC | Age: 84
End: 2024-11-25
Attending: INTERNAL MEDICINE
Payer: COMMERCIAL

## 2024-11-25 DIAGNOSIS — Z12.31 ENCOUNTER FOR SCREENING MAMMOGRAM FOR BREAST CANCER: ICD-10-CM

## 2024-11-25 PROCEDURE — 77063 BREAST TOMOSYNTHESIS BI: CPT | Mod: GC | Performed by: STUDENT IN AN ORGANIZED HEALTH CARE EDUCATION/TRAINING PROGRAM

## 2024-11-25 PROCEDURE — 77067 SCR MAMMO BI INCL CAD: CPT | Mod: GC | Performed by: STUDENT IN AN ORGANIZED HEALTH CARE EDUCATION/TRAINING PROGRAM

## 2024-12-03 NOTE — PROGRESS NOTES
[facility]  HEMATOLOGY & ONCOLOGY  FOLLOW UP  12/04/2024    SUMMARY  Kerri Shook is a 84 year old female with PMH significant for 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    1. CLL in 1986. Found to have an elevated white blood count on routine testing. This was consistent with CLL.Saw Dr. Puente who recommended observation. WBCs 15-20 during this time. She has required no treatment for CLL. Followed by PCP with yearly CBCs.  2. 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 The patient had a screening mammogram in 1990, which showed an abnormality in the left breast. She underwent a lumpectomy on 03/02/1990, which was consistent with a 1.5 cm primary, mucinous adenocarcinoma with 0 of 8 lymph nodes positive. She was staged as a stage I (T1 N0 M0). The tumor was ER positive. She did have radiation to the left breast with a boost having completed 6600 cGy in 33 fractions from 03/28/1990 until 05/15/1990. She went on to take tamoxifen for 6 years from 03/1990 until 03/1996.  3. 1/2019 labs showing WBC increasing to 27. This has continued with WBCs 47 on 11/12/2020 and 71.0 on 8//18/21  4. 8/31/21 Peripheral flow showing CD5+ lambda CLL cells. Hb 14.6, WBC 66.2, Plts 162, ANC 5.3  5. 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 She feels her energy levels are low but feels this is due to age and her energy levels have been stable.  She has no concerns for her appetite and is eating well.   She has been waking up the past week feeling warm but denies being sweaty or night sweats.     Denies unexplained weight loss, fevers/chills, night sweats, n/v/d/c, rashes/sore, recurring infections, new pains, new lumps/bumps, all other acute issues or concerns.     CURRENT OUTPATIENT MEDICATIONS  Current  Outpatient Medications   Medication Sig Dispense Refill    aspirin 81 MG tablet Take 81 mg by mouth daily.      carvedilol (COREG) 3.125 MG tablet Take 1 tablet (3.125 mg) by mouth 2 times daily 180 tablet 2    gabapentin (NEURONTIN) 100 MG capsule TAKE 1 CAPSULE(100 MG) BY MOUTH EVERY NIGHT AS NEEDED 30 capsule 1    Multiple Vitamin (MULTIVITAMIN) per tablet Take 1 tablet by mouth daily.      simvastatin (ZOCOR) 20 MG tablet Take 1 tablet (20 mg) by mouth at bedtime. 90 tablet 0    fluticasone (FLONASE) 50 MCG/ACT nasal spray Spray 1 spray into both nostrils daily. 9.9 mL 0    ondansetron (ZOFRAN) 4 MG tablet Take one tablet every six hours for nausea during colonoscopy bowel prepping 3 tablet 0       REVIEW OF SYSTEMS  A complete ROS was performed and was negative except as mentioned in HPI    PHYSICAL EXAM  /69 (BP Location: Right arm, Patient Position: Sitting, Cuff Size: Adult Regular)   Pulse 69   Temp 97.9  F (36.6  C) (Oral)   Resp 16   Wt 64.7 kg (142 lb 11.2 oz)   SpO2 95%   BMI 23.75 kg/m    General: No acute distress, pleasant, well-groomed  HEENT: Sclera anicteric. Oral exam deferred  Lymph: No lymphadenopathy in neck, supraclavicular, axillary, or inguinal areas  Heart: Regular, rate, and rhythm  Lungs: Clear to ascultation bilaterally  Abdomen: Positive bowel sounds. Soft, non-distended, non-tender. No organomegaly or mass.   Extremities: no lower extremity edema  Neuro: Cranial nerves grossly intact  Skin: No rashes, sores, lesions, or unusual bruising on exposed skin    Wt Readings from Last 4 Encounters:   12/04/24 64.7 kg (142 lb 11.2 oz)   11/14/24 65.4 kg (144 lb 1.6 oz)   09/17/24 63.5 kg (140 lb)   09/04/24 64.4 kg (142 lb)       LABORATORY AND IMAGING STUDIES  Most Recent 3 CBC's:  Recent Labs   Lab Test 12/04/24  1325 11/06/24  1307 10/09/24  1252 02/15/23  1345 12/06/22  1006 09/06/22  1003 02/17/22  1005 01/13/22  0952   .4* 211.5* 192.9*   < > 112.5* 104.7*   < > 93.1*    HGB 12.5 12.7 13.6   < > 13.3 13.8   < > 14.4   MCV 93 96 97   < > 93 93   < > 93    190 169   < > 172 181   < > 189   ANEUTAUTO  --   --   --   --  4.2 4.8  --  5.8    < > = values in this interval not displayed.     Most Recent 3 BMP's:  Recent Labs   Lab Test 11/06/24  1307 10/09/24  1252 09/04/24  2222 09/04/24  2220    138 139 140   POTASSIUM 4.5 5.1 4.1 4.7   CHLORIDE 103 104  --  106   CO2 25 26  --  22   BUN 14.6 15.3  --  15.0   CR 0.89 0.90  --  0.85   ANIONGAP 11 8  --  12   AAKASH 9.1 9.4  --  9.1   GLC 81 114* 120* 119*   PROTTOTAL 6.3* 6.5  --  6.5   ALBUMIN 4.2 4.4  --  4.3    Most Recent 3 LFT's:  Recent Labs   Lab Test 11/06/24  1307 10/09/24  1252 09/04/24  2220   AST 32 35 31   ALT 25 24 19   ALKPHOS 130 105 109   BILITOTAL 0.4 0.6 0.3    Most Recent 2 TSH and T4:No lab results found.  I reviewed the above labs today.    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    # High risk CLL - Arboleda Stage 0, CLL-IPI = 5 = High risk. FISH showing loss of 17p, IgHV mutated and NGS for TP53 mutation positive  Previously discussed that CLL is a slow-growing disease and is monitored expectantly.  CLL will grow over time without any treatment.  Since CLL is a mature B-cell malignancy, high white blood cell count usually does not cause any symptoms to the patient and does not put patient at high risk of hyperviscosity or tumor lysis just by virtue of having high white blood cell count.  Spontaneous tumor lysis can occur in CLL but it is not common.  Therefore absolute lymphocyte count is not a part of indications for treatment initiation in CLL guidelines.  Specially for clinical trials, rapidly rising lymphocytosis defined as lymphocyte doubling time less than 6 months is required to initiate therapy.  She does  not have bulky lymphadenopathy, massive splenomegaly, progressive cytopenias or B symptoms that warrant treatment initiation.  She does not want to start treatment now, if possible she would like to continue expectant management. She is coming close to starting treatment and reviewed treatment options including zanubrutinib, acalabrutinib with or without obinutuzumab as well as venetoclax obinutuzumab.  Venetoclax obinutuzumab was a fixed duration treatment and patient was inclining towards fixed duration treatment.  Previously reviewed that retrospective data suggest that BTK inhibitors may have better response rates and PFS rates in patients with 17P deletion or TP53 mutation but this is not coming from a randomized or competitive retrospective data.  Institutional clinical trial of zanubrutinib plus sonrotoclax versus venetoclax obinutuzumab was discussed with the patient.  She would not qualify for the trial at this point but if she continues expectant management and desires to initiate treatment when she has one of the treatment initiation criteria's, then she should be able to participate in clinical trial.      Her WBC today (12/4) has decreased to 176.4 from 211.5 on 11/6.  She is feeling well and no concerns on labs or exam today to start therapy.  Will continue monthly labs and follow-up in 3 months.    Final plan:  - Continue monthly labs in [place]   - Follow-up with Simran in 3 months  - Follow-up with Dr. Centeno in 6 months       18 minutes spent on the date of the encounter doing chart review, review of test results, interpretation of tests, patient visit, and documentation     The longitudinal plan of care for the diagnosis(es)/condition(s) as documented were addressed during this visit. Due to the added complexity in care, I will continue to support Kerri in the subsequent management and with ongoing continuity of care.    JAMSHID Delgado CNP

## 2024-12-04 ENCOUNTER — ONCOLOGY VISIT (OUTPATIENT)
Dept: ONCOLOGY | Facility: CLINIC | Age: 84
End: 2024-12-04
Attending: STUDENT IN AN ORGANIZED HEALTH CARE EDUCATION/TRAINING PROGRAM
Payer: COMMERCIAL

## 2024-12-04 VITALS
RESPIRATION RATE: 16 BRPM | WEIGHT: 142.7 LBS | SYSTOLIC BLOOD PRESSURE: 123 MMHG | OXYGEN SATURATION: 95 % | BODY MASS INDEX: 23.75 KG/M2 | HEART RATE: 69 BPM | DIASTOLIC BLOOD PRESSURE: 69 MMHG | TEMPERATURE: 97.9 F

## 2024-12-04 DIAGNOSIS — E78.5 HYPERLIPIDEMIA LDL GOAL <70: ICD-10-CM

## 2024-12-04 DIAGNOSIS — C91.10 CLL (CHRONIC LYMPHOCYTIC LEUKEMIA) (H): Primary | ICD-10-CM

## 2024-12-04 LAB
ALBUMIN SERPL BCG-MCNC: 4.1 G/DL (ref 3.5–5.2)
ALP SERPL-CCNC: 102 U/L (ref 40–150)
ALT SERPL W P-5'-P-CCNC: 18 U/L (ref 0–50)
ANION GAP SERPL CALCULATED.3IONS-SCNC: 7 MMOL/L (ref 7–15)
AST SERPL W P-5'-P-CCNC: 29 U/L (ref 0–45)
BASOPHILS # BLD MANUAL: 0 10E3/UL (ref 0–0.2)
BASOPHILS NFR BLD MANUAL: 0 %
BILIRUB SERPL-MCNC: 0.4 MG/DL
BUN SERPL-MCNC: 16.6 MG/DL (ref 8–23)
CALCIUM SERPL-MCNC: 8.8 MG/DL (ref 8.8–10.4)
CHLORIDE SERPL-SCNC: 105 MMOL/L (ref 98–107)
CREAT SERPL-MCNC: 0.86 MG/DL (ref 0.51–0.95)
EGFRCR SERPLBLD CKD-EPI 2021: 66 ML/MIN/1.73M2
EOSINOPHIL # BLD MANUAL: 0 10E3/UL (ref 0–0.7)
EOSINOPHIL NFR BLD MANUAL: 0 %
ERYTHROCYTE [DISTWIDTH] IN BLOOD BY AUTOMATED COUNT: 14.6 % (ref 10–15)
GLUCOSE SERPL-MCNC: 83 MG/DL (ref 70–99)
HCO3 SERPL-SCNC: 26 MMOL/L (ref 22–29)
HCT VFR BLD AUTO: 39.7 % (ref 35–47)
HGB BLD-MCNC: 12.5 G/DL (ref 11.7–15.7)
LYMPHOCYTES # BLD MANUAL: 169.3 10E3/UL (ref 0.8–5.3)
LYMPHOCYTES NFR BLD MANUAL: 96 %
MCH RBC QN AUTO: 29.3 PG (ref 26.5–33)
MCHC RBC AUTO-ENTMCNC: 31.5 G/DL (ref 31.5–36.5)
MCV RBC AUTO: 93 FL (ref 78–100)
MONOCYTES # BLD MANUAL: 3.5 10E3/UL (ref 0–1.3)
MONOCYTES NFR BLD MANUAL: 2 %
NEUTROPHILS # BLD MANUAL: 3.5 10E3/UL (ref 1.6–8.3)
NEUTROPHILS NFR BLD MANUAL: 2 %
PLAT MORPH BLD: ABNORMAL
PLATELET # BLD AUTO: 157 10E3/UL (ref 150–450)
POTASSIUM SERPL-SCNC: ABNORMAL MMOL/L
PROT SERPL-MCNC: 6.2 G/DL (ref 6.4–8.3)
RBC # BLD AUTO: 4.26 10E6/UL (ref 3.8–5.2)
RBC MORPH BLD: ABNORMAL
SODIUM SERPL-SCNC: 138 MMOL/L (ref 135–145)
WBC # BLD AUTO: 176.4 10E3/UL (ref 4–11)

## 2024-12-04 PROCEDURE — 99213 OFFICE O/P EST LOW 20 MIN: CPT | Mod: 24

## 2024-12-04 PROCEDURE — G0463 HOSPITAL OUTPT CLINIC VISIT: HCPCS

## 2024-12-04 PROCEDURE — 84460 ALANINE AMINO (ALT) (SGPT): CPT

## 2024-12-04 PROCEDURE — 85049 AUTOMATED PLATELET COUNT: CPT

## 2024-12-04 PROCEDURE — G2211 COMPLEX E/M VISIT ADD ON: HCPCS

## 2024-12-04 PROCEDURE — 36415 COLL VENOUS BLD VENIPUNCTURE: CPT

## 2024-12-04 PROCEDURE — 85007 BL SMEAR W/DIFF WBC COUNT: CPT

## 2024-12-04 PROCEDURE — 85014 HEMATOCRIT: CPT

## 2024-12-04 PROCEDURE — 84155 ASSAY OF PROTEIN SERUM: CPT

## 2024-12-04 RX ORDER — SIMVASTATIN 20 MG
20 TABLET ORAL AT BEDTIME
Qty: 90 TABLET | Refills: 0 | Status: SHIPPED | OUTPATIENT
Start: 2024-12-04

## 2024-12-04 ASSESSMENT — PAIN SCALES - GENERAL: PAINLEVEL_OUTOF10: NO PAIN (0)

## 2024-12-04 NOTE — NURSING NOTE
Chief Complaint   Patient presents with    Oncology Clinic Visit     CLL (chronic lymphocytic leukemia)    Blood Draw     Labs drawn via  by RN in lab. VS taken.      Labs collected from venipuncture by RN. Vitals taken. Checked in for appointment(s).    Mayra Lee RN

## 2024-12-04 NOTE — LETTER
12/4/2024      Kerri Shook  [address]  [facility] [address]      Dear Colleague,    Thank you for referring your patient, Kerri Shook, to the [facility]. Please see a copy of my visit note below.    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    1. CLL in 1986. Found to have an elevated white blood count on routine testing. This was consistent with CLL.Saw Dr. Puente who recommended observation. WBCs 15-20 during this time. She has required no treatment for CLL. Followed by PCP with yearly CBCs.  2. 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 The patient had a screening mammogram in 1990, which showed an abnormality in the left breast. She underwent a lumpectomy on 03/02/1990, which was consistent with a 1.5 cm primary, mucinous adenocarcinoma with 0 of 8 lymph nodes positive. She was staged as a stage I (T1 N0 M0). The tumor was ER positive. She did have radiation to the left breast with a boost having completed 6600 cGy in 33 fractions from 03/28/1990 until 05/15/1990. She went on to take tamoxifen for 6 years from 03/1990 until 03/1996.  3. 1/2019 labs showing WBC increasing to 27. This has continued with WBCs 47 on 11/12/2020 and 71.0 on 8//18/21  4. 8/31/21 Peripheral flow showing CD5+ lambda CLL cells. Hb 14.6, WBC 66.2, Plts 162, ANC 5.3  5. 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 She feels her energy levels are low but feels this is due to age and her energy levels have been stable.  She has no concerns for her appetite and is eating well.   She has been waking up the past week feeling warm but  denies being sweaty or night sweats.     Denies unexplained weight loss, fevers/chills, night sweats, n/v/d/c, rashes/sore, recurring infections, new pains, new lumps/bumps, all other acute issues or concerns.     CURRENT OUTPATIENT MEDICATIONS  Current Outpatient Medications   Medication Sig Dispense Refill     aspirin 81 MG tablet Take 81 mg by mouth daily.       carvedilol (COREG) 3.125 MG tablet Take 1 tablet (3.125 mg) by mouth 2 times daily 180 tablet 2     gabapentin (NEURONTIN) 100 MG capsule TAKE 1 CAPSULE(100 MG) BY MOUTH EVERY NIGHT AS NEEDED 30 capsule 1     Multiple Vitamin (MULTIVITAMIN) per tablet Take 1 tablet by mouth daily.       simvastatin (ZOCOR) 20 MG tablet Take 1 tablet (20 mg) by mouth at bedtime. 90 tablet 0     fluticasone (FLONASE) 50 MCG/ACT nasal spray Spray 1 spray into both nostrils daily. 9.9 mL 0     ondansetron (ZOFRAN) 4 MG tablet Take one tablet every six hours for nausea during colonoscopy bowel prepping 3 tablet 0       REVIEW OF SYSTEMS  A complete ROS was performed and was negative except as mentioned in HPI    PHYSICAL EXAM  /69 (BP Location: Right arm, Patient Position: Sitting, Cuff Size: Adult Regular)   Pulse 69   Temp 97.9  F (36.6  C) (Oral)   Resp 16   Wt 64.7 kg (142 lb 11.2 oz)   SpO2 95%   BMI 23.75 kg/m    General: No acute distress, pleasant, well-groomed  HEENT: Sclera anicteric. Oral exam deferred  Lymph: No lymphadenopathy in neck, supraclavicular, axillary, or inguinal areas  Heart: Regular, rate, and rhythm  Lungs: Clear to ascultation bilaterally  Abdomen: Positive bowel sounds. Soft, non-distended, non-tender. No organomegaly or mass.   Extremities: no lower extremity edema  Neuro: Cranial nerves grossly intact  Skin: No rashes, sores, lesions, or unusual bruising on exposed skin    Wt Readings from Last 4 Encounters:   12/04/24 64.7 kg (142 lb 11.2 oz)   11/14/24 65.4 kg (144 lb 1.6 oz)   09/17/24 63.5 kg (140 lb)   09/04/24 64.4 kg (142 lb)        LABORATORY AND IMAGING STUDIES  Most Recent 3 CBC's:  Recent Labs   Lab Test 12/04/24  1325 11/06/24  1307 10/09/24  1252 02/15/23  1345 12/06/22  1006 09/06/22  1003 02/17/22  1005 01/13/22  0952   .4* 211.5* 192.9*   < > 112.5* 104.7*   < > 93.1*   HGB 12.5 12.7 13.6   < > 13.3 13.8   < > 14.4   MCV 93 96 97   < > 93 93   < > 93    190 169   < > 172 181   < > 189   ANEUTAUTO  --   --   --   --  4.2 4.8  --  5.8    < > = values in this interval not displayed.     Most Recent 3 BMP's:  Recent Labs   Lab Test 11/06/24  1307 10/09/24  1252 09/04/24  2222 09/04/24  2220    138 139 140   POTASSIUM 4.5 5.1 4.1 4.7   CHLORIDE 103 104  --  106   CO2 25 26  --  22   BUN 14.6 15.3  --  15.0   CR 0.89 0.90  --  0.85   ANIONGAP 11 8  --  12   AAKASH 9.1 9.4  --  9.1   GLC 81 114* 120* 119*   PROTTOTAL 6.3* 6.5  --  6.5   ALBUMIN 4.2 4.4  --  4.3    Most Recent 3 LFT's:  Recent Labs   Lab Test 11/06/24  1307 10/09/24  1252 09/04/24  2220   AST 32 35 31   ALT 25 24 19   ALKPHOS 130 105 109   BILITOTAL 0.4 0.6 0.3    Most Recent 2 TSH and T4:No lab results found.  I reviewed the above labs today.    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    # High risk CLL - Arboleda Stage 0, CLL-IPI = 5 = High risk. FISH showing loss of 17p, IgHV mutated and NGS for TP53 mutation positive  Previously discussed that CLL is a slow-growing disease and is monitored expectantly.  CLL will grow over time without any treatment.  Since CLL is a mature B-cell malignancy, high white blood cell count usually does not cause any symptoms to the patient and does not put patient at high risk of hyperviscosity or tumor lysis just by virtue of having high white blood cell count.  Spontaneous tumor lysis can occur in CLL but it is not common.   Therefore absolute lymphocyte count is not a part of indications for treatment initiation in CLL guidelines.  Specially for clinical trials, rapidly rising lymphocytosis defined as lymphocyte doubling time less than 6 months is required to initiate therapy.  She does not have bulky lymphadenopathy, massive splenomegaly, progressive cytopenias or B symptoms that warrant treatment initiation.  She does not want to start treatment now, if possible she would like to continue expectant management. She is coming close to starting treatment and reviewed treatment options including zanubrutinib, acalabrutinib with or without obinutuzumab as well as venetoclax obinutuzumab.  Venetoclax obinutuzumab was a fixed duration treatment and patient was inclining towards fixed duration treatment.  Previously reviewed that retrospective data suggest that BTK inhibitors may have better response rates and PFS rates in patients with 17P deletion or TP53 mutation but this is not coming from a randomized or competitive retrospective data.  Institutional clinical trial of zanubrutinib plus sonrotoclax versus venetoclax obinutuzumab was discussed with the patient.  She would not qualify for the trial at this point but if she continues expectant management and desires to initiate treatment when she has one of the treatment initiation criteria's, then she should be able to participate in clinical trial.      Her WBC today (12/4) has decreased to 176.4 from 211.5 on 11/6.  She is feeling well and no concerns on labs or exam today to start therapy.  Will continue monthly labs and follow-up in 3 months.    Final plan:  - Continue monthly labs in [place]   - Follow-up with Simran in 3 months  - Follow-up with Dr. Centeno in 6 months       18 minutes spent on the date of the encounter doing chart review, review of test results, interpretation of tests, patient visit, and documentation     The longitudinal plan of care for the  diagnosis(es)/condition(s) as documented were addressed during this visit. Due to the added complexity in care, I will continue to support Kerri in the subsequent management and with ongoing continuity of care.    JAMSHID Delgado CNP                Again, thank you for allowing me to participate in the care of your patient.        Sincerely,        JAMSHID Delgado CNP

## 2024-12-04 NOTE — NURSING NOTE
"Oncology Rooming Note    December 4, 2024 1:30 PM   Kerri Shook is a 84 year old female who presents for:    Chief Complaint   Patient presents with    Oncology Clinic Visit     CLL (chronic lymphocytic leukemia)    Blood Draw     Labs drawn via  by RN in lab. VS taken.      Initial Vitals: /69 (BP Location: Right arm, Patient Position: Sitting, Cuff Size: Adult Regular)   Pulse 69   Temp 97.9  F (36.6  C) (Oral)   Resp 16   Wt 64.7 kg (142 lb 11.2 oz)   SpO2 95%   BMI 23.75 kg/m   Estimated body mass index is 23.75 kg/m  as calculated from the following:    Height as of 9/17/24: 1.651 m (5' 5\").    Weight as of this encounter: 64.7 kg (142 lb 11.2 oz). Body surface area is 1.72 meters squared.  No Pain (0) Comment: Data Unavailable   No LMP recorded. Patient is postmenopausal.  Allergies reviewed: Yes  Medications reviewed: Yes    Medications: MEDICATION REFILLS NEEDED TODAY. Provider was notified.  Pharmacy name entered into [facility]: [id number] - [place], [place] - [address] AT [facility] [address]    Frailty Screening:   Is the patient here for a new oncology consult visit in cancer care? 2. No      Clinical concerns: Pt needs refill simvastatin. Pt reports no new concerns today. Simran was notified via message.       Charlee Johnson, EMT     "

## 2024-12-16 ENCOUNTER — TELEPHONE (OUTPATIENT)
Dept: DERMATOLOGY | Facility: CLINIC | Age: 84
End: 2024-12-16
Payer: COMMERCIAL

## 2024-12-16 NOTE — TELEPHONE ENCOUNTER
[facility] Call Center    Phone Message    May a detailed message be left on voicemail: no     Reason for Call: Other: Patient called and is wondering if she can change her upcoming appointment time on that same day. Please call patient back to discuss.     Action Taken: Message routed to:  [facility] ([facility] Dermatology    Travel Screening: Not Applicable     Date of Service:

## 2024-12-30 ENCOUNTER — OFFICE VISIT (OUTPATIENT)
Dept: URGENT CARE | Facility: URGENT CARE | Age: 84
End: 2024-12-30
Payer: COMMERCIAL

## 2024-12-30 ENCOUNTER — ANCILLARY PROCEDURE (OUTPATIENT)
Dept: GENERAL RADIOLOGY | Facility: CLINIC | Age: 84
End: 2024-12-30
Payer: COMMERCIAL

## 2024-12-30 VITALS
RESPIRATION RATE: 16 BRPM | SYSTOLIC BLOOD PRESSURE: 109 MMHG | DIASTOLIC BLOOD PRESSURE: 66 MMHG | OXYGEN SATURATION: 97 % | TEMPERATURE: 98 F | HEART RATE: 68 BPM

## 2024-12-30 DIAGNOSIS — S92.515A CLOSED NONDISPLACED FRACTURE OF PROXIMAL PHALANX OF LESSER TOE OF LEFT FOOT, INITIAL ENCOUNTER: ICD-10-CM

## 2024-12-30 DIAGNOSIS — M79.675 PAIN OF TOE OF LEFT FOOT: ICD-10-CM

## 2024-12-30 DIAGNOSIS — M79.675 PAIN OF TOE OF LEFT FOOT: Primary | ICD-10-CM

## 2024-12-30 PROCEDURE — 99213 OFFICE O/P EST LOW 20 MIN: CPT

## 2024-12-30 PROCEDURE — 73660 X-RAY EXAM OF TOE(S): CPT | Mod: TC | Performed by: RADIOLOGY

## 2024-12-30 ASSESSMENT — PAIN SCALES - GENERAL: PAINLEVEL_OUTOF10: SEVERE PAIN (6)

## 2024-12-30 NOTE — PATIENT INSTRUCTIONS
You will likely need to keep the toe sanaz taped for approximately 4-6 weeks.     Use Tylenol, ibuprofen, heat/ice for pain. You can elevate the foot for swelling.     Please return or see your PCP or bone doctors if you notice there is skin breakdown between the toes.

## 2024-12-30 NOTE — PROGRESS NOTES
Assessment & Plan     Pain of toe of left foot  Closed nondisplaced fracture of proximal phalanx of lesser toe of left foot, initial encounter  Review of x-ray shows a fracture of the fifth proximal phalanx of left great toe.  This was sanaz taped in the office.  There is no overlying wounds, no history of diabetes.  Reviewed recommendations to use sanaz tape at home, using a foam or other insert between the toes when she gets home.  A referral to podiatry/orthopedics was placed for follow-up in the next 1 to 2 weeks if symptoms worsen.  Otherwise, continue to manage with sanaz taping for at least 4 to 6 weeks.  Follow-up in clinic if any concerns.  Reviewed to return to the office or to primary care if there are signs of open wounds related to the splinting or taping.  - XR Toe Left G/E 2 Views  - Orthopedic  Referral        No follow-ups on file.    NUNO BEACH MD  [facility]    Marisol Manning is a 84 year old female who presents to clinic today for the following health issues:  Chief Complaint   Patient presents with    Urgent Care    Toe Pain     Patient presents with hitting her left pinky toe on a table 3x days ago and is still having pain.        HPI    Patient bumped her left pinky toe 3 days ago on a table leg.  Pain has been ongoing in the left toe since then.  There is also swelling and redness.  The top of the toe is tender to the touch.  She has been to walk since the incident.  No other recent injuries, he she did not fall did not hit her head.  She is not on any blood thinners.  She does not have history of diabetes.  There are no open wounds.    Review of Systems  Negative except as noted in HPI above.        Objective    /66 (BP Location: Right arm, Patient Position: Chair)   Pulse 68   Temp 98  F (36.7  C) (Tympanic)   Resp 16   SpO2 97%   Physical Exam   GENERAL: alert and no distress  CV: Appears well-perfused  RESP: Breathing  comfortably room air  MS: Left fifth toe with swelling, erythema, tenderness to palpation over the proximal phalanx.  There is intact active and passive range of motion, although there is pain with passive and active range of motion.  Capillary refill distal to the toe is intact.  There are no open wounds.    Xray - Reviewed and interpreted by me.  There appears to be a fracture of the proximal phalanx of the left fifth toe.

## 2024-12-31 ENCOUNTER — TELEPHONE (OUTPATIENT)
Dept: ORTHOPEDICS | Facility: CLINIC | Age: 84
End: 2024-12-31
Payer: COMMERCIAL

## 2024-12-31 NOTE — TELEPHONE ENCOUNTER
Orthopedic/Sports Medicine Fracture Triage    Incoming call/or message from call center member.    Fracture type: Toe.    The patient is in a   [place] Tape  .    Date of injury 12/27.    Triaged by: Dr. Ruiz .    Determined to be managed Non operatively.    Needs to be seen in 1-2 weeks.    Additional Comments/information:     Simran So LPN

## 2024-12-31 NOTE — TELEPHONE ENCOUNTER
What is the Concern:  Fracture  Date the concern started: 12/30/24  Injury related? yes  Is this related to recent surgery?no  Laceration?  No  Body part affected:  Toe of left foot  Has Patient been evaluated for condition? yes  Location the patient was evaluated and treated for the condition?   [facility] Location [facility]  Who is referring provider, (name and clinic location) Elvis Hernandez MD [facility]  What images have been done? X-Ray; Location and City where images were taken: [facility]    Could we send this information to you in [employer] or would you prefer to receive a phone call?:   Patient would prefer a phone call   Okay to leave a detailed message?: Yes at Cell number on file:    Telephone Information:   Mobile [phone]

## 2025-01-02 ENCOUNTER — TELEPHONE (OUTPATIENT)
Dept: INTERNAL MEDICINE | Facility: CLINIC | Age: 85
End: 2025-01-02

## 2025-01-02 ENCOUNTER — NURSE TRIAGE (OUTPATIENT)
Dept: ONCOLOGY | Facility: CLINIC | Age: 85
End: 2025-01-02

## 2025-01-02 ENCOUNTER — LAB (OUTPATIENT)
Dept: LAB | Facility: CLINIC | Age: 85
End: 2025-01-02
Attending: STUDENT IN AN ORGANIZED HEALTH CARE EDUCATION/TRAINING PROGRAM
Payer: COMMERCIAL

## 2025-01-02 DIAGNOSIS — C91.10 CLL (CHRONIC LYMPHOCYTIC LEUKEMIA) (H): ICD-10-CM

## 2025-01-02 LAB
ALBUMIN SERPL BCG-MCNC: 4.1 G/DL (ref 3.5–5.2)
ALP SERPL-CCNC: 102 U/L (ref 40–150)
ALT SERPL W P-5'-P-CCNC: 22 U/L (ref 0–50)
ANION GAP SERPL CALCULATED.3IONS-SCNC: 9 MMOL/L (ref 7–15)
AST SERPL W P-5'-P-CCNC: 49 U/L (ref 0–45)
BASOPHILS # BLD MANUAL: 0 10E3/UL (ref 0–0.2)
BASOPHILS NFR BLD MANUAL: 0 %
BILIRUB SERPL-MCNC: 0.4 MG/DL
BUN SERPL-MCNC: 14 MG/DL (ref 8–23)
BURR CELLS BLD QL SMEAR: SLIGHT
CALCIUM SERPL-MCNC: 9.1 MG/DL (ref 8.8–10.4)
CHLORIDE SERPL-SCNC: 105 MMOL/L (ref 98–107)
CREAT SERPL-MCNC: 0.84 MG/DL (ref 0.51–0.95)
EGFRCR SERPLBLD CKD-EPI 2021: 68 ML/MIN/1.73M2
EOSINOPHIL # BLD MANUAL: 1.6 10E3/UL (ref 0–0.7)
EOSINOPHIL NFR BLD MANUAL: 1 %
ERYTHROCYTE [DISTWIDTH] IN BLOOD BY AUTOMATED COUNT: 14.6 % (ref 10–15)
GLUCOSE SERPL-MCNC: 82 MG/DL (ref 70–99)
HCO3 SERPL-SCNC: 27 MMOL/L (ref 22–29)
HCT VFR BLD AUTO: 40.9 % (ref 35–47)
HGB BLD-MCNC: 12.9 G/DL (ref 11.7–15.7)
LYMPHOCYTES # BLD MANUAL: 146.8 10E3/UL (ref 0.8–5.3)
LYMPHOCYTES NFR BLD MANUAL: 75 %
MCH RBC QN AUTO: 30.3 PG (ref 26.5–33)
MCHC RBC AUTO-ENTMCNC: 31.5 G/DL (ref 31.5–36.5)
MCV RBC AUTO: 96 FL (ref 78–100)
MONOCYTES # BLD MANUAL: 4.7 10E3/UL (ref 0–1.3)
MONOCYTES NFR BLD MANUAL: 2 %
NEUTROPHILS # BLD MANUAL: 1.6 10E3/UL (ref 1.6–8.3)
NEUTROPHILS NFR BLD MANUAL: 1 %
OTHER CELLS # BLD MANUAL: 41 10E3/UL
OTHER CELLS NFR BLD MANUAL: 21 %
PLAT MORPH BLD: ABNORMAL
PLATELET # BLD AUTO: 147 10E3/UL (ref 150–450)
POTASSIUM SERPL-SCNC: 4.3 MMOL/L (ref 3.4–5.3)
PROT SERPL-MCNC: 6.1 G/DL (ref 6.4–8.3)
RBC # BLD AUTO: 4.26 10E6/UL (ref 3.8–5.2)
RBC MORPH BLD: ABNORMAL
SODIUM SERPL-SCNC: 141 MMOL/L (ref 135–145)
WBC # BLD AUTO: 195.7 10E3/UL (ref 4–11)

## 2025-01-02 NOTE — TELEPHONE ENCOUNTER
Patient confirmed scheduled appointment:  Date: 1/6/25  Time: 2:40pm  Visit type: New foot/ankle  Provider: Dr. Bland

## 2025-01-02 NOTE — TELEPHONE ENCOUNTER
DIAGNOSIS: Closed nondisplaced fracture of proximal phalanx of lesser toe of left foot, ini...    APPOINTMENT DATE: 1/6/25   NOTES STATUS DETAILS   OFFICE NOTE from referring provider Internal 12/30/24 Elvis Hernandez MD    MEDICATION LIST Internal    XRAYS  & INJECTIONS (IMAGES & REPORTS) Internal 12/30/24 xr toe left

## 2025-01-02 NOTE — TELEPHONE ENCOUNTER
DATE/TIME OF CALL RECEIVED FROM LAB:  01/02/25 at 1:58 PM   Critical LAB TEST:  .1  LAB VALUE:  12/04/24 .4  PROVIDER NOTIFIED?: Yes  PROVIDER NAME: Simran Roe  DATE/TIME LAB VALUE REPORTED TO PROVIDER: 1/2/2025  MECHANISM OF PROVIDER NOTIFICATION:  Routed encounter to Simran Roe

## 2025-01-02 NOTE — TELEPHONE ENCOUNTER
Lab calling with critical lab results ordered by oncologist.  Advised lab to follow critical lab workflow and call the speciality line. Lab agreeable with plan.       Sandy EMERSONN,  RN  [facility]

## 2025-01-05 ENCOUNTER — HEALTH MAINTENANCE LETTER (OUTPATIENT)
Age: 85
End: 2025-01-05

## 2025-01-06 ENCOUNTER — ANCILLARY PROCEDURE (OUTPATIENT)
Dept: GENERAL RADIOLOGY | Facility: CLINIC | Age: 85
End: 2025-01-06
Attending: FAMILY MEDICINE
Payer: COMMERCIAL

## 2025-01-06 ENCOUNTER — PRE VISIT (OUTPATIENT)
Dept: ORTHOPEDICS | Facility: CLINIC | Age: 85
End: 2025-01-06

## 2025-01-06 ENCOUNTER — OFFICE VISIT (OUTPATIENT)
Dept: ORTHOPEDICS | Facility: CLINIC | Age: 85
End: 2025-01-06
Payer: COMMERCIAL

## 2025-01-06 DIAGNOSIS — S92.515A CLOSED NONDISPLACED FRACTURE OF PROXIMAL PHALANX OF LESSER TOE OF LEFT FOOT, INITIAL ENCOUNTER: ICD-10-CM

## 2025-01-06 PROCEDURE — 73660 X-RAY EXAM OF TOE(S): CPT | Mod: LT | Performed by: RADIOLOGY

## 2025-01-06 NOTE — LETTER
1/6/2025      RE: Kerri Shook  [address]  [facility] [address]     Dear Colleague,    Thank you for referring your patient, Kerri Shook, to the [facility] SP[facility]TS [facility]IC[facility]. Please see a copy of my visit note below.      [facility]  SPORTS & ORTHOPEDIC CLINIC VISIT     Jan 6, 2025        ASSESSMENT & PLAN    83 yo with left 5th toe fx. Stable.     Reviewed imaging and assessment with patient in detail  Sanaz tape as necessary  Discussed footwear modifications  Follow up in one month if still painful  Return to activity as tolerated    Onesimo Bland MD  [facility]    -----  Chief Complaint   Patient presents with     Left Foot - Pain       SUBJECTIVE  Kerri Shook is a/an 84 year old female who is seen as an  referral for evaluation of  left foot pain.     The patient is seen by themselves.  The patient is Right handed    Onset: 12/27/24. Patient describes injury as catching her foot on a table leg.  Location of Pain: left pinky toe   Worsened by: Walking   Better with: ice and elevation   Treatments tried: elevation, ice, and sanaz taping   Associated symptoms: swelling    Orthopedic/Surgical history: NO  Social History/Occupation: Retired       REVIEW OF SYSTEMS:  Do you have fever, chills, weight loss? No  Do you have any vision problems? No  Do you have any chest pain or edema? No  Do you have any shortness of breath or wheezing?  No  Do you have stomach problems? No  Do you have any numbness or focal weakness? No  Do you have diabetes? No  Do you have problems with bleeding or clotting? No  Do you have an rashes or other skin lesions? No    OBJECTIVE:  There were no vitals taken for this visit.     General  - alert, pleasant, no distress  CV  - normal radial pulse, cap refill brisk  Musculoskeletal -left 5th toe  - inspection: swelling, resolving ecchymosis  - palpation: ttp of proximal  phalanx  no ttp of remaining phalanges or metatarsal  - ROM:  MTP 30 deg flexion   80 deg extension   PIP 50 deg flexion   50 deg extension   DIP 50 deg flexion   50 deg extension  - strength: 5/5 toe flexion, 5/5 toe extension,   - special tests:  none  Neuro  - no numbness, no motor deficit, grossly normal coordination, normal muscle tone             RADIOLOGY:    3 view xrays of left 5th toe performed and reviewed independently demonstrating stable appearance of proximal phalanx fx. See EMR for formal radiology report.             Again, thank you for allowing me to participate in the care of your patient.      Sincerely,    Onesimo Bland MD

## 2025-01-06 NOTE — PROGRESS NOTES
[facility]  SPORTS & ORTHOPEDIC CLINIC VISIT     Jan 6, 2025        ASSESSMENT & PLAN    85 yo with left 5th toe fx. Stable.     Reviewed imaging and assessment with patient in detail  Sanaz tape as necessary  Discussed footwear modifications  Follow up in one month if still painful  Return to activity as tolerated    Onesimo Bland MD  [facility] SPORTS MEDICINE CLINIC [place]    -----  Chief Complaint   Patient presents with    Left Foot - Pain       SUBJECTIVE  Kerri Shook is a/an 84 year old female who is seen as an  referral for evaluation of  left foot pain.     The patient is seen by themselves.  The patient is Right handed    Onset: 12/27/24. Patient describes injury as catching her foot on a table leg.  Location of Pain: left pinky toe   Worsened by: Walking   Better with: ice and elevation   Treatments tried: elevation, ice, and sanaz taping   Associated symptoms: swelling    Orthopedic/Surgical history: NO  Social History/Occupation: Retired       REVIEW OF SYSTEMS:  Do you have fever, chills, weight loss? No  Do you have any vision problems? No  Do you have any chest pain or edema? No  Do you have any shortness of breath or wheezing?  No  Do you have stomach problems? No  Do you have any numbness or focal weakness? No  Do you have diabetes? No  Do you have problems with bleeding or clotting? No  Do you have an rashes or other skin lesions? No    OBJECTIVE:  There were no vitals taken for this visit.     General  - alert, pleasant, no distress  CV  - normal radial pulse, cap refill brisk  Musculoskeletal -left 5th toe  - inspection: swelling, resolving ecchymosis  - palpation: ttp of proximal phalanx  no ttp of remaining phalanges or metatarsal  - ROM:  MTP 30 deg flexion   80 deg extension   PIP 50 deg flexion   50 deg extension   DIP 50 deg flexion   50 deg extension  - strength: 5/5 toe flexion, 5/5 toe extension,   - special tests:  none  Neuro  - no  numbness, no motor deficit, grossly normal coordination, normal muscle tone             RADIOLOGY:    3 view xrays of left 5th toe performed and reviewed independently demonstrating stable appearance of proximal phalanx fx. See EMR for formal radiology report.

## 2025-01-20 NOTE — PROGRESS NOTES
Dermatologic Surgery Post-Op Wound Check     CC: Scar Management (Left cheek and right shin.)      Dermatology Problem List:  FBSE 08/07/2024      1. Hx of NMSC  - BCC, R lower lateral shin, s/p Mohs 10/15/24  - BCC, L lateral nasal sidewall, s/p Mohs 10/15/24     # Lesion on R upper nasal side wall - ddx cyst v bony structure?  - ultrasound ordered 8/7/24 to characterize    Subjective: Kerri Shook is a 84 year old female who presents today for wound check after Mohs surgery on 10/15/24.  - She reports the left lateral nasal sidewall is healing appropriately  and she's discontinued wound care at this site.  - Right lateral lower shin still has the derma bond and sutures in tact. She'd like to get rid of them if possible, however, if they will dissolve on their own she is happy with this as well.   - no other concerns today    Objective: An exam of the right lower lateral shin and left lateral nasal sidewall was performed today   - right lower lateral shin , there is a well healed surgical scar with Vicryl Rapide and derma bond in tact. No adjacent erythema or edema.   - left lateral nasal sidewall, there is a geometric scar consistent with advancement flap. The scar is pink but no adjacent erythema or edema.    Assessment and Plan:     1. Post-op wound evaluation status post Mohs and intermediate repair of BCC on right lower lateral shin.   2. Post-op wound evaluation status post Mohs and advancement flap repair of BCC on left lateral nasal sidewall.  - The patient's surgery site(s) is/are healing very well. No evidence of infection on examination today.  - We discussed scar massage starting 1 month after surgery for both sites. She's interested in a 3 month scar evaluation for the left lateral nasal sidewall.   - Sutures were removed. The patient was told to continue with wound cares for 1 more week on the right lower lateral shin.    Scaly blocked pores present at the L nasal sidewall surgical site. Discussed  adapalene vs alpha hydroxyacid containing lotion.     Patient was discussed with and evaluated by attending physician, Nii Lucio DO.    Scribe Disclosure:   I, Heather Orosco am serving as a scribe; to document services personally performed by Dr. Nii Lucio - -based on data collection and the provider's statements to me.       Provider Disclosure:   The documentation recorded by the scribe accurately reflects the services I personally performed and the decisions made by me.    Nii Lucio DO    Department of Dermatology  [facility]: Phone: [phone], Fax:[phone]  [facility]: Phone: [phone], Fax: [phone]

## 2025-01-21 ENCOUNTER — OFFICE VISIT (OUTPATIENT)
Dept: DERMATOLOGY | Facility: CLINIC | Age: 85
End: 2025-01-21
Payer: COMMERCIAL

## 2025-01-21 DIAGNOSIS — Z85.828 HISTORY OF BASAL CELL CARCINOMA OF SKIN: Primary | ICD-10-CM

## 2025-01-21 DIAGNOSIS — L90.5 FACIAL SCAR: ICD-10-CM

## 2025-01-21 PROCEDURE — 99212 OFFICE O/P EST SF 10 MIN: CPT | Performed by: DERMATOLOGY

## 2025-01-21 ASSESSMENT — PAIN SCALES - GENERAL: PAINLEVEL_OUTOF10: NO PAIN (0)

## 2025-01-21 NOTE — NURSING NOTE
Chief Complaint   Patient presents with    Scar Management     Left cheek and right olmos.     Guerita MAYEN CMA

## 2025-01-21 NOTE — LETTER
1/21/2025       RE: Kerri Shook  [address]  [facility] [address]     Dear Colleague,    Thank you for referring your patient, Kerri Shook, to the [facility] DERMATOLOGIC SURGERY CLINIC [place] at [facility]. Please see a copy of my visit note below.    Dermatologic Surgery Post-Op Wound Check     CC: Scar Management (Left cheek and right shin.)      Dermatology Problem List:  FBSE 08/07/2024      1. Hx of NMSC  - BCC, R lower lateral shin, s/p Mohs 10/15/24  - BCC, L lateral nasal sidewall, s/p Mohs 10/15/24     # Lesion on R upper nasal side wall - ddx cyst v bony structure?  - ultrasound ordered 8/7/24 to characterize    Subjective: Kerri Shook is a 84 year old female who presents today for wound check after Mohs surgery on 10/15/24.  - She reports the left lateral nasal sidewall is healing appropriately  and she's discontinued wound care at this site.  - Right lateral lower shin still has the derma bond and sutures in tact. She'd like to get rid of them if possible, however, if they will dissolve on their own she is happy with this as well.   - no other concerns today    Objective: An exam of the right lower lateral shin and left lateral nasal sidewall was performed today   - right lower lateral shin , there is a well healed surgical scar with Vicryl Rapide and derma bond in tact. No adjacent erythema or edema.   - left lateral nasal sidewall, there is a geometric scar consistent with advancement flap. The scar is pink but no adjacent erythema or edema.    Assessment and Plan:     1. Post-op wound evaluation status post Mohs and intermediate repair of BCC on right lower lateral shin.   2. Post-op wound evaluation status post Mohs and advancement flap repair of BCC on left lateral nasal sidewall.  - The patient's surgery site(s) is/are healing very well. No evidence of infection on examination today.  - We discussed scar massage  starting 1 month after surgery for both sites. She's interested in a 3 month scar evaluation for the left lateral nasal sidewall.   - Sutures were removed. The patient was told to continue with wound cares for 1 more week on the right lower lateral shin.    Scaly blocked pores present at the L nasal sidewall surgical site. Discussed adapalene vs alpha hydroxyacid containing lotion.     Patient was discussed with and evaluated by attending physician, Nii Lucio DO.    Scribe Disclosure:   I, Heather Orosco am serving as a scribe; to document services personally performed by Dr. Nii Lucio - -based on data collection and the provider's statements to me.       Provider Disclosure:   The documentation recorded by the scribe accurately reflects the services I personally performed and the decisions made by me.    Nii Lucio DO    Department of Dermatology  [facility]: Phone: [phone], Fax:[phone]  [facility]: Phone: [phone], Fax: [phone]    Again, thank you for allowing me to participate in the care of your patient.      Sincerely,    Nii Lucio MD

## 2025-02-05 ENCOUNTER — LAB (OUTPATIENT)
Dept: LAB | Facility: CLINIC | Age: 85
End: 2025-02-05
Attending: STUDENT IN AN ORGANIZED HEALTH CARE EDUCATION/TRAINING PROGRAM
Payer: COMMERCIAL

## 2025-02-05 DIAGNOSIS — C91.10 CLL (CHRONIC LYMPHOCYTIC LEUKEMIA) (H): ICD-10-CM

## 2025-02-05 DIAGNOSIS — I51.81 STRESS-INDUCED CARDIOMYOPATHY: Primary | ICD-10-CM

## 2025-02-05 LAB
ALBUMIN SERPL BCG-MCNC: 4.2 G/DL (ref 3.5–5.2)
ALP SERPL-CCNC: 98 U/L (ref 40–150)
ALT SERPL W P-5'-P-CCNC: 23 U/L (ref 0–50)
ANION GAP SERPL CALCULATED.3IONS-SCNC: 10 MMOL/L (ref 7–15)
AST SERPL W P-5'-P-CCNC: 50 U/L (ref 0–45)
BASOPHILS # BLD MANUAL: 0 10E3/UL (ref 0–0.2)
BASOPHILS NFR BLD MANUAL: 0 %
BILIRUB SERPL-MCNC: 0.4 MG/DL
BUN SERPL-MCNC: 14.7 MG/DL (ref 8–23)
BURR CELLS BLD QL SMEAR: SLIGHT
CALCIUM SERPL-MCNC: 9.5 MG/DL (ref 8.8–10.4)
CHLORIDE SERPL-SCNC: 105 MMOL/L (ref 98–107)
CREAT SERPL-MCNC: 0.92 MG/DL (ref 0.51–0.95)
EGFRCR SERPLBLD CKD-EPI 2021: 61 ML/MIN/1.73M2
ELLIPTOCYTES BLD QL SMEAR: SLIGHT
EOSINOPHIL # BLD MANUAL: 0 10E3/UL (ref 0–0.7)
EOSINOPHIL NFR BLD MANUAL: 0 %
ERYTHROCYTE [DISTWIDTH] IN BLOOD BY AUTOMATED COUNT: 14.5 % (ref 10–15)
FRAGMENTS BLD QL SMEAR: SLIGHT
GLUCOSE SERPL-MCNC: 98 MG/DL (ref 70–99)
HCO3 SERPL-SCNC: 27 MMOL/L (ref 22–29)
HCT VFR BLD AUTO: 40.9 % (ref 35–47)
HGB BLD-MCNC: 13.1 G/DL (ref 11.7–15.7)
LYMPHOCYTES # BLD MANUAL: 175.6 10E3/UL (ref 0.8–5.3)
LYMPHOCYTES NFR BLD MANUAL: 95 %
MCH RBC QN AUTO: 30.5 PG (ref 26.5–33)
MCHC RBC AUTO-ENTMCNC: 32 G/DL (ref 31.5–36.5)
MCV RBC AUTO: 95 FL (ref 78–100)
MONOCYTES # BLD MANUAL: 2.8 10E3/UL (ref 0–1.3)
MONOCYTES NFR BLD MANUAL: 2 %
NEUTROPHILS # BLD MANUAL: 6.9 10E3/UL (ref 1.6–8.3)
NEUTROPHILS NFR BLD MANUAL: 4 %
PLAT MORPH BLD: ABNORMAL
PLATELET # BLD AUTO: 157 10E3/UL (ref 150–450)
POTASSIUM SERPL-SCNC: 4.7 MMOL/L (ref 3.4–5.3)
PROT SERPL-MCNC: 6.4 G/DL (ref 6.4–8.3)
RBC # BLD AUTO: 4.3 10E6/UL (ref 3.8–5.2)
RBC MORPH BLD: ABNORMAL
SODIUM SERPL-SCNC: 142 MMOL/L (ref 135–145)
WBC # BLD AUTO: 185.3 10E3/UL (ref 4–11)

## 2025-02-05 PROCEDURE — 85007 BL SMEAR W/DIFF WBC COUNT: CPT

## 2025-02-05 PROCEDURE — 36415 COLL VENOUS BLD VENIPUNCTURE: CPT

## 2025-02-05 PROCEDURE — 85027 COMPLETE CBC AUTOMATED: CPT

## 2025-02-05 PROCEDURE — 80053 COMPREHEN METABOLIC PANEL: CPT

## 2025-02-10 RX ORDER — CARVEDILOL 3.12 MG/1
3.12 TABLET ORAL 2 TIMES DAILY
Qty: 180 TABLET | Refills: 0 | Status: SHIPPED | OUTPATIENT
Start: 2025-02-10

## 2025-02-16 NOTE — PROGRESS NOTES
HPI:    Last visit with us was 11/14/2024 and additional information in that note. Overall stable. She denies any new HEENT, cardiopulmonary, abdominal, , GYN, neurological, systemic, psychiatric, lymphatic, endocrine, vascular complaints.         Past Medical History:   Diagnosis Date    Breast disorder 1990    Breast Cancer    Cardiomyopathy (H)     Chronic osteoarthritis 2012    resulted in hip replacement    CLL (chronic lymphoblastic leukemia) 1989    Closed fracture of second toe of left foot     Colon adenomas     6/26/14 colonoscopy, repeat in 3 years    History of blood transfusion     hyperlipidemia     Primary localized osteoarthrosis, pelvic region and thigh     Tinnitus      Past Surgical History:   Procedure Laterality Date    BIOPSY  within the last 5 years    polyps during colonoscopy    COLONOSCOPY  6/26/2014    Procedure: COMBINED COLONOSCOPY, SINGLE BIOPSY/POLYPECTOMY BY BIOPSY;  Surgeon: Pola Arceo MD;  Location:  GI    COLONOSCOPY N/A 1/5/2021    Procedure: COLONOSCOPY, WITH POLYPECTOMY AND CLIP;  Surgeon: Charlie Wang MD;  Location: [facility] OR    COLONOSCOPY N/A 11/18/2024    Procedure: COLONOSCOPY, WITH POLYPECTOMY;  Surgeon: Devin Motley MD;  Location:  GI    D & C  1962    late PP hemorrhage --> retained placenta    ENDOSCOPIC SINUS SURGERY Right 7/16/2018    Procedure: ENDOSCOPIC SINUS SURGERY;  Endoscopic Sphenoidotomy with Removal of Tissue;  Surgeon: Kishore Webster MD;  Location:  OR    ENT SURGERY  1950    tonselectomy    EYE SURGERY  2015    cataract on left eye    left hip replacemen Left     hip replacement in 2013    LUMPECTOMY BREAST  1990    Left    ORTHOPEDIC SURGERY  age?    right shoulder     ORTHOPEDIC SURGERY  ~ 2013    left hip replacement    R hip arthroscopy  7/19/11    TONSILLECTOMY  1950    [facility] PELVIS/HIP JOINT SURGERY UNLISTED  April 2012    left hip replacement    [facility] SHOULDER SURG PROC UNLISTED  ?     PE:    Vitals noted, gen, nad, cooperative, alert,  neck supple nl rom, lungs with good air movement, RRR S1, S2, on MRG abdomen, no acute findings. Grossly normal neurological exam.       A/P:     1. CLL (dx 1986); follows with oncology, last visit with Dr. Tanner 8/26/2024.  Follow up Ms. Roe 3/5/2025 and with Dr. Centeno 6/4/2025  2. Immunizations; COVID-19 Moderna x 5 and Pfizer x 3. Shingrix vaccine series 2019. Pneumococcal 23 on 10/21/2021. Prevnar 13 on 2/4/2015. Tdap on 1/27/2014. Td/Tdap done in [place]  RSV 11/21/2023  3. Breast cancer (1990) s/p radiation and tamoxifen; mammogram done 11/25/2024  4. Dermatology; she follows with Dr. Lucio for h/o BCC (last visit 1/21/2025) and  and then with Dr. Ann 3/5/2025  5. CT abdomen/pelvis 4/7/2022 for abdominal pain; no acute findings.   6. DEXA scan: 11/19/2020 and recommended repeat in 5 years (2025).  Ordered DEXA scan today 2/17/2025.   7. Increased cholesterol on Simvastatin 20 mg; lipids; 10/9/2024; , HDL 57, and TG's 116.   8. Colonoscopy; done 11/18/2024  9. A1c 6.2% 2/17/2022; repeat 6.4% on 7/17/2023.   10. Seen Dr. Sigala, Podiatry 5/17/2022  11. R leg complaints. X-rays of hips and R femur were done 9/25/2023. Given her h/o breast cancer and CLL MRI R hip and R leg were done 11/28/2023 Discussed spinal stenosis can cause anterior thigh pain with walking.    12. Hypertension: stable, continue with carvedilol   13. Cataracts: R eye cataract surgery 2/22/23. See Optometry appt. On 4/15/2024  14. Eyebrow/eyelid lift: procedure 7/14/23 and follow up with Dr. Greenwood at [facility] on 7/24/2023.   15. Voice hoarseness:  CXR was done 11/1/2024.  She had 12/11/2023 ENT appt. With Dr. Dias.   16. Seen Dr. Bland, Orthopedics 1/6/2025 for L foot pain      40 minutes spent on the date of the encounter doing chart review, history and exam, documentation and further activities as noted above exclusive of procedures and other billable interpretations

## 2025-02-17 ENCOUNTER — OFFICE VISIT (OUTPATIENT)
Dept: INTERNAL MEDICINE | Facility: CLINIC | Age: 85
End: 2025-02-17
Payer: COMMERCIAL

## 2025-02-17 VITALS
RESPIRATION RATE: 16 BRPM | HEART RATE: 69 BPM | HEIGHT: 65 IN | SYSTOLIC BLOOD PRESSURE: 113 MMHG | TEMPERATURE: 97.5 F | WEIGHT: 141 LBS | BODY MASS INDEX: 23.49 KG/M2 | OXYGEN SATURATION: 96 % | DIASTOLIC BLOOD PRESSURE: 70 MMHG

## 2025-02-17 DIAGNOSIS — M80.0B2A AGE-RELATED OSTEOPOROSIS WITH CURRENT PATHOLOGICAL FRACTURE, LEFT PELVIS, INITIAL ENCOUNTER FOR FRACTURE: ICD-10-CM

## 2025-02-17 DIAGNOSIS — M85.9: Primary | ICD-10-CM

## 2025-03-10 ENCOUNTER — MYC REFILL (OUTPATIENT)
Dept: ONCOLOGY | Facility: CLINIC | Age: 85
End: 2025-03-10
Payer: COMMERCIAL

## 2025-03-10 DIAGNOSIS — E78.5 HYPERLIPIDEMIA LDL GOAL <70: ICD-10-CM

## 2025-03-11 RX ORDER — SIMVASTATIN 20 MG
20 TABLET ORAL AT BEDTIME
Qty: 90 TABLET | Refills: 0 | Status: SHIPPED | OUTPATIENT
Start: 2025-03-11

## 2025-03-11 NOTE — TELEPHONE ENCOUNTER
Simvastatin 20mg tab  Last prescribing provider: Simran Roe    Last clinic visit date: 12/4/24    Recommendations for requested medication (if none, N/A): NA    Any other pertinent information (if none, N/A): NA    Refilled: Y/N, if NO, why?

## 2025-03-12 NOTE — PROGRESS NOTES
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    1. CLL in 1986. Found to have an elevated white blood count on routine testing. This was consistent with CLL.Saw Dr. Puente who recommended observation. WBCs 15-20 during this time. She has required no treatment for CLL. Followed by PCP with yearly CBCs.  2. 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 The patient had a screening mammogram in 1990, which showed an abnormality in the left breast. She underwent a lumpectomy on 03/02/1990, which was consistent with a 1.5 cm primary, mucinous adenocarcinoma with 0 of 8 lymph nodes positive. She was staged as a stage I (T1 N0 M0). The tumor was ER positive. She did have radiation to the left breast with a boost having completed 6600 cGy in 33 fractions from 03/28/1990 until 05/15/1990. She went on to take tamoxifen for 6 years from 03/1990 until 03/1996.  3. 1/2019 labs showing WBC increasing to 27. This has continued with WBCs 47 on 11/12/2020 and 71.0 on 8//18/21  4. 8/31/21 Peripheral flow showing CD5+ lambda CLL cells. Hb 14.6, WBC 66.2, Plts 162, ANC 5.3  5. 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 Her energy levels have been stable/normal.  She remains active during the day such as organizations/meetings.  She will occasionally need a <30 minute nap in the afternoon. Her appetite has been good and she is eating and drinking well- weight has been stable. She has been having some mild night sweats- damp around the neck but denies drenching.  Night sweats have been almost every night for about 2 weeks. She continues with low back pain from her arthritis- is  not always good about doing her exercises/stretches.     Denies unexplained weight loss, fevers/chills,  n/v/d/c, rashes/sore, recurring infections, new pains, new lumps/bumps, all other acute issues or concerns.     She is going on a trip to [place] 4/20-5/1- she would like to hold off on treatment prior to her trip.    CURRENT OUTPATIENT MEDICATIONS  Current Outpatient Medications   Medication Sig Dispense Refill    aspirin 81 MG tablet Take 81 mg by mouth daily.      carvedilol (COREG) 3.125 MG tablet Take 1 tablet (3.125 mg) by mouth 2 times daily. 180 tablet 0    gabapentin (NEURONTIN) 100 MG capsule TAKE 1 CAPSULE(100 MG) BY MOUTH EVERY NIGHT AS NEEDED 30 capsule 1    Multiple Vitamin (MULTIVITAMIN) per tablet Take 1 tablet by mouth daily.      simvastatin (ZOCOR) 20 MG tablet Take 1 tablet (20 mg) by mouth at bedtime. 90 tablet 0       REVIEW OF SYSTEMS  A complete ROS was performed and was negative except as mentioned in HPI    PHYSICAL EXAM  /77 (BP Location: Left arm, Patient Position: Sitting, Cuff Size: Adult Regular)   Pulse 71   Temp 97.9  F (36.6  C) (Oral)   Resp 16   Wt 65 kg (143 lb 6.4 oz)   SpO2 95%   BMI 23.86 kg/m    General: No acute distress, pleasant, well-groomed  HEENT: Sclera anicteric. Oral exam deferred  Lymph: No lymphadenopathy in neck, supraclavicular, or axillary, small palpable right inguinal lymph node  Heart: Regular, rate, and rhythm  Lungs: Clear to ascultation bilaterally  Abdomen: Positive bowel sounds. Soft, non-distended, non-tender. No organomegaly or mass.   Extremities: no lower extremity edema  Neuro: Cranial nerves grossly intact  Skin: No rashes, sores, lesions, or unusual bruising on exposed skin    Wt Readings from Last 4 Encounters:   03/13/25 65 kg (143 lb 6.4 oz)   02/17/25 64 kg (141 lb)   12/04/24 64.7 kg (142 lb 11.2 oz)   11/14/24 65.4 kg (144 lb 1.6 oz)       LABORATORY AND IMAGING STUDIES  Most Recent 3 CBC's:  Recent Labs   Lab Test  03/13/25  1453 02/05/25  1302 01/02/25  1335 02/15/23  1345 12/06/22  1006 09/06/22  1003 02/17/22  1005 01/13/22  0952   .0* 185.3* 195.7*   < > 112.5* 104.7*   < > 93.1*   HGB 12.3 13.1 12.9   < > 13.3 13.8   < > 14.4   MCV 95 95 96   < > 93 93   < > 93    157 147*   < > 172 181   < > 189   ANEUTAUTO  --   --   --   --  4.2 4.8  --  5.8    < > = values in this interval not displayed.     Most Recent 3 BMP's:  Recent Labs   Lab Test 03/13/25  1453 02/05/25  1302 01/02/25  1335 12/04/24  1325 11/06/24  1307    142 141   < > 139   POTASSIUM  --  4.7 4.3  --  4.5   CHLORIDE 104 105 105   < > 103   CO2 27 27 27   < > 25   BUN 13.8 14.7 14.0   < > 14.6   CR 0.92 0.92 0.84   < > 0.89   ANIONGAP 9 10 9   < > 11   AAKASH 9.0 9.5 9.1   < > 9.1   GLC 97 98 82   < > 81   PROTTOTAL 6.3* 6.4 6.1*   < > 6.3*   ALBUMIN 4.2 4.2 4.1   < > 4.2    < > = values in this interval not displayed.    Most Recent 3 LFT's:  Recent Labs   Lab Test 03/13/25  1453 02/05/25  1302 01/02/25  1335   AST 32 50* 49*   ALT 21 23 22   ALKPHOS 100 98 102   BILITOTAL 0.4 0.4 0.4    Most Recent 2 TSH and T4:No lab results found.  I reviewed the above labs today.    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    # High risk CLL - Arboleda Stage 0, CLL-IPI = 5 = High risk. FISH showing loss of 17p, IgHV mutated and NGS for TP53 mutation positive  Previously discussed that CLL is a slow-growing disease and is monitored expectantly.  CLL will grow over time without any treatment.  Since CLL is a mature B-cell malignancy, high white blood cell count usually does not cause any symptoms to the patient and does not put patient at high risk of hyperviscosity or tumor lysis just by virtue of having high white blood cell count.  Spontaneous tumor lysis can occur  in CLL but it is not common.  Therefore absolute lymphocyte count is not a part of indications for treatment initiation in CLL guidelines.  Specially for clinical trials, rapidly rising lymphocytosis defined as lymphocyte doubling time less than 6 months is required to initiate therapy.  She does not have bulky lymphadenopathy, massive splenomegaly, progressive cytopenias or B symptoms that warrant treatment initiation.  She does not want to start treatment now, if possible she would like to continue expectant management. She is coming close to starting treatment and reviewed treatment options including zanubrutinib, acalabrutinib with or without obinutuzumab as well as venetoclax obinutuzumab.  Venetoclax obinutuzumab was a fixed duration treatment and patient was inclining towards fixed duration treatment.  Previously reviewed that retrospective data suggest that BTK inhibitors may have better response rates and PFS rates in patients with 17P deletion or TP53 mutation but this is not coming from a randomized or competitive retrospective data.  Institutional clinical trial of zanubrutinib plus sonrotoclax versus venetoclax obinutuzumab was discussed with the patient.  She would not qualify for the trial at this point but if she continues expectant management and desires to initiate treatment when she has one of the treatment initiation criteria's, then she should be able to participate in clinical trial.      Patient does not have any indications to treat:   -hemoglobin < 10  -platelets acutely less than 100,000  -massive or progressive or symptomatic splenomegaly  -massive or progressive or symptomatic lymphadenopathy (small palpable right inguinal LN which is not symptomatic)  -progressive lymphocytosis with an increase of >50 percent over a two-month period or lymphocyte doubling time (LDT) of <6 months.   -symptomatic or functional extranodal involvement (eg, skin, kidney, lung, spine).  -constitutional  symptoms including:   -significant fatigue with ECOG < or = 2   -night sweats for > or = 1 month (has only has mild night sweats for 2 weeks)   -unintentional weight loss >=10 percent within the previous six months   -fevers for >=2 weeks without other evidence of infection    Due to mild non-drenching night sweats small right inguinal LN I will discuss starting treatment with Dr. Centeno.  She is going on a trip to [place] 4/20-5/1 and would ideally like to hold off on starting treatment until she is back from her trip.   Informed her I will follow-up once I discuss with Dr. Centeno.    Final plan:  - Continue monthly labs in [place]   - Follow-up with Dr. Centeno in 3 months       23  minutes spent on the date of the encounter doing chart review, review of test results, interpretation of tests, patient visit, and documentation     The longitudinal plan of care for the diagnosis(es)/condition(s) as documented were addressed during this visit. Due to the added complexity in care, I will continue to support Kerri in the subsequent management and with ongoing continuity of care.    JAMSHID Delgado CNP

## 2025-03-13 ENCOUNTER — ONCOLOGY VISIT (OUTPATIENT)
Dept: ONCOLOGY | Facility: CLINIC | Age: 85
End: 2025-03-13
Payer: COMMERCIAL

## 2025-03-13 ENCOUNTER — LAB (OUTPATIENT)
Dept: LAB | Facility: CLINIC | Age: 85
End: 2025-03-13
Payer: COMMERCIAL

## 2025-03-13 VITALS
DIASTOLIC BLOOD PRESSURE: 77 MMHG | HEART RATE: 71 BPM | TEMPERATURE: 97.9 F | RESPIRATION RATE: 16 BRPM | BODY MASS INDEX: 23.86 KG/M2 | OXYGEN SATURATION: 95 % | SYSTOLIC BLOOD PRESSURE: 117 MMHG | WEIGHT: 143.4 LBS

## 2025-03-13 DIAGNOSIS — C91.10 CLL (CHRONIC LYMPHOCYTIC LEUKEMIA) (H): ICD-10-CM

## 2025-03-13 DIAGNOSIS — C91.10 CLL (CHRONIC LYMPHOCYTIC LEUKEMIA) (H): Primary | ICD-10-CM

## 2025-03-13 LAB
ALBUMIN SERPL BCG-MCNC: 4.2 G/DL (ref 3.5–5.2)
ALP SERPL-CCNC: 100 U/L (ref 40–150)
ALT SERPL W P-5'-P-CCNC: 21 U/L (ref 0–50)
ANION GAP SERPL CALCULATED.3IONS-SCNC: 9 MMOL/L (ref 7–15)
AST SERPL W P-5'-P-CCNC: 32 U/L (ref 0–45)
BASOPHILS # BLD MANUAL: 0 10E3/UL (ref 0–0.2)
BASOPHILS NFR BLD MANUAL: 0 %
BILIRUB SERPL-MCNC: 0.4 MG/DL
BUN SERPL-MCNC: 13.8 MG/DL (ref 8–23)
CALCIUM SERPL-MCNC: 9 MG/DL (ref 8.8–10.4)
CHLORIDE SERPL-SCNC: 104 MMOL/L (ref 98–107)
CREAT SERPL-MCNC: 0.92 MG/DL (ref 0.51–0.95)
EGFRCR SERPLBLD CKD-EPI 2021: 61 ML/MIN/1.73M2
EOSINOPHIL # BLD MANUAL: 0 10E3/UL (ref 0–0.7)
EOSINOPHIL NFR BLD MANUAL: 0 %
ERYTHROCYTE [DISTWIDTH] IN BLOOD BY AUTOMATED COUNT: 14.8 % (ref 10–15)
GLUCOSE SERPL-MCNC: 97 MG/DL (ref 70–99)
HCO3 SERPL-SCNC: 27 MMOL/L (ref 22–29)
HCT VFR BLD AUTO: 40.4 % (ref 35–47)
HGB BLD-MCNC: 12.3 G/DL (ref 11.7–15.7)
LYMPHOCYTES # BLD MANUAL: 185.8 10E3/UL (ref 0.8–5.3)
LYMPHOCYTES NFR BLD MANUAL: 92 %
MCH RBC QN AUTO: 29 PG (ref 26.5–33)
MCHC RBC AUTO-ENTMCNC: 30.4 G/DL (ref 31.5–36.5)
MCV RBC AUTO: 95 FL (ref 78–100)
MONOCYTES # BLD MANUAL: 0 10E3/UL (ref 0–1.3)
MONOCYTES NFR BLD MANUAL: 0 %
NEUTROPHILS # BLD MANUAL: 16.2 10E3/UL (ref 1.6–8.3)
NEUTROPHILS NFR BLD MANUAL: 8 %
PLAT MORPH BLD: ABNORMAL
PLATELET # BLD AUTO: 159 10E3/UL (ref 150–450)
POTASSIUM SERPL-SCNC: ABNORMAL MMOL/L
PROT SERPL-MCNC: 6.3 G/DL (ref 6.4–8.3)
RBC # BLD AUTO: 4.24 10E6/UL (ref 3.8–5.2)
RBC MORPH BLD: ABNORMAL
SMUDGE CELLS BLD QL SMEAR: PRESENT
SODIUM SERPL-SCNC: 140 MMOL/L (ref 135–145)
WBC # BLD AUTO: 202 10E3/UL (ref 4–11)

## 2025-03-13 PROCEDURE — 84520 ASSAY OF UREA NITROGEN: CPT | Performed by: PATHOLOGY

## 2025-03-13 PROCEDURE — 82435 ASSAY OF BLOOD CHLORIDE: CPT | Performed by: PATHOLOGY

## 2025-03-13 PROCEDURE — 84295 ASSAY OF SERUM SODIUM: CPT | Performed by: PATHOLOGY

## 2025-03-13 PROCEDURE — G0463 HOSPITAL OUTPT CLINIC VISIT: HCPCS

## 2025-03-13 PROCEDURE — 85007 BL SMEAR W/DIFF WBC COUNT: CPT | Performed by: PATHOLOGY

## 2025-03-13 PROCEDURE — 82565 ASSAY OF CREATININE: CPT | Performed by: PATHOLOGY

## 2025-03-13 PROCEDURE — 82040 ASSAY OF SERUM ALBUMIN: CPT | Performed by: PATHOLOGY

## 2025-03-13 PROCEDURE — 82310 ASSAY OF CALCIUM: CPT | Performed by: PATHOLOGY

## 2025-03-13 PROCEDURE — 84075 ASSAY ALKALINE PHOSPHATASE: CPT | Performed by: PATHOLOGY

## 2025-03-13 PROCEDURE — 82374 ASSAY BLOOD CARBON DIOXIDE: CPT | Performed by: PATHOLOGY

## 2025-03-13 PROCEDURE — 82247 BILIRUBIN TOTAL: CPT | Performed by: PATHOLOGY

## 2025-03-13 PROCEDURE — 85027 COMPLETE CBC AUTOMATED: CPT | Performed by: PATHOLOGY

## 2025-03-13 PROCEDURE — 84155 ASSAY OF PROTEIN SERUM: CPT | Performed by: PATHOLOGY

## 2025-03-13 PROCEDURE — 82947 ASSAY GLUCOSE BLOOD QUANT: CPT | Mod: GZ | Performed by: PATHOLOGY

## 2025-03-13 PROCEDURE — 84450 TRANSFERASE (AST) (SGOT): CPT | Performed by: PATHOLOGY

## 2025-03-13 PROCEDURE — 84460 ALANINE AMINO (ALT) (SGPT): CPT | Performed by: PATHOLOGY

## 2025-03-13 PROCEDURE — 36415 COLL VENOUS BLD VENIPUNCTURE: CPT | Performed by: PATHOLOGY

## 2025-03-13 ASSESSMENT — PAIN SCALES - GENERAL: PAINLEVEL_OUTOF10: NO PAIN (0)

## 2025-03-13 NOTE — LETTER
3/13/2025      Kerri Shook  [address]  [facility] [address]      Dear Colleague,    Thank you for referring your patient, Kerri Shook, to the [facility]. Please see a copy of my visit note below.    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    1. CLL in 1986. Found to have an elevated white blood count on routine testing. This was consistent with CLL.Saw Dr. Puente who recommended observation. WBCs 15-20 during this time. She has required no treatment for CLL. Followed by PCP with yearly CBCs.  2. 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 The patient had a screening mammogram in 1990, which showed an abnormality in the left breast. She underwent a lumpectomy on 03/02/1990, which was consistent with a 1.5 cm primary, mucinous adenocarcinoma with 0 of 8 lymph nodes positive. She was staged as a stage I (T1 N0 M0). The tumor was ER positive. She did have radiation to the left breast with a boost having completed 6600 cGy in 33 fractions from 03/28/1990 until 05/15/1990. She went on to take tamoxifen for 6 years from 03/1990 until 03/1996.  3. 1/2019 labs showing WBC increasing to 27. This has continued with WBCs 47 on 11/12/2020 and 71.0 on 8//18/21  4. 8/31/21 Peripheral flow showing CD5+ lambda CLL cells. Hb 14.6, WBC 66.2, Plts 162, ANC 5.3  5. 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 Her energy levels have been stable/normal.  She remains active during the day such as organizations/meetings.  She will occasionally need a <30 minute nap in the afternoon. Her appetite has been good and she is  eating and drinking well- weight has been stable. She has been having some mild night sweats- damp around the neck but denies drenching.  Night sweats have been almost every night for about 2 weeks. She continues with low back pain from her arthritis- is not always good about doing her exercises/stretches.     Denies unexplained weight loss, fevers/chills,  n/v/d/c, rashes/sore, recurring infections, new pains, new lumps/bumps, all other acute issues or concerns.     She is going on a trip to [place] 4/20-5/1- she would like to hold off on treatment prior to her trip.    CURRENT OUTPATIENT MEDICATIONS  Current Outpatient Medications   Medication Sig Dispense Refill     aspirin 81 MG tablet Take 81 mg by mouth daily.       carvedilol (COREG) 3.125 MG tablet Take 1 tablet (3.125 mg) by mouth 2 times daily. 180 tablet 0     gabapentin (NEURONTIN) 100 MG capsule TAKE 1 CAPSULE(100 MG) BY MOUTH EVERY NIGHT AS NEEDED 30 capsule 1     Multiple Vitamin (MULTIVITAMIN) per tablet Take 1 tablet by mouth daily.       simvastatin (ZOCOR) 20 MG tablet Take 1 tablet (20 mg) by mouth at bedtime. 90 tablet 0       REVIEW OF SYSTEMS  A complete ROS was performed and was negative except as mentioned in HPI    PHYSICAL EXAM  /77 (BP Location: Left arm, Patient Position: Sitting, Cuff Size: Adult Regular)   Pulse 71   Temp 97.9  F (36.6  C) (Oral)   Resp 16   Wt 65 kg (143 lb 6.4 oz)   SpO2 95%   BMI 23.86 kg/m    General: No acute distress, pleasant, well-groomed  HEENT: Sclera anicteric. Oral exam deferred  Lymph: No lymphadenopathy in neck, supraclavicular, or axillary, small palpable right inguinal lymph node  Heart: Regular, rate, and rhythm  Lungs: Clear to ascultation bilaterally  Abdomen: Positive bowel sounds. Soft, non-distended, non-tender. No organomegaly or mass.   Extremities: no lower extremity edema  Neuro: Cranial nerves grossly intact  Skin: No rashes, sores, lesions, or unusual bruising on exposed  skin    Wt Readings from Last 4 Encounters:   03/13/25 65 kg (143 lb 6.4 oz)   02/17/25 64 kg (141 lb)   12/04/24 64.7 kg (142 lb 11.2 oz)   11/14/24 65.4 kg (144 lb 1.6 oz)       LABORATORY AND IMAGING STUDIES  Most Recent 3 CBC's:  Recent Labs   Lab Test 03/13/25  1453 02/05/25  1302 01/02/25  1335 02/15/23  1345 12/06/22  1006 09/06/22  1003 02/17/22  1005 01/13/22  0952   .0* 185.3* 195.7*   < > 112.5* 104.7*   < > 93.1*   HGB 12.3 13.1 12.9   < > 13.3 13.8   < > 14.4   MCV 95 95 96   < > 93 93   < > 93    157 147*   < > 172 181   < > 189   ANEUTAUTO  --   --   --   --  4.2 4.8  --  5.8    < > = values in this interval not displayed.     Most Recent 3 BMP's:  Recent Labs   Lab Test 03/13/25  1453 02/05/25  1302 01/02/25  1335 12/04/24  1325 11/06/24  1307    142 141   < > 139   POTASSIUM  --  4.7 4.3  --  4.5   CHLORIDE 104 105 105   < > 103   CO2 27 27 27   < > 25   BUN 13.8 14.7 14.0   < > 14.6   CR 0.92 0.92 0.84   < > 0.89   ANIONGAP 9 10 9   < > 11   AAKASH 9.0 9.5 9.1   < > 9.1   GLC 97 98 82   < > 81   PROTTOTAL 6.3* 6.4 6.1*   < > 6.3*   ALBUMIN 4.2 4.2 4.1   < > 4.2    < > = values in this interval not displayed.    Most Recent 3 LFT's:  Recent Labs   Lab Test 03/13/25  1453 02/05/25  1302 01/02/25  1335   AST 32 50* 49*   ALT 21 23 22   ALKPHOS 100 98 102   BILITOTAL 0.4 0.4 0.4    Most Recent 2 TSH and T4:No lab results found.  I reviewed the above labs today.    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    # High risk CLL - Arboleda Stage 0, CLL-IPI = 5 = High risk. FISH showing loss of 17p, IgHV mutated and NGS for TP53 mutation positive  Previously discussed that CLL is a slow-growing disease and is monitored expectantly.  CLL will grow over time without any treatment.  Since CLL is a mature  B-cell malignancy, high white blood cell count usually does not cause any symptoms to the patient and does not put patient at high risk of hyperviscosity or tumor lysis just by virtue of having high white blood cell count.  Spontaneous tumor lysis can occur in CLL but it is not common.  Therefore absolute lymphocyte count is not a part of indications for treatment initiation in CLL guidelines.  Specially for clinical trials, rapidly rising lymphocytosis defined as lymphocyte doubling time less than 6 months is required to initiate therapy.  She does not have bulky lymphadenopathy, massive splenomegaly, progressive cytopenias or B symptoms that warrant treatment initiation.  She does not want to start treatment now, if possible she would like to continue expectant management. She is coming close to starting treatment and reviewed treatment options including zanubrutinib, acalabrutinib with or without obinutuzumab as well as venetoclax obinutuzumab.  Venetoclax obinutuzumab was a fixed duration treatment and patient was inclining towards fixed duration treatment.  Previously reviewed that retrospective data suggest that BTK inhibitors may have better response rates and PFS rates in patients with 17P deletion or TP53 mutation but this is not coming from a randomized or competitive retrospective data.  Institutional clinical trial of zanubrutinib plus sonrotoclax versus venetoclax obinutuzumab was discussed with the patient.  She would not qualify for the trial at this point but if she continues expectant management and desires to initiate treatment when she has one of the treatment initiation criteria's, then she should be able to participate in clinical trial.      Patient does not have any indications to treat:   -hemoglobin < 10  -platelets acutely less than 100,000  -massive or progressive or symptomatic splenomegaly  -massive or progressive or symptomatic lymphadenopathy (small palpable right inguinal LN which is  not symptomatic)  -progressive lymphocytosis with an increase of >50 percent over a two-month period or lymphocyte doubling time (LDT) of <6 months.   -symptomatic or functional extranodal involvement (eg, skin, kidney, lung, spine).  -constitutional symptoms including:   -significant fatigue with ECOG < or = 2   -night sweats for > or = 1 month (has only has mild night sweats for 2 weeks)   -unintentional weight loss >=10 percent within the previous six months   -fevers for >=2 weeks without other evidence of infection    Due to mild non-drenching night sweats small right inguinal LN I will discuss starting treatment with Dr. Centeno.  She is going on a trip to [place] 4/20-5/1 and would ideally like to hold off on starting treatment until she is back from her trip.   Informed her I will follow-up once I discuss with Dr. Centeno.    Final plan:  - Continue monthly labs in [place]   - Follow-up with Dr. Centeno in 3 months       23  minutes spent on the date of the encounter doing chart review, review of test results, interpretation of tests, patient visit, and documentation     The longitudinal plan of care for the diagnosis(es)/condition(s) as documented were addressed during this visit. Due to the added complexity in care, I will continue to support Kerri in the subsequent management and with ongoing continuity of care.    JAMSHID Delgado CNP              Again, thank you for allowing me to participate in the care of your patient.        Sincerely,        JAMSHID Delgado CNP    Electronically signed

## 2025-03-13 NOTE — NURSING NOTE
"Oncology Rooming Note    March 13, 2025 3:12 PM   Kerri Shook is a 84 year old female who presents for:    Chief Complaint   Patient presents with    Oncology Clinic Visit     Chronic Lymphocytic Leukemia     Initial Vitals: /77 (BP Location: Left arm, Patient Position: Sitting, Cuff Size: Adult Regular)   Pulse 71   Temp 97.9  F (36.6  C) (Oral)   Resp 16   Wt 65 kg (143 lb 6.4 oz)   SpO2 95%   BMI 23.86 kg/m   Estimated body mass index is 23.86 kg/m  as calculated from the following:    Height as of 2/17/25: 1.651 m (5' 5\").    Weight as of this encounter: 65 kg (143 lb 6.4 oz). Body surface area is 1.73 meters squared.  No Pain (0) Comment: Data Unavailable   No LMP recorded. Patient is postmenopausal.  Allergies reviewed: Yes  Medications reviewed: Yes    Medications: Medication refills not needed today.  Pharmacy name entered into [employer]: [id number] - [place], [place] - [address] AT [facility] [address]    Frailty Screening:   Is the patient here for a new oncology consult visit in cancer care? 2. No    PHQ9:  Did this patient require a PHQ9?: No      Clinical concerns: None       Nicole Pineda LP  3/13/2025              "

## 2025-03-17 ENCOUNTER — ANCILLARY PROCEDURE (OUTPATIENT)
Dept: BONE DENSITY | Facility: CLINIC | Age: 85
End: 2025-03-17
Attending: INTERNAL MEDICINE
Payer: COMMERCIAL

## 2025-03-17 DIAGNOSIS — M85.9: ICD-10-CM

## 2025-03-17 DIAGNOSIS — M80.0B2A AGE-RELATED OSTEOPOROSIS WITH CURRENT PATHOLOGICAL FRACTURE, LEFT PELVIS, INITIAL ENCOUNTER FOR FRACTURE: ICD-10-CM

## 2025-03-17 PROCEDURE — 77080 DXA BONE DENSITY AXIAL: CPT | Performed by: INTERNAL MEDICINE

## 2025-04-04 ENCOUNTER — MYC REFILL (OUTPATIENT)
Dept: INTERNAL MEDICINE | Facility: CLINIC | Age: 85
End: 2025-04-04
Payer: COMMERCIAL

## 2025-04-04 DIAGNOSIS — M79.605 PAIN OF LEFT LOWER EXTREMITY: ICD-10-CM

## 2025-04-07 RX ORDER — GABAPENTIN 100 MG/1
CAPSULE ORAL
Qty: 30 CAPSULE | Refills: 1 | Status: SHIPPED | OUTPATIENT
Start: 2025-04-07

## 2025-04-07 NOTE — TELEPHONE ENCOUNTER
Controlled substance refill request notes    Refill request received for: Gabapentin 100 Mg Capsule  [place]  data reviewed 04/07/25:  Medication last refill: qty 30, 30 day supply, filled/sold to patient on 01/17/2025  Pended order: Gabapentin 100 Mg Capsule, qty 30, 30 day supply, no delay in fill date     Primary care provider: Juan Westbrook  Last office visit with this department: 2/17/2025  Next appointment with PCP:            5/23/2025 11:00 AM [facility] RETURN 30 min [facility] Juan Westbrook MD   Location Instructions:    Due to road [place] [address], travel times to this location may be longer than usual. Please plan for extra travel time and check the [place] Department of Transportation [address] [place] website for delay, closure, and detour information.  [facility] ([facility]) is in a dense urban area with multiple transportation and parking options. You may wish to review options for  service and self-parking in more detail on the [facility] s website at [email or web address].     Refill request forwarded to provider for review.     Sara PÉREZ LPN  [facility]

## 2025-04-10 ENCOUNTER — LAB (OUTPATIENT)
Dept: LAB | Facility: CLINIC | Age: 85
End: 2025-04-10
Payer: COMMERCIAL

## 2025-04-10 ENCOUNTER — NURSE TRIAGE (OUTPATIENT)
Dept: ONCOLOGY | Facility: CLINIC | Age: 85
End: 2025-04-10

## 2025-04-10 DIAGNOSIS — C91.10 CLL (CHRONIC LYMPHOCYTIC LEUKEMIA) (H): ICD-10-CM

## 2025-04-10 LAB
ACANTHOCYTES BLD QL SMEAR: SLIGHT
ALBUMIN SERPL BCG-MCNC: 4.3 G/DL (ref 3.5–5.2)
ALP SERPL-CCNC: 102 U/L (ref 40–150)
ALT SERPL W P-5'-P-CCNC: 23 U/L (ref 0–50)
ANION GAP SERPL CALCULATED.3IONS-SCNC: 10 MMOL/L (ref 7–15)
AST SERPL W P-5'-P-CCNC: 44 U/L (ref 0–45)
BASOPHILS # BLD AUTO: 0.1 10E3/UL (ref 0–0.2)
BASOPHILS # BLD MANUAL: 0 10E3/UL (ref 0–0.2)
BASOPHILS NFR BLD AUTO: 0 %
BASOPHILS NFR BLD MANUAL: 0 %
BILIRUB SERPL-MCNC: 0.5 MG/DL
BUN SERPL-MCNC: 17.5 MG/DL (ref 8–23)
CALCIUM SERPL-MCNC: 9.4 MG/DL (ref 8.8–10.4)
CHLORIDE SERPL-SCNC: 105 MMOL/L (ref 98–107)
CREAT SERPL-MCNC: 0.92 MG/DL (ref 0.51–0.95)
EGFRCR SERPLBLD CKD-EPI 2021: 61 ML/MIN/1.73M2
ELLIPTOCYTES BLD QL SMEAR: SLIGHT
EOSINOPHIL # BLD AUTO: 0.3 10E3/UL (ref 0–0.7)
EOSINOPHIL # BLD MANUAL: 0 10E3/UL (ref 0–0.7)
EOSINOPHIL NFR BLD AUTO: 0 %
EOSINOPHIL NFR BLD MANUAL: 0 %
ERYTHROCYTE [DISTWIDTH] IN BLOOD BY AUTOMATED COUNT: 14.7 % (ref 10–15)
FRAGMENTS BLD QL SMEAR: SLIGHT
GLUCOSE SERPL-MCNC: 95 MG/DL (ref 70–99)
HCO3 SERPL-SCNC: 26 MMOL/L (ref 22–29)
HCT VFR BLD AUTO: 40.9 % (ref 35–47)
HGB BLD-MCNC: 12.4 G/DL (ref 11.7–15.7)
IMM GRANULOCYTES # BLD: 0.3 10E3/UL
IMM GRANULOCYTES NFR BLD: 0 %
LYMPHOCYTES # BLD AUTO: 190.2 10E3/UL (ref 0.8–5.3)
LYMPHOCYTES # BLD MANUAL: 191.3 10E3/UL (ref 0.8–5.3)
LYMPHOCYTES NFR BLD AUTO: 94 %
LYMPHOCYTES NFR BLD MANUAL: 95 %
MCH RBC QN AUTO: 28.6 PG (ref 26.5–33)
MCHC RBC AUTO-ENTMCNC: 30.3 G/DL (ref 31.5–36.5)
MCV RBC AUTO: 95 FL (ref 78–100)
MONOCYTES # BLD AUTO: 6.8 10E3/UL (ref 0–1.3)
MONOCYTES # BLD MANUAL: 8.8 10E3/UL (ref 0–1.3)
MONOCYTES NFR BLD AUTO: 3 %
MONOCYTES NFR BLD MANUAL: 4 %
NEUTROPHILS # BLD AUTO: 4.3 10E3/UL (ref 1.6–8.3)
NEUTROPHILS # BLD MANUAL: 1.8 10E3/UL (ref 1.6–8.3)
NEUTROPHILS NFR BLD AUTO: 2 %
NEUTROPHILS NFR BLD MANUAL: 1 %
NRBC # BLD AUTO: 0 10E3/UL
NRBC BLD AUTO-RTO: 0 /100
PLATELET # BLD AUTO: 151 10E3/UL (ref 150–450)
POTASSIUM SERPL-SCNC: 4.5 MMOL/L (ref 3.4–5.3)
PROT SERPL-MCNC: 6.3 G/DL (ref 6.4–8.3)
RBC # BLD AUTO: 4.33 10E6/UL (ref 3.8–5.2)
RBC MORPH BLD: ABNORMAL
SMUDGE CELLS BLD QL SMEAR: PRESENT
SODIUM SERPL-SCNC: 141 MMOL/L (ref 135–145)
WBC # BLD AUTO: 201.8 10E3/UL (ref 4–11)

## 2025-04-10 NOTE — TELEPHONE ENCOUNTER
DATE/TIME OF CALL RECEIVED FROM LAB:  04/10/25 at 1:05 PM     LAB TEST & LAB VALUE:  Critical .6   Other Hgb 12.2 Plts 146    Previous Labs from March 13th  Hgb12.3 Plts 159    PROVIDER NOTIFIED?: Yes    PROVIDER NAME: Dr. Tanner    TIME LAB VALUE REPORTED TO PROVIDER:   1304    MECHANISM OF PROVIDER NOTIFICATION:  routed high priority as WBC results inbetweewn what pt has been resulting.     PROVIDER RESPONSE:   1334 Dr. Centeno aware, continue with current appts as planned.

## 2025-05-11 ENCOUNTER — HEALTH MAINTENANCE LETTER (OUTPATIENT)
Age: 85
End: 2025-05-11

## 2025-05-13 ENCOUNTER — RESULTS FOLLOW-UP (OUTPATIENT)
Dept: ONCOLOGY | Facility: CLINIC | Age: 85
End: 2025-05-13

## 2025-05-15 DIAGNOSIS — I51.81 STRESS-INDUCED CARDIOMYOPATHY: ICD-10-CM

## 2025-05-19 RX ORDER — CARVEDILOL 3.12 MG/1
3.12 TABLET ORAL 2 TIMES DAILY
Qty: 180 TABLET | Refills: 3 | Status: SHIPPED | OUTPATIENT
Start: 2025-05-19

## 2025-05-19 NOTE — TELEPHONE ENCOUNTER
Last Written Prescription:  2/10/25  #180  ----------------------  Last Visit Date: 2/17/25  Future Visit Date: 5/23/25    Refill decision: Medication unable to be refilled by RN due to: Other:  Medication indicated for associated diagnosis    Request from pharmacy:  Requested Prescriptions   Pending Prescriptions Disp Refills    carvedilol (COREG) 3.125 MG tablet 180 tablet 0     Sig: Take 1 tablet (3.125 mg) by mouth 2 times daily.       Beta-Blockers Protocol Failed - 5/18/2025  9:59 PM        Failed - Medication indicated for associated diagnosis     Medication is associated with one or more of the following diagnoses:     Hypertension (HTN)   Atrial fibrillation/flutter   Angina   ASCVD   Migraine   Heart Failure   Tremor   Anxiety   Ocular hypertension   Glaucoma   PTSD   Obstructive hypertrophic cardiomyopathy   History of myocarditis   Palpitations   POTS (postural orthostatic tachycardia syndrome)   SVT (supraventricular tachycardia)   Hyperthyroidism   Tachycardia   Acute non-st segment elevation myocardial infarction   Subsequent non-st segment elevation myocardial infarction   Ischemic myocardial dysfunction          Passed - Most recent blood pressure under 140/90 in past 12 months     BP Readings from Last 3 Encounters:   03/13/25 117/77   02/17/25 113/70   12/30/24 109/66       No data recorded            Passed - Patient is age 6 or older        Passed - Medication is active on med list and the sig matches. RN to manually verify dose and sig if red X/fail.             Passed - Recent (12 mo) or future (90 days) visit within the authorizing provider's specialty     The patient must have completed an in-person or virtual visit within the past 12 months or has a future visit scheduled within the next 90 days with the authorizing provider s specialty.  Urgent care and e-visits do not qualify as an office visit for this protocol.

## 2025-06-03 ENCOUNTER — DOCUMENTATION ONLY (OUTPATIENT)
Facility: CLINIC | Age: 85
End: 2025-06-03
Payer: COMMERCIAL

## 2025-06-03 DIAGNOSIS — C91.10 CLL (CHRONIC LYMPHOCYTIC LEUKEMIA) (H): Primary | ICD-10-CM

## 2025-06-04 ENCOUNTER — ONCOLOGY VISIT (OUTPATIENT)
Dept: ONCOLOGY | Facility: CLINIC | Age: 85
End: 2025-06-04
Attending: STUDENT IN AN ORGANIZED HEALTH CARE EDUCATION/TRAINING PROGRAM
Payer: COMMERCIAL

## 2025-06-04 ENCOUNTER — LAB (OUTPATIENT)
Dept: LAB | Facility: CLINIC | Age: 85
End: 2025-06-04
Attending: STUDENT IN AN ORGANIZED HEALTH CARE EDUCATION/TRAINING PROGRAM
Payer: COMMERCIAL

## 2025-06-04 ENCOUNTER — NURSE TRIAGE (OUTPATIENT)
Dept: ONCOLOGY | Facility: CLINIC | Age: 85
End: 2025-06-04

## 2025-06-04 VITALS
TEMPERATURE: 97.5 F | RESPIRATION RATE: 18 BRPM | WEIGHT: 142.3 LBS | DIASTOLIC BLOOD PRESSURE: 67 MMHG | BODY MASS INDEX: 23.42 KG/M2 | SYSTOLIC BLOOD PRESSURE: 118 MMHG | OXYGEN SATURATION: 96 % | HEART RATE: 71 BPM

## 2025-06-04 DIAGNOSIS — C91.10 CLL (CHRONIC LYMPHOCYTIC LEUKEMIA) (H): Primary | ICD-10-CM

## 2025-06-04 DIAGNOSIS — C91.10 CLL (CHRONIC LYMPHOCYTIC LEUKEMIA) (H): ICD-10-CM

## 2025-06-04 LAB
ALBUMIN SERPL BCG-MCNC: 4.1 G/DL (ref 3.5–5.2)
ALP SERPL-CCNC: 110 U/L (ref 40–150)
ALT SERPL W P-5'-P-CCNC: 19 U/L (ref 0–50)
ANION GAP SERPL CALCULATED.3IONS-SCNC: 10 MMOL/L (ref 7–15)
AST SERPL W P-5'-P-CCNC: 27 U/L (ref 0–45)
BASOPHILS # BLD MANUAL: 0 10E3/UL (ref 0–0.2)
BASOPHILS NFR BLD MANUAL: 0 %
BILIRUB SERPL-MCNC: 0.5 MG/DL
BUN SERPL-MCNC: 14.8 MG/DL (ref 8–23)
CALCIUM SERPL-MCNC: 9.2 MG/DL (ref 8.8–10.4)
CHLORIDE SERPL-SCNC: 104 MMOL/L (ref 98–107)
CREAT SERPL-MCNC: 0.9 MG/DL (ref 0.51–0.95)
EGFRCR SERPLBLD CKD-EPI 2021: 63 ML/MIN/1.73M2
ELLIPTOCYTES BLD QL SMEAR: SLIGHT
EOSINOPHIL # BLD MANUAL: 0 10E3/UL (ref 0–0.7)
EOSINOPHIL NFR BLD MANUAL: 0 %
ERYTHROCYTE [DISTWIDTH] IN BLOOD BY AUTOMATED COUNT: 15.1 % (ref 10–15)
GLUCOSE SERPL-MCNC: 145 MG/DL (ref 70–99)
HCO3 SERPL-SCNC: 25 MMOL/L (ref 22–29)
HCT VFR BLD AUTO: 40.4 % (ref 35–47)
HGB BLD-MCNC: 12.2 G/DL (ref 11.7–15.7)
LYMPHOCYTES # BLD MANUAL: 188.5 10E3/UL (ref 0.8–5.3)
LYMPHOCYTES NFR BLD MANUAL: 95 %
MCH RBC QN AUTO: 28.8 PG (ref 26.5–33)
MCHC RBC AUTO-ENTMCNC: 30.2 G/DL (ref 31.5–36.5)
MCV RBC AUTO: 95 FL (ref 78–100)
MONOCYTES # BLD MANUAL: 3.3 10E3/UL (ref 0–1.3)
MONOCYTES NFR BLD MANUAL: 2 %
NEUTROPHILS # BLD MANUAL: 6.6 10E3/UL (ref 1.6–8.3)
NEUTROPHILS NFR BLD MANUAL: 3 %
PLAT MORPH BLD: ABNORMAL
PLATELET # BLD AUTO: 162 10E3/UL (ref 150–450)
POTASSIUM SERPL-SCNC: 4.1 MMOL/L (ref 3.4–5.3)
PROT SERPL-MCNC: 6.1 G/DL (ref 6.4–8.3)
RBC # BLD AUTO: 4.24 10E6/UL (ref 3.8–5.2)
RBC MORPH BLD: ABNORMAL
SODIUM SERPL-SCNC: 139 MMOL/L (ref 135–145)
WBC # BLD AUTO: 198.4 10E3/UL (ref 4–11)

## 2025-06-04 PROCEDURE — 80053 COMPREHEN METABOLIC PANEL: CPT | Performed by: PATHOLOGY

## 2025-06-04 PROCEDURE — 36415 COLL VENOUS BLD VENIPUNCTURE: CPT | Performed by: PATHOLOGY

## 2025-06-04 PROCEDURE — 85027 COMPLETE CBC AUTOMATED: CPT | Performed by: PATHOLOGY

## 2025-06-04 PROCEDURE — 85007 BL SMEAR W/DIFF WBC COUNT: CPT | Performed by: PATHOLOGY

## 2025-06-04 PROCEDURE — 99214 OFFICE O/P EST MOD 30 MIN: CPT | Performed by: STUDENT IN AN ORGANIZED HEALTH CARE EDUCATION/TRAINING PROGRAM

## 2025-06-04 PROCEDURE — G0463 HOSPITAL OUTPT CLINIC VISIT: HCPCS | Performed by: STUDENT IN AN ORGANIZED HEALTH CARE EDUCATION/TRAINING PROGRAM

## 2025-06-04 ASSESSMENT — PAIN SCALES - GENERAL: PAINLEVEL_OUTOF10: NO PAIN (0)

## 2025-06-04 NOTE — NURSING NOTE
"Oncology Rooming Note    June 4, 2025 12:31 PM   Kerri Shook is a 84 year old female who presents for:    Chief Complaint   Patient presents with    Oncology Clinic Visit     CLL (chronic lymphocytic leukemia)     Initial Vitals: /67 (BP Location: Right arm, Patient Position: Sitting, Cuff Size: Adult Regular)   Pulse 71   Temp 97.5  F (36.4  C) (Oral)   Resp 18   Wt 64.5 kg (142 lb 4.8 oz)   SpO2 96%   BMI 23.42 kg/m   Estimated body mass index is 23.42 kg/m  as calculated from the following:    Height as of 5/23/25: 1.66 m (5' 5.35\").    Weight as of this encounter: 64.5 kg (142 lb 4.8 oz). Body surface area is 1.72 meters squared.  No Pain (0) Comment: Data Unavailable   No LMP recorded. Patient is postmenopausal.  Allergies reviewed: Yes  Medications reviewed: Yes    Medications: Medication refills not needed today.  Pharmacy name entered into [employer]: [id number] - [place], [place] - [address] AT [facility] [address]    Frailty Screening:   Is the patient here for a new oncology consult visit in cancer care? 2. No    PHQ9:  Did this patient require a PHQ9?: No      Clinical concerns: Patient has noticed \"mild night sweats\" for the past six months. She states it occurs 50% of nights.      Georgina Douglass              "

## 2025-06-04 NOTE — TELEPHONE ENCOUNTER
DATE/TIME OF CALL RECEIVED FROM LAB:  06/04/25 at 1:06 PM     LAB TEST & LAB VALUE:  Critical .4   Other Hgb 12.2 Plts 161    Previous Labs from May 23rd .9 Hgb12.8 [id number]    PROVIDER NOTIFIED?: Yes    PROVIDER NAME: Dr. Centeno    TIME LAB VALUE REPORTED TO PROVIDER:   1308   Zohreh states they tried paging Dr. Centeno around 12:30pm since pt has a provider visit today.     MECHANISM OF PROVIDER NOTIFICATION: routed high piority    PROVIDER RESPONSE:   1310 Dr Centeno aware, pt was evaluated by provider today.

## 2025-06-04 NOTE — PROGRESS NOTES
"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    1. CLL in 1986. Found to have an elevated white blood count on routine testing. This was consistent with CLL.Saw Dr. Puente who recommended observation. WBCs 15-20 during this time. She has required no treatment for CLL. Followed by PCP with yearly CBCs.  2. 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 The patient had a screening mammogram in 1990, which showed an abnormality in the left breast. She underwent a lumpectomy on 03/02/1990, which was consistent with a 1.5 cm primary, mucinous adenocarcinoma with 0 of 8 lymph nodes positive. She was staged as a stage I (T1 N0 M0). The tumor was ER positive. She did have radiation to the left breast with a boost having completed 6600 cGy in 33 fractions from 03/28/1990 until 05/15/1990. She went on to take tamoxifen for 6 years from 03/1990 until 03/1996.  3. 1/2019 labs showing WBC increasing to 27. This has continued with WBCs 47 on 11/12/2020 and 71.0 on 8//18/21  4. 8/31/21 Peripheral flow showing CD5+ lambda CLL cells. Hb 14.6, WBC 66.2, Plts 162, ANC 5.3  5. 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 Doing well overall. No changes in her health. No lumps/bumps or bruising/bleeding. She continues to have some \"clamminess\" at night but not drenching night sweats / fevers /chills. Appetite is good and weights are stable.       CURRENT OUTPATIENT MEDICATIONS  Current Outpatient Medications   Medication Sig Dispense Refill    aspirin 81 MG tablet Take 81 mg by mouth daily.      carvedilol (COREG) 3.125 MG tablet Take 1 tablet (3.125 mg) by mouth 2 times daily. " 180 tablet 3    gabapentin (NEURONTIN) 100 MG capsule TAKE 1 CAPSULE(100 MG) BY MOUTH EVERY NIGHT AS NEEDED 30 capsule 1    Multiple Vitamin (MULTIVITAMIN) per tablet Take 1 tablet by mouth daily.      simvastatin (ZOCOR) 20 MG tablet Take 1 tablet (20 mg) by mouth at bedtime. 90 tablet 0       REVIEW OF SYSTEMS  A complete ROS was performed and was negative except as mentioned in HPI    PHYSICAL EXAM  /67 (BP Location: Right arm, Patient Position: Sitting, Cuff Size: Adult Regular)   Pulse 71   Temp 97.5  F (36.4  C) (Oral)   Resp 18   Wt 64.5 kg (142 lb 4.8 oz)   SpO2 96%   BMI 23.42 kg/m    General: No acute distress, pleasant, well-groomed  HEENT: Sclera anicteric. Oral exam deferred  Lymph: No lymphadenopathy in neck, supraclavicular, or axillary, small palpable right inguinal lymph node  Heart: Regular, rate, and rhythm  Lungs: Clear to ascultation bilaterally  Abdomen: Positive bowel sounds. Soft, non-distended, non-tender. No organomegaly or mass.   Extremities: no lower extremity edema  Neuro: Cranial nerves grossly intact  Skin: No rashes, sores, lesions, or unusual bruising on exposed skin    Wt Readings from Last 4 Encounters:   06/04/25 64.5 kg (142 lb 4.8 oz)   05/23/25 64.2 kg (141 lb 9.6 oz)   03/13/25 65 kg (143 lb 6.4 oz)   02/17/25 64 kg (141 lb)       LABORATORY AND IMAGING STUDIES  Most Recent 3 CBC's:  Recent Labs   Lab Test 06/04/25  1215 05/23/25  1027 05/09/25  1341   .4* 224.9* 200.5*   HGB 12.2 12.8 12.6   MCV 95 95 95    145* 178     Most Recent 3 BMP's:  Recent Labs   Lab Test 06/04/25  1215 05/23/25  1027 05/09/25  1341    140 138   POTASSIUM 4.1 4.5 4.6   CHLORIDE 104 103 101   CO2 25 26 27   BUN 14.8 14.7 13.4   CR 0.90 0.86 0.82   ANIONGAP 10 11 10   AAKASH 9.2 9.6 9.2   * 128* 129*   PROTTOTAL 6.1* 6.4 5.8*   ALBUMIN 4.1 4.3 4.3    Most Recent 3 LFT's:  Recent Labs   Lab Test 06/04/25  1215 05/23/25  1027 05/09/25  1341   AST 27 31 65*   ALT 19 19  35   ALKPHOS 110 134 101   BILITOTAL 0.5 0.3 0.4    Most Recent 2 TSH and T4:No lab results found.  I reviewed the above labs today.    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    # High risk CLL - [place] Stage 0, CLL-IPI = 5 = High risk. FISH showing loss of 17p, IgHV mutated and NGS for TP53 mutation positive  Previously discussed that CLL is a slow-growing disease and is monitored expectantly.  CLL will grow over time without any treatment.  Since CLL is a mature B-cell malignancy, high white blood cell count usually does not cause any symptoms to the patient and does not put patient at high risk of hyperviscosity or tumor lysis just by virtue of having high white blood cell count.  Spontaneous tumor lysis can occur in CLL but it is not common.  Therefore absolute lymphocyte count is not a part of indications for treatment initiation in CLL guidelines.  Specially for clinical trials, rapidly rising lymphocytosis defined as lymphocyte doubling time less than 6 months is required to initiate therapy.  She does not have bulky lymphadenopathy, massive splenomegaly, progressive cytopenias or B symptoms that warrant treatment initiation.  She does not want to start treatment now, if possible she would like to continue expectant management. She is coming close to starting treatment and reviewed treatment options including zanubrutinib, acalabrutinib with or without obinutuzumab as well as venetoclax obinutuzumab.  Venetoclax obinutuzumab was a fixed duration treatment and patient was inclining towards fixed duration treatment.  Previously reviewed that retrospective data suggest that BTK inhibitors may have better response rates and PFS rates in patients with 17P deletion or TP53 mutation but this is not coming from a randomized  or competitive retrospective data.  Institutional clinical trial of zanubrutinib plus sonrotoclax versus venetoclax obinutuzumab was discussed with the patient.  She would not qualify for the trial at this point but if she continues expectant management and desires to initiate treatment when she has one of the treatment initiation criteria's, then she should be able to participate in clinical trial.      Patient does not have any indications to treat:   -hemoglobin < 10  -platelets acutely less than 100,000  -massive or progressive or symptomatic splenomegaly  -massive or progressive or symptomatic lymphadenopathy (small palpable right inguinal LN which is not symptomatic)  -progressive lymphocytosis with an increase of >50 percent over a two-month period or lymphocyte doubling time (LDT) of <6 months.   -symptomatic or functional extranodal involvement (eg, skin, kidney, lung, spine).  -constitutional symptoms including:   -significant fatigue with ECOG < or = 2   -night sweats for > or = 1 month (has only has mild night sweats for 2 weeks)   -unintentional weight loss >=10 percent within the previous six months   -fevers for >=2 weeks without other evidence of infection    Due to mild non-drenching night sweats small right inguinal LN I will discuss starting treatment with Dr. Centeno.  She is going on a trip to [place] 4/20-5/1 and would ideally like to hold off on starting treatment until she is back from her trip.   Informed her I will follow-up once I discuss with Dr. Centeno.    Final plan:  - Continue monthly labs in [place]   - [place] in 6 months      30  minutes spent on the date of the encounter doing chart review, review of test results, interpretation of tests, patient visit, and documentation     The longitudinal plan of care for the diagnosis(es)/condition(s) as documented were addressed during this visit. Due to the added complexity in care, I will continue to support Kerri in the subsequent  management and with ongoing continuity of care.    Pop Centeno MD     Division of Hematology, Oncology and Transplantation  [facility]  P: [phone]

## 2025-06-26 DIAGNOSIS — E78.5 HYPERLIPIDEMIA LDL GOAL <70: ICD-10-CM

## 2025-07-02 ENCOUNTER — TELEPHONE (OUTPATIENT)
Facility: CLINIC | Age: 85
End: 2025-07-02

## 2025-07-02 ENCOUNTER — LAB (OUTPATIENT)
Dept: LAB | Facility: CLINIC | Age: 85
End: 2025-07-02
Payer: COMMERCIAL

## 2025-07-02 DIAGNOSIS — C91.10 CLL (CHRONIC LYMPHOCYTIC LEUKEMIA) (H): ICD-10-CM

## 2025-07-02 LAB
ALBUMIN SERPL BCG-MCNC: 4.2 G/DL (ref 3.5–5.2)
ALP SERPL-CCNC: 104 U/L (ref 40–150)
ALT SERPL W P-5'-P-CCNC: 22 U/L (ref 0–50)
ANION GAP SERPL CALCULATED.3IONS-SCNC: 11 MMOL/L (ref 7–15)
AST SERPL W P-5'-P-CCNC: 36 U/L (ref 0–45)
BILIRUB SERPL-MCNC: 0.6 MG/DL
BUN SERPL-MCNC: 15.4 MG/DL (ref 8–23)
BURR CELLS BLD QL SMEAR: SLIGHT
CALCIUM SERPL-MCNC: 9.5 MG/DL (ref 8.8–10.4)
CHLORIDE SERPL-SCNC: 102 MMOL/L (ref 98–107)
CREAT SERPL-MCNC: 0.92 MG/DL (ref 0.51–0.95)
EGFRCR SERPLBLD CKD-EPI 2021: 61 ML/MIN/1.73M2
ERYTHROCYTE [DISTWIDTH] IN BLOOD BY AUTOMATED COUNT: 15 % (ref 10–15)
FRAGMENTS BLD QL SMEAR: SLIGHT
GLUCOSE SERPL-MCNC: 132 MG/DL (ref 70–99)
HCO3 SERPL-SCNC: 26 MMOL/L (ref 22–29)
HCT VFR BLD AUTO: 41.2 % (ref 35–47)
HGB BLD-MCNC: 12.4 G/DL (ref 11.7–15.7)
MCH RBC QN AUTO: 29.3 PG (ref 26.5–33)
MCHC RBC AUTO-ENTMCNC: 30.1 G/DL (ref 31.5–36.5)
MCV RBC AUTO: 97 FL (ref 78–100)
PLAT MORPH BLD: ABNORMAL
PLATELET # BLD AUTO: 156 10E3/UL (ref 150–450)
POTASSIUM SERPL-SCNC: 4.9 MMOL/L (ref 3.4–5.3)
PROT SERPL-MCNC: 6.1 G/DL (ref 6.4–8.3)
RBC # BLD AUTO: 4.23 10E6/UL (ref 3.8–5.2)
RBC MORPH BLD: ABNORMAL
SODIUM SERPL-SCNC: 139 MMOL/L (ref 135–145)
WBC # BLD AUTO: 246 10E3/UL (ref 4–11)

## 2025-07-02 PROCEDURE — 80053 COMPREHEN METABOLIC PANEL: CPT

## 2025-07-02 PROCEDURE — 85007 BL SMEAR W/DIFF WBC COUNT: CPT

## 2025-07-02 PROCEDURE — 85027 COMPLETE CBC AUTOMATED: CPT

## 2025-07-02 PROCEDURE — 36415 COLL VENOUS BLD VENIPUNCTURE: CPT

## 2025-07-02 RX ORDER — SIMVASTATIN 20 MG
20 TABLET ORAL AT BEDTIME
Qty: 90 TABLET | Refills: 0 | Status: SHIPPED | OUTPATIENT
Start: 2025-07-02

## 2025-07-02 NOTE — TELEPHONE ENCOUNTER
Medication:simvastatin  Last prescribing provider:Simran Roe  Last clinic visit date: 6/4/2025 Dr. Centeno  Recommendations for requested medication:n/a  Any other pertinent information:routed to  last provider Dr. Centeno

## 2025-07-02 NOTE — TELEPHONE ENCOUNTER
DATE:  2025   TIME OF RECEIPT FROM LAB:  1144  Critical lab value:  WBC: 235.0; Other Hgb: 12.4, Plts: 168, ANC: 4.67, A.41  Last lab values:  25: WBC: 198.4, Hgb: 12.2, Plts: 162, ANC: 6.6, A.5  Provider Notified: Yes  Provider Name: Pop Centeno MD  Date/Time lab value reported to provider: 25 at 1147  Mechanism of provider notification: Routed message to Care Team

## 2025-07-03 ENCOUNTER — RESULTS FOLLOW-UP (OUTPATIENT)
Dept: TRANSPLANT | Facility: CLINIC | Age: 85
End: 2025-07-03

## 2025-07-03 LAB
BASOPHILS # BLD MANUAL: 0 10E3/UL (ref 0–0.2)
BASOPHILS NFR BLD MANUAL: 0 %
EOSINOPHIL # BLD MANUAL: 0 10E3/UL (ref 0–0.7)
EOSINOPHIL NFR BLD MANUAL: 0 %
LYMPHOCYTES # BLD MANUAL: 231.8 10E3/UL (ref 0.8–5.3)
LYMPHOCYTES NFR BLD MANUAL: 94 %
MONOCYTES # BLD MANUAL: 0 10E3/UL (ref 0–1.3)
MONOCYTES NFR BLD MANUAL: 0 %
NEUTROPHILS # BLD MANUAL: 6.1 10E3/UL (ref 1.6–8.3)
NEUTROPHILS NFR BLD MANUAL: 3 %
OTHER CELLS # BLD MANUAL: 8.1 10E3/UL
OTHER CELLS NFR BLD MANUAL: 3 %
PATH REV: ABNORMAL

## 2025-07-13 ENCOUNTER — HEALTH MAINTENANCE LETTER (OUTPATIENT)
Age: 85
End: 2025-07-13

## 2025-07-29 ENCOUNTER — OFFICE VISIT (OUTPATIENT)
Dept: INTERNAL MEDICINE | Facility: CLINIC | Age: 85
End: 2025-07-29
Payer: COMMERCIAL

## 2025-07-29 ENCOUNTER — ANCILLARY PROCEDURE (OUTPATIENT)
Dept: GENERAL RADIOLOGY | Facility: CLINIC | Age: 85
End: 2025-07-29
Attending: INTERNAL MEDICINE
Payer: COMMERCIAL

## 2025-07-29 VITALS
RESPIRATION RATE: 16 BRPM | TEMPERATURE: 97.5 F | DIASTOLIC BLOOD PRESSURE: 73 MMHG | HEART RATE: 75 BPM | HEIGHT: 65 IN | SYSTOLIC BLOOD PRESSURE: 125 MMHG | OXYGEN SATURATION: 97 % | BODY MASS INDEX: 23.93 KG/M2 | WEIGHT: 143.6 LBS

## 2025-07-29 DIAGNOSIS — R73.09 INCREASED GLUCOSE LEVEL: Primary | ICD-10-CM

## 2025-07-29 DIAGNOSIS — M25.512 LEFT SHOULDER PAIN, UNSPECIFIED CHRONICITY: ICD-10-CM

## 2025-07-29 DIAGNOSIS — M79.642 PAIN OF LEFT HAND: ICD-10-CM

## 2025-07-29 DIAGNOSIS — L98.9 SKIN LESION: ICD-10-CM

## 2025-07-29 DIAGNOSIS — R49.9 VOICE COMPLAINT: ICD-10-CM

## 2025-07-29 DIAGNOSIS — R73.09 INCREASED GLUCOSE LEVEL: ICD-10-CM

## 2025-07-29 PROCEDURE — 73130 X-RAY EXAM OF HAND: CPT | Mod: LT | Performed by: RADIOLOGY

## 2025-07-29 PROCEDURE — 3078F DIAST BP <80 MM HG: CPT | Performed by: INTERNAL MEDICINE

## 2025-07-29 PROCEDURE — 73030 X-RAY EXAM OF SHOULDER: CPT | Mod: LT | Performed by: RADIOLOGY

## 2025-07-29 PROCEDURE — 99215 OFFICE O/P EST HI 40 MIN: CPT | Performed by: INTERNAL MEDICINE

## 2025-07-29 PROCEDURE — 3074F SYST BP LT 130 MM HG: CPT | Performed by: INTERNAL MEDICINE

## 2025-07-30 ENCOUNTER — PATIENT OUTREACH (OUTPATIENT)
Dept: CARE COORDINATION | Facility: CLINIC | Age: 85
End: 2025-07-30
Payer: COMMERCIAL

## 2025-08-06 ENCOUNTER — LAB (OUTPATIENT)
Dept: LAB | Facility: CLINIC | Age: 85
End: 2025-08-06
Payer: COMMERCIAL

## 2025-08-06 ENCOUNTER — NURSE TRIAGE (OUTPATIENT)
Dept: ONCOLOGY | Facility: CLINIC | Age: 85
End: 2025-08-06

## 2025-08-06 DIAGNOSIS — C91.10 CLL (CHRONIC LYMPHOCYTIC LEUKEMIA) (H): ICD-10-CM

## 2025-08-06 DIAGNOSIS — R73.09 INCREASED GLUCOSE LEVEL: ICD-10-CM

## 2025-08-06 LAB
ALBUMIN SERPL BCG-MCNC: 4.1 G/DL (ref 3.5–5.2)
ALP SERPL-CCNC: 105 U/L (ref 40–150)
ALT SERPL W P-5'-P-CCNC: 19 U/L (ref 0–50)
ANION GAP SERPL CALCULATED.3IONS-SCNC: 11 MMOL/L (ref 7–15)
AST SERPL W P-5'-P-CCNC: 28 U/L (ref 0–45)
BASOPHILS # BLD MANUAL: 0 10E3/UL (ref 0–0.2)
BASOPHILS NFR BLD MANUAL: 0 %
BILIRUB SERPL-MCNC: 0.5 MG/DL
BUN SERPL-MCNC: 14.1 MG/DL (ref 8–23)
BURR CELLS BLD QL SMEAR: SLIGHT
CALCIUM SERPL-MCNC: 9.3 MG/DL (ref 8.8–10.4)
CHLORIDE SERPL-SCNC: 103 MMOL/L (ref 98–107)
CREAT SERPL-MCNC: 0.92 MG/DL (ref 0.51–0.95)
EGFRCR SERPLBLD CKD-EPI 2021: 61 ML/MIN/1.73M2
ELLIPTOCYTES BLD QL SMEAR: SLIGHT
EOSINOPHIL # BLD MANUAL: 0 10E3/UL (ref 0–0.7)
EOSINOPHIL NFR BLD MANUAL: 0 %
ERYTHROCYTE [DISTWIDTH] IN BLOOD BY AUTOMATED COUNT: 14.9 % (ref 10–15)
EST. AVERAGE GLUCOSE BLD GHB EST-MCNC: 134 MG/DL
FRAGMENTS BLD QL SMEAR: SLIGHT
GLUCOSE SERPL-MCNC: 171 MG/DL (ref 70–99)
HBA1C MFR BLD: 6.3 % (ref 0–5.6)
HCO3 SERPL-SCNC: 25 MMOL/L (ref 22–29)
HCT VFR BLD AUTO: 39.6 % (ref 35–47)
HGB BLD-MCNC: 12.2 G/DL (ref 11.7–15.7)
LYMPHOCYTES # BLD MANUAL: 226.5 10E3/UL (ref 0.8–5.3)
LYMPHOCYTES NFR BLD MANUAL: 94 %
MCH RBC QN AUTO: 29.1 PG (ref 26.5–33)
MCHC RBC AUTO-ENTMCNC: 30.8 G/DL (ref 31.5–36.5)
MCV RBC AUTO: 95 FL (ref 78–100)
MONOCYTES # BLD MANUAL: 5.5 10E3/UL (ref 0–1.3)
MONOCYTES NFR BLD MANUAL: 2 %
NEUTROPHILS # BLD MANUAL: 9.2 10E3/UL (ref 1.6–8.3)
NEUTROPHILS NFR BLD MANUAL: 4 %
PLAT MORPH BLD: ABNORMAL
PLATELET # BLD AUTO: 161 10E3/UL (ref 150–450)
POTASSIUM SERPL-SCNC: 4.2 MMOL/L (ref 3.4–5.3)
PROT SERPL-MCNC: 5.9 G/DL (ref 6.4–8.3)
RBC # BLD AUTO: 4.19 10E6/UL (ref 3.8–5.2)
RBC MORPH BLD: ABNORMAL
SMUDGE CELLS BLD QL SMEAR: PRESENT
SODIUM SERPL-SCNC: 139 MMOL/L (ref 135–145)
WBC # BLD AUTO: 241.2 10E3/UL (ref 4–11)

## 2025-08-06 PROCEDURE — 85027 COMPLETE CBC AUTOMATED: CPT

## 2025-08-06 PROCEDURE — 80053 COMPREHEN METABOLIC PANEL: CPT

## 2025-08-06 PROCEDURE — 83036 HEMOGLOBIN GLYCOSYLATED A1C: CPT

## 2025-08-06 PROCEDURE — 36415 COLL VENOUS BLD VENIPUNCTURE: CPT

## 2025-08-06 PROCEDURE — 85007 BL SMEAR W/DIFF WBC COUNT: CPT

## 2025-08-08 ENCOUNTER — PRE VISIT (OUTPATIENT)
Dept: ORTHOPEDICS | Facility: CLINIC | Age: 85
End: 2025-08-08

## 2025-08-19 ENCOUNTER — DOCUMENTATION ONLY (OUTPATIENT)
Dept: OTHER | Facility: CLINIC | Age: 85
End: 2025-08-19
Payer: COMMERCIAL

## 2025-08-22 ASSESSMENT — ACTIVITIES OF DAILY LIVING (ADL)
AT_ITS_WORST?: 8
PUTTING_ON_A_SHIRT_THAT_BUTTONS_DOWN_THE_FRONT: 1
PLEASE_INDICATE_YOR_PRIMARY_REASON_FOR_REFERRAL_TO_THERAPY:: SHOULDER
WHEN_LYING_ON_THE_INVOLVED_SIDE: 2
REMOVING_SOMETHING_FROM_YOUR_BACK_POCKET: 7
PUSHING_WITH_THE_INVOLVED_ARM: 10
PUTTING_ON_YOUR_PANTS: 0
PUTTING_ON_AN_UNDERSHIRT_OR_A_PULLOVER_SWEATER: 2
TOUCHING_THE_BACK_OF_YOUR_NECK: 9
WASHING_YOUR_BACK: 9
WASHING_YOUR_HAIR?: 0

## 2025-08-27 ENCOUNTER — THERAPY VISIT (OUTPATIENT)
Dept: PHYSICAL THERAPY | Facility: CLINIC | Age: 85
End: 2025-08-27
Attending: FAMILY MEDICINE
Payer: COMMERCIAL

## 2025-08-27 DIAGNOSIS — M79.642 PAIN OF LEFT HAND: ICD-10-CM

## 2025-08-27 DIAGNOSIS — M25.512 LEFT SHOULDER PAIN, UNSPECIFIED CHRONICITY: ICD-10-CM

## 2025-08-27 DIAGNOSIS — R73.09 INCREASED GLUCOSE LEVEL: ICD-10-CM

## 2025-08-27 PROCEDURE — 97110 THERAPEUTIC EXERCISES: CPT | Mod: GP | Performed by: PHYSICAL THERAPIST

## 2025-08-27 PROCEDURE — 97530 THERAPEUTIC ACTIVITIES: CPT | Mod: GP | Performed by: PHYSICAL THERAPIST

## 2025-08-27 PROCEDURE — 97161 PT EVAL LOW COMPLEX 20 MIN: CPT | Mod: GP | Performed by: PHYSICAL THERAPIST

## 2025-09-03 ENCOUNTER — LAB (OUTPATIENT)
Dept: LAB | Facility: CLINIC | Age: 85
End: 2025-09-03
Payer: COMMERCIAL

## 2025-09-03 ENCOUNTER — NURSE TRIAGE (OUTPATIENT)
Dept: ONCOLOGY | Facility: CLINIC | Age: 85
End: 2025-09-03

## 2025-09-03 DIAGNOSIS — C91.10 CLL (CHRONIC LYMPHOCYTIC LEUKEMIA) (H): ICD-10-CM

## 2025-09-03 LAB
ALBUMIN SERPL BCG-MCNC: 4.2 G/DL (ref 3.5–5.2)
ALP SERPL-CCNC: 107 U/L (ref 40–150)
ALT SERPL W P-5'-P-CCNC: 21 U/L (ref 0–50)
ANION GAP SERPL CALCULATED.3IONS-SCNC: 8 MMOL/L (ref 7–15)
AST SERPL W P-5'-P-CCNC: 37 U/L (ref 0–45)
BASOPHILS # BLD MANUAL: 0 10E3/UL (ref 0–0.2)
BASOPHILS NFR BLD MANUAL: 0 %
BILIRUB SERPL-MCNC: 0.5 MG/DL
BUN SERPL-MCNC: 14.2 MG/DL (ref 8–23)
CALCIUM SERPL-MCNC: 9.5 MG/DL (ref 8.8–10.4)
CHLORIDE SERPL-SCNC: 104 MMOL/L (ref 98–107)
CREAT SERPL-MCNC: 0.97 MG/DL (ref 0.51–0.95)
EGFRCR SERPLBLD CKD-EPI 2021: 57 ML/MIN/1.73M2
EOSINOPHIL # BLD MANUAL: 0 10E3/UL (ref 0–0.7)
EOSINOPHIL NFR BLD MANUAL: 0 %
ERYTHROCYTE [DISTWIDTH] IN BLOOD BY AUTOMATED COUNT: 14.7 % (ref 10–15)
GLUCOSE SERPL-MCNC: 102 MG/DL (ref 70–99)
HCO3 SERPL-SCNC: 27 MMOL/L (ref 22–29)
HCT VFR BLD AUTO: 41.2 %
HGB BLD-MCNC: 12.2 G/DL
LYMPHOCYTES # BLD MANUAL: 208.71 10E3/UL (ref 0.8–5.3)
LYMPHOCYTES NFR BLD MANUAL: 96 %
MCH RBC QN AUTO: 28.4 PG (ref 26.5–33)
MCHC RBC AUTO-ENTMCNC: 29.6 G/DL (ref 31.5–36.5)
MCV RBC AUTO: 95.8 FL (ref 78–100)
MONOCYTES # BLD MANUAL: 3.45 10E3/UL (ref 0–1.3)
MONOCYTES NFR BLD MANUAL: 1.6 %
NEUTROPHILS # BLD MANUAL: 5.17 10E3/UL (ref 1.6–8.3)
NEUTROPHILS NFR BLD MANUAL: 2.4 %
PLAT MORPH BLD: NORMAL
PLATELET # BLD AUTO: 170 10E3/UL (ref 150–450)
POTASSIUM SERPL-SCNC: 5 MMOL/L (ref 3.4–5.3)
PROT SERPL-MCNC: 6.2 G/DL (ref 6.4–8.3)
RBC # BLD AUTO: 4.3 10E6/UL
RBC MORPH BLD: NORMAL
SODIUM SERPL-SCNC: 139 MMOL/L (ref 135–145)
WBC # BLD AUTO: 217.33 10E3/UL (ref 4–11)

## 2025-09-03 PROCEDURE — 85027 COMPLETE CBC AUTOMATED: CPT

## 2025-09-03 PROCEDURE — 80053 COMPREHEN METABOLIC PANEL: CPT

## 2025-09-03 PROCEDURE — 85007 BL SMEAR W/DIFF WBC COUNT: CPT

## 2025-09-03 PROCEDURE — 36415 COLL VENOUS BLD VENIPUNCTURE: CPT

## (undated) DEVICE — SYR 10ML LL W/O NDL

## (undated) DEVICE — BASIN SET SINGLE STERILE 13752-624

## (undated) DEVICE — GLOVE PROTEXIS POWDER FREE SMT 7.5  2D72PT75X

## (undated) DEVICE — KIT ENDO TURNOVER/PROCEDURE CARRY-ON 101822

## (undated) DEVICE — SOL NACL 0.9% IRRIG 1000ML BOTTLE 2F7124

## (undated) DEVICE — TUBING SUCTION 12"X1/4" N612

## (undated) DEVICE — NDL 25GA 2"  8881200441

## (undated) DEVICE — SPONGE COTTONOID 1/2X3" 80-1407

## (undated) DEVICE — SPONGE RAY-TEC 4X8" 7318

## (undated) DEVICE — CLIP HEMOCLIP ENDOSCOPIC INSTINCT 2.8X230CM INSC-7-230-SS

## (undated) DEVICE — ENDO SNARE EXACTO COLD 9MM LOOP 2.4MMX230CM 00711115

## (undated) DEVICE — SPECIMEN CONTAINER 3OZ W/FORMALIN 59901

## (undated) DEVICE — SOL WATER IRRIG 1000ML BOTTLE 2F7114

## (undated) DEVICE — ENDO TRAP POLYP E-TRAP 00711099

## (undated) DEVICE — ENDO FORCEP SPIKED SERRATED SHAFT JUMBO 239CM G56998

## (undated) DEVICE — ANTIFOG SOLUTION W/FOAM PAD 31142527

## (undated) DEVICE — SUCTION MANIFOLD NEPTUNE 2 SYS 1 PORT 702-025-000

## (undated) DEVICE — LINEN TOWEL PACK X5 5464

## (undated) DEVICE — GOWN IMPERVIOUS 2XL BLUE

## (undated) DEVICE — PACK ENT ENDOSCOPY CUSTOM ASC

## (undated) DEVICE — DRAPE U SPLIT 74X120" 29440

## (undated) DEVICE — SUCTION MANIFOLD NEPTUNE 2 SYS 4 PORT 0702-020-000

## (undated) DEVICE — SYR 03ML LL W/O NDL 309657

## (undated) RX ORDER — ONDANSETRON 2 MG/ML
INJECTION INTRAMUSCULAR; INTRAVENOUS
Status: DISPENSED
Start: 2018-07-16

## (undated) RX ORDER — FENTANYL CITRATE 50 UG/ML
INJECTION, SOLUTION INTRAMUSCULAR; INTRAVENOUS
Status: DISPENSED
Start: 2024-11-18

## (undated) RX ORDER — LIDOCAINE HYDROCHLORIDE AND EPINEPHRINE 10; 10 MG/ML; UG/ML
INJECTION, SOLUTION INFILTRATION; PERINEURAL
Status: DISPENSED
Start: 2018-07-16

## (undated) RX ORDER — TRIAMCINOLONE ACETONIDE 40 MG/ML
INJECTION, SUSPENSION INTRA-ARTICULAR; INTRAMUSCULAR
Status: DISPENSED
Start: 2019-09-16

## (undated) RX ORDER — ACETAMINOPHEN 325 MG/1
TABLET ORAL
Status: DISPENSED
Start: 2018-07-16

## (undated) RX ORDER — FENTANYL CITRATE 50 UG/ML
INJECTION, SOLUTION INTRAMUSCULAR; INTRAVENOUS
Status: DISPENSED
Start: 2018-07-16

## (undated) RX ORDER — DEXAMETHASONE SODIUM PHOSPHATE 4 MG/ML
INJECTION, SOLUTION INTRA-ARTICULAR; INTRALESIONAL; INTRAMUSCULAR; INTRAVENOUS; SOFT TISSUE
Status: DISPENSED
Start: 2018-07-16

## (undated) RX ORDER — BUPIVACAINE HYDROCHLORIDE 2.5 MG/ML
INJECTION, SOLUTION EPIDURAL; INFILTRATION; INTRACAUDAL
Status: DISPENSED
Start: 2019-09-16

## (undated) RX ORDER — DIPHENHYDRAMINE HYDROCHLORIDE 50 MG/ML
INJECTION INTRAMUSCULAR; INTRAVENOUS
Status: DISPENSED
Start: 2021-01-05

## (undated) RX ORDER — LIDOCAINE HYDROCHLORIDE 20 MG/ML
INJECTION, SOLUTION EPIDURAL; INFILTRATION; INTRACAUDAL; PERINEURAL
Status: DISPENSED
Start: 2018-07-16

## (undated) RX ORDER — OXYMETAZOLINE HYDROCHLORIDE 0.05 G/100ML
SPRAY NASAL
Status: DISPENSED
Start: 2018-07-16

## (undated) RX ORDER — PROPOFOL 10 MG/ML
INJECTION, EMULSION INTRAVENOUS
Status: DISPENSED
Start: 2018-07-16

## (undated) RX ORDER — LIDOCAINE HYDROCHLORIDE 10 MG/ML
INJECTION, SOLUTION EPIDURAL; INFILTRATION; INTRACAUDAL; PERINEURAL
Status: DISPENSED
Start: 2019-09-16

## (undated) RX ORDER — ONDANSETRON 4 MG/1
TABLET, ORALLY DISINTEGRATING ORAL
Status: DISPENSED
Start: 2018-07-16

## (undated) RX ORDER — LIDOCAINE HYDROCHLORIDE 10 MG/ML
INJECTION, SOLUTION EPIDURAL; INFILTRATION; INTRACAUDAL; PERINEURAL
Status: DISPENSED
Start: 2019-03-28

## (undated) RX ORDER — EPHEDRINE SULFATE 50 MG/ML
INJECTION, SOLUTION INTRAMUSCULAR; INTRAVENOUS; SUBCUTANEOUS
Status: DISPENSED
Start: 2018-07-16

## (undated) RX ORDER — TRIAMCINOLONE ACETONIDE 40 MG/ML
INJECTION, SUSPENSION INTRA-ARTICULAR; INTRAMUSCULAR
Status: DISPENSED
Start: 2019-03-28

## (undated) RX ORDER — FENTANYL CITRATE 50 UG/ML
INJECTION, SOLUTION INTRAMUSCULAR; INTRAVENOUS
Status: DISPENSED
Start: 2021-01-05